# Patient Record
Sex: MALE | Race: OTHER | HISPANIC OR LATINO | Employment: UNEMPLOYED | URBAN - METROPOLITAN AREA
[De-identification: names, ages, dates, MRNs, and addresses within clinical notes are randomized per-mention and may not be internally consistent; named-entity substitution may affect disease eponyms.]

---

## 2021-01-01 ENCOUNTER — APPOINTMENT (INPATIENT)
Dept: NON INVASIVE DIAGNOSTICS | Facility: HOSPITAL | Age: 51
DRG: 435 | End: 2021-01-01
Payer: COMMERCIAL

## 2021-01-01 ENCOUNTER — APPOINTMENT (INPATIENT)
Dept: RADIOLOGY | Facility: HOSPITAL | Age: 51
DRG: 435 | End: 2021-01-01
Payer: COMMERCIAL

## 2021-01-01 ENCOUNTER — APPOINTMENT (OUTPATIENT)
Dept: RADIATION ONCOLOGY | Facility: HOSPITAL | Age: 51
End: 2021-01-01
Payer: COMMERCIAL

## 2021-01-01 ENCOUNTER — APPOINTMENT (EMERGENCY)
Dept: RADIOLOGY | Facility: HOSPITAL | Age: 51
DRG: 871 | End: 2021-01-01
Payer: COMMERCIAL

## 2021-01-01 ENCOUNTER — APPOINTMENT (OUTPATIENT)
Dept: NEUROLOGY | Facility: HOSPITAL | Age: 51
DRG: 871 | End: 2021-01-01
Payer: COMMERCIAL

## 2021-01-01 ENCOUNTER — APPOINTMENT (EMERGENCY)
Dept: CT IMAGING | Facility: HOSPITAL | Age: 51
DRG: 871 | End: 2021-01-01
Payer: COMMERCIAL

## 2021-01-01 ENCOUNTER — IMMUNIZATIONS (OUTPATIENT)
Dept: FAMILY MEDICINE CLINIC | Facility: HOSPITAL | Age: 51
End: 2021-01-01

## 2021-01-01 ENCOUNTER — CONSULT (OUTPATIENT)
Dept: SURGERY | Facility: CLINIC | Age: 51
End: 2021-01-01

## 2021-01-01 ENCOUNTER — APPOINTMENT (INPATIENT)
Dept: NEUROLOGY | Facility: HOSPITAL | Age: 51
DRG: 871 | End: 2021-01-01
Payer: COMMERCIAL

## 2021-01-01 ENCOUNTER — APPOINTMENT (INPATIENT)
Dept: CT IMAGING | Facility: HOSPITAL | Age: 51
DRG: 871 | End: 2021-01-01
Payer: COMMERCIAL

## 2021-01-01 ENCOUNTER — HOSPITAL ENCOUNTER (INPATIENT)
Facility: HOSPITAL | Age: 51
LOS: 14 days | Discharge: HOME HEALTH | DRG: 435 | End: 2021-08-06
Attending: EMERGENCY MEDICINE | Admitting: INTERNAL MEDICINE
Payer: COMMERCIAL

## 2021-01-01 ENCOUNTER — TELEPHONE (OUTPATIENT)
Dept: PHYSICAL THERAPY | Facility: OTHER | Age: 51
End: 2021-01-01

## 2021-01-01 ENCOUNTER — HOSPITAL ENCOUNTER (OUTPATIENT)
Dept: ULTRASOUND IMAGING | Facility: HOSPITAL | Age: 51
Discharge: HOME | End: 2021-07-22
Attending: SURGERY

## 2021-01-01 ENCOUNTER — ANESTHESIA EVENT (INPATIENT)
Dept: RADIOLOGY | Facility: HOSPITAL | Age: 51
DRG: 435 | End: 2021-01-01
Payer: COMMERCIAL

## 2021-01-01 ENCOUNTER — HOSPITAL ENCOUNTER (EMERGENCY)
Facility: HOSPITAL | Age: 51
Discharge: HOME | End: 2021-07-08
Attending: EMERGENCY MEDICINE | Admitting: EMERGENCY MEDICINE

## 2021-01-01 ENCOUNTER — APPOINTMENT (OUTPATIENT)
Dept: RADIATION ONCOLOGY | Facility: HOSPITAL | Age: 51
End: 2021-01-01
Attending: INTERNAL MEDICINE
Payer: COMMERCIAL

## 2021-01-01 ENCOUNTER — APPOINTMENT (EMERGENCY)
Dept: RADIOLOGY | Facility: HOSPITAL | Age: 51
DRG: 435 | End: 2021-01-01
Payer: COMMERCIAL

## 2021-01-01 ENCOUNTER — TELEPHONE (OUTPATIENT)
Dept: PALLIATIVE MEDICINE | Facility: CLINIC | Age: 51
End: 2021-01-01

## 2021-01-01 ENCOUNTER — HOSPITAL ENCOUNTER (OUTPATIENT)
Dept: CT IMAGING | Facility: HOSPITAL | Age: 51
Discharge: HOME | End: 2021-07-22
Attending: SURGERY

## 2021-01-01 ENCOUNTER — PATIENT OUTREACH (OUTPATIENT)
Dept: CASE MANAGEMENT | Facility: HOSPITAL | Age: 51
End: 2021-01-01

## 2021-01-01 ENCOUNTER — ANESTHESIA (INPATIENT)
Dept: PERIOP | Facility: HOSPITAL | Age: 51
DRG: 435 | End: 2021-01-01
Payer: COMMERCIAL

## 2021-01-01 ENCOUNTER — TELEPHONE (OUTPATIENT)
Dept: NEUROSURGERY | Facility: CLINIC | Age: 51
End: 2021-01-01

## 2021-01-01 ENCOUNTER — APPOINTMENT (INPATIENT)
Dept: RADIOLOGY | Facility: HOSPITAL | Age: 51
DRG: 435 | End: 2021-01-01
Attending: INTERNAL MEDICINE
Payer: COMMERCIAL

## 2021-01-01 ENCOUNTER — HOSPITAL ENCOUNTER (INPATIENT)
Facility: HOSPITAL | Age: 51
LOS: 6 days | DRG: 871 | End: 2021-08-17
Attending: EMERGENCY MEDICINE | Admitting: INTERNAL MEDICINE
Payer: COMMERCIAL

## 2021-01-01 ENCOUNTER — ANESTHESIA (INPATIENT)
Dept: RADIOLOGY | Facility: HOSPITAL | Age: 51
DRG: 435 | End: 2021-01-01
Payer: COMMERCIAL

## 2021-01-01 ENCOUNTER — HOSPITAL ENCOUNTER (EMERGENCY)
Facility: HOSPITAL | Age: 51
Discharge: HOME | End: 2021-06-10
Attending: EMERGENCY MEDICINE | Admitting: EMERGENCY MEDICINE

## 2021-01-01 ENCOUNTER — ANESTHESIA EVENT (INPATIENT)
Dept: PERIOP | Facility: HOSPITAL | Age: 51
DRG: 435 | End: 2021-01-01
Payer: COMMERCIAL

## 2021-01-01 VITALS
OXYGEN SATURATION: 97 % | WEIGHT: 115.44 LBS | DIASTOLIC BLOOD PRESSURE: 82 MMHG | TEMPERATURE: 97.9 F | HEART RATE: 110 BPM | SYSTOLIC BLOOD PRESSURE: 125 MMHG | RESPIRATION RATE: 18 BRPM

## 2021-01-01 VITALS
HEIGHT: 67 IN | BODY MASS INDEX: 19.93 KG/M2 | TEMPERATURE: 98.7 F | DIASTOLIC BLOOD PRESSURE: 67 MMHG | HEART RATE: 128 BPM | RESPIRATION RATE: 16 BRPM | SYSTOLIC BLOOD PRESSURE: 108 MMHG | OXYGEN SATURATION: 86 % | WEIGHT: 126.98 LBS

## 2021-01-01 VITALS
HEART RATE: 79 BPM | SYSTOLIC BLOOD PRESSURE: 128 MMHG | WEIGHT: 121.19 LBS | TEMPERATURE: 99.5 F | RESPIRATION RATE: 16 BRPM | OXYGEN SATURATION: 98 % | DIASTOLIC BLOOD PRESSURE: 70 MMHG

## 2021-01-01 VITALS
TEMPERATURE: 97.2 F | SYSTOLIC BLOOD PRESSURE: 110 MMHG | WEIGHT: 112.43 LBS | RESPIRATION RATE: 19 BRPM | HEART RATE: 116 BPM | OXYGEN SATURATION: 96 % | DIASTOLIC BLOOD PRESSURE: 76 MMHG | HEIGHT: 69 IN | BODY MASS INDEX: 16.65 KG/M2

## 2021-01-01 VITALS
SYSTOLIC BLOOD PRESSURE: 122 MMHG | DIASTOLIC BLOOD PRESSURE: 74 MMHG | HEART RATE: 104 BPM | WEIGHT: 115 LBS | TEMPERATURE: 99.4 F | BODY MASS INDEX: 17.03 KG/M2 | HEIGHT: 69 IN

## 2021-01-01 DIAGNOSIS — G95.89 CERVICAL SPINAL MASS (HCC): ICD-10-CM

## 2021-01-01 DIAGNOSIS — C79.9 METASTATIC CANCER (HCC): Primary | ICD-10-CM

## 2021-01-01 DIAGNOSIS — C79.31 BRAIN METASTASES (HCC): ICD-10-CM

## 2021-01-01 DIAGNOSIS — Z23 ENCOUNTER FOR IMMUNIZATION: Primary | ICD-10-CM

## 2021-01-01 DIAGNOSIS — R94.31 ABNORMAL EKG: ICD-10-CM

## 2021-01-01 DIAGNOSIS — R56.9 SEIZURE-LIKE ACTIVITY (HCC): ICD-10-CM

## 2021-01-01 DIAGNOSIS — R22.1 MASS OF LATERAL NECK: Primary | ICD-10-CM

## 2021-01-01 DIAGNOSIS — T40.2X5A CONSTIPATION DUE TO OPIOID THERAPY: ICD-10-CM

## 2021-01-01 DIAGNOSIS — G89.3 CANCER RELATED PAIN: ICD-10-CM

## 2021-01-01 DIAGNOSIS — M54.50 LOW BACK PAIN: Primary | ICD-10-CM

## 2021-01-01 DIAGNOSIS — I48.91 NEW ONSET A-FIB (HCC): ICD-10-CM

## 2021-01-01 DIAGNOSIS — R22.1 NECK MASS: ICD-10-CM

## 2021-01-01 DIAGNOSIS — R22.1 NECK MASS: Primary | ICD-10-CM

## 2021-01-01 DIAGNOSIS — D64.9 ANEMIA: ICD-10-CM

## 2021-01-01 DIAGNOSIS — M48.02 CERVICAL STENOSIS OF SPINAL CANAL: ICD-10-CM

## 2021-01-01 DIAGNOSIS — I82.B11 SUBCLAVIAN VEIN THROMBOEMBOLISM, ACUTE, RIGHT (HCC): ICD-10-CM

## 2021-01-01 DIAGNOSIS — R56.9 SEIZURE (HCC): Primary | ICD-10-CM

## 2021-01-01 DIAGNOSIS — D17.9 LIPOMA: Primary | ICD-10-CM

## 2021-01-01 DIAGNOSIS — R93.89 ABNORMAL CT SCAN, NECK: ICD-10-CM

## 2021-01-01 DIAGNOSIS — C79.31 BRAIN METASTASIS (HCC): ICD-10-CM

## 2021-01-01 DIAGNOSIS — K59.03 CONSTIPATION DUE TO OPIOID THERAPY: ICD-10-CM

## 2021-01-01 DIAGNOSIS — L72.9 SCALP CYST: ICD-10-CM

## 2021-01-01 DIAGNOSIS — E43 SEVERE PROTEIN-CALORIE MALNUTRITION (HCC): ICD-10-CM

## 2021-01-01 DIAGNOSIS — K86.9 PANCREATIC LESION: ICD-10-CM

## 2021-01-01 DIAGNOSIS — L98.9 SCALP LESION: ICD-10-CM

## 2021-01-01 DIAGNOSIS — R91.8 OPACITY OF LUNG ON IMAGING STUDY: ICD-10-CM

## 2021-01-01 DIAGNOSIS — G95.20 CERVICAL SPINAL CORD COMPRESSION (HCC): ICD-10-CM

## 2021-01-01 DIAGNOSIS — I82.290 BRACHIOCEPHALIC VEIN THROMBOSIS (HCC): ICD-10-CM

## 2021-01-01 DIAGNOSIS — I82.B12 SUBCLAVIAN VEIN THROMBOSIS, LEFT (HCC): Primary | ICD-10-CM

## 2021-01-01 DIAGNOSIS — L73.9 FOLLICULITIS: ICD-10-CM

## 2021-01-01 DIAGNOSIS — C79.9 METASTATIC CANCER (HCC): ICD-10-CM

## 2021-01-01 DIAGNOSIS — R22.1 MASS OF LATERAL NECK: ICD-10-CM

## 2021-01-01 DIAGNOSIS — I82.C12 THROMBOSIS OF LEFT INTERNAL JUGULAR VEIN (HCC): ICD-10-CM

## 2021-01-01 DIAGNOSIS — C79.51 BONE METASTASIS (HCC): ICD-10-CM

## 2021-01-01 LAB
ABO GROUP BLD: NORMAL
ABO GROUP BLD: NORMAL
ACANTHOCYTES BLD QL SMEAR: PRESENT
ALBUMIN SERPL BCP-MCNC: 1.4 G/DL (ref 3.5–5)
ALBUMIN SERPL BCP-MCNC: 1.8 G/DL (ref 3.5–5)
ALBUMIN SERPL BCP-MCNC: 1.9 G/DL (ref 3.5–5)
ALBUMIN SERPL BCP-MCNC: 2.2 G/DL (ref 3.5–5)
ALBUMIN SERPL BCP-MCNC: 2.2 G/DL (ref 3.5–5)
ALBUMIN SERPL BCP-MCNC: 2.3 G/DL (ref 3.5–5)
ALBUMIN SERPL BCP-MCNC: 2.6 G/DL (ref 3.5–5)
ALBUMIN SERPL BCP-MCNC: 2.7 G/DL (ref 3.5–5)
ALBUMIN SERPL BCP-MCNC: 2.8 G/DL (ref 3.5–5)
ALP SERPL-CCNC: 117 U/L (ref 46–116)
ALP SERPL-CCNC: 134 U/L (ref 46–116)
ALP SERPL-CCNC: 140 U/L (ref 46–116)
ALP SERPL-CCNC: 142 U/L (ref 46–116)
ALP SERPL-CCNC: 173 U/L (ref 46–116)
ALP SERPL-CCNC: 198 U/L (ref 46–116)
ALP SERPL-CCNC: 254 U/L (ref 46–116)
ALP SERPL-CCNC: 256 U/L (ref 46–116)
ALP SERPL-CCNC: 266 U/L (ref 46–116)
ALP SERPL-CCNC: 272 U/L (ref 46–116)
ALP SERPL-CCNC: 275 U/L (ref 46–116)
ALP SERPL-CCNC: 282 U/L (ref 46–116)
ALT SERPL W P-5'-P-CCNC: 26 U/L (ref 12–78)
ALT SERPL W P-5'-P-CCNC: 30 U/L (ref 12–78)
ALT SERPL W P-5'-P-CCNC: 32 U/L (ref 12–78)
ALT SERPL W P-5'-P-CCNC: 32 U/L (ref 12–78)
ALT SERPL W P-5'-P-CCNC: 53 U/L (ref 12–78)
ALT SERPL W P-5'-P-CCNC: 58 U/L (ref 12–78)
ALT SERPL W P-5'-P-CCNC: 65 U/L (ref 12–78)
ALT SERPL W P-5'-P-CCNC: 65 U/L (ref 12–78)
ALT SERPL W P-5'-P-CCNC: 75 U/L (ref 12–78)
ALT SERPL W P-5'-P-CCNC: 75 U/L (ref 12–78)
ALT SERPL W P-5'-P-CCNC: 76 U/L (ref 12–78)
ALT SERPL W P-5'-P-CCNC: 77 U/L (ref 12–78)
ANION GAP SERPL CALCULATED.3IONS-SCNC: 10 MMOL/L (ref 4–13)
ANION GAP SERPL CALCULATED.3IONS-SCNC: 2 MMOL/L (ref 4–13)
ANION GAP SERPL CALCULATED.3IONS-SCNC: 2 MMOL/L (ref 4–13)
ANION GAP SERPL CALCULATED.3IONS-SCNC: 3 MMOL/L (ref 4–13)
ANION GAP SERPL CALCULATED.3IONS-SCNC: 3 MMOL/L (ref 4–13)
ANION GAP SERPL CALCULATED.3IONS-SCNC: 4 MMOL/L (ref 4–13)
ANION GAP SERPL CALCULATED.3IONS-SCNC: 6 MMOL/L (ref 4–13)
ANION GAP SERPL CALCULATED.3IONS-SCNC: 7 MMOL/L (ref 4–13)
ANION GAP SERPL CALCULATED.3IONS-SCNC: 8 MMOL/L (ref 4–13)
ANION GAP SERPL CALCULATED.3IONS-SCNC: 9 MMOL/L (ref 4–13)
ANISOCYTOSIS BLD QL SMEAR: PRESENT
APTT PPP: 22 SECONDS (ref 23–37)
APTT PPP: 27 SECONDS (ref 23–37)
APTT PPP: 28 SECONDS (ref 23–37)
APTT PPP: 28 SECONDS (ref 23–37)
APTT PPP: 33 SECONDS (ref 23–37)
APTT PPP: 37 SECONDS (ref 23–37)
APTT PPP: 39 SECONDS (ref 23–37)
APTT PPP: 42 SECONDS (ref 23–37)
APTT PPP: 43 SECONDS (ref 23–37)
APTT PPP: 44 SECONDS (ref 23–37)
APTT PPP: 48 SECONDS (ref 23–37)
APTT PPP: 50 SECONDS (ref 23–37)
APTT PPP: 51 SECONDS (ref 23–37)
APTT PPP: 54 SECONDS (ref 23–37)
APTT PPP: 56 SECONDS (ref 23–37)
APTT PPP: 58 SECONDS (ref 23–37)
APTT PPP: 60 SECONDS (ref 23–37)
APTT PPP: 61 SECONDS (ref 23–37)
APTT PPP: 61 SECONDS (ref 23–37)
APTT PPP: 63 SECONDS (ref 23–37)
APTT PPP: 65 SECONDS (ref 23–37)
APTT PPP: 68 SECONDS (ref 23–37)
APTT PPP: 68 SECONDS (ref 23–37)
APTT PPP: 71 SECONDS (ref 23–37)
APTT PPP: 74 SECONDS (ref 23–37)
APTT PPP: 76 SECONDS (ref 23–37)
APTT PPP: 76 SECONDS (ref 23–37)
APTT PPP: 82 SECONDS (ref 23–37)
APTT PPP: 85 SECONDS (ref 23–37)
APTT PPP: 91 SECONDS (ref 23–37)
AST SERPL W P-5'-P-CCNC: 29 U/L (ref 5–45)
AST SERPL W P-5'-P-CCNC: 32 U/L (ref 5–45)
AST SERPL W P-5'-P-CCNC: 32 U/L (ref 5–45)
AST SERPL W P-5'-P-CCNC: 39 U/L (ref 5–45)
AST SERPL W P-5'-P-CCNC: 46 U/L (ref 5–45)
AST SERPL W P-5'-P-CCNC: 49 U/L (ref 5–45)
AST SERPL W P-5'-P-CCNC: 49 U/L (ref 5–45)
AST SERPL W P-5'-P-CCNC: 57 U/L (ref 5–45)
AST SERPL W P-5'-P-CCNC: 57 U/L (ref 5–45)
AST SERPL W P-5'-P-CCNC: 58 U/L (ref 5–45)
AST SERPL W P-5'-P-CCNC: 61 U/L (ref 5–45)
AST SERPL W P-5'-P-CCNC: 70 U/L (ref 5–45)
ATRIAL RATE: 0 BPM
ATRIAL RATE: 100 BPM
ATRIAL RATE: 100 BPM
ATRIAL RATE: 107 BPM
ATRIAL RATE: 109 BPM
ATRIAL RATE: 159 BPM
ATRIAL RATE: 88 BPM
ATRIAL RATE: 97 BPM
BACTERIA BLD CULT: NORMAL
BACTERIA SPEC ANAEROBE CULT: NO GROWTH
BACTERIA SPEC ANAEROBE CULT: NO GROWTH
BACTERIA TISS AEROBE CULT: NO GROWTH
BACTERIA TISS AEROBE CULT: NO GROWTH
BASOPHILS # BLD AUTO: 0.01 THOUSANDS/ΜL (ref 0–0.1)
BASOPHILS # BLD AUTO: 0.02 THOUSANDS/ΜL (ref 0–0.1)
BASOPHILS # BLD AUTO: 0.02 THOUSANDS/ΜL (ref 0–0.1)
BASOPHILS # BLD AUTO: 0.04 THOUSANDS/ΜL (ref 0–0.1)
BASOPHILS # BLD AUTO: 0.05 THOUSANDS/ΜL (ref 0–0.1)
BASOPHILS # BLD AUTO: 0.06 THOUSANDS/ΜL (ref 0–0.1)
BASOPHILS # BLD AUTO: 0.07 THOUSANDS/ΜL (ref 0–0.1)
BASOPHILS # BLD MANUAL: 0 THOUSAND/UL (ref 0–0.1)
BASOPHILS NFR BLD AUTO: 0 % (ref 0–1)
BASOPHILS NFR BLD AUTO: 1 % (ref 0–1)
BASOPHILS NFR BLD AUTO: 1 % (ref 0–1)
BASOPHILS NFR BLD MANUAL: 1 % (ref 0–1)
BASOPHILS NFR MAR MANUAL: 0 % (ref 0–1)
BILIRUB DIRECT SERPL-MCNC: 0.12 MG/DL (ref 0–0.2)
BILIRUB SERPL-MCNC: 0.16 MG/DL (ref 0.2–1)
BILIRUB SERPL-MCNC: 0.22 MG/DL (ref 0.2–1)
BILIRUB SERPL-MCNC: 0.29 MG/DL (ref 0.2–1)
BILIRUB SERPL-MCNC: 0.32 MG/DL (ref 0.2–1)
BILIRUB SERPL-MCNC: 0.34 MG/DL (ref 0.2–1)
BILIRUB SERPL-MCNC: 0.37 MG/DL (ref 0.2–1)
BILIRUB SERPL-MCNC: 0.38 MG/DL (ref 0.2–1)
BILIRUB SERPL-MCNC: 0.4 MG/DL (ref 0.2–1)
BILIRUB SERPL-MCNC: 0.41 MG/DL (ref 0.2–1)
BILIRUB SERPL-MCNC: 0.41 MG/DL (ref 0.2–1)
BILIRUB SERPL-MCNC: 0.46 MG/DL (ref 0.2–1)
BILIRUB SERPL-MCNC: 0.52 MG/DL (ref 0.2–1)
BILIRUB UR QL STRIP: NEGATIVE
BILIRUB UR QL STRIP: NEGATIVE
BLD GP AB SCN SERPL QL: NEGATIVE
BUN SERPL-MCNC: 10 MG/DL (ref 5–25)
BUN SERPL-MCNC: 10 MG/DL (ref 5–25)
BUN SERPL-MCNC: 11 MG/DL (ref 5–25)
BUN SERPL-MCNC: 13 MG/DL (ref 5–25)
BUN SERPL-MCNC: 14 MG/DL (ref 5–25)
BUN SERPL-MCNC: 15 MG/DL (ref 5–25)
BUN SERPL-MCNC: 16 MG/DL (ref 5–25)
BUN SERPL-MCNC: 18 MG/DL (ref 5–25)
BUN SERPL-MCNC: 19 MG/DL (ref 5–25)
BUN SERPL-MCNC: 21 MG/DL (ref 5–25)
BUN SERPL-MCNC: 21 MG/DL (ref 5–25)
BUN SERPL-MCNC: 27 MG/DL (ref 5–25)
BUN SERPL-MCNC: 29 MG/DL (ref 5–25)
BUN SERPL-MCNC: 30 MG/DL (ref 5–25)
BUN SERPL-MCNC: 6 MG/DL (ref 5–25)
BUN SERPL-MCNC: 7 MG/DL (ref 5–25)
BUN SERPL-MCNC: 8 MG/DL (ref 5–25)
BUN SERPL-MCNC: 9 MG/DL (ref 5–25)
BURR CELLS BLD QL SMEAR: PRESENT
CALCIUM ALBUM COR SERPL-MCNC: 10.4 MG/DL (ref 8.3–10.1)
CALCIUM ALBUM COR SERPL-MCNC: 10.4 MG/DL (ref 8.3–10.1)
CALCIUM ALBUM COR SERPL-MCNC: 10.5 MG/DL (ref 8.3–10.1)
CALCIUM ALBUM COR SERPL-MCNC: 10.6 MG/DL (ref 8.3–10.1)
CALCIUM ALBUM COR SERPL-MCNC: 10.7 MG/DL (ref 8.3–10.1)
CALCIUM ALBUM COR SERPL-MCNC: 11 MG/DL (ref 8.3–10.1)
CALCIUM ALBUM COR SERPL-MCNC: 11.2 MG/DL (ref 8.3–10.1)
CALCIUM ALBUM COR SERPL-MCNC: 11.3 MG/DL (ref 8.3–10.1)
CALCIUM ALBUM COR SERPL-MCNC: 11.5 MG/DL (ref 8.3–10.1)
CALCIUM ALBUM COR SERPL-MCNC: 11.6 MG/DL (ref 8.3–10.1)
CALCIUM ALBUM COR SERPL-MCNC: 9.7 MG/DL (ref 8.3–10.1)
CALCIUM SERPL-MCNC: 10.1 MG/DL (ref 8.3–10.1)
CALCIUM SERPL-MCNC: 11.1 MG/DL (ref 8.3–10.1)
CALCIUM SERPL-MCNC: 8.3 MG/DL (ref 8.3–10.1)
CALCIUM SERPL-MCNC: 8.6 MG/DL (ref 8.3–10.1)
CALCIUM SERPL-MCNC: 8.7 MG/DL (ref 8.3–10.1)
CALCIUM SERPL-MCNC: 8.8 MG/DL (ref 8.3–10.1)
CALCIUM SERPL-MCNC: 8.8 MG/DL (ref 8.3–10.1)
CALCIUM SERPL-MCNC: 8.9 MG/DL (ref 8.3–10.1)
CALCIUM SERPL-MCNC: 9 MG/DL (ref 8.3–10.1)
CALCIUM SERPL-MCNC: 9.2 MG/DL (ref 8.3–10.1)
CALCIUM SERPL-MCNC: 9.3 MG/DL (ref 8.3–10.1)
CALCIUM SERPL-MCNC: 9.3 MG/DL (ref 8.3–10.1)
CALCIUM SERPL-MCNC: 9.4 MG/DL (ref 8.3–10.1)
CALCIUM SERPL-MCNC: 9.4 MG/DL (ref 8.3–10.1)
CALCIUM SERPL-MCNC: 9.5 MG/DL (ref 8.3–10.1)
CALCIUM SERPL-MCNC: 9.5 MG/DL (ref 8.3–10.1)
CALCIUM SERPL-MCNC: 9.6 MG/DL (ref 8.3–10.1)
CALCIUM SERPL-MCNC: 9.6 MG/DL (ref 8.3–10.1)
CALCIUM SERPL-MCNC: 9.7 MG/DL (ref 8.3–10.1)
CALCIUM SERPL-MCNC: 9.8 MG/DL (ref 8.3–10.1)
CANCER AG19-9 SERPL-ACNC: 5418 U/ML (ref 0–35)
CEA SERPL-MCNC: 63.8 NG/ML (ref 0–3)
CHLORIDE SERPL-SCNC: 101 MMOL/L (ref 100–108)
CHLORIDE SERPL-SCNC: 101 MMOL/L (ref 100–108)
CHLORIDE SERPL-SCNC: 102 MMOL/L (ref 100–108)
CHLORIDE SERPL-SCNC: 102 MMOL/L (ref 100–108)
CHLORIDE SERPL-SCNC: 103 MMOL/L (ref 100–108)
CHLORIDE SERPL-SCNC: 103 MMOL/L (ref 100–108)
CHLORIDE SERPL-SCNC: 104 MMOL/L (ref 100–108)
CHLORIDE SERPL-SCNC: 104 MMOL/L (ref 100–108)
CHLORIDE SERPL-SCNC: 105 MMOL/L (ref 100–108)
CHLORIDE SERPL-SCNC: 106 MMOL/L (ref 100–108)
CHLORIDE SERPL-SCNC: 107 MMOL/L (ref 100–108)
CHLORIDE SERPL-SCNC: 110 MMOL/L (ref 100–108)
CHLORIDE SERPL-SCNC: 111 MMOL/L (ref 100–108)
CHLORIDE SERPL-SCNC: 113 MMOL/L (ref 100–108)
CHLORIDE SERPL-SCNC: 94 MMOL/L (ref 100–108)
CHLORIDE SERPL-SCNC: 97 MMOL/L (ref 100–108)
CHLORIDE SERPL-SCNC: 98 MMOL/L (ref 100–108)
CHLORIDE SERPL-SCNC: 99 MMOL/L (ref 100–108)
CLARITY UR: CLEAR
CLARITY UR: CLEAR
CO2 SERPL-SCNC: 24 MMOL/L (ref 21–32)
CO2 SERPL-SCNC: 25 MMOL/L (ref 21–32)
CO2 SERPL-SCNC: 25 MMOL/L (ref 21–32)
CO2 SERPL-SCNC: 26 MMOL/L (ref 21–32)
CO2 SERPL-SCNC: 27 MMOL/L (ref 21–32)
CO2 SERPL-SCNC: 28 MMOL/L (ref 21–32)
CO2 SERPL-SCNC: 28 MMOL/L (ref 21–32)
CO2 SERPL-SCNC: 29 MMOL/L (ref 21–32)
CO2 SERPL-SCNC: 30 MMOL/L (ref 21–32)
CO2 SERPL-SCNC: 30 MMOL/L (ref 21–32)
CO2 SERPL-SCNC: 31 MMOL/L (ref 21–32)
CO2 SERPL-SCNC: 31 MMOL/L (ref 21–32)
CO2 SERPL-SCNC: 32 MMOL/L (ref 21–32)
COLOR UR: YELLOW
COLOR UR: YELLOW
CREAT SERPL-MCNC: 0.34 MG/DL (ref 0.6–1.3)
CREAT SERPL-MCNC: 0.35 MG/DL (ref 0.6–1.3)
CREAT SERPL-MCNC: 0.39 MG/DL (ref 0.6–1.3)
CREAT SERPL-MCNC: 0.39 MG/DL (ref 0.6–1.3)
CREAT SERPL-MCNC: 0.41 MG/DL (ref 0.6–1.3)
CREAT SERPL-MCNC: 0.43 MG/DL (ref 0.6–1.3)
CREAT SERPL-MCNC: 0.5 MG/DL (ref 0.6–1.3)
CREAT SERPL-MCNC: 0.52 MG/DL (ref 0.6–1.3)
CREAT SERPL-MCNC: 0.52 MG/DL (ref 0.6–1.3)
CREAT SERPL-MCNC: 0.53 MG/DL (ref 0.6–1.3)
CREAT SERPL-MCNC: 0.57 MG/DL (ref 0.6–1.3)
CREAT SERPL-MCNC: 0.58 MG/DL (ref 0.6–1.3)
CREAT SERPL-MCNC: 0.62 MG/DL (ref 0.6–1.3)
CREAT SERPL-MCNC: 0.63 MG/DL (ref 0.6–1.3)
CREAT SERPL-MCNC: 0.64 MG/DL (ref 0.6–1.3)
CREAT SERPL-MCNC: 0.65 MG/DL (ref 0.6–1.3)
CREAT SERPL-MCNC: 0.7 MG/DL (ref 0.6–1.3)
CREAT SERPL-MCNC: 0.7 MG/DL (ref 0.6–1.3)
CREAT SERPL-MCNC: 0.75 MG/DL (ref 0.6–1.3)
CREAT SERPL-MCNC: 1.07 MG/DL (ref 0.6–1.3)
EOSINOPHIL # BLD AUTO: 0 THOUSAND/ΜL (ref 0–0.61)
EOSINOPHIL # BLD AUTO: 0.08 THOUSAND/ΜL (ref 0–0.61)
EOSINOPHIL # BLD AUTO: 0.09 THOUSAND/ΜL (ref 0–0.61)
EOSINOPHIL # BLD AUTO: 0.11 THOUSAND/ΜL (ref 0–0.61)
EOSINOPHIL # BLD AUTO: 0.12 THOUSAND/ΜL (ref 0–0.61)
EOSINOPHIL # BLD AUTO: 0.12 THOUSAND/ΜL (ref 0–0.61)
EOSINOPHIL # BLD AUTO: 0.13 THOUSAND/ΜL (ref 0–0.61)
EOSINOPHIL # BLD AUTO: 0.16 THOUSAND/ΜL (ref 0–0.61)
EOSINOPHIL # BLD AUTO: 0.16 THOUSAND/ΜL (ref 0–0.61)
EOSINOPHIL # BLD MANUAL: 0 THOUSAND/UL (ref 0–0.4)
EOSINOPHIL # BLD MANUAL: 0 THOUSAND/UL (ref 0–0.4)
EOSINOPHIL # BLD MANUAL: 0.3 THOUSAND/UL (ref 0–0.4)
EOSINOPHIL NFR BLD AUTO: 0 % (ref 0–6)
EOSINOPHIL NFR BLD AUTO: 1 % (ref 0–6)
EOSINOPHIL NFR BLD MANUAL: 0 % (ref 0–6)
EOSINOPHIL NFR BLD MANUAL: 0 % (ref 0–6)
EOSINOPHIL NFR BLD MANUAL: 1 % (ref 0–6)
ERYTHROCYTE [DISTWIDTH] IN BLOOD BY AUTOMATED COUNT: 13.8 % (ref 11.6–15.1)
ERYTHROCYTE [DISTWIDTH] IN BLOOD BY AUTOMATED COUNT: 13.9 % (ref 11.6–15.1)
ERYTHROCYTE [DISTWIDTH] IN BLOOD BY AUTOMATED COUNT: 14 % (ref 11.6–15.1)
ERYTHROCYTE [DISTWIDTH] IN BLOOD BY AUTOMATED COUNT: 14.2 % (ref 11.6–15.1)
ERYTHROCYTE [DISTWIDTH] IN BLOOD BY AUTOMATED COUNT: 14.4 % (ref 11.6–15.1)
ERYTHROCYTE [DISTWIDTH] IN BLOOD BY AUTOMATED COUNT: 14.8 % (ref 11.6–15.1)
ERYTHROCYTE [DISTWIDTH] IN BLOOD BY AUTOMATED COUNT: 14.9 % (ref 11.6–15.1)
ERYTHROCYTE [DISTWIDTH] IN BLOOD BY AUTOMATED COUNT: 16.6 % (ref 11.6–15.1)
ERYTHROCYTE [DISTWIDTH] IN BLOOD BY AUTOMATED COUNT: 18.8 % (ref 11.6–15.1)
ERYTHROCYTE [DISTWIDTH] IN BLOOD BY AUTOMATED COUNT: 18.9 % (ref 11.6–15.1)
ERYTHROCYTE [DISTWIDTH] IN BLOOD BY AUTOMATED COUNT: 19.2 % (ref 11.6–15.1)
ERYTHROCYTE [DISTWIDTH] IN BLOOD BY AUTOMATED COUNT: 19.5 % (ref 11.6–15.1)
ERYTHROCYTE [DISTWIDTH] IN BLOOD BY AUTOMATED COUNT: 19.9 % (ref 11.6–15.1)
ERYTHROCYTE [DISTWIDTH] IN BLOOD BY AUTOMATED COUNT: 19.9 % (ref 11.6–15.1)
ERYTHROCYTE [DISTWIDTH] IN BLOOD BY AUTOMATED COUNT: 20.1 % (ref 11.6–15.1)
ERYTHROCYTE [DISTWIDTH] IN BLOOD BY AUTOMATED COUNT: 20.3 % (ref 11.6–15.1)
ERYTHROCYTE [DISTWIDTH] IN BLOOD BY AUTOMATED COUNT: 20.6 % (ref 11.6–15.1)
FERRITIN SERPL-MCNC: 1230 NG/ML (ref 8–388)
GFR SERPL CREATININE-BSD FRML MDRD: 107 ML/MIN/1.73SQ M
GFR SERPL CREATININE-BSD FRML MDRD: 110 ML/MIN/1.73SQ M
GFR SERPL CREATININE-BSD FRML MDRD: 110 ML/MIN/1.73SQ M
GFR SERPL CREATININE-BSD FRML MDRD: 113 ML/MIN/1.73SQ M
GFR SERPL CREATININE-BSD FRML MDRD: 114 ML/MIN/1.73SQ M
GFR SERPL CREATININE-BSD FRML MDRD: 115 ML/MIN/1.73SQ M
GFR SERPL CREATININE-BSD FRML MDRD: 116 ML/MIN/1.73SQ M
GFR SERPL CREATININE-BSD FRML MDRD: 119 ML/MIN/1.73SQ M
GFR SERPL CREATININE-BSD FRML MDRD: 120 ML/MIN/1.73SQ M
GFR SERPL CREATININE-BSD FRML MDRD: 123 ML/MIN/1.73SQ M
GFR SERPL CREATININE-BSD FRML MDRD: 124 ML/MIN/1.73SQ M
GFR SERPL CREATININE-BSD FRML MDRD: 124 ML/MIN/1.73SQ M
GFR SERPL CREATININE-BSD FRML MDRD: 126 ML/MIN/1.73SQ M
GFR SERPL CREATININE-BSD FRML MDRD: 134 ML/MIN/1.73SQ M
GFR SERPL CREATININE-BSD FRML MDRD: 137 ML/MIN/1.73SQ M
GFR SERPL CREATININE-BSD FRML MDRD: 140 ML/MIN/1.73SQ M
GFR SERPL CREATININE-BSD FRML MDRD: 140 ML/MIN/1.73SQ M
GFR SERPL CREATININE-BSD FRML MDRD: 146 ML/MIN/1.73SQ M
GFR SERPL CREATININE-BSD FRML MDRD: 148 ML/MIN/1.73SQ M
GFR SERPL CREATININE-BSD FRML MDRD: 81 ML/MIN/1.73SQ M
GLUCOSE SERPL-MCNC: 101 MG/DL (ref 65–140)
GLUCOSE SERPL-MCNC: 108 MG/DL (ref 65–140)
GLUCOSE SERPL-MCNC: 113 MG/DL (ref 65–140)
GLUCOSE SERPL-MCNC: 116 MG/DL (ref 65–140)
GLUCOSE SERPL-MCNC: 122 MG/DL (ref 65–140)
GLUCOSE SERPL-MCNC: 124 MG/DL (ref 65–140)
GLUCOSE SERPL-MCNC: 132 MG/DL (ref 65–140)
GLUCOSE SERPL-MCNC: 151 MG/DL (ref 65–140)
GLUCOSE SERPL-MCNC: 156 MG/DL (ref 65–140)
GLUCOSE SERPL-MCNC: 199 MG/DL (ref 65–140)
GLUCOSE SERPL-MCNC: 84 MG/DL (ref 65–140)
GLUCOSE SERPL-MCNC: 85 MG/DL (ref 65–140)
GLUCOSE SERPL-MCNC: 87 MG/DL (ref 65–140)
GLUCOSE SERPL-MCNC: 88 MG/DL (ref 65–140)
GLUCOSE SERPL-MCNC: 89 MG/DL (ref 65–140)
GLUCOSE SERPL-MCNC: 91 MG/DL (ref 65–140)
GLUCOSE SERPL-MCNC: 94 MG/DL (ref 65–140)
GLUCOSE SERPL-MCNC: 94 MG/DL (ref 65–140)
GLUCOSE SERPL-MCNC: 95 MG/DL (ref 65–140)
GLUCOSE SERPL-MCNC: 96 MG/DL (ref 65–140)
GLUCOSE SERPL-MCNC: 96 MG/DL (ref 65–140)
GLUCOSE SERPL-MCNC: 97 MG/DL (ref 65–140)
GLUCOSE SERPL-MCNC: 97 MG/DL (ref 65–140)
GLUCOSE UR STRIP-MCNC: NEGATIVE MG/DL
GLUCOSE UR STRIP-MCNC: NEGATIVE MG/DL
GRAM STN SPEC: NORMAL
GRAM STN SPEC: NORMAL
HCT VFR BLD AUTO: 23.4 % (ref 36.5–49.3)
HCT VFR BLD AUTO: 25.4 % (ref 36.5–49.3)
HCT VFR BLD AUTO: 26 % (ref 36.5–49.3)
HCT VFR BLD AUTO: 26.8 % (ref 36.5–49.3)
HCT VFR BLD AUTO: 27 % (ref 36.5–49.3)
HCT VFR BLD AUTO: 27.4 % (ref 36.5–49.3)
HCT VFR BLD AUTO: 27.6 % (ref 36.5–49.3)
HCT VFR BLD AUTO: 28.3 % (ref 36.5–49.3)
HCT VFR BLD AUTO: 28.5 % (ref 36.5–49.3)
HCT VFR BLD AUTO: 28.7 % (ref 36.5–49.3)
HCT VFR BLD AUTO: 28.8 % (ref 36.5–49.3)
HCT VFR BLD AUTO: 29.2 % (ref 36.5–49.3)
HCT VFR BLD AUTO: 29.6 % (ref 36.5–49.3)
HCT VFR BLD AUTO: 30.3 % (ref 36.5–49.3)
HCT VFR BLD AUTO: 30.8 % (ref 36.5–49.3)
HCT VFR BLD AUTO: 33.2 % (ref 36.5–49.3)
HCT VFR BLD AUTO: 33.7 % (ref 36.5–49.3)
HCT VFR BLD AUTO: 35.2 % (ref 36.5–49.3)
HCT VFR BLD AUTO: 35.6 % (ref 36.5–49.3)
HCT VFR BLD AUTO: 36.2 % (ref 36.5–49.3)
HCT VFR BLD AUTO: 37.2 % (ref 36.5–49.3)
HELMET CELLS BLD QL SMEAR: PRESENT
HGB BLD-MCNC: 10.4 G/DL (ref 12–17)
HGB BLD-MCNC: 10.8 G/DL (ref 12–17)
HGB BLD-MCNC: 11.2 G/DL (ref 12–17)
HGB BLD-MCNC: 11.2 G/DL (ref 12–17)
HGB BLD-MCNC: 11.3 G/DL (ref 12–17)
HGB BLD-MCNC: 11.8 G/DL (ref 12–17)
HGB BLD-MCNC: 7.3 G/DL (ref 12–17)
HGB BLD-MCNC: 8.4 G/DL (ref 12–17)
HGB BLD-MCNC: 8.4 G/DL (ref 12–17)
HGB BLD-MCNC: 8.6 G/DL (ref 12–17)
HGB BLD-MCNC: 8.7 G/DL (ref 12–17)
HGB BLD-MCNC: 8.8 G/DL (ref 12–17)
HGB BLD-MCNC: 8.9 G/DL (ref 12–17)
HGB BLD-MCNC: 8.9 G/DL (ref 12–17)
HGB BLD-MCNC: 9.1 G/DL (ref 12–17)
HGB BLD-MCNC: 9.4 G/DL (ref 12–17)
HGB BLD-MCNC: 9.4 G/DL (ref 12–17)
HGB BLD-MCNC: 9.5 G/DL (ref 12–17)
HGB BLD-MCNC: 9.5 G/DL (ref 12–17)
HGB BLD-MCNC: 9.6 G/DL (ref 12–17)
HGB BLD-MCNC: 9.9 G/DL (ref 12–17)
HGB UR QL STRIP.AUTO: NEGATIVE
HGB UR QL STRIP.AUTO: NEGATIVE
HIV 1+2 AB+HIV1 P24 AG SERPL QL IA: NORMAL
HIV1 P24 AG SER QL: NORMAL
IMM GRANULOCYTES # BLD AUTO: 0.09 THOUSAND/UL (ref 0–0.2)
IMM GRANULOCYTES # BLD AUTO: 0.1 THOUSAND/UL (ref 0–0.2)
IMM GRANULOCYTES # BLD AUTO: 0.11 THOUSAND/UL (ref 0–0.2)
IMM GRANULOCYTES # BLD AUTO: 0.11 THOUSAND/UL (ref 0–0.2)
IMM GRANULOCYTES # BLD AUTO: 0.12 THOUSAND/UL (ref 0–0.2)
IMM GRANULOCYTES # BLD AUTO: 0.12 THOUSAND/UL (ref 0–0.2)
IMM GRANULOCYTES # BLD AUTO: 0.14 THOUSAND/UL (ref 0–0.2)
IMM GRANULOCYTES # BLD AUTO: 0.15 THOUSAND/UL (ref 0–0.2)
IMM GRANULOCYTES # BLD AUTO: 0.26 THOUSAND/UL (ref 0–0.2)
IMM GRANULOCYTES # BLD AUTO: 0.27 THOUSAND/UL (ref 0–0.2)
IMM GRANULOCYTES # BLD AUTO: 0.37 THOUSAND/UL (ref 0–0.2)
IMM GRANULOCYTES # BLD AUTO: 0.41 THOUSAND/UL (ref 0–0.2)
IMM GRANULOCYTES # BLD AUTO: 0.42 THOUSAND/UL (ref 0–0.2)
IMM GRANULOCYTES NFR BLD AUTO: 1 % (ref 0–2)
IMM GRANULOCYTES NFR BLD AUTO: 2 % (ref 0–2)
INR PPP: 0.96 (ref 0.84–1.19)
INR PPP: 1.05 (ref 0.84–1.19)
INR PPP: 1.08 (ref 0.84–1.19)
INR PPP: 1.4 (ref 0.84–1.19)
IRON SATN MFR SERPL: 14 %
IRON SERPL-MCNC: 28 UG/DL (ref 65–175)
KETONES UR STRIP-MCNC: NEGATIVE MG/DL
KETONES UR STRIP-MCNC: NEGATIVE MG/DL
LACTATE SERPL-SCNC: 1.1 MMOL/L (ref 0.5–2)
LACTATE SERPL-SCNC: 1.5 MMOL/L (ref 0.5–2)
LACTATE SERPL-SCNC: 1.9 MMOL/L (ref 0.5–2)
LACTATE SERPL-SCNC: 2.2 MMOL/L (ref 0.5–2)
LACTATE SERPL-SCNC: 2.3 MMOL/L (ref 0.5–2)
LACTATE SERPL-SCNC: 2.4 MMOL/L (ref 0.5–2)
LACTATE SERPL-SCNC: 2.6 MMOL/L (ref 0.5–2)
LACTATE SERPL-SCNC: 2.9 MMOL/L (ref 0.5–2)
LACTATE SERPL-SCNC: 3.6 MMOL/L (ref 0.5–2)
LACTATE SERPL-SCNC: 3.7 MMOL/L (ref 0.5–2)
LACTATE SERPL-SCNC: 3.7 MMOL/L (ref 0.5–2)
LACTATE SERPL-SCNC: 4 MMOL/L (ref 0.5–2)
LACTATE SERPL-SCNC: 7.2 MMOL/L (ref 0.5–2)
LDH SERPL-CCNC: 1234 U/L (ref 81–234)
LEUKOCYTE ESTERASE UR QL STRIP: NEGATIVE
LEUKOCYTE ESTERASE UR QL STRIP: NEGATIVE
LG PLATELETS BLD QL SMEAR: PRESENT
LIPASE SERPL-CCNC: 141 U/L (ref 73–393)
LYMPHOCYTES # BLD AUTO: 0.82 THOUSANDS/ΜL (ref 0.6–4.47)
LYMPHOCYTES # BLD AUTO: 1.4 THOUSANDS/ΜL (ref 0.6–4.47)
LYMPHOCYTES # BLD AUTO: 1.43 THOUSANDS/ΜL (ref 0.6–4.47)
LYMPHOCYTES # BLD AUTO: 1.46 THOUSANDS/ΜL (ref 0.6–4.47)
LYMPHOCYTES # BLD AUTO: 1.49 THOUSAND/UL (ref 0.6–4.47)
LYMPHOCYTES # BLD AUTO: 1.81 THOUSANDS/ΜL (ref 0.6–4.47)
LYMPHOCYTES # BLD AUTO: 1.82 THOUSANDS/ΜL (ref 0.6–4.47)
LYMPHOCYTES # BLD AUTO: 1.88 THOUSANDS/ΜL (ref 0.6–4.47)
LYMPHOCYTES # BLD AUTO: 1.89 THOUSANDS/ΜL (ref 0.6–4.47)
LYMPHOCYTES # BLD AUTO: 1.98 THOUSANDS/ΜL (ref 0.6–4.47)
LYMPHOCYTES # BLD AUTO: 12 % (ref 14–44)
LYMPHOCYTES # BLD AUTO: 2.04 THOUSANDS/ΜL (ref 0.6–4.47)
LYMPHOCYTES # BLD AUTO: 2.27 THOUSANDS/ΜL (ref 0.6–4.47)
LYMPHOCYTES # BLD AUTO: 2.3 THOUSANDS/ΜL (ref 0.6–4.47)
LYMPHOCYTES # BLD AUTO: 2.47 THOUSANDS/ΜL (ref 0.6–4.47)
LYMPHOCYTES # BLD AUTO: 2.7 THOUSAND/UL (ref 0.6–4.47)
LYMPHOCYTES # BLD AUTO: 2.89 THOUSAND/UL (ref 0.6–4.47)
LYMPHOCYTES # BLD AUTO: 6 % (ref 14–44)
LYMPHOCYTES # BLD AUTO: 9 % (ref 14–44)
LYMPHOCYTES NFR BLD AUTO: 10 % (ref 14–44)
LYMPHOCYTES NFR BLD AUTO: 17 % (ref 14–44)
LYMPHOCYTES NFR BLD AUTO: 17 % (ref 14–44)
LYMPHOCYTES NFR BLD AUTO: 18 % (ref 14–44)
LYMPHOCYTES NFR BLD AUTO: 18 % (ref 14–44)
LYMPHOCYTES NFR BLD AUTO: 21 % (ref 14–44)
LYMPHOCYTES NFR BLD AUTO: 6 % (ref 14–44)
LYMPHOCYTES NFR BLD AUTO: 8 % (ref 14–44)
LYMPHOCYTES NFR BLD AUTO: 9 % (ref 14–44)
LYMPHOCYTES NFR BLD: 7 % (ref 14–44)
MAGNESIUM SERPL-MCNC: 1.3 MG/DL (ref 1.6–2.6)
MAGNESIUM SERPL-MCNC: 1.6 MG/DL (ref 1.6–2.6)
MAGNESIUM SERPL-MCNC: 1.7 MG/DL (ref 1.6–2.6)
MAGNESIUM SERPL-MCNC: 1.9 MG/DL (ref 1.6–2.6)
MAGNESIUM SERPL-MCNC: 2 MG/DL (ref 1.6–2.6)
MAGNESIUM SERPL-MCNC: 2.1 MG/DL (ref 1.6–2.6)
MAGNESIUM SERPL-MCNC: 2.1 MG/DL (ref 1.6–2.6)
MCH RBC QN AUTO: 29 PG (ref 26.8–34.3)
MCH RBC QN AUTO: 29.3 PG (ref 26.8–34.3)
MCH RBC QN AUTO: 29.6 PG (ref 26.8–34.3)
MCH RBC QN AUTO: 29.6 PG (ref 26.8–34.3)
MCH RBC QN AUTO: 29.7 PG (ref 26.8–34.3)
MCH RBC QN AUTO: 29.7 PG (ref 26.8–34.3)
MCH RBC QN AUTO: 29.9 PG (ref 26.8–34.3)
MCH RBC QN AUTO: 30 PG (ref 26.8–34.3)
MCH RBC QN AUTO: 30.1 PG (ref 26.8–34.3)
MCH RBC QN AUTO: 30.2 PG (ref 26.8–34.3)
MCH RBC QN AUTO: 30.3 PG (ref 26.8–34.3)
MCH RBC QN AUTO: 30.6 PG (ref 26.8–34.3)
MCH RBC QN AUTO: 30.6 PG (ref 26.8–34.3)
MCH RBC QN AUTO: 30.7 PG (ref 26.8–34.3)
MCHC RBC AUTO-ENTMCNC: 30.6 G/DL (ref 31.4–37.4)
MCHC RBC AUTO-ENTMCNC: 30.7 G/DL (ref 31.4–37.4)
MCHC RBC AUTO-ENTMCNC: 30.9 G/DL (ref 31.4–37.4)
MCHC RBC AUTO-ENTMCNC: 31 G/DL (ref 31.4–37.4)
MCHC RBC AUTO-ENTMCNC: 31.2 G/DL (ref 31.4–37.4)
MCHC RBC AUTO-ENTMCNC: 31.2 G/DL (ref 31.4–37.4)
MCHC RBC AUTO-ENTMCNC: 31.3 G/DL (ref 31.4–37.4)
MCHC RBC AUTO-ENTMCNC: 31.4 G/DL (ref 31.4–37.4)
MCHC RBC AUTO-ENTMCNC: 31.7 G/DL (ref 31.4–37.4)
MCHC RBC AUTO-ENTMCNC: 31.7 G/DL (ref 31.4–37.4)
MCHC RBC AUTO-ENTMCNC: 32.1 G/DL (ref 31.4–37.4)
MCHC RBC AUTO-ENTMCNC: 32.2 G/DL (ref 31.4–37.4)
MCHC RBC AUTO-ENTMCNC: 32.2 G/DL (ref 31.4–37.4)
MCHC RBC AUTO-ENTMCNC: 32.3 G/DL (ref 31.4–37.4)
MCHC RBC AUTO-ENTMCNC: 32.5 G/DL (ref 31.4–37.4)
MCHC RBC AUTO-ENTMCNC: 33 G/DL (ref 31.4–37.4)
MCHC RBC AUTO-ENTMCNC: 33.1 G/DL (ref 31.4–37.4)
MCHC RBC AUTO-ENTMCNC: 33.1 G/DL (ref 31.4–37.4)
MCHC RBC AUTO-ENTMCNC: 33.2 G/DL (ref 31.4–37.4)
MCHC RBC AUTO-ENTMCNC: 33.2 G/DL (ref 31.4–37.4)
MCHC RBC AUTO-ENTMCNC: 33.4 G/DL (ref 31.4–37.4)
MCV RBC AUTO: 91 FL (ref 82–98)
MCV RBC AUTO: 92 FL (ref 82–98)
MCV RBC AUTO: 93 FL (ref 82–98)
MCV RBC AUTO: 93 FL (ref 82–98)
MCV RBC AUTO: 94 FL (ref 82–98)
MCV RBC AUTO: 95 FL (ref 82–98)
MCV RBC AUTO: 96 FL (ref 82–98)
MCV RBC AUTO: 96 FL (ref 82–98)
MCV RBC AUTO: 97 FL (ref 82–98)
MCV RBC AUTO: 98 FL (ref 82–98)
METAMYELOCYTES NFR BLD MANUAL: 1 % (ref 0–1)
MONOCYTES # BLD AUTO: 0.2 THOUSAND/ΜL (ref 0.17–1.22)
MONOCYTES # BLD AUTO: 0.66 THOUSAND/ΜL (ref 0.17–1.22)
MONOCYTES # BLD AUTO: 0.71 THOUSAND/ΜL (ref 0.17–1.22)
MONOCYTES # BLD AUTO: 0.72 THOUSAND/UL (ref 0–1.22)
MONOCYTES # BLD AUTO: 0.72 THOUSAND/ΜL (ref 0.17–1.22)
MONOCYTES # BLD AUTO: 0.85 THOUSAND/ΜL (ref 0.17–1.22)
MONOCYTES # BLD AUTO: 0.9 THOUSAND/ΜL (ref 0.17–1.22)
MONOCYTES # BLD AUTO: 0.92 THOUSAND/ΜL (ref 0.17–1.22)
MONOCYTES # BLD AUTO: 0.99 THOUSAND/UL (ref 0–1.22)
MONOCYTES # BLD AUTO: 1.07 THOUSAND/ΜL (ref 0.17–1.22)
MONOCYTES # BLD AUTO: 1.2 THOUSAND/ΜL (ref 0.17–1.22)
MONOCYTES # BLD AUTO: 1.25 THOUSAND/ΜL (ref 0.17–1.22)
MONOCYTES # BLD AUTO: 1.29 THOUSAND/ΜL (ref 0.17–1.22)
MONOCYTES # BLD AUTO: 1.3 THOUSAND/ΜL (ref 0.17–1.22)
MONOCYTES # BLD AUTO: 1.5 THOUSAND/UL (ref 0–1.22)
MONOCYTES # BLD AUTO: 1.84 THOUSAND/ΜL (ref 0.17–1.22)
MONOCYTES NFR BLD AUTO: 10 % (ref 4–12)
MONOCYTES NFR BLD AUTO: 10 % (ref 4–12)
MONOCYTES NFR BLD AUTO: 11 % (ref 4–12)
MONOCYTES NFR BLD AUTO: 13 % (ref 4–12)
MONOCYTES NFR BLD AUTO: 2 % (ref 4–12)
MONOCYTES NFR BLD AUTO: 3 % (ref 4–12)
MONOCYTES NFR BLD AUTO: 4 % (ref 4–12)
MONOCYTES NFR BLD AUTO: 5 % (ref 4–12)
MONOCYTES NFR BLD AUTO: 5 % (ref 4–12)
MONOCYTES NFR BLD AUTO: 8 % (ref 4–12)
MONOCYTES NFR BLD: 3 % (ref 4–12)
MONOCYTES NFR BLD: 4 % (ref 4–12)
MONOCYTES NFR BLD: 5 % (ref 4–12)
MRSA NOSE QL CULT: NORMAL
MYELOCYTES NFR BLD MANUAL: 1 % (ref 0–1)
MYELOCYTES NFR BLD MANUAL: 2 % (ref 0–1)
MYELOCYTES NFR BLD MANUAL: 4 % (ref 0–1)
NEUTROPHILS # BLD AUTO: 13.56 THOUSANDS/ΜL (ref 1.85–7.62)
NEUTROPHILS # BLD AUTO: 14.87 THOUSANDS/ΜL (ref 1.85–7.62)
NEUTROPHILS # BLD AUTO: 15.24 THOUSANDS/ΜL (ref 1.85–7.62)
NEUTROPHILS # BLD AUTO: 19.48 THOUSANDS/ΜL (ref 1.85–7.62)
NEUTROPHILS # BLD AUTO: 21.56 THOUSANDS/ΜL (ref 1.85–7.62)
NEUTROPHILS # BLD AUTO: 5.95 THOUSANDS/ΜL (ref 1.85–7.62)
NEUTROPHILS # BLD AUTO: 7.01 THOUSANDS/ΜL (ref 1.85–7.62)
NEUTROPHILS # BLD AUTO: 7.36 THOUSANDS/ΜL (ref 1.85–7.62)
NEUTROPHILS # BLD AUTO: 7.87 THOUSANDS/ΜL (ref 1.85–7.62)
NEUTROPHILS # BLD AUTO: 8.1 THOUSANDS/ΜL (ref 1.85–7.62)
NEUTROPHILS # BLD AUTO: 8.25 THOUSANDS/ΜL (ref 1.85–7.62)
NEUTROPHILS # BLD AUTO: 8.96 THOUSANDS/ΜL (ref 1.85–7.62)
NEUTROPHILS # BLD AUTO: 9.32 THOUSANDS/ΜL (ref 1.85–7.62)
NEUTROPHILS # BLD MANUAL: 19.75 THOUSAND/UL (ref 1.85–7.62)
NEUTROPHILS # BLD MANUAL: 21.88 THOUSAND/UL (ref 1.85–7.62)
NEUTROPHILS # BLD MANUAL: 23.96 THOUSAND/UL (ref 1.85–7.62)
NEUTS BAND NFR BLD MANUAL: 5 THOUSAND/UL
NEUTS BAND NFR BLD MANUAL: 7 % (ref 0–8)
NEUTS SEG NFR BLD AUTO: 66 % (ref 43–75)
NEUTS SEG NFR BLD AUTO: 67 % (ref 43–75)
NEUTS SEG NFR BLD AUTO: 68 % (ref 43–75)
NEUTS SEG NFR BLD AUTO: 68 % (ref 43–75)
NEUTS SEG NFR BLD AUTO: 69 % (ref 43–75)
NEUTS SEG NFR BLD AUTO: 69 % (ref 43–75)
NEUTS SEG NFR BLD AUTO: 70 % (ref 43–75)
NEUTS SEG NFR BLD AUTO: 75 % (ref 43–75)
NEUTS SEG NFR BLD AUTO: 80 % (ref 43–75)
NEUTS SEG NFR BLD AUTO: 82 % (ref 43–75)
NEUTS SEG NFR BLD AUTO: 82 % (ref 45–77)
NEUTS SEG NFR BLD AUTO: 84 % (ref 43–75)
NEUTS SEG NFR BLD AUTO: 85 % (ref 43–75)
NEUTS SEG NFR BLD AUTO: 87 % (ref 43–75)
NEUTS SEG NFR BLD AUTO: 88 % (ref 43–75)
NEUTS SEG NFR BLD AUTO: 88 % (ref 43–75)
NEUTS SEG NFR BLD AUTO: 89 % (ref 43–75)
NITRITE UR QL STRIP: NEGATIVE
NITRITE UR QL STRIP: NEGATIVE
NRBC BLD AUTO-RTO: 0 /100 WBCS
NRBC BLD AUTO-RTO: 1 /100 WBC (ref 0–2)
NRBC BLD AUTO-RTO: 1 /100 WBCS
NRBC BLD AUTO-RTO: 15 /100 WBCS
NRBC BLD AUTO-RTO: 17 /100 WBCS
NRBC BLD AUTO-RTO: 18 /100 WBC (ref 0–2)
NRBC BLD AUTO-RTO: 18 /100 WBCS
NRBC BLD AUTO-RTO: 2 /100 WBCS
NRBC BLD AUTO-RTO: 2 /100 WBCS
NRBC BLD AUTO-RTO: 23 /100 WBCS
NRBC BLD AUTO-RTO: 24 /100 WBCS
NRBC BLD AUTO-RTO: 27 /100 WBC (ref 0–2)
NRBC BLD AUTO-RTO: 3 /100 WBCS
NRBC BLD AUTO-RTO: 3 /100 WBCS
NRBC BLD AUTO-RTO: 32 /100 WBCS
NRBC BLD AUTO-RTO: 5 /100 WBCS
NRBC BLD AUTO-RTO: 7 /100 WBCS
NT-PROBNP SERPL-MCNC: 924 PG/ML
P AXIS: 73 DEGREES
P AXIS: 76 DEGREES
P AXIS: 77 DEGREES
P AXIS: 83 DEGREES
P AXIS: 86 DEGREES
PH UR STRIP.AUTO: 6 [PH] (ref 4.5–8)
PH UR STRIP.AUTO: 7.5 [PH]
PHOSPHATE SERPL-MCNC: 2.9 MG/DL (ref 2.7–4.5)
PHOSPHATE SERPL-MCNC: 2.9 MG/DL (ref 2.7–4.5)
PHOSPHATE SERPL-MCNC: 3.2 MG/DL (ref 2.7–4.5)
PHOSPHATE SERPL-MCNC: 3.5 MG/DL (ref 2.7–4.5)
PHOSPHATE SERPL-MCNC: 3.6 MG/DL (ref 2.7–4.5)
PHOSPHATE SERPL-MCNC: 3.6 MG/DL (ref 2.7–4.5)
PHOSPHATE SERPL-MCNC: 4.3 MG/DL (ref 2.7–4.5)
PLATELET # BLD AUTO: 138 THOUSANDS/UL (ref 149–390)
PLATELET # BLD AUTO: 141 THOUSANDS/UL (ref 149–390)
PLATELET # BLD AUTO: 142 THOUSANDS/UL (ref 149–390)
PLATELET # BLD AUTO: 153 THOUSANDS/UL (ref 149–390)
PLATELET # BLD AUTO: 154 THOUSANDS/UL (ref 149–390)
PLATELET # BLD AUTO: 218 THOUSANDS/UL (ref 149–390)
PLATELET # BLD AUTO: 231 THOUSANDS/UL (ref 149–390)
PLATELET # BLD AUTO: 239 THOUSANDS/UL (ref 149–390)
PLATELET # BLD AUTO: 252 THOUSANDS/UL (ref 149–390)
PLATELET # BLD AUTO: 268 THOUSANDS/UL (ref 149–390)
PLATELET # BLD AUTO: 278 THOUSANDS/UL (ref 149–390)
PLATELET # BLD AUTO: 283 THOUSANDS/UL (ref 149–390)
PLATELET # BLD AUTO: 299 THOUSANDS/UL (ref 149–390)
PLATELET # BLD AUTO: 333 THOUSANDS/UL (ref 149–390)
PLATELET # BLD AUTO: 335 THOUSANDS/UL (ref 149–390)
PLATELET # BLD AUTO: 365 THOUSANDS/UL (ref 149–390)
PLATELET # BLD AUTO: 382 THOUSANDS/UL (ref 149–390)
PLATELET # BLD AUTO: 383 THOUSANDS/UL (ref 149–390)
PLATELET # BLD AUTO: 412 THOUSANDS/UL (ref 149–390)
PLATELET # BLD AUTO: 433 THOUSANDS/UL (ref 149–390)
PLATELET # BLD AUTO: 438 THOUSANDS/UL (ref 149–390)
PLATELET BLD QL SMEAR: ABNORMAL
PLATELET BLD QL SMEAR: ABNORMAL
PLATELET BLD QL SMEAR: ADEQUATE
PLATELET BLD QL SMEAR: ADEQUATE
PMV BLD AUTO: 10.1 FL (ref 8.9–12.7)
PMV BLD AUTO: 10.2 FL (ref 8.9–12.7)
PMV BLD AUTO: 10.3 FL (ref 8.9–12.7)
PMV BLD AUTO: 10.4 FL (ref 8.9–12.7)
PMV BLD AUTO: 10.5 FL (ref 8.9–12.7)
PMV BLD AUTO: 10.7 FL (ref 8.9–12.7)
PMV BLD AUTO: 8.8 FL (ref 8.9–12.7)
PMV BLD AUTO: 9 FL (ref 8.9–12.7)
PMV BLD AUTO: 9.1 FL (ref 8.9–12.7)
PMV BLD AUTO: 9.2 FL (ref 8.9–12.7)
PMV BLD AUTO: 9.3 FL (ref 8.9–12.7)
PMV BLD AUTO: 9.4 FL (ref 8.9–12.7)
PMV BLD AUTO: 9.4 FL (ref 8.9–12.7)
PMV BLD AUTO: 9.6 FL (ref 8.9–12.7)
PMV BLD AUTO: 9.6 FL (ref 8.9–12.7)
PMV BLD AUTO: 9.7 FL (ref 8.9–12.7)
PMV BLD AUTO: 9.8 FL (ref 8.9–12.7)
PMV BLD AUTO: 9.9 FL (ref 8.9–12.7)
POIKILOCYTOSIS BLD QL SMEAR: PRESENT
POLYCHROMASIA BLD QL SMEAR: PRESENT
POTASSIUM SERPL-SCNC: 3.7 MMOL/L (ref 3.5–5.3)
POTASSIUM SERPL-SCNC: 3.8 MMOL/L (ref 3.5–5.3)
POTASSIUM SERPL-SCNC: 3.9 MMOL/L (ref 3.5–5.3)
POTASSIUM SERPL-SCNC: 4 MMOL/L (ref 3.5–5.3)
POTASSIUM SERPL-SCNC: 4 MMOL/L (ref 3.5–5.3)
POTASSIUM SERPL-SCNC: 4.1 MMOL/L (ref 3.5–5.3)
POTASSIUM SERPL-SCNC: 4.1 MMOL/L (ref 3.5–5.3)
POTASSIUM SERPL-SCNC: 4.2 MMOL/L (ref 3.5–5.3)
POTASSIUM SERPL-SCNC: 4.3 MMOL/L (ref 3.5–5.3)
POTASSIUM SERPL-SCNC: 4.4 MMOL/L (ref 3.5–5.3)
POTASSIUM SERPL-SCNC: 4.5 MMOL/L (ref 3.5–5.3)
POTASSIUM SERPL-SCNC: 4.6 MMOL/L (ref 3.5–5.3)
POTASSIUM SERPL-SCNC: 4.7 MMOL/L (ref 3.5–5.3)
PR INTERVAL: 108 MS
PR INTERVAL: 110 MS
PR INTERVAL: 116 MS
PR INTERVAL: 116 MS
PR INTERVAL: 121 MS
PROCALCITONIN SERPL-MCNC: 0.14 NG/ML
PROCALCITONIN SERPL-MCNC: 0.27 NG/ML
PROCALCITONIN SERPL-MCNC: 2.32 NG/ML
PROCALCITONIN SERPL-MCNC: 3.3 NG/ML
PROCALCITONIN SERPL-MCNC: 3.35 NG/ML
PROCALCITONIN SERPL-MCNC: 3.86 NG/ML
PROT SERPL-MCNC: 4.8 G/DL (ref 6.4–8.2)
PROT SERPL-MCNC: 4.9 G/DL (ref 6.4–8.2)
PROT SERPL-MCNC: 5.5 G/DL (ref 6.4–8.2)
PROT SERPL-MCNC: 5.7 G/DL (ref 6.4–8.2)
PROT SERPL-MCNC: 5.9 G/DL (ref 6.4–8.2)
PROT SERPL-MCNC: 6.2 G/DL (ref 6.4–8.2)
PROT SERPL-MCNC: 6.2 G/DL (ref 6.4–8.2)
PROT SERPL-MCNC: 6.3 G/DL (ref 6.4–8.2)
PROT SERPL-MCNC: 6.6 G/DL (ref 6.4–8.2)
PROT SERPL-MCNC: 7.1 G/DL (ref 6.4–8.2)
PROT SERPL-MCNC: 7.3 G/DL (ref 6.4–8.2)
PROT SERPL-MCNC: 7.3 G/DL (ref 6.4–8.2)
PROT UR STRIP-MCNC: NEGATIVE MG/DL
PROT UR STRIP-MCNC: NEGATIVE MG/DL
PROTHROMBIN TIME: 12.8 SECONDS (ref 11.6–14.5)
PROTHROMBIN TIME: 13.7 SECONDS (ref 11.6–14.5)
PROTHROMBIN TIME: 14 SECONDS (ref 11.6–14.5)
PROTHROMBIN TIME: 16.9 SECONDS (ref 11.6–14.5)
QRS AXIS: 0 DEGREES
QRS AXIS: 69 DEGREES
QRS AXIS: 71 DEGREES
QRS AXIS: 72 DEGREES
QRS AXIS: 72 DEGREES
QRS AXIS: 79 DEGREES
QRS AXIS: 79 DEGREES
QRS AXIS: 84 DEGREES
QRSD INTERVAL: 0 MS
QRSD INTERVAL: 100 MS
QRSD INTERVAL: 102 MS
QRSD INTERVAL: 104 MS
QRSD INTERVAL: 88 MS
QRSD INTERVAL: 94 MS
QRSD INTERVAL: 94 MS
QRSD INTERVAL: 96 MS
QT INTERVAL: 0 MS
QT INTERVAL: 270 MS
QT INTERVAL: 300 MS
QT INTERVAL: 308 MS
QT INTERVAL: 308 MS
QT INTERVAL: 328 MS
QT INTERVAL: 334 MS
QT INTERVAL: 334 MS
QTC INTERVAL: 0 MS
QTC INTERVAL: 373 MS
QTC INTERVAL: 397 MS
QTC INTERVAL: 404 MS
QTC INTERVAL: 415 MS
QTC INTERVAL: 423 MS
QTC INTERVAL: 424 MS
QTC INTERVAL: 436 MS
RBC # BLD AUTO: 2.52 MILLION/UL (ref 3.88–5.62)
RBC # BLD AUTO: 2.79 MILLION/UL (ref 3.88–5.62)
RBC # BLD AUTO: 2.83 MILLION/UL (ref 3.88–5.62)
RBC # BLD AUTO: 2.88 MILLION/UL (ref 3.88–5.62)
RBC # BLD AUTO: 2.88 MILLION/UL (ref 3.88–5.62)
RBC # BLD AUTO: 2.94 MILLION/UL (ref 3.88–5.62)
RBC # BLD AUTO: 3 MILLION/UL (ref 3.88–5.62)
RBC # BLD AUTO: 3.01 MILLION/UL (ref 3.88–5.62)
RBC # BLD AUTO: 3.01 MILLION/UL (ref 3.88–5.62)
RBC # BLD AUTO: 3.1 MILLION/UL (ref 3.88–5.62)
RBC # BLD AUTO: 3.15 MILLION/UL (ref 3.88–5.62)
RBC # BLD AUTO: 3.17 MILLION/UL (ref 3.88–5.62)
RBC # BLD AUTO: 3.18 MILLION/UL (ref 3.88–5.62)
RBC # BLD AUTO: 3.23 MILLION/UL (ref 3.88–5.62)
RBC # BLD AUTO: 3.23 MILLION/UL (ref 3.88–5.62)
RBC # BLD AUTO: 3.44 MILLION/UL (ref 3.88–5.62)
RBC # BLD AUTO: 3.59 MILLION/UL (ref 3.88–5.62)
RBC # BLD AUTO: 3.66 MILLION/UL (ref 3.88–5.62)
RBC # BLD AUTO: 3.72 MILLION/UL (ref 3.88–5.62)
RBC # BLD AUTO: 3.73 MILLION/UL (ref 3.88–5.62)
RBC # BLD AUTO: 3.85 MILLION/UL (ref 3.88–5.62)
RBC MORPH BLD: PRESENT
RBC MORPH BLD: PRESENT
RETICS # AUTO: ABNORMAL 10*3/UL (ref 14356–105094)
RETICS # CALC: 5.12 % (ref 0.37–1.87)
RH BLD: POSITIVE
RH BLD: POSITIVE
SARS-COV-2 RNA RESP QL NAA+PROBE: NEGATIVE
SCAN RESULT: NORMAL
SCAN RESULT: NORMAL
SODIUM SERPL-SCNC: 131 MMOL/L (ref 136–145)
SODIUM SERPL-SCNC: 131 MMOL/L (ref 136–145)
SODIUM SERPL-SCNC: 133 MMOL/L (ref 136–145)
SODIUM SERPL-SCNC: 135 MMOL/L (ref 136–145)
SODIUM SERPL-SCNC: 136 MMOL/L (ref 136–145)
SODIUM SERPL-SCNC: 136 MMOL/L (ref 136–145)
SODIUM SERPL-SCNC: 137 MMOL/L (ref 136–145)
SODIUM SERPL-SCNC: 138 MMOL/L (ref 136–145)
SODIUM SERPL-SCNC: 139 MMOL/L (ref 136–145)
SODIUM SERPL-SCNC: 140 MMOL/L (ref 136–145)
SODIUM SERPL-SCNC: 142 MMOL/L (ref 136–145)
SODIUM SERPL-SCNC: 143 MMOL/L (ref 136–145)
SODIUM SERPL-SCNC: 143 MMOL/L (ref 136–145)
SODIUM SERPL-SCNC: 146 MMOL/L (ref 136–145)
SP GR UR STRIP.AUTO: 1 (ref 1–1.03)
SP GR UR STRIP.AUTO: <=1.005 (ref 1–1.03)
SPECIMEN EXPIRATION DATE: NORMAL
T WAVE AXIS: 0 DEGREES
T WAVE AXIS: 56 DEGREES
T WAVE AXIS: 58 DEGREES
T WAVE AXIS: 64 DEGREES
T WAVE AXIS: 66 DEGREES
T WAVE AXIS: 72 DEGREES
T WAVE AXIS: 72 DEGREES
T WAVE AXIS: 80 DEGREES
TARGETS BLD QL SMEAR: PRESENT
TIBC SERPL-MCNC: 205 UG/DL (ref 250–450)
TOTAL CELLS COUNTED SPEC: 100
TOTAL CELLS COUNTED SPEC: 100
TROPONIN I SERPL-MCNC: <0.02 NG/ML
TSH SERPL DL<=0.05 MIU/L-ACNC: 3.86 UIU/ML (ref 0.36–3.74)
URATE SERPL-MCNC: 3.1 MG/DL (ref 4.2–8)
UROBILINOGEN UR QL STRIP.AUTO: 0.2 E.U./DL
UROBILINOGEN UR QL STRIP.AUTO: 1 E.U./DL
VANCOMYCIN TROUGH SERPL-MCNC: 14.5 UG/ML (ref 10–20)
VARIANT LYMPHS BLD QL SMEAR: 1 % (ref 0–0)
VENTRICULAR RATE: 0 BPM
VENTRICULAR RATE: 100 BPM
VENTRICULAR RATE: 100 BPM
VENTRICULAR RATE: 109 BPM
VENTRICULAR RATE: 115 BPM
VENTRICULAR RATE: 127 BPM
VENTRICULAR RATE: 88 BPM
VENTRICULAR RATE: 97 BPM
VIT B12 SERPL-MCNC: 3067 PG/ML (ref 100–900)
WBC # BLD AUTO: 10.08 THOUSAND/UL (ref 4.31–10.16)
WBC # BLD AUTO: 10.62 THOUSAND/UL (ref 4.31–10.16)
WBC # BLD AUTO: 10.73 THOUSAND/UL (ref 4.31–10.16)
WBC # BLD AUTO: 11.45 THOUSAND/UL (ref 4.31–10.16)
WBC # BLD AUTO: 11.84 THOUSAND/UL (ref 4.31–10.16)
WBC # BLD AUTO: 11.85 THOUSAND/UL (ref 4.31–10.16)
WBC # BLD AUTO: 13.8 THOUSAND/UL (ref 4.31–10.16)
WBC # BLD AUTO: 16.04 THOUSAND/UL (ref 4.31–10.16)
WBC # BLD AUTO: 17.97 THOUSAND/UL (ref 4.31–10.16)
WBC # BLD AUTO: 18.08 THOUSAND/UL (ref 4.31–10.16)
WBC # BLD AUTO: 19.12 THOUSAND/UL (ref 4.31–10.16)
WBC # BLD AUTO: 22.42 THOUSAND/UL (ref 4.31–10.16)
WBC # BLD AUTO: 24.08 THOUSAND/UL (ref 4.31–10.16)
WBC # BLD AUTO: 24.53 THOUSAND/UL (ref 4.31–10.16)
WBC # BLD AUTO: 24.86 THOUSAND/UL (ref 4.31–10.16)
WBC # BLD AUTO: 29.72 THOUSAND/UL (ref 4.31–10.16)
WBC # BLD AUTO: 29.95 THOUSAND/UL (ref 4.31–10.16)
WBC # BLD AUTO: 30.71 THOUSAND/UL (ref 4.31–10.16)
WBC # BLD AUTO: 32.56 THOUSAND/UL (ref 4.31–10.16)
WBC # BLD AUTO: 37.1 THOUSAND/UL (ref 4.31–10.16)
WBC # BLD AUTO: 9.03 THOUSAND/UL (ref 4.31–10.16)

## 2021-01-01 PROCEDURE — 85610 PROTHROMBIN TIME: CPT | Performed by: EMERGENCY MEDICINE

## 2021-01-01 PROCEDURE — 99232 SBSQ HOSP IP/OBS MODERATE 35: CPT | Performed by: HOSPITALIST

## 2021-01-01 PROCEDURE — 85025 COMPLETE CBC W/AUTO DIFF WBC: CPT | Performed by: STUDENT IN AN ORGANIZED HEALTH CARE EDUCATION/TRAINING PROGRAM

## 2021-01-01 PROCEDURE — 84100 ASSAY OF PHOSPHORUS: CPT | Performed by: STUDENT IN AN ORGANIZED HEALTH CARE EDUCATION/TRAINING PROGRAM

## 2021-01-01 PROCEDURE — 88307 TISSUE EXAM BY PATHOLOGIST: CPT | Performed by: PATHOLOGY

## 2021-01-01 PROCEDURE — 93005 ELECTROCARDIOGRAM TRACING: CPT

## 2021-01-01 PROCEDURE — 83605 ASSAY OF LACTIC ACID: CPT | Performed by: EMERGENCY MEDICINE

## 2021-01-01 PROCEDURE — 81003 URINALYSIS AUTO W/O SCOPE: CPT

## 2021-01-01 PROCEDURE — 77387 GUIDANCE FOR RADJ TX DLVR: CPT | Performed by: STUDENT IN AN ORGANIZED HEALTH CARE EDUCATION/TRAINING PROGRAM

## 2021-01-01 PROCEDURE — 80053 COMPREHEN METABOLIC PANEL: CPT | Performed by: INTERNAL MEDICINE

## 2021-01-01 PROCEDURE — 85025 COMPLETE CBC W/AUTO DIFF WBC: CPT | Performed by: EMERGENCY MEDICINE

## 2021-01-01 PROCEDURE — 88333 PATH CONSLTJ SURG CYTO XM 1: CPT | Performed by: PATHOLOGY

## 2021-01-01 PROCEDURE — 99232 SBSQ HOSP IP/OBS MODERATE 35: CPT | Performed by: SURGERY

## 2021-01-01 PROCEDURE — 83605 ASSAY OF LACTIC ACID: CPT

## 2021-01-01 PROCEDURE — 80076 HEPATIC FUNCTION PANEL: CPT | Performed by: INTERNAL MEDICINE

## 2021-01-01 PROCEDURE — 99024 POSTOP FOLLOW-UP VISIT: CPT | Performed by: SURGERY

## 2021-01-01 PROCEDURE — 88342 IMHCHEM/IMCYTCHM 1ST ANTB: CPT | Performed by: PATHOLOGY

## 2021-01-01 PROCEDURE — 85730 THROMBOPLASTIN TIME PARTIAL: CPT | Performed by: EMERGENCY MEDICINE

## 2021-01-01 PROCEDURE — 80053 COMPREHEN METABOLIC PANEL: CPT | Performed by: STUDENT IN AN ORGANIZED HEALTH CARE EDUCATION/TRAINING PROGRAM

## 2021-01-01 PROCEDURE — NC001 PR NO CHARGE: Performed by: RADIOLOGY

## 2021-01-01 PROCEDURE — 77387 GUIDANCE FOR RADJ TX DLVR: CPT | Performed by: INTERNAL MEDICINE

## 2021-01-01 PROCEDURE — 86301 IMMUNOASSAY TUMOR CA 19-9: CPT | Performed by: STUDENT IN AN ORGANIZED HEALTH CARE EDUCATION/TRAINING PROGRAM

## 2021-01-01 PROCEDURE — 99255 IP/OBS CONSLTJ NEW/EST HI 80: CPT | Performed by: RADIOLOGY

## 2021-01-01 PROCEDURE — 36415 COLL VENOUS BLD VENIPUNCTURE: CPT | Performed by: STUDENT IN AN ORGANIZED HEALTH CARE EDUCATION/TRAINING PROGRAM

## 2021-01-01 PROCEDURE — ND001 PR NO DOCUMENTATION: Performed by: SURGERY

## 2021-01-01 PROCEDURE — 80048 BASIC METABOLIC PNL TOTAL CA: CPT | Performed by: STUDENT IN AN ORGANIZED HEALTH CARE EDUCATION/TRAINING PROGRAM

## 2021-01-01 PROCEDURE — 85730 THROMBOPLASTIN TIME PARTIAL: CPT | Performed by: INTERNAL MEDICINE

## 2021-01-01 PROCEDURE — 93010 ELECTROCARDIOGRAM REPORT: CPT | Performed by: INTERNAL MEDICINE

## 2021-01-01 PROCEDURE — 83605 ASSAY OF LACTIC ACID: CPT | Performed by: HOSPITALIST

## 2021-01-01 PROCEDURE — A9585 GADOBUTROL INJECTION: HCPCS | Performed by: STUDENT IN AN ORGANIZED HEALTH CARE EDUCATION/TRAINING PROGRAM

## 2021-01-01 PROCEDURE — 99254 IP/OBS CNSLTJ NEW/EST MOD 60: CPT | Performed by: INTERNAL MEDICINE

## 2021-01-01 PROCEDURE — 80202 ASSAY OF VANCOMYCIN: CPT | Performed by: INTERNAL MEDICINE

## 2021-01-01 PROCEDURE — G1004 CDSM NDSC: HCPCS

## 2021-01-01 PROCEDURE — 71045 X-RAY EXAM CHEST 1 VIEW: CPT

## 2021-01-01 PROCEDURE — 88341 IMHCHEM/IMCYTCHM EA ADD ANTB: CPT | Performed by: PATHOLOGY

## 2021-01-01 PROCEDURE — 77427 RADIATION TX MANAGEMENT X5: CPT | Performed by: INTERNAL MEDICINE

## 2021-01-01 PROCEDURE — 85027 COMPLETE CBC AUTOMATED: CPT | Performed by: STUDENT IN AN ORGANIZED HEALTH CARE EDUCATION/TRAINING PROGRAM

## 2021-01-01 PROCEDURE — 77290 THER RAD SIMULAJ FIELD CPLX: CPT | Performed by: INTERNAL MEDICINE

## 2021-01-01 PROCEDURE — 97116 GAIT TRAINING THERAPY: CPT

## 2021-01-01 PROCEDURE — 38510 BIOPSY/REMOVAL LYMPH NODES: CPT | Performed by: SURGERY

## 2021-01-01 PROCEDURE — 85007 BL SMEAR W/DIFF WBC COUNT: CPT

## 2021-01-01 PROCEDURE — 71275 CT ANGIOGRAPHY CHEST: CPT

## 2021-01-01 PROCEDURE — 99282 EMERGENCY DEPT VISIT SF MDM: CPT

## 2021-01-01 PROCEDURE — 87040 BLOOD CULTURE FOR BACTERIA: CPT | Performed by: EMERGENCY MEDICINE

## 2021-01-01 PROCEDURE — 99232 SBSQ HOSP IP/OBS MODERATE 35: CPT | Performed by: INTERNAL MEDICINE

## 2021-01-01 PROCEDURE — 83735 ASSAY OF MAGNESIUM: CPT | Performed by: STUDENT IN AN ORGANIZED HEALTH CARE EDUCATION/TRAINING PROGRAM

## 2021-01-01 PROCEDURE — 76536 US EXAM OF HEAD AND NECK: CPT

## 2021-01-01 PROCEDURE — 80053 COMPREHEN METABOLIC PANEL: CPT | Performed by: HOSPITALIST

## 2021-01-01 PROCEDURE — 77334 RADIATION TREATMENT AID(S): CPT | Performed by: INTERNAL MEDICINE

## 2021-01-01 PROCEDURE — 70491 CT SOFT TISSUE NECK W/DYE: CPT

## 2021-01-01 PROCEDURE — 85027 COMPLETE CBC AUTOMATED: CPT

## 2021-01-01 PROCEDURE — 77399 UNLISTED PX MED RADJ PHYSICS: CPT | Performed by: INTERNAL MEDICINE

## 2021-01-01 PROCEDURE — 99232 SBSQ HOSP IP/OBS MODERATE 35: CPT | Performed by: PHYSICIAN ASSISTANT

## 2021-01-01 PROCEDURE — 85027 COMPLETE CBC AUTOMATED: CPT | Performed by: INTERNAL MEDICINE

## 2021-01-01 PROCEDURE — 88304 TISSUE EXAM BY PATHOLOGIST: CPT | Performed by: PATHOLOGY

## 2021-01-01 PROCEDURE — 88305 TISSUE EXAM BY PATHOLOGIST: CPT | Performed by: PATHOLOGY

## 2021-01-01 PROCEDURE — NC001 PR NO CHARGE: Performed by: SURGERY

## 2021-01-01 PROCEDURE — 82728 ASSAY OF FERRITIN: CPT | Performed by: INTERNAL MEDICINE

## 2021-01-01 PROCEDURE — 77412 RADIATION TX DELIVERY LVL 3: CPT | Performed by: INTERNAL MEDICINE

## 2021-01-01 PROCEDURE — 83880 ASSAY OF NATRIURETIC PEPTIDE: CPT | Performed by: EMERGENCY MEDICINE

## 2021-01-01 PROCEDURE — 93970 EXTREMITY STUDY: CPT

## 2021-01-01 PROCEDURE — 88365 INSITU HYBRIDIZATION (FISH): CPT | Performed by: PATHOLOGY

## 2021-01-01 PROCEDURE — 99223 1ST HOSP IP/OBS HIGH 75: CPT | Performed by: PHYSICIAN ASSISTANT

## 2021-01-01 PROCEDURE — 88184 FLOWCYTOMETRY/ TC 1 MARKER: CPT | Performed by: STUDENT IN AN ORGANIZED HEALTH CARE EDUCATION/TRAINING PROGRAM

## 2021-01-01 PROCEDURE — 87102 FUNGUS ISOLATION CULTURE: CPT | Performed by: SURGERY

## 2021-01-01 PROCEDURE — 99203 OFFICE O/P NEW LOW 30 MIN: CPT | Performed by: SURGERY

## 2021-01-01 PROCEDURE — 77331 SPECIAL RADIATION DOSIMETRY: CPT | Performed by: INTERNAL MEDICINE

## 2021-01-01 PROCEDURE — 96365 THER/PROPH/DIAG IV INF INIT: CPT

## 2021-01-01 PROCEDURE — 76942 ECHO GUIDE FOR BIOPSY: CPT | Performed by: RADIOLOGY

## 2021-01-01 PROCEDURE — 72157 MRI CHEST SPINE W/O & W/DYE: CPT

## 2021-01-01 PROCEDURE — 87081 CULTURE SCREEN ONLY: CPT | Performed by: STUDENT IN AN ORGANIZED HEALTH CARE EDUCATION/TRAINING PROGRAM

## 2021-01-01 PROCEDURE — 84145 PROCALCITONIN (PCT): CPT | Performed by: INTERNAL MEDICINE

## 2021-01-01 PROCEDURE — 84550 ASSAY OF BLOOD/URIC ACID: CPT | Performed by: INTERNAL MEDICINE

## 2021-01-01 PROCEDURE — 99291 CRITICAL CARE FIRST HOUR: CPT | Performed by: EMERGENCY MEDICINE

## 2021-01-01 PROCEDURE — 83550 IRON BINDING TEST: CPT | Performed by: INTERNAL MEDICINE

## 2021-01-01 PROCEDURE — 97530 THERAPEUTIC ACTIVITIES: CPT

## 2021-01-01 PROCEDURE — 87075 CULTR BACTERIA EXCEPT BLOOD: CPT | Performed by: SURGERY

## 2021-01-01 PROCEDURE — 85007 BL SMEAR W/DIFF WBC COUNT: CPT | Performed by: INTERNAL MEDICINE

## 2021-01-01 PROCEDURE — 87206 SMEAR FLUORESCENT/ACID STAI: CPT | Performed by: SURGERY

## 2021-01-01 PROCEDURE — 77336 RADIATION PHYSICS CONSULT: CPT | Performed by: INTERNAL MEDICINE

## 2021-01-01 PROCEDURE — 86850 RBC ANTIBODY SCREEN: CPT | Performed by: SURGERY

## 2021-01-01 PROCEDURE — 0012A SARS-COV-2 / COVID-19 MRNA VACCINE (MODERNA) 100 MCG: CPT

## 2021-01-01 PROCEDURE — 72158 MRI LUMBAR SPINE W/O & W/DYE: CPT

## 2021-01-01 PROCEDURE — 36415 COLL VENOUS BLD VENIPUNCTURE: CPT | Performed by: EMERGENCY MEDICINE

## 2021-01-01 PROCEDURE — 99252 IP/OBS CONSLTJ NEW/EST SF 35: CPT | Performed by: INTERNAL MEDICINE

## 2021-01-01 PROCEDURE — 47000 NEEDLE BIOPSY OF LIVER PERQ: CPT | Performed by: RADIOLOGY

## 2021-01-01 PROCEDURE — 99283 EMERGENCY DEPT VISIT LOW MDM: CPT

## 2021-01-01 PROCEDURE — 72040 X-RAY EXAM NECK SPINE 2-3 VW: CPT

## 2021-01-01 PROCEDURE — 83735 ASSAY OF MAGNESIUM: CPT | Performed by: EMERGENCY MEDICINE

## 2021-01-01 PROCEDURE — 77280 THER RAD SIMULAJ FIELD SMPL: CPT | Performed by: RADIOLOGY

## 2021-01-01 PROCEDURE — 91301 SARS-COV-2 / COVID-19 MRNA VACCINE (MODERNA) 100 MCG: CPT

## 2021-01-01 PROCEDURE — U0005 INFEC AGEN DETEC AMPLI PROBE: HCPCS | Performed by: EMERGENCY MEDICINE

## 2021-01-01 PROCEDURE — 99285 EMERGENCY DEPT VISIT HI MDM: CPT

## 2021-01-01 PROCEDURE — 77263 THER RADIOLOGY TX PLNG CPLX: CPT | Performed by: INTERNAL MEDICINE

## 2021-01-01 PROCEDURE — 76937 US GUIDE VASCULAR ACCESS: CPT

## 2021-01-01 PROCEDURE — 99223 1ST HOSP IP/OBS HIGH 75: CPT | Performed by: INTERNAL MEDICINE

## 2021-01-01 PROCEDURE — 0JB53ZX EXCISION OF LEFT NECK SUBCUTANEOUS TISSUE AND FASCIA, PERCUTANEOUS APPROACH, DIAGNOSTIC: ICD-10-PCS | Performed by: INTERNAL MEDICINE

## 2021-01-01 PROCEDURE — 88185 FLOWCYTOMETRY/TC ADD-ON: CPT

## 2021-01-01 PROCEDURE — 85730 THROMBOPLASTIN TIME PARTIAL: CPT | Performed by: STUDENT IN AN ORGANIZED HEALTH CARE EDUCATION/TRAINING PROGRAM

## 2021-01-01 PROCEDURE — 74018 RADEX ABDOMEN 1 VIEW: CPT

## 2021-01-01 PROCEDURE — 85730 THROMBOPLASTIN TIME PARTIAL: CPT | Performed by: SURGERY

## 2021-01-01 PROCEDURE — G6002 STEREOSCOPIC X-RAY GUIDANCE: HCPCS | Performed by: INTERNAL MEDICINE

## 2021-01-01 PROCEDURE — 72080 X-RAY EXAM THORACOLMB 2/> VW: CPT

## 2021-01-01 PROCEDURE — 70450 CT HEAD/BRAIN W/O DYE: CPT

## 2021-01-01 PROCEDURE — 87116 MYCOBACTERIA CULTURE: CPT | Performed by: SURGERY

## 2021-01-01 PROCEDURE — 82607 VITAMIN B-12: CPT | Performed by: INTERNAL MEDICINE

## 2021-01-01 PROCEDURE — 93970 EXTREMITY STUDY: CPT | Performed by: SURGERY

## 2021-01-01 PROCEDURE — 96374 THER/PROPH/DIAG INJ IV PUSH: CPT

## 2021-01-01 PROCEDURE — 84145 PROCALCITONIN (PCT): CPT | Performed by: HOSPITALIST

## 2021-01-01 PROCEDURE — 84145 PROCALCITONIN (PCT): CPT | Performed by: EMERGENCY MEDICINE

## 2021-01-01 PROCEDURE — 80048 BASIC METABOLIC PNL TOTAL CA: CPT | Performed by: HOSPITALIST

## 2021-01-01 PROCEDURE — 86901 BLOOD TYPING SEROLOGIC RH(D): CPT | Performed by: SURGERY

## 2021-01-01 PROCEDURE — 84484 ASSAY OF TROPONIN QUANT: CPT | Performed by: EMERGENCY MEDICINE

## 2021-01-01 PROCEDURE — 82948 REAGENT STRIP/BLOOD GLUCOSE: CPT

## 2021-01-01 PROCEDURE — G6002 STEREOSCOPIC X-RAY GUIDANCE: HCPCS | Performed by: RADIOLOGY

## 2021-01-01 PROCEDURE — 99233 SBSQ HOSP IP/OBS HIGH 50: CPT | Performed by: PHYSICIAN ASSISTANT

## 2021-01-01 PROCEDURE — 0FB03ZX EXCISION OF LIVER, PERCUTANEOUS APPROACH, DIAGNOSTIC: ICD-10-PCS | Performed by: INTERNAL MEDICINE

## 2021-01-01 PROCEDURE — 80048 BASIC METABOLIC PNL TOTAL CA: CPT | Performed by: INTERNAL MEDICINE

## 2021-01-01 PROCEDURE — 77412 RADIATION TX DELIVERY LVL 3: CPT | Performed by: RADIOLOGY

## 2021-01-01 PROCEDURE — 99284 EMERGENCY DEPT VISIT MOD MDM: CPT | Performed by: EMERGENCY MEDICINE

## 2021-01-01 PROCEDURE — 96372 THER/PROPH/DIAG INJ SC/IM: CPT

## 2021-01-01 PROCEDURE — 0011A SARS-COV-2 / COVID-19 MRNA VACCINE (MODERNA) 100 MCG: CPT

## 2021-01-01 PROCEDURE — 99233 SBSQ HOSP IP/OBS HIGH 50: CPT | Performed by: HOSPITALIST

## 2021-01-01 PROCEDURE — 83735 ASSAY OF MAGNESIUM: CPT | Performed by: HOSPITALIST

## 2021-01-01 PROCEDURE — 77307 TELETHX ISODOSE PLAN CPLX: CPT | Performed by: INTERNAL MEDICINE

## 2021-01-01 PROCEDURE — 97167 OT EVAL HIGH COMPLEX 60 MIN: CPT

## 2021-01-01 PROCEDURE — 96375 TX/PRO/DX INJ NEW DRUG ADDON: CPT

## 2021-01-01 PROCEDURE — G6002 STEREOSCOPIC X-RAY GUIDANCE: HCPCS | Performed by: STUDENT IN AN ORGANIZED HEALTH CARE EDUCATION/TRAINING PROGRAM

## 2021-01-01 PROCEDURE — 87806 HIV AG W/HIV1&2 ANTB W/OPTIC: CPT | Performed by: EMERGENCY MEDICINE

## 2021-01-01 PROCEDURE — 85027 COMPLETE CBC AUTOMATED: CPT | Performed by: EMERGENCY MEDICINE

## 2021-01-01 PROCEDURE — NC001 PR NO CHARGE: Performed by: INTERNAL MEDICINE

## 2021-01-01 PROCEDURE — 85045 AUTOMATED RETICULOCYTE COUNT: CPT | Performed by: INTERNAL MEDICINE

## 2021-01-01 PROCEDURE — 83540 ASSAY OF IRON: CPT | Performed by: INTERNAL MEDICINE

## 2021-01-01 PROCEDURE — 84443 ASSAY THYROID STIM HORMONE: CPT | Performed by: EMERGENCY MEDICINE

## 2021-01-01 PROCEDURE — 88312 SPECIAL STAINS GROUP 1: CPT | Performed by: PATHOLOGY

## 2021-01-01 PROCEDURE — 85025 COMPLETE CBC W/AUTO DIFF WBC: CPT | Performed by: HOSPITALIST

## 2021-01-01 PROCEDURE — 99152 MOD SED SAME PHYS/QHP 5/>YRS: CPT

## 2021-01-01 PROCEDURE — 74177 CT ABD & PELVIS W/CONTRAST: CPT

## 2021-01-01 PROCEDURE — 99152 MOD SED SAME PHYS/QHP 5/>YRS: CPT | Performed by: RADIOLOGY

## 2021-01-01 PROCEDURE — 99284 EMERGENCY DEPT VISIT MOD MDM: CPT | Performed by: PHYSICIAN ASSISTANT

## 2021-01-01 PROCEDURE — 93306 TTE W/DOPPLER COMPLETE: CPT | Performed by: INTERNAL MEDICINE

## 2021-01-01 PROCEDURE — 99231 SBSQ HOSP IP/OBS SF/LOW 25: CPT | Performed by: PSYCHIATRY & NEUROLOGY

## 2021-01-01 PROCEDURE — 86900 BLOOD TYPING SEROLOGIC ABO: CPT | Performed by: SURGERY

## 2021-01-01 PROCEDURE — 99285 EMERGENCY DEPT VISIT HI MDM: CPT | Performed by: EMERGENCY MEDICINE

## 2021-01-01 PROCEDURE — 77387 GUIDANCE FOR RADJ TX DLVR: CPT | Performed by: RADIOLOGY

## 2021-01-01 PROCEDURE — 93306 TTE W/DOPPLER COMPLETE: CPT

## 2021-01-01 PROCEDURE — 87070 CULTURE OTHR SPECIMN AEROBIC: CPT | Performed by: SURGERY

## 2021-01-01 PROCEDURE — 85007 BL SMEAR W/DIFF WBC COUNT: CPT | Performed by: EMERGENCY MEDICINE

## 2021-01-01 PROCEDURE — 85610 PROTHROMBIN TIME: CPT | Performed by: STUDENT IN AN ORGANIZED HEALTH CARE EDUCATION/TRAINING PROGRAM

## 2021-01-01 PROCEDURE — 82378 CARCINOEMBRYONIC ANTIGEN: CPT | Performed by: STUDENT IN AN ORGANIZED HEALTH CARE EDUCATION/TRAINING PROGRAM

## 2021-01-01 PROCEDURE — 83615 LACTATE (LD) (LDH) ENZYME: CPT | Performed by: INTERNAL MEDICINE

## 2021-01-01 PROCEDURE — 47000 NEEDLE BIOPSY OF LIVER PERQ: CPT

## 2021-01-01 PROCEDURE — 87040 BLOOD CULTURE FOR BACTERIA: CPT

## 2021-01-01 PROCEDURE — 83690 ASSAY OF LIPASE: CPT | Performed by: EMERGENCY MEDICINE

## 2021-01-01 PROCEDURE — 72156 MRI NECK SPINE W/O & W/DYE: CPT

## 2021-01-01 PROCEDURE — 99223 1ST HOSP IP/OBS HIGH 75: CPT | Performed by: NURSE PRACTITIONER

## 2021-01-01 PROCEDURE — 83605 ASSAY OF LACTIC ACID: CPT | Performed by: NURSE PRACTITIONER

## 2021-01-01 PROCEDURE — 97163 PT EVAL HIGH COMPLEX 45 MIN: CPT

## 2021-01-01 PROCEDURE — 77412 RADIATION TX DELIVERY LVL 3: CPT | Performed by: STUDENT IN AN ORGANIZED HEALTH CARE EDUCATION/TRAINING PROGRAM

## 2021-01-01 PROCEDURE — NC001 PR NO CHARGE: Performed by: NURSE PRACTITIONER

## 2021-01-01 PROCEDURE — A9585 GADOBUTROL INJECTION: HCPCS | Performed by: INTERNAL MEDICINE

## 2021-01-01 PROCEDURE — 0HB0XZX EXCISION OF SCALP SKIN, EXTERNAL APPROACH, DIAGNOSTIC: ICD-10-PCS | Performed by: INTERNAL MEDICINE

## 2021-01-01 PROCEDURE — 85007 BL SMEAR W/DIFF WBC COUNT: CPT | Performed by: STUDENT IN AN ORGANIZED HEALTH CARE EDUCATION/TRAINING PROGRAM

## 2021-01-01 PROCEDURE — 72146 MRI CHEST SPINE W/O DYE: CPT

## 2021-01-01 PROCEDURE — 84145 PROCALCITONIN (PCT): CPT | Performed by: STUDENT IN AN ORGANIZED HEALTH CARE EDUCATION/TRAINING PROGRAM

## 2021-01-01 PROCEDURE — 70553 MRI BRAIN STEM W/O & W/DYE: CPT

## 2021-01-01 PROCEDURE — 99153 MOD SED SAME PHYS/QHP EA: CPT

## 2021-01-01 PROCEDURE — 80053 COMPREHEN METABOLIC PANEL: CPT | Performed by: EMERGENCY MEDICINE

## 2021-01-01 PROCEDURE — U0003 INFECTIOUS AGENT DETECTION BY NUCLEIC ACID (DNA OR RNA); SEVERE ACUTE RESPIRATORY SYNDROME CORONAVIRUS 2 (SARS-COV-2) (CORONAVIRUS DISEASE [COVID-19]), AMPLIFIED PROBE TECHNIQUE, MAKING USE OF HIGH THROUGHPUT TECHNOLOGIES AS DESCRIBED BY CMS-2020-01-R: HCPCS | Performed by: EMERGENCY MEDICINE

## 2021-01-01 PROCEDURE — 95816 EEG AWAKE AND DROWSY: CPT | Performed by: PSYCHIATRY & NEUROLOGY

## 2021-01-01 PROCEDURE — 85025 COMPLETE CBC W/AUTO DIFF WBC: CPT | Performed by: INTERNAL MEDICINE

## 2021-01-01 PROCEDURE — 97535 SELF CARE MNGMENT TRAINING: CPT

## 2021-01-01 PROCEDURE — 87205 SMEAR GRAM STAIN: CPT | Performed by: SURGERY

## 2021-01-01 PROCEDURE — 99254 IP/OBS CNSLTJ NEW/EST MOD 60: CPT | Performed by: SURGERY

## 2021-01-01 PROCEDURE — 95816 EEG AWAKE AND DROWSY: CPT

## 2021-01-01 PROCEDURE — 10060 I&D ABSCESS SIMPLE/SINGLE: CPT | Performed by: PHYSICIAN ASSISTANT

## 2021-01-01 RX ORDER — CALCIUM CARBONATE 200(500)MG
500 TABLET,CHEWABLE ORAL DAILY PRN
Qty: 90 TABLET | Refills: 0 | Status: SHIPPED | OUTPATIENT
Start: 2021-01-01

## 2021-01-01 RX ORDER — OXYCODONE HYDROCHLORIDE 5 MG/1
TABLET ORAL
Qty: 168 TABLET | Refills: 0 | Status: SHIPPED | OUTPATIENT
Start: 2021-01-01

## 2021-01-01 RX ORDER — FENTANYL CITRATE/PF 50 MCG/ML
25 SYRINGE (ML) INJECTION
Status: COMPLETED | OUTPATIENT
Start: 2021-01-01 | End: 2021-01-01

## 2021-01-01 RX ORDER — DEXAMETHASONE 4 MG/1
4 TABLET ORAL EVERY 12 HOURS SCHEDULED
Status: DISCONTINUED | OUTPATIENT
Start: 2021-01-01 | End: 2021-01-01 | Stop reason: HOSPADM

## 2021-01-01 RX ORDER — OXYCODONE HYDROCHLORIDE 10 MG/1
10 TABLET ORAL EVERY 6 HOURS PRN
Status: DISCONTINUED | OUTPATIENT
Start: 2021-01-01 | End: 2021-01-01

## 2021-01-01 RX ORDER — SODIUM CHLORIDE 9 MG/ML
125 INJECTION, SOLUTION INTRAVENOUS CONTINUOUS
Status: DISCONTINUED | OUTPATIENT
Start: 2021-01-01 | End: 2021-01-01

## 2021-01-01 RX ORDER — DILTIAZEM HYDROCHLORIDE 5 MG/ML
15 INJECTION INTRAVENOUS ONCE
Status: COMPLETED | OUTPATIENT
Start: 2021-01-01 | End: 2021-01-01

## 2021-01-01 RX ORDER — VANCOMYCIN HYDROCHLORIDE 1 G/200ML
1000 INJECTION, SOLUTION INTRAVENOUS EVERY 8 HOURS
Status: DISCONTINUED | OUTPATIENT
Start: 2021-01-01 | End: 2021-01-01

## 2021-01-01 RX ORDER — OXYCODONE HYDROCHLORIDE 5 MG/1
5 TABLET ORAL EVERY 4 HOURS PRN
Status: DISCONTINUED | OUTPATIENT
Start: 2021-01-01 | End: 2021-01-01 | Stop reason: HOSPADM

## 2021-01-01 RX ORDER — CLINDAMYCIN PHOSPHATE 10 MG/G
GEL TOPICAL 2 TIMES DAILY
Qty: 30 G | Refills: 0 | Status: SHIPPED | OUTPATIENT
Start: 2021-01-01 | End: 2021-01-01 | Stop reason: HOSPADM

## 2021-01-01 RX ORDER — LACTULOSE 20 G/30ML
10 SOLUTION ORAL ONCE
Status: COMPLETED | OUTPATIENT
Start: 2021-01-01 | End: 2021-01-01

## 2021-01-01 RX ORDER — CEFAZOLIN SODIUM 1 G/3ML
INJECTION, POWDER, FOR SOLUTION INTRAMUSCULAR; INTRAVENOUS AS NEEDED
Status: DISCONTINUED | OUTPATIENT
Start: 2021-01-01 | End: 2021-01-01

## 2021-01-01 RX ORDER — CLINDAMYCIN PHOSPHATE 600 MG/50ML
600 INJECTION INTRAVENOUS ONCE
Status: COMPLETED | OUTPATIENT
Start: 2021-01-01 | End: 2021-01-01

## 2021-01-01 RX ORDER — HEPARIN SODIUM 1000 [USP'U]/ML
4000 INJECTION, SOLUTION INTRAVENOUS; SUBCUTANEOUS ONCE
Status: COMPLETED | OUTPATIENT
Start: 2021-01-01 | End: 2021-01-01

## 2021-01-01 RX ORDER — OXYCODONE HYDROCHLORIDE 5 MG/1
5 TABLET ORAL EVERY 6 HOURS PRN
Status: DISCONTINUED | OUTPATIENT
Start: 2021-01-01 | End: 2021-01-01

## 2021-01-01 RX ORDER — LORAZEPAM 2 MG/ML
1 INJECTION INTRAMUSCULAR ONCE AS NEEDED
Status: COMPLETED | OUTPATIENT
Start: 2021-01-01 | End: 2021-01-01

## 2021-01-01 RX ORDER — MAGNESIUM SULFATE 1 G/100ML
1 INJECTION INTRAVENOUS ONCE
Status: COMPLETED | OUTPATIENT
Start: 2021-01-01 | End: 2021-01-01

## 2021-01-01 RX ORDER — METOPROLOL TARTRATE 50 MG/1
100 TABLET, FILM COATED ORAL EVERY 12 HOURS SCHEDULED
Status: DISCONTINUED | OUTPATIENT
Start: 2021-01-01 | End: 2021-01-01 | Stop reason: HOSPADM

## 2021-01-01 RX ORDER — SODIUM CHLORIDE 9 MG/ML
75 INJECTION, SOLUTION INTRAVENOUS CONTINUOUS
Status: DISCONTINUED | OUTPATIENT
Start: 2021-01-01 | End: 2021-01-01

## 2021-01-01 RX ORDER — OXYCODONE HYDROCHLORIDE 10 MG/1
10 TABLET ORAL EVERY 4 HOURS PRN
Status: DISCONTINUED | OUTPATIENT
Start: 2021-01-01 | End: 2021-01-01 | Stop reason: HOSPADM

## 2021-01-01 RX ORDER — PROPOFOL 10 MG/ML
INJECTION, EMULSION INTRAVENOUS AS NEEDED
Status: DISCONTINUED | OUTPATIENT
Start: 2021-01-01 | End: 2021-01-01

## 2021-01-01 RX ORDER — METOPROLOL TARTRATE 100 MG/1
50 TABLET ORAL EVERY 12 HOURS SCHEDULED
Qty: 90 TABLET | Refills: 2 | Status: SHIPPED | OUTPATIENT
Start: 2021-01-01

## 2021-01-01 RX ORDER — ONDANSETRON 2 MG/ML
INJECTION INTRAMUSCULAR; INTRAVENOUS AS NEEDED
Status: DISCONTINUED | OUTPATIENT
Start: 2021-01-01 | End: 2021-01-01

## 2021-01-01 RX ORDER — VANCOMYCIN HYDROCHLORIDE 1 G/200ML
20 INJECTION, SOLUTION INTRAVENOUS ONCE
Status: COMPLETED | OUTPATIENT
Start: 2021-01-01 | End: 2021-01-01

## 2021-01-01 RX ORDER — IBUPROFEN 400 MG/1
400 TABLET ORAL EVERY 6 HOURS PRN
Status: DISCONTINUED | OUTPATIENT
Start: 2021-01-01 | End: 2021-01-01

## 2021-01-01 RX ORDER — HEPARIN SODIUM 1000 [USP'U]/ML
2000 INJECTION, SOLUTION INTRAVENOUS; SUBCUTANEOUS
Status: DISCONTINUED | OUTPATIENT
Start: 2021-01-01 | End: 2021-01-01

## 2021-01-01 RX ORDER — LIDOCAINE 50 MG/G
1 PATCH TOPICAL ONCE
Status: COMPLETED | OUTPATIENT
Start: 2021-01-01 | End: 2021-01-01

## 2021-01-01 RX ORDER — METOPROLOL TARTRATE 5 MG/5ML
5 INJECTION INTRAVENOUS ONCE
Status: COMPLETED | OUTPATIENT
Start: 2021-01-01 | End: 2021-01-01

## 2021-01-01 RX ORDER — FLUCONAZOLE 200 MG/1
200 TABLET ORAL ONCE
Status: COMPLETED | OUTPATIENT
Start: 2021-01-01 | End: 2021-01-01

## 2021-01-01 RX ORDER — CYCLOBENZAPRINE HCL 10 MG
10 TABLET ORAL 2 TIMES DAILY PRN
Qty: 20 TABLET | Refills: 0 | Status: SHIPPED | OUTPATIENT
Start: 2021-01-01 | End: 2021-01-01 | Stop reason: HOSPADM

## 2021-01-01 RX ORDER — ONDANSETRON 2 MG/ML
4 INJECTION INTRAMUSCULAR; INTRAVENOUS ONCE AS NEEDED
Status: DISCONTINUED | OUTPATIENT
Start: 2021-01-01 | End: 2021-01-01 | Stop reason: HOSPADM

## 2021-01-01 RX ORDER — FLUCONAZOLE 200 MG/1
200 TABLET ORAL DAILY
Status: CANCELLED | OUTPATIENT
Start: 2021-01-01 | End: 2021-01-01

## 2021-01-01 RX ORDER — POLYETHYLENE GLYCOL 3350 17 G/17G
17 POWDER, FOR SOLUTION ORAL DAILY
Status: DISCONTINUED | OUTPATIENT
Start: 2021-01-01 | End: 2021-01-01

## 2021-01-01 RX ORDER — ACETAMINOPHEN 325 MG/1
650 TABLET ORAL EVERY 6 HOURS PRN
Qty: 120 TABLET | Refills: 2 | Status: SHIPPED | OUTPATIENT
Start: 2021-01-01

## 2021-01-01 RX ORDER — CALCIUM CARBONATE 200(500)MG
500 TABLET,CHEWABLE ORAL DAILY PRN
Status: DISCONTINUED | OUTPATIENT
Start: 2021-01-01 | End: 2021-08-18 | Stop reason: HOSPADM

## 2021-01-01 RX ORDER — CALCIUM CARBONATE 200(500)MG
500 TABLET,CHEWABLE ORAL DAILY PRN
Status: DISCONTINUED | OUTPATIENT
Start: 2021-01-01 | End: 2021-01-01 | Stop reason: HOSPADM

## 2021-01-01 RX ORDER — SENNOSIDES 8.6 MG
8.6 TABLET ORAL 2 TIMES DAILY
Qty: 28 TABLET | Refills: 0 | Status: SHIPPED | OUTPATIENT
Start: 2021-01-01

## 2021-01-01 RX ORDER — BISACODYL 10 MG
10 SUPPOSITORY, RECTAL RECTAL DAILY PRN
Status: DISCONTINUED | OUTPATIENT
Start: 2021-01-01 | End: 2021-01-01 | Stop reason: HOSPADM

## 2021-01-01 RX ORDER — MORPHINE SULFATE 15 MG/1
15 TABLET, FILM COATED, EXTENDED RELEASE ORAL EVERY 8 HOURS SCHEDULED
Status: DISCONTINUED | OUTPATIENT
Start: 2021-01-01 | End: 2021-01-01 | Stop reason: HOSPADM

## 2021-01-01 RX ORDER — DEXAMETHASONE 4 MG/1
TABLET ORAL
Qty: 8 TABLET | Refills: 0 | Status: SHIPPED | OUTPATIENT
Start: 2021-01-01 | End: 2021-01-01

## 2021-01-01 RX ORDER — ACETAMINOPHEN 325 MG/1
650 TABLET ORAL EVERY 6 HOURS PRN
Status: DISCONTINUED | OUTPATIENT
Start: 2021-01-01 | End: 2021-01-01 | Stop reason: HOSPADM

## 2021-01-01 RX ORDER — HYDROMORPHONE HCL IN WATER/PF 6 MG/30 ML
0.2 PATIENT CONTROLLED ANALGESIA SYRINGE INTRAVENOUS
Status: DISCONTINUED | OUTPATIENT
Start: 2021-01-01 | End: 2021-01-01 | Stop reason: HOSPADM

## 2021-01-01 RX ORDER — ACETAMINOPHEN 325 MG/1
975 TABLET ORAL ONCE
Status: COMPLETED | OUTPATIENT
Start: 2021-01-01 | End: 2021-01-01

## 2021-01-01 RX ORDER — POLYETHYLENE GLYCOL 3350 17 G/17G
17 POWDER, FOR SOLUTION ORAL 2 TIMES DAILY
Status: DISCONTINUED | OUTPATIENT
Start: 2021-01-01 | End: 2021-01-01 | Stop reason: HOSPADM

## 2021-01-01 RX ORDER — KETOROLAC TROMETHAMINE 30 MG/ML
15 INJECTION, SOLUTION INTRAMUSCULAR; INTRAVENOUS ONCE
Status: COMPLETED | OUTPATIENT
Start: 2021-01-01 | End: 2021-01-01

## 2021-01-01 RX ORDER — BISACODYL 10 MG
10 SUPPOSITORY, RECTAL RECTAL DAILY PRN
Qty: 12 SUPPOSITORY | Refills: 0 | Status: SHIPPED | OUTPATIENT
Start: 2021-01-01

## 2021-01-01 RX ORDER — HEPARIN SODIUM 1000 [USP'U]/ML
4000 INJECTION, SOLUTION INTRAVENOUS; SUBCUTANEOUS
Status: DISCONTINUED | OUTPATIENT
Start: 2021-01-01 | End: 2021-01-01

## 2021-01-01 RX ORDER — MAGNESIUM SULFATE HEPTAHYDRATE 40 MG/ML
2 INJECTION, SOLUTION INTRAVENOUS ONCE
Status: COMPLETED | OUTPATIENT
Start: 2021-01-01 | End: 2021-01-01

## 2021-01-01 RX ORDER — OXYCODONE HYDROCHLORIDE 5 MG/1
5 TABLET ORAL EVERY 4 HOURS PRN
Status: DISCONTINUED | OUTPATIENT
Start: 2021-01-01 | End: 2021-08-18 | Stop reason: HOSPADM

## 2021-01-01 RX ORDER — METRONIDAZOLE 500 MG/1
500 TABLET ORAL EVERY 8 HOURS SCHEDULED
Status: DISCONTINUED | OUTPATIENT
Start: 2021-01-01 | End: 2021-01-01

## 2021-01-01 RX ORDER — SENNOSIDES 8.6 MG
8.6 TABLET ORAL 2 TIMES DAILY
Status: DISCONTINUED | OUTPATIENT
Start: 2021-01-01 | End: 2021-08-18 | Stop reason: HOSPADM

## 2021-01-01 RX ORDER — MAGNESIUM HYDROXIDE 1200 MG/15ML
LIQUID ORAL AS NEEDED
Status: DISCONTINUED | OUTPATIENT
Start: 2021-01-01 | End: 2021-01-01 | Stop reason: HOSPADM

## 2021-01-01 RX ORDER — LORAZEPAM 2 MG/ML
1 INJECTION INTRAMUSCULAR ONCE AS NEEDED
Status: DISCONTINUED | OUTPATIENT
Start: 2021-01-01 | End: 2021-01-01 | Stop reason: HOSPADM

## 2021-01-01 RX ORDER — MORPHINE SULFATE 15 MG/1
15 TABLET, FILM COATED, EXTENDED RELEASE ORAL EVERY 8 HOURS SCHEDULED
Status: COMPLETED | OUTPATIENT
Start: 2021-01-01 | End: 2021-01-01

## 2021-01-01 RX ORDER — DEXAMETHASONE SODIUM PHOSPHATE 10 MG/ML
10 INJECTION, SOLUTION INTRAMUSCULAR; INTRAVENOUS ONCE
Status: COMPLETED | OUTPATIENT
Start: 2021-01-01 | End: 2021-01-01

## 2021-01-01 RX ORDER — DEXAMETHASONE 2 MG/1
2 TABLET ORAL DAILY
Status: COMPLETED | OUTPATIENT
Start: 2021-01-01 | End: 2021-01-01

## 2021-01-01 RX ORDER — AMOXICILLIN 250 MG
1 CAPSULE ORAL
Status: DISCONTINUED | OUTPATIENT
Start: 2021-01-01 | End: 2021-01-01

## 2021-01-01 RX ORDER — FLUCONAZOLE 200 MG/1
200 TABLET ORAL DAILY
Status: COMPLETED | OUTPATIENT
Start: 2021-01-01 | End: 2021-01-01

## 2021-01-01 RX ORDER — LIDOCAINE HYDROCHLORIDE 10 MG/ML
10 INJECTION, SOLUTION EPIDURAL; INFILTRATION; INTRACAUDAL; PERINEURAL ONCE
Status: DISCONTINUED | OUTPATIENT
Start: 2021-01-01 | End: 2021-01-01

## 2021-01-01 RX ORDER — METOPROLOL TARTRATE 50 MG/1
50 TABLET, FILM COATED ORAL EVERY 12 HOURS SCHEDULED
Status: DISCONTINUED | OUTPATIENT
Start: 2021-01-01 | End: 2021-01-01

## 2021-01-01 RX ORDER — MIDAZOLAM HYDROCHLORIDE 1 MG/ML
1 INJECTION INTRAMUSCULAR; INTRAVENOUS ONCE
Status: COMPLETED | OUTPATIENT
Start: 2021-01-01 | End: 2021-01-01

## 2021-01-01 RX ORDER — CEFAZOLIN SODIUM 1 G/50ML
1000 SOLUTION INTRAVENOUS
Status: COMPLETED | OUTPATIENT
Start: 2021-01-01 | End: 2021-01-01

## 2021-01-01 RX ORDER — FENTANYL CITRATE 50 UG/ML
INJECTION, SOLUTION INTRAMUSCULAR; INTRAVENOUS CODE/TRAUMA/SEDATION MEDICATION
Status: COMPLETED | OUTPATIENT
Start: 2021-01-01 | End: 2021-01-01

## 2021-01-01 RX ORDER — MIDAZOLAM HYDROCHLORIDE 2 MG/2ML
INJECTION, SOLUTION INTRAMUSCULAR; INTRAVENOUS CODE/TRAUMA/SEDATION MEDICATION
Status: COMPLETED | OUTPATIENT
Start: 2021-01-01 | End: 2021-01-01

## 2021-01-01 RX ORDER — POLYETHYLENE GLYCOL 3350 17 G/17G
17 POWDER, FOR SOLUTION ORAL DAILY PRN
Status: DISCONTINUED | OUTPATIENT
Start: 2021-01-01 | End: 2021-01-01

## 2021-01-01 RX ORDER — AMOXICILLIN 250 MG
1 CAPSULE ORAL 2 TIMES DAILY
Status: DISCONTINUED | OUTPATIENT
Start: 2021-01-01 | End: 2021-01-01 | Stop reason: HOSPADM

## 2021-01-01 RX ORDER — SODIUM CHLORIDE, SODIUM LACTATE, POTASSIUM CHLORIDE, CALCIUM CHLORIDE 600; 310; 30; 20 MG/100ML; MG/100ML; MG/100ML; MG/100ML
INJECTION, SOLUTION INTRAVENOUS CONTINUOUS PRN
Status: DISCONTINUED | OUTPATIENT
Start: 2021-01-01 | End: 2021-01-01

## 2021-01-01 RX ORDER — DEXAMETHASONE 4 MG/1
4 TABLET ORAL EVERY 6 HOURS SCHEDULED
Status: DISCONTINUED | OUTPATIENT
Start: 2021-01-01 | End: 2021-01-01

## 2021-01-01 RX ORDER — POLYETHYLENE GLYCOL 3350 17 G/17G
17 POWDER, FOR SOLUTION ORAL 2 TIMES DAILY
Qty: 90 EACH | Refills: 0 | Status: SHIPPED | OUTPATIENT
Start: 2021-01-01

## 2021-01-01 RX ORDER — KETAMINE HYDROCHLORIDE 50 MG/ML
INJECTION, SOLUTION, CONCENTRATE INTRAMUSCULAR; INTRAVENOUS AS NEEDED
Status: DISCONTINUED | OUTPATIENT
Start: 2021-01-01 | End: 2021-01-01

## 2021-01-01 RX ORDER — OXYCODONE HYDROCHLORIDE 5 MG/1
2.5 TABLET ORAL EVERY 6 HOURS PRN
Status: DISCONTINUED | OUTPATIENT
Start: 2021-01-01 | End: 2021-01-01

## 2021-01-01 RX ORDER — HYDROMORPHONE HCL/PF 1 MG/ML
0.5 SYRINGE (ML) INJECTION
Status: DISCONTINUED | OUTPATIENT
Start: 2021-01-01 | End: 2021-08-18 | Stop reason: HOSPADM

## 2021-01-01 RX ORDER — LIDOCAINE HYDROCHLORIDE AND EPINEPHRINE 10; 10 MG/ML; UG/ML
5 INJECTION, SOLUTION INFILTRATION; PERINEURAL ONCE
Status: COMPLETED | OUTPATIENT
Start: 2021-01-01 | End: 2021-01-01

## 2021-01-01 RX ORDER — FENTANYL CITRATE 50 UG/ML
INJECTION, SOLUTION INTRAMUSCULAR; INTRAVENOUS AS NEEDED
Status: DISCONTINUED | OUTPATIENT
Start: 2021-01-01 | End: 2021-01-01

## 2021-01-01 RX ORDER — MIDAZOLAM HYDROCHLORIDE 2 MG/2ML
INJECTION, SOLUTION INTRAMUSCULAR; INTRAVENOUS AS NEEDED
Status: DISCONTINUED | OUTPATIENT
Start: 2021-01-01 | End: 2021-01-01

## 2021-01-01 RX ORDER — ALBUMIN, HUMAN INJ 5% 5 %
25 SOLUTION INTRAVENOUS ONCE
Status: COMPLETED | OUTPATIENT
Start: 2021-01-01 | End: 2021-01-01

## 2021-01-01 RX ORDER — OXYCODONE HYDROCHLORIDE AND ACETAMINOPHEN 5; 325 MG/1; MG/1
1 TABLET ORAL ONCE AS NEEDED
Status: DISCONTINUED | OUTPATIENT
Start: 2021-01-01 | End: 2021-01-01 | Stop reason: HOSPADM

## 2021-01-01 RX ORDER — MAGNESIUM CARB/ALUMINUM HYDROX 105-160MG
296 TABLET,CHEWABLE ORAL ONCE
Status: COMPLETED | OUTPATIENT
Start: 2021-01-01 | End: 2021-01-01

## 2021-01-01 RX ORDER — BACITRACIN, NEOMYCIN, POLYMYXIN B 400; 3.5; 5 [USP'U]/G; MG/G; [USP'U]/G
1 OINTMENT TOPICAL ONCE
Status: COMPLETED | OUTPATIENT
Start: 2021-01-01 | End: 2021-01-01

## 2021-01-01 RX ORDER — SODIUM CHLORIDE, SODIUM LACTATE, POTASSIUM CHLORIDE, CALCIUM CHLORIDE 600; 310; 30; 20 MG/100ML; MG/100ML; MG/100ML; MG/100ML
100 INJECTION, SOLUTION INTRAVENOUS CONTINUOUS
Status: DISPENSED | OUTPATIENT
Start: 2021-01-01 | End: 2021-01-01

## 2021-01-01 RX ORDER — SODIUM CHLORIDE, SODIUM LACTATE, POTASSIUM CHLORIDE, CALCIUM CHLORIDE 600; 310; 30; 20 MG/100ML; MG/100ML; MG/100ML; MG/100ML
75 INJECTION, SOLUTION INTRAVENOUS CONTINUOUS
Status: DISCONTINUED | OUTPATIENT
Start: 2021-01-01 | End: 2021-01-01

## 2021-01-01 RX ORDER — OXYCODONE HYDROCHLORIDE 5 MG/1
5 TABLET ORAL EVERY 4 HOURS PRN
Status: DISCONTINUED | OUTPATIENT
Start: 2021-01-01 | End: 2021-01-01

## 2021-01-01 RX ORDER — OXYCODONE HYDROCHLORIDE 10 MG/1
10 TABLET ORAL EVERY 4 HOURS
Status: DISCONTINUED | OUTPATIENT
Start: 2021-01-01 | End: 2021-01-01

## 2021-01-01 RX ORDER — POLYETHYLENE GLYCOL 3350 17 G/17G
17 POWDER, FOR SOLUTION ORAL 2 TIMES DAILY PRN
Status: DISCONTINUED | OUTPATIENT
Start: 2021-01-01 | End: 2021-01-01

## 2021-01-01 RX ORDER — HEPARIN SODIUM 10000 [USP'U]/100ML
3-30 INJECTION, SOLUTION INTRAVENOUS
Status: DISCONTINUED | OUTPATIENT
Start: 2021-01-01 | End: 2021-01-01

## 2021-01-01 RX ORDER — METOPROLOL TARTRATE 5 MG/5ML
2.5 INJECTION INTRAVENOUS EVERY 6 HOURS PRN
Status: DISCONTINUED | OUTPATIENT
Start: 2021-01-01 | End: 2021-01-01 | Stop reason: HOSPADM

## 2021-01-01 RX ORDER — LORAZEPAM 2 MG/ML
1 INJECTION INTRAMUSCULAR EVERY 4 HOURS PRN
Status: DISCONTINUED | OUTPATIENT
Start: 2021-01-01 | End: 2021-08-18 | Stop reason: HOSPADM

## 2021-01-01 RX ORDER — POLYETHYLENE GLYCOL 3350 17 G/17G
17 POWDER, FOR SOLUTION ORAL 2 TIMES DAILY PRN
Qty: 578 G | Refills: 0 | Status: SHIPPED | OUTPATIENT
Start: 2021-01-01

## 2021-01-01 RX ORDER — HYDROMORPHONE HCL/PF 1 MG/ML
0.5 SYRINGE (ML) INJECTION
Status: DISCONTINUED | OUTPATIENT
Start: 2021-01-01 | End: 2021-01-01 | Stop reason: HOSPADM

## 2021-01-01 RX ORDER — BISACODYL 10 MG
10 SUPPOSITORY, RECTAL RECTAL DAILY PRN
Status: DISCONTINUED | OUTPATIENT
Start: 2021-01-01 | End: 2021-08-18 | Stop reason: HOSPADM

## 2021-01-01 RX ORDER — LIDOCAINE HYDROCHLORIDE 10 MG/ML
INJECTION, SOLUTION EPIDURAL; INFILTRATION; INTRACAUDAL; PERINEURAL AS NEEDED
Status: DISCONTINUED | OUTPATIENT
Start: 2021-01-01 | End: 2021-01-01

## 2021-01-01 RX ORDER — CYCLOBENZAPRINE HCL 10 MG
10 TABLET ORAL ONCE
Status: COMPLETED | OUTPATIENT
Start: 2021-01-01 | End: 2021-01-01

## 2021-01-01 RX ORDER — SODIUM CHLORIDE, SODIUM LACTATE, POTASSIUM CHLORIDE, CALCIUM CHLORIDE 600; 310; 30; 20 MG/100ML; MG/100ML; MG/100ML; MG/100ML
125 INJECTION, SOLUTION INTRAVENOUS CONTINUOUS
Status: DISCONTINUED | OUTPATIENT
Start: 2021-01-01 | End: 2021-01-01

## 2021-01-01 RX ORDER — MORPHINE SULFATE 15 MG/1
15 TABLET, FILM COATED, EXTENDED RELEASE ORAL EVERY 8 HOURS SCHEDULED
Qty: 42 TABLET | Refills: 0 | Status: SHIPPED | OUTPATIENT
Start: 2021-01-01 | End: 2021-08-19

## 2021-01-01 RX ORDER — POLYETHYLENE GLYCOL 3350 17 G/17G
17 POWDER, FOR SOLUTION ORAL 2 TIMES DAILY
Status: DISCONTINUED | OUTPATIENT
Start: 2021-01-01 | End: 2021-01-01

## 2021-01-01 RX ORDER — LORAZEPAM 2 MG/ML
1 INJECTION INTRAMUSCULAR ONCE
Status: DISCONTINUED | OUTPATIENT
Start: 2021-01-01 | End: 2021-01-01

## 2021-01-01 RX ORDER — MORPHINE SULFATE 30 MG/1
30 TABLET, FILM COATED, EXTENDED RELEASE ORAL EVERY 12 HOURS SCHEDULED
Status: DISCONTINUED | OUTPATIENT
Start: 2021-01-01 | End: 2021-01-01

## 2021-01-01 RX ADMIN — OXYCODONE HYDROCHLORIDE 10 MG: 10 TABLET ORAL at 11:40

## 2021-01-01 RX ADMIN — OXYCODONE HYDROCHLORIDE 15 MG: 10 TABLET ORAL at 05:09

## 2021-01-01 RX ADMIN — HEPARIN SODIUM 2000 UNITS: 1000 INJECTION INTRAVENOUS; SUBCUTANEOUS at 09:01

## 2021-01-01 RX ADMIN — DOCUSATE SODIUM AND SENNOSIDES 1 TABLET: 8.6; 5 TABLET ORAL at 23:02

## 2021-01-01 RX ADMIN — SODIUM CHLORIDE, SODIUM LACTATE, POTASSIUM CHLORIDE, AND CALCIUM CHLORIDE 1000 ML: .6; .31; .03; .02 INJECTION, SOLUTION INTRAVENOUS at 16:53

## 2021-01-01 RX ADMIN — SODIUM CHLORIDE 75 ML/HR: 0.9 INJECTION, SOLUTION INTRAVENOUS at 10:15

## 2021-01-01 RX ADMIN — HEPARIN SODIUM 18 UNITS/KG/HR: 10000 INJECTION, SOLUTION INTRAVENOUS at 04:24

## 2021-01-01 RX ADMIN — LORAZEPAM 1 MG: 2 INJECTION INTRAMUSCULAR; INTRAVENOUS at 00:52

## 2021-01-01 RX ADMIN — MIDAZOLAM 2 MG: 1 INJECTION INTRAMUSCULAR; INTRAVENOUS at 09:07

## 2021-01-01 RX ADMIN — HYDROMORPHONE HYDROCHLORIDE 0.5 MG: 1 INJECTION, SOLUTION INTRAMUSCULAR; INTRAVENOUS; SUBCUTANEOUS at 17:30

## 2021-01-01 RX ADMIN — METHYLNALTREXONE BROMIDE 8 MG: 8 INJECTION, SOLUTION SUBCUTANEOUS at 11:05

## 2021-01-01 RX ADMIN — Medication 25 MCG: at 10:43

## 2021-01-01 RX ADMIN — CEFAZOLIN SODIUM 1000 MG: 1 SOLUTION INTRAVENOUS at 05:17

## 2021-01-01 RX ADMIN — HYDROMORPHONE HYDROCHLORIDE 0.5 MG: 1 INJECTION, SOLUTION INTRAMUSCULAR; INTRAVENOUS; SUBCUTANEOUS at 00:21

## 2021-01-01 RX ADMIN — SODIUM CHLORIDE 100 ML/HR: 0.9 INJECTION, SOLUTION INTRAVENOUS at 04:51

## 2021-01-01 RX ADMIN — METRONIDAZOLE 500 MG: 500 TABLET ORAL at 21:15

## 2021-01-01 RX ADMIN — SODIUM CHLORIDE 100 ML/HR: 0.9 INJECTION, SOLUTION INTRAVENOUS at 00:51

## 2021-01-01 RX ADMIN — HYDROMORPHONE HYDROCHLORIDE 0.5 MG: 1 INJECTION, SOLUTION INTRAMUSCULAR; INTRAVENOUS; SUBCUTANEOUS at 08:25

## 2021-01-01 RX ADMIN — DEXAMETHASONE 4 MG: 4 TABLET ORAL at 12:26

## 2021-01-01 RX ADMIN — OXYCODONE HYDROCHLORIDE 5 MG: 5 TABLET ORAL at 04:05

## 2021-01-01 RX ADMIN — OXYCODONE HYDROCHLORIDE 5 MG: 5 TABLET ORAL at 18:39

## 2021-01-01 RX ADMIN — MAGNESIUM SULFATE HEPTAHYDRATE 1 G: 1 INJECTION, SOLUTION INTRAVENOUS at 12:46

## 2021-01-01 RX ADMIN — HEPARIN SODIUM 2000 UNITS: 1000 INJECTION INTRAVENOUS; SUBCUTANEOUS at 22:25

## 2021-01-01 RX ADMIN — OXYCODONE HYDROCHLORIDE 10 MG: 10 TABLET ORAL at 01:54

## 2021-01-01 RX ADMIN — DOCUSATE SODIUM AND SENNOSIDES 1 TABLET: 8.6; 5 TABLET ORAL at 22:47

## 2021-01-01 RX ADMIN — SODIUM CHLORIDE, SODIUM LACTATE, POTASSIUM CHLORIDE, AND CALCIUM CHLORIDE 125 ML/HR: .6; .31; .03; .02 INJECTION, SOLUTION INTRAVENOUS at 01:18

## 2021-01-01 RX ADMIN — CEFEPIME HYDROCHLORIDE 2000 MG: 2 INJECTION, POWDER, FOR SOLUTION INTRAVENOUS at 04:33

## 2021-01-01 RX ADMIN — METOPROLOL TARTRATE 25 MG: 25 TABLET, FILM COATED ORAL at 20:49

## 2021-01-01 RX ADMIN — GADOBUTROL 5 ML: 604.72 INJECTION INTRAVENOUS at 04:18

## 2021-01-01 RX ADMIN — FENTANYL CITRATE 25 MCG: 50 INJECTION INTRAMUSCULAR; INTRAVENOUS at 10:10

## 2021-01-01 RX ADMIN — DOCUSATE SODIUM AND SENNOSIDES 1 TABLET: 8.6; 5 TABLET ORAL at 08:53

## 2021-01-01 RX ADMIN — DILTIAZEM HYDROCHLORIDE 2.5 MG/HR: 5 INJECTION INTRAVENOUS at 19:40

## 2021-01-01 RX ADMIN — DOCUSATE SODIUM AND SENNOSIDES 1 TABLET: 8.6; 5 TABLET ORAL at 09:28

## 2021-01-01 RX ADMIN — SODIUM CHLORIDE, SODIUM LACTATE, POTASSIUM CHLORIDE, AND CALCIUM CHLORIDE 125 ML/HR: .6; .31; .03; .02 INJECTION, SOLUTION INTRAVENOUS at 00:01

## 2021-01-01 RX ADMIN — OXYCODONE HYDROCHLORIDE 10 MG: 10 TABLET ORAL at 17:31

## 2021-01-01 RX ADMIN — METRONIDAZOLE 500 MG: 500 INJECTION, SOLUTION INTRAVENOUS at 16:18

## 2021-01-01 RX ADMIN — DOCUSATE SODIUM AND SENNOSIDES 1 TABLET: 8.6; 5 TABLET ORAL at 20:33

## 2021-01-01 RX ADMIN — METRONIDAZOLE 500 MG: 500 TABLET ORAL at 05:02

## 2021-01-01 RX ADMIN — APIXABAN 5 MG: 5 TABLET, FILM COATED ORAL at 08:13

## 2021-01-01 RX ADMIN — HYDROMORPHONE HYDROCHLORIDE 0.5 MG: 1 INJECTION, SOLUTION INTRAMUSCULAR; INTRAVENOUS; SUBCUTANEOUS at 04:16

## 2021-01-01 RX ADMIN — METOPROLOL TARTRATE 50 MG: 50 TABLET, FILM COATED ORAL at 08:02

## 2021-01-01 RX ADMIN — SODIUM CHLORIDE 75 ML/HR: 0.9 INJECTION, SOLUTION INTRAVENOUS at 04:43

## 2021-01-01 RX ADMIN — SENNOSIDES 8.6 MG: 8.6 TABLET ORAL at 17:13

## 2021-01-01 RX ADMIN — OXYCODONE HYDROCHLORIDE 10 MG: 10 TABLET ORAL at 11:33

## 2021-01-01 RX ADMIN — CLINDAMYCIN IN 5 PERCENT DEXTROSE 600 MG: 12 INJECTION, SOLUTION INTRAVENOUS at 22:50

## 2021-01-01 RX ADMIN — Medication 25 MCG: at 11:20

## 2021-01-01 RX ADMIN — CEFAZOLIN 1000 MG: 1 INJECTION, POWDER, FOR SOLUTION INTRAMUSCULAR; INTRAVENOUS at 09:25

## 2021-01-01 RX ADMIN — HYDROMORPHONE HYDROCHLORIDE 0.5 MG: 1 INJECTION, SOLUTION INTRAMUSCULAR; INTRAVENOUS; SUBCUTANEOUS at 02:18

## 2021-01-01 RX ADMIN — APIXABAN 5 MG: 5 TABLET, FILM COATED ORAL at 17:46

## 2021-01-01 RX ADMIN — METRONIDAZOLE 500 MG: 500 TABLET ORAL at 22:11

## 2021-01-01 RX ADMIN — METOPROLOL TARTRATE 50 MG: 50 TABLET, FILM COATED ORAL at 20:52

## 2021-01-01 RX ADMIN — DOCUSATE SODIUM AND SENNOSIDES 1 TABLET: 8.6; 5 TABLET ORAL at 08:02

## 2021-01-01 RX ADMIN — LEVETIRACETAM 750 MG: 100 INJECTION, SOLUTION INTRAVENOUS at 21:11

## 2021-01-01 RX ADMIN — DEXAMETHASONE 4 MG: 4 TABLET ORAL at 05:58

## 2021-01-01 RX ADMIN — SODIUM CHLORIDE, SODIUM LACTATE, POTASSIUM CHLORIDE, AND CALCIUM CHLORIDE: .6; .31; .03; .02 INJECTION, SOLUTION INTRAVENOUS at 10:27

## 2021-01-01 RX ADMIN — BACITRACIN ZINC, NEOMYCIN, POLYMYXIN B SULFAT 1 SMALL APPLICATION: 5000; 3.5; 4 OINTMENT TOPICAL at 12:37

## 2021-01-01 RX ADMIN — MORPHINE SULFATE 15 MG: 15 TABLET, EXTENDED RELEASE ORAL at 15:12

## 2021-01-01 RX ADMIN — MIDAZOLAM 0.5 MG: 1 INJECTION INTRAMUSCULAR; INTRAVENOUS at 09:41

## 2021-01-01 RX ADMIN — HEPARIN SODIUM 32 UNITS/KG/HR: 10000 INJECTION, SOLUTION INTRAVENOUS at 22:39

## 2021-01-01 RX ADMIN — OXYCODONE HYDROCHLORIDE 15 MG: 10 TABLET ORAL at 20:11

## 2021-01-01 RX ADMIN — HEPARIN SODIUM 38 UNITS/KG/HR: 10000 INJECTION, SOLUTION INTRAVENOUS at 10:31

## 2021-01-01 RX ADMIN — SODIUM CHLORIDE 100 ML/HR: 0.9 INJECTION, SOLUTION INTRAVENOUS at 22:39

## 2021-01-01 RX ADMIN — LEVETIRACETAM 750 MG: 100 INJECTION, SOLUTION INTRAVENOUS at 08:48

## 2021-01-01 RX ADMIN — OXYCODONE HYDROCHLORIDE 15 MG: 10 TABLET ORAL at 12:58

## 2021-01-01 RX ADMIN — OXYCODONE HYDROCHLORIDE 15 MG: 10 TABLET ORAL at 15:41

## 2021-01-01 RX ADMIN — ENOXAPARIN SODIUM 50 MG: 60 INJECTION SUBCUTANEOUS at 06:39

## 2021-01-01 RX ADMIN — DEXAMETHASONE 4 MG: 4 TABLET ORAL at 19:48

## 2021-01-01 RX ADMIN — PROPOFOL 130 MG: 10 INJECTION, EMULSION INTRAVENOUS at 09:15

## 2021-01-01 RX ADMIN — OXYCODONE HYDROCHLORIDE 15 MG: 10 TABLET ORAL at 04:01

## 2021-01-01 RX ADMIN — ENOXAPARIN SODIUM 50 MG: 60 INJECTION SUBCUTANEOUS at 06:01

## 2021-01-01 RX ADMIN — ONDANSETRON 4 MG: 2 INJECTION INTRAMUSCULAR; INTRAVENOUS at 09:57

## 2021-01-01 RX ADMIN — LEVETIRACETAM 750 MG: 100 INJECTION, SOLUTION INTRAVENOUS at 21:43

## 2021-01-01 RX ADMIN — SENNOSIDES 8.6 MG: 8.6 TABLET ORAL at 08:13

## 2021-01-01 RX ADMIN — POLYETHYLENE GLYCOL 3350 17 G: 17 POWDER, FOR SOLUTION ORAL at 09:49

## 2021-01-01 RX ADMIN — HEPARIN SODIUM 4000 UNITS: 1000 INJECTION INTRAVENOUS; SUBCUTANEOUS at 07:26

## 2021-01-01 RX ADMIN — APIXABAN 5 MG: 5 TABLET, FILM COATED ORAL at 17:30

## 2021-01-01 RX ADMIN — VANCOMYCIN HYDROCHLORIDE 750 MG: 750 INJECTION, SOLUTION INTRAVENOUS at 06:21

## 2021-01-01 RX ADMIN — POLYETHYLENE GLYCOL 3350 17 G: 17 POWDER, FOR SOLUTION ORAL at 17:13

## 2021-01-01 RX ADMIN — HEPARIN SODIUM 4000 UNITS: 1000 INJECTION INTRAVENOUS; SUBCUTANEOUS at 04:23

## 2021-01-01 RX ADMIN — METOPROLOL TARTRATE 50 MG: 50 TABLET, FILM COATED ORAL at 20:50

## 2021-01-01 RX ADMIN — HEPARIN SODIUM 20 UNITS/KG/HR: 10000 INJECTION, SOLUTION INTRAVENOUS at 04:42

## 2021-01-01 RX ADMIN — LIDOCAINE HYDROCHLORIDE,EPINEPHRINE BITARTRATE 5 ML: 10; .01 INJECTION, SOLUTION INFILTRATION; PERINEURAL at 12:37

## 2021-01-01 RX ADMIN — OXYCODONE HYDROCHLORIDE 15 MG: 10 TABLET ORAL at 05:58

## 2021-01-01 RX ADMIN — IOHEXOL 85 ML: 350 INJECTION, SOLUTION INTRAVENOUS at 19:22

## 2021-01-01 RX ADMIN — DEXAMETHASONE 4 MG: 4 TABLET ORAL at 05:09

## 2021-01-01 RX ADMIN — SODIUM CHLORIDE 75 ML/HR: 0.9 INJECTION, SOLUTION INTRAVENOUS at 04:05

## 2021-01-01 RX ADMIN — DOCUSATE SODIUM AND SENNOSIDES 1 TABLET: 8.6; 5 TABLET ORAL at 20:49

## 2021-01-01 RX ADMIN — MAGNESIUM SULFATE HEPTAHYDRATE 2 G: 40 INJECTION, SOLUTION INTRAVENOUS at 09:54

## 2021-01-01 RX ADMIN — SODIUM CHLORIDE 100 ML/HR: 0.9 INJECTION, SOLUTION INTRAVENOUS at 12:47

## 2021-01-01 RX ADMIN — HYDROMORPHONE HYDROCHLORIDE 0.5 MG: 1 INJECTION, SOLUTION INTRAMUSCULAR; INTRAVENOUS; SUBCUTANEOUS at 03:14

## 2021-01-01 RX ADMIN — LEVETIRACETAM 3200 MG: 100 INJECTION, SOLUTION INTRAVENOUS at 11:37

## 2021-01-01 RX ADMIN — MORPHINE SULFATE 15 MG: 15 TABLET, EXTENDED RELEASE ORAL at 05:44

## 2021-01-01 RX ADMIN — HEPARIN SODIUM 4000 UNITS: 1000 INJECTION INTRAVENOUS; SUBCUTANEOUS at 12:44

## 2021-01-01 RX ADMIN — SODIUM CHLORIDE, SODIUM LACTATE, POTASSIUM CHLORIDE, AND CALCIUM CHLORIDE 125 ML/HR: .6; .31; .03; .02 INJECTION, SOLUTION INTRAVENOUS at 09:22

## 2021-01-01 RX ADMIN — POLYETHYLENE GLYCOL 3350 17 G: 17 POWDER, FOR SOLUTION ORAL at 08:22

## 2021-01-01 RX ADMIN — MIDAZOLAM 0.5 MG: 1 INJECTION INTRAMUSCULAR; INTRAVENOUS at 09:49

## 2021-01-01 RX ADMIN — LEVETIRACETAM 750 MG: 100 INJECTION, SOLUTION INTRAVENOUS at 08:14

## 2021-01-01 RX ADMIN — ONDANSETRON 4 MG: 2 INJECTION INTRAMUSCULAR; INTRAVENOUS at 12:36

## 2021-01-01 RX ADMIN — METRONIDAZOLE 500 MG: 500 TABLET ORAL at 14:18

## 2021-01-01 RX ADMIN — DEXAMETHASONE 2 MG: 2 TABLET ORAL at 09:50

## 2021-01-01 RX ADMIN — HYDROMORPHONE HYDROCHLORIDE 0.5 MG: 1 INJECTION, SOLUTION INTRAMUSCULAR; INTRAVENOUS; SUBCUTANEOUS at 17:35

## 2021-01-01 RX ADMIN — ACETAMINOPHEN 650 MG: 325 TABLET, FILM COATED ORAL at 01:32

## 2021-01-01 RX ADMIN — OXYCODONE HYDROCHLORIDE 15 MG: 10 TABLET ORAL at 20:47

## 2021-01-01 RX ADMIN — LIDOCAINE HYDROCHLORIDE 50 MG: 10 INJECTION, SOLUTION EPIDURAL; INFILTRATION; INTRACAUDAL; PERINEURAL at 10:30

## 2021-01-01 RX ADMIN — SODIUM CHLORIDE 100 ML/HR: 0.9 INJECTION, SOLUTION INTRAVENOUS at 07:31

## 2021-01-01 RX ADMIN — FENTANYL CITRATE 25 MCG: 50 INJECTION INTRAMUSCULAR; INTRAVENOUS at 09:32

## 2021-01-01 RX ADMIN — POLYETHYLENE GLYCOL 3350 17 G: 17 POWDER, FOR SOLUTION ORAL at 09:26

## 2021-01-01 RX ADMIN — SODIUM CHLORIDE, SODIUM LACTATE, POTASSIUM CHLORIDE, AND CALCIUM CHLORIDE 600 ML: .6; .31; .03; .02 INJECTION, SOLUTION INTRAVENOUS at 12:10

## 2021-01-01 RX ADMIN — FENTANYL CITRATE 25 MCG: 50 INJECTION INTRAMUSCULAR; INTRAVENOUS at 09:21

## 2021-01-01 RX ADMIN — ENOXAPARIN SODIUM 50 MG: 60 INJECTION SUBCUTANEOUS at 20:34

## 2021-01-01 RX ADMIN — HYDROMORPHONE HYDROCHLORIDE 0.5 MG: 1 INJECTION, SOLUTION INTRAMUSCULAR; INTRAVENOUS; SUBCUTANEOUS at 00:54

## 2021-01-01 RX ADMIN — OXYCODONE HYDROCHLORIDE 15 MG: 10 TABLET ORAL at 14:29

## 2021-01-01 RX ADMIN — DEXAMETHASONE 4 MG: 4 TABLET ORAL at 17:26

## 2021-01-01 RX ADMIN — DEXAMETHASONE 4 MG: 4 TABLET ORAL at 01:54

## 2021-01-01 RX ADMIN — DEXAMETHASONE 4 MG: 4 TABLET ORAL at 00:15

## 2021-01-01 RX ADMIN — OXYCODONE HYDROCHLORIDE 10 MG: 10 TABLET ORAL at 00:28

## 2021-01-01 RX ADMIN — OXYCODONE HYDROCHLORIDE 5 MG: 5 TABLET ORAL at 17:13

## 2021-01-01 RX ADMIN — DEXAMETHASONE 4 MG: 4 TABLET ORAL at 00:21

## 2021-01-01 RX ADMIN — HYDROMORPHONE HYDROCHLORIDE 0.5 MG: 1 INJECTION, SOLUTION INTRAMUSCULAR; INTRAVENOUS; SUBCUTANEOUS at 12:34

## 2021-01-01 RX ADMIN — SODIUM CHLORIDE, SODIUM LACTATE, POTASSIUM CHLORIDE, AND CALCIUM CHLORIDE 125 ML/HR: .6; .31; .03; .02 INJECTION, SOLUTION INTRAVENOUS at 05:02

## 2021-01-01 RX ADMIN — CEFEPIME HYDROCHLORIDE 2000 MG: 2 INJECTION, POWDER, FOR SOLUTION INTRAVENOUS at 22:58

## 2021-01-01 RX ADMIN — LEVETIRACETAM 750 MG: 100 INJECTION, SOLUTION INTRAVENOUS at 09:05

## 2021-01-01 RX ADMIN — APIXABAN 5 MG: 5 TABLET, FILM COATED ORAL at 08:57

## 2021-01-01 RX ADMIN — SODIUM CHLORIDE, SODIUM LACTATE, POTASSIUM CHLORIDE, AND CALCIUM CHLORIDE 500 ML: .6; .31; .03; .02 INJECTION, SOLUTION INTRAVENOUS at 11:16

## 2021-01-01 RX ADMIN — CYCLOBENZAPRINE HYDROCHLORIDE 10 MG: 10 TABLET, FILM COATED ORAL at 17:07

## 2021-01-01 RX ADMIN — SODIUM CHLORIDE 125 ML/HR: 0.9 INJECTION, SOLUTION INTRAVENOUS at 11:04

## 2021-01-01 RX ADMIN — SODIUM CHLORIDE, SODIUM LACTATE, POTASSIUM CHLORIDE, AND CALCIUM CHLORIDE 1000 ML: .6; .31; .03; .02 INJECTION, SOLUTION INTRAVENOUS at 15:47

## 2021-01-01 RX ADMIN — VANCOMYCIN HYDROCHLORIDE 1000 MG: 1 INJECTION, SOLUTION INTRAVENOUS at 23:13

## 2021-01-01 RX ADMIN — HEPARIN SODIUM 38 UNITS/KG/HR: 10000 INJECTION, SOLUTION INTRAVENOUS at 20:13

## 2021-01-01 RX ADMIN — HEPARIN SODIUM 30 UNITS/KG/HR: 10000 INJECTION, SOLUTION INTRAVENOUS at 15:43

## 2021-01-01 RX ADMIN — CEFEPIME HYDROCHLORIDE 2000 MG: 2 INJECTION, POWDER, FOR SOLUTION INTRAVENOUS at 09:54

## 2021-01-01 RX ADMIN — POLYETHYLENE GLYCOL 3350 17 G: 17 POWDER, FOR SOLUTION ORAL at 09:28

## 2021-01-01 RX ADMIN — HYDROMORPHONE HYDROCHLORIDE 0.5 MG: 1 INJECTION, SOLUTION INTRAMUSCULAR; INTRAVENOUS; SUBCUTANEOUS at 13:38

## 2021-01-01 RX ADMIN — FLUCONAZOLE 200 MG: 200 TABLET ORAL at 11:49

## 2021-01-01 RX ADMIN — METOPROLOL TARTRATE 50 MG: 50 TABLET, FILM COATED ORAL at 21:43

## 2021-01-01 RX ADMIN — KETOROLAC TROMETHAMINE 15 MG: 30 INJECTION, SOLUTION INTRAMUSCULAR; INTRAVENOUS at 17:07

## 2021-01-01 RX ADMIN — ENOXAPARIN SODIUM 50 MG: 60 INJECTION SUBCUTANEOUS at 17:18

## 2021-01-01 RX ADMIN — METOROPROLOL TARTRATE 5 MG: 5 INJECTION, SOLUTION INTRAVENOUS at 12:44

## 2021-01-01 RX ADMIN — CEFEPIME HYDROCHLORIDE 2000 MG: 2 INJECTION, POWDER, FOR SOLUTION INTRAVENOUS at 15:26

## 2021-01-01 RX ADMIN — POLYETHYLENE GLYCOL 3350 17 G: 17 POWDER, FOR SOLUTION ORAL at 17:36

## 2021-01-01 RX ADMIN — POLYETHYLENE GLYCOL 3350 17 G: 17 POWDER, FOR SOLUTION ORAL at 08:12

## 2021-01-01 RX ADMIN — POLYETHYLENE GLYCOL 3350 17 G: 17 POWDER, FOR SOLUTION ORAL at 08:00

## 2021-01-01 RX ADMIN — LEVETIRACETAM 750 MG: 100 INJECTION, SOLUTION INTRAVENOUS at 09:24

## 2021-01-01 RX ADMIN — HEPARIN SODIUM 38 UNITS/KG/HR: 10000 INJECTION, SOLUTION INTRAVENOUS at 11:29

## 2021-01-01 RX ADMIN — DILTIAZEM HYDROCHLORIDE 15 MG: 5 INJECTION INTRAVENOUS at 15:42

## 2021-01-01 RX ADMIN — POLYETHYLENE GLYCOL 3350 17 G: 17 POWDER, FOR SOLUTION ORAL at 08:57

## 2021-01-01 RX ADMIN — HEPARIN SODIUM 2000 UNITS: 1000 INJECTION INTRAVENOUS; SUBCUTANEOUS at 16:23

## 2021-01-01 RX ADMIN — SODIUM CHLORIDE, SODIUM LACTATE, POTASSIUM CHLORIDE, AND CALCIUM CHLORIDE 100 ML/HR: .6; .31; .03; .02 INJECTION, SOLUTION INTRAVENOUS at 11:28

## 2021-01-01 RX ADMIN — CEFEPIME HYDROCHLORIDE 2000 MG: 2 INJECTION, POWDER, FOR SOLUTION INTRAVENOUS at 04:21

## 2021-01-01 RX ADMIN — OXYCODONE HYDROCHLORIDE 15 MG: 10 TABLET ORAL at 10:02

## 2021-01-01 RX ADMIN — METOPROLOL TARTRATE 2.5 MG: 1 INJECTION, SOLUTION INTRAVENOUS at 15:11

## 2021-01-01 RX ADMIN — APIXABAN 5 MG: 5 TABLET, FILM COATED ORAL at 18:00

## 2021-01-01 RX ADMIN — LEVETIRACETAM 750 MG: 100 INJECTION, SOLUTION INTRAVENOUS at 09:22

## 2021-01-01 RX ADMIN — DOCUSATE SODIUM AND SENNOSIDES 1 TABLET: 8.6; 5 TABLET ORAL at 08:22

## 2021-01-01 RX ADMIN — SODIUM CHLORIDE, SODIUM LACTATE, POTASSIUM CHLORIDE, AND CALCIUM CHLORIDE 125 ML/HR: .6; .31; .03; .02 INJECTION, SOLUTION INTRAVENOUS at 00:41

## 2021-01-01 RX ADMIN — LIDOCAINE HYDROCHLORIDE 50 MG: 20 INJECTION, SOLUTION INTRAVENOUS at 09:15

## 2021-01-01 RX ADMIN — HEPARIN SODIUM 2000 UNITS: 1000 INJECTION INTRAVENOUS; SUBCUTANEOUS at 00:14

## 2021-01-01 RX ADMIN — HYDROMORPHONE HYDROCHLORIDE 0.5 MG: 1 INJECTION, SOLUTION INTRAMUSCULAR; INTRAVENOUS; SUBCUTANEOUS at 20:18

## 2021-01-01 RX ADMIN — HEPARIN SODIUM 36 UNITS/KG/HR: 10000 INJECTION, SOLUTION INTRAVENOUS at 17:36

## 2021-01-01 RX ADMIN — VANCOMYCIN HYDROCHLORIDE 750 MG: 750 INJECTION, SOLUTION INTRAVENOUS at 22:25

## 2021-01-01 RX ADMIN — MAGNESIUM CITRATE 296 ML: 1.75 LIQUID ORAL at 17:00

## 2021-01-01 RX ADMIN — Medication 25 MCG: at 10:46

## 2021-01-01 RX ADMIN — SODIUM CHLORIDE, SODIUM LACTATE, POTASSIUM CHLORIDE, AND CALCIUM CHLORIDE 1000 ML: .6; .31; .03; .02 INJECTION, SOLUTION INTRAVENOUS at 12:40

## 2021-01-01 RX ADMIN — IOHEXOL 70 ML: 350 INJECTION, SOLUTION INTRAVENOUS at 12:07

## 2021-01-01 RX ADMIN — SODIUM CHLORIDE, SODIUM LACTATE, POTASSIUM CHLORIDE, AND CALCIUM CHLORIDE 125 ML/HR: .6; .31; .03; .02 INJECTION, SOLUTION INTRAVENOUS at 20:34

## 2021-01-01 RX ADMIN — OXYCODONE HYDROCHLORIDE 10 MG: 10 TABLET ORAL at 20:22

## 2021-01-01 RX ADMIN — HYDROMORPHONE HYDROCHLORIDE 0.5 MG: 1 INJECTION, SOLUTION INTRAMUSCULAR; INTRAVENOUS; SUBCUTANEOUS at 04:40

## 2021-01-01 RX ADMIN — OXYCODONE HYDROCHLORIDE 15 MG: 10 TABLET ORAL at 14:02

## 2021-01-01 RX ADMIN — DEXAMETHASONE 4 MG: 4 TABLET ORAL at 12:29

## 2021-01-01 RX ADMIN — POLYETHYLENE GLYCOL 3350 17 G: 17 POWDER, FOR SOLUTION ORAL at 08:52

## 2021-01-01 RX ADMIN — DEXAMETHASONE 4 MG: 4 TABLET ORAL at 17:23

## 2021-01-01 RX ADMIN — DEXAMETHASONE 4 MG: 4 TABLET ORAL at 11:33

## 2021-01-01 RX ADMIN — KETOROLAC TROMETHAMINE 15 MG: 30 INJECTION, SOLUTION INTRAMUSCULAR; INTRAVENOUS at 22:50

## 2021-01-01 RX ADMIN — DEXAMETHASONE SODIUM PHOSPHATE 10 MG: 10 INJECTION, SOLUTION INTRAMUSCULAR; INTRAVENOUS at 18:08

## 2021-01-01 RX ADMIN — HYDROMORPHONE HYDROCHLORIDE 0.5 MG: 1 INJECTION, SOLUTION INTRAMUSCULAR; INTRAVENOUS; SUBCUTANEOUS at 09:59

## 2021-01-01 RX ADMIN — DEXAMETHASONE 4 MG: 4 TABLET ORAL at 00:53

## 2021-01-01 RX ADMIN — HYDROMORPHONE HYDROCHLORIDE 0.5 MG: 1 INJECTION, SOLUTION INTRAMUSCULAR; INTRAVENOUS; SUBCUTANEOUS at 07:52

## 2021-01-01 RX ADMIN — OXYCODONE HYDROCHLORIDE 10 MG: 10 TABLET ORAL at 02:35

## 2021-01-01 RX ADMIN — METRONIDAZOLE 500 MG: 500 TABLET ORAL at 05:14

## 2021-01-01 RX ADMIN — DEXAMETHASONE 4 MG: 4 TABLET ORAL at 20:33

## 2021-01-01 RX ADMIN — HYDROMORPHONE HYDROCHLORIDE 0.5 MG: 1 INJECTION, SOLUTION INTRAMUSCULAR; INTRAVENOUS; SUBCUTANEOUS at 00:53

## 2021-01-01 RX ADMIN — CEFEPIME HYDROCHLORIDE 2000 MG: 2 INJECTION, POWDER, FOR SOLUTION INTRAVENOUS at 15:57

## 2021-01-01 RX ADMIN — DEXAMETHASONE 4 MG: 4 TABLET ORAL at 17:36

## 2021-01-01 RX ADMIN — IOHEXOL 100 ML: 350 INJECTION, SOLUTION INTRAVENOUS at 13:00

## 2021-01-01 RX ADMIN — SODIUM CHLORIDE 125 ML/HR: 0.9 INJECTION, SOLUTION INTRAVENOUS at 02:58

## 2021-01-01 RX ADMIN — MORPHINE SULFATE 15 MG: 15 TABLET, EXTENDED RELEASE ORAL at 21:26

## 2021-01-01 RX ADMIN — SODIUM CHLORIDE, SODIUM LACTATE, POTASSIUM CHLORIDE, AND CALCIUM CHLORIDE: .6; .31; .03; .02 INJECTION, SOLUTION INTRAVENOUS at 10:22

## 2021-01-01 RX ADMIN — OXYCODONE HYDROCHLORIDE 10 MG: 10 TABLET ORAL at 17:17

## 2021-01-01 RX ADMIN — ALBUMIN (HUMAN) 25 G: 12.5 INJECTION, SOLUTION INTRAVENOUS at 15:51

## 2021-01-01 RX ADMIN — SODIUM CHLORIDE, SODIUM LACTATE, POTASSIUM CHLORIDE, AND CALCIUM CHLORIDE 75 ML/HR: .6; .31; .03; .02 INJECTION, SOLUTION INTRAVENOUS at 12:34

## 2021-01-01 RX ADMIN — HYDROMORPHONE HYDROCHLORIDE 0.5 MG: 1 INJECTION, SOLUTION INTRAMUSCULAR; INTRAVENOUS; SUBCUTANEOUS at 10:01

## 2021-01-01 RX ADMIN — METOPROLOL TARTRATE 25 MG: 25 TABLET, FILM COATED ORAL at 14:41

## 2021-01-01 RX ADMIN — OXYCODONE HYDROCHLORIDE 10 MG: 10 TABLET ORAL at 00:11

## 2021-01-01 RX ADMIN — OXYCODONE HYDROCHLORIDE 10 MG: 10 TABLET ORAL at 21:43

## 2021-01-01 RX ADMIN — DEXAMETHASONE 4 MG: 4 TABLET ORAL at 08:44

## 2021-01-01 RX ADMIN — HYDROMORPHONE HYDROCHLORIDE 0.5 MG: 1 INJECTION, SOLUTION INTRAMUSCULAR; INTRAVENOUS; SUBCUTANEOUS at 16:45

## 2021-01-01 RX ADMIN — METRONIDAZOLE 500 MG: 500 INJECTION, SOLUTION INTRAVENOUS at 16:19

## 2021-01-01 RX ADMIN — LEVETIRACETAM 750 MG: 100 INJECTION, SOLUTION INTRAVENOUS at 20:15

## 2021-01-01 RX ADMIN — HEPARIN SODIUM 38 UNITS/KG/HR: 10000 INJECTION, SOLUTION INTRAVENOUS at 17:42

## 2021-01-01 RX ADMIN — CEFEPIME HYDROCHLORIDE 2000 MG: 2 INJECTION, POWDER, FOR SOLUTION INTRAVENOUS at 12:02

## 2021-01-01 RX ADMIN — MORPHINE SULFATE 15 MG: 15 TABLET, EXTENDED RELEASE ORAL at 14:42

## 2021-01-01 RX ADMIN — OXYCODONE HYDROCHLORIDE 10 MG: 10 TABLET ORAL at 07:29

## 2021-01-01 RX ADMIN — DEXAMETHASONE 4 MG: 4 TABLET ORAL at 00:57

## 2021-01-01 RX ADMIN — POLYETHYLENE GLYCOL 3350 17 G: 17 POWDER, FOR SOLUTION ORAL at 20:33

## 2021-01-01 RX ADMIN — SODIUM CHLORIDE, SODIUM LACTATE, POTASSIUM CHLORIDE, AND CALCIUM CHLORIDE 125 ML/HR: .6; .31; .03; .02 INJECTION, SOLUTION INTRAVENOUS at 13:54

## 2021-01-01 RX ADMIN — METRONIDAZOLE 500 MG: 500 TABLET ORAL at 21:10

## 2021-01-01 RX ADMIN — HYDROMORPHONE HYDROCHLORIDE 0.5 MG: 1 INJECTION, SOLUTION INTRAMUSCULAR; INTRAVENOUS; SUBCUTANEOUS at 02:56

## 2021-01-01 RX ADMIN — HEPARIN SODIUM 32 UNITS/KG/HR: 10000 INJECTION, SOLUTION INTRAVENOUS at 10:27

## 2021-01-01 RX ADMIN — DEXAMETHASONE 4 MG: 4 TABLET ORAL at 06:00

## 2021-01-01 RX ADMIN — DEXAMETHASONE 4 MG: 4 TABLET ORAL at 06:38

## 2021-01-01 RX ADMIN — METOPROLOL TARTRATE 100 MG: 50 TABLET, FILM COATED ORAL at 12:22

## 2021-01-01 RX ADMIN — FLUCONAZOLE 200 MG: 200 TABLET ORAL at 11:26

## 2021-01-01 RX ADMIN — HYDROMORPHONE HYDROCHLORIDE 0.5 MG: 1 INJECTION, SOLUTION INTRAMUSCULAR; INTRAVENOUS; SUBCUTANEOUS at 23:16

## 2021-01-01 RX ADMIN — OXYCODONE HYDROCHLORIDE 15 MG: 10 TABLET ORAL at 13:26

## 2021-01-01 RX ADMIN — LEVETIRACETAM 750 MG: 100 INJECTION, SOLUTION INTRAVENOUS at 09:50

## 2021-01-01 RX ADMIN — OXYCODONE HYDROCHLORIDE 10 MG: 10 TABLET ORAL at 14:13

## 2021-01-01 RX ADMIN — IOHEXOL 100 ML: 350 INJECTION, SOLUTION INTRAVENOUS at 21:41

## 2021-01-01 RX ADMIN — METRONIDAZOLE 500 MG: 500 INJECTION, SOLUTION INTRAVENOUS at 07:37

## 2021-01-01 RX ADMIN — FENTANYL CITRATE 25 MCG: 50 INJECTION INTRAMUSCULAR; INTRAVENOUS at 09:41

## 2021-01-01 RX ADMIN — OXYCODONE HYDROCHLORIDE 15 MG: 10 TABLET ORAL at 10:19

## 2021-01-01 RX ADMIN — HYDROMORPHONE HYDROCHLORIDE 0.5 MG: 1 INJECTION, SOLUTION INTRAMUSCULAR; INTRAVENOUS; SUBCUTANEOUS at 05:13

## 2021-01-01 RX ADMIN — HYDROMORPHONE HYDROCHLORIDE 0.5 MG: 1 INJECTION, SOLUTION INTRAMUSCULAR; INTRAVENOUS; SUBCUTANEOUS at 23:02

## 2021-01-01 RX ADMIN — OXYCODONE HYDROCHLORIDE 10 MG: 10 TABLET ORAL at 03:41

## 2021-01-01 RX ADMIN — OXYCODONE HYDROCHLORIDE 10 MG: 10 TABLET ORAL at 19:49

## 2021-01-01 RX ADMIN — ENOXAPARIN SODIUM 50 MG: 60 INJECTION SUBCUTANEOUS at 05:42

## 2021-01-01 RX ADMIN — LIDOCAINE 1 PATCH: 50 PATCH TOPICAL at 23:55

## 2021-01-01 RX ADMIN — SENNOSIDES 8.6 MG: 8.6 TABLET ORAL at 08:57

## 2021-01-01 RX ADMIN — APIXABAN 5 MG: 5 TABLET, FILM COATED ORAL at 17:23

## 2021-01-01 RX ADMIN — OXYCODONE HYDROCHLORIDE 10 MG: 10 TABLET ORAL at 20:34

## 2021-01-01 RX ADMIN — HYDROMORPHONE HYDROCHLORIDE 0.5 MG: 1 INJECTION, SOLUTION INTRAMUSCULAR; INTRAVENOUS; SUBCUTANEOUS at 01:10

## 2021-01-01 RX ADMIN — SODIUM CHLORIDE 100 ML/HR: 0.9 INJECTION, SOLUTION INTRAVENOUS at 17:56

## 2021-01-01 RX ADMIN — VANCOMYCIN HYDROCHLORIDE 1000 MG: 1 INJECTION, SOLUTION INTRAVENOUS at 12:26

## 2021-01-01 RX ADMIN — METRONIDAZOLE 500 MG: 500 TABLET ORAL at 13:50

## 2021-01-01 RX ADMIN — METOPROLOL TARTRATE 50 MG: 50 TABLET, FILM COATED ORAL at 21:16

## 2021-01-01 RX ADMIN — DEXAMETHASONE 4 MG: 4 TABLET ORAL at 11:04

## 2021-01-01 RX ADMIN — DEXAMETHASONE 4 MG: 4 TABLET ORAL at 01:59

## 2021-01-01 RX ADMIN — METOPROLOL TARTRATE 50 MG: 50 TABLET, FILM COATED ORAL at 09:28

## 2021-01-01 RX ADMIN — METRONIDAZOLE 500 MG: 500 INJECTION, SOLUTION INTRAVENOUS at 23:19

## 2021-01-01 RX ADMIN — DOCUSATE SODIUM AND SENNOSIDES 1 TABLET: 8.6; 5 TABLET ORAL at 21:09

## 2021-01-01 RX ADMIN — HEPARIN SODIUM 2000 UNITS: 1000 INJECTION INTRAVENOUS; SUBCUTANEOUS at 19:49

## 2021-01-01 RX ADMIN — HYDROMORPHONE HYDROCHLORIDE 0.5 MG: 1 INJECTION, SOLUTION INTRAMUSCULAR; INTRAVENOUS; SUBCUTANEOUS at 15:51

## 2021-01-01 RX ADMIN — HEPARIN SODIUM 30 UNITS/KG/HR: 10000 INJECTION, SOLUTION INTRAVENOUS at 12:49

## 2021-01-01 RX ADMIN — APIXABAN 5 MG: 5 TABLET, FILM COATED ORAL at 18:36

## 2021-01-01 RX ADMIN — OXYCODONE HYDROCHLORIDE 15 MG: 10 TABLET ORAL at 20:22

## 2021-01-01 RX ADMIN — APIXABAN 5 MG: 5 TABLET, FILM COATED ORAL at 09:48

## 2021-01-01 RX ADMIN — METOPROLOL TARTRATE 50 MG: 50 TABLET, FILM COATED ORAL at 08:44

## 2021-01-01 RX ADMIN — METOROPROLOL TARTRATE 5 MG: 5 INJECTION, SOLUTION INTRAVENOUS at 10:31

## 2021-01-01 RX ADMIN — CEFEPIME HYDROCHLORIDE 2000 MG: 2 INJECTION, POWDER, FOR SOLUTION INTRAVENOUS at 03:54

## 2021-01-01 RX ADMIN — HYDROMORPHONE HYDROCHLORIDE 0.5 MG: 1 INJECTION, SOLUTION INTRAMUSCULAR; INTRAVENOUS; SUBCUTANEOUS at 06:29

## 2021-01-01 RX ADMIN — HYDROMORPHONE HYDROCHLORIDE 0.5 MG: 1 INJECTION, SOLUTION INTRAMUSCULAR; INTRAVENOUS; SUBCUTANEOUS at 23:59

## 2021-01-01 RX ADMIN — DEXAMETHASONE 4 MG: 4 TABLET ORAL at 17:21

## 2021-01-01 RX ADMIN — HYDROMORPHONE HYDROCHLORIDE 0.5 MG: 1 INJECTION, SOLUTION INTRAMUSCULAR; INTRAVENOUS; SUBCUTANEOUS at 07:59

## 2021-01-01 RX ADMIN — SODIUM CHLORIDE, SODIUM LACTATE, POTASSIUM CHLORIDE, AND CALCIUM CHLORIDE 500 ML: .6; .31; .03; .02 INJECTION, SOLUTION INTRAVENOUS at 11:41

## 2021-01-01 RX ADMIN — SODIUM CHLORIDE 125 ML/HR: 0.9 INJECTION, SOLUTION INTRAVENOUS at 18:35

## 2021-01-01 RX ADMIN — GADOBUTROL 5 ML: 604.72 INJECTION INTRAVENOUS at 12:07

## 2021-01-01 RX ADMIN — HYDROMORPHONE HYDROCHLORIDE 0.5 MG: 1 INJECTION, SOLUTION INTRAMUSCULAR; INTRAVENOUS; SUBCUTANEOUS at 22:18

## 2021-01-01 RX ADMIN — OXYCODONE HYDROCHLORIDE 15 MG: 10 TABLET ORAL at 21:09

## 2021-01-01 RX ADMIN — DEXAMETHASONE 4 MG: 4 TABLET ORAL at 09:25

## 2021-01-01 RX ADMIN — APIXABAN 5 MG: 5 TABLET, FILM COATED ORAL at 09:06

## 2021-01-01 RX ADMIN — SENNOSIDES 8.6 MG: 8.6 TABLET ORAL at 18:36

## 2021-01-01 RX ADMIN — OXYCODONE HYDROCHLORIDE 10 MG: 10 TABLET ORAL at 06:38

## 2021-01-01 RX ADMIN — SODIUM CHLORIDE 100 ML/HR: 0.9 INJECTION, SOLUTION INTRAVENOUS at 15:40

## 2021-01-01 RX ADMIN — PROPOFOL 120 MG: 10 INJECTION, EMULSION INTRAVENOUS at 10:30

## 2021-01-01 RX ADMIN — SENNOSIDES 8.6 MG: 8.6 TABLET ORAL at 09:48

## 2021-01-01 RX ADMIN — OXYCODONE HYDROCHLORIDE 10 MG: 10 TABLET ORAL at 03:03

## 2021-01-01 RX ADMIN — ACETAMINOPHEN 975 MG: 325 TABLET, FILM COATED ORAL at 03:25

## 2021-01-01 RX ADMIN — HEPARIN SODIUM 26 UNITS/KG/HR: 10000 INJECTION, SOLUTION INTRAVENOUS at 00:12

## 2021-01-01 RX ADMIN — OXYCODONE HYDROCHLORIDE 5 MG: 5 TABLET ORAL at 16:03

## 2021-01-01 RX ADMIN — FENTANYL CITRATE 25 MCG: 50 INJECTION INTRAMUSCULAR; INTRAVENOUS at 09:49

## 2021-01-01 RX ADMIN — HEPARIN SODIUM 26 UNITS/KG/HR: 10000 INJECTION, SOLUTION INTRAVENOUS at 01:43

## 2021-01-01 RX ADMIN — KETAMINE HYDROCHLORIDE 20 MG: 50 INJECTION, SOLUTION INTRAMUSCULAR; INTRAVENOUS at 09:43

## 2021-01-01 RX ADMIN — HEPARIN SODIUM 2000 UNITS: 1000 INJECTION INTRAVENOUS; SUBCUTANEOUS at 06:35

## 2021-01-01 RX ADMIN — SODIUM CHLORIDE, SODIUM LACTATE, POTASSIUM CHLORIDE, AND CALCIUM CHLORIDE 75 ML/HR: .6; .31; .03; .02 INJECTION, SOLUTION INTRAVENOUS at 21:44

## 2021-01-01 RX ADMIN — ENOXAPARIN SODIUM 50 MG: 60 INJECTION SUBCUTANEOUS at 17:25

## 2021-01-01 RX ADMIN — MORPHINE SULFATE 15 MG: 15 TABLET, EXTENDED RELEASE ORAL at 15:22

## 2021-01-01 RX ADMIN — METOPROLOL TARTRATE 100 MG: 50 TABLET, FILM COATED ORAL at 21:26

## 2021-01-01 RX ADMIN — MORPHINE SULFATE 15 MG: 15 TABLET, EXTENDED RELEASE ORAL at 06:00

## 2021-01-01 RX ADMIN — LACTULOSE 10 G: 20 SOLUTION ORAL at 09:59

## 2021-01-01 RX ADMIN — DEXAMETHASONE 4 MG: 4 TABLET ORAL at 00:22

## 2021-01-01 RX ADMIN — VANCOMYCIN HYDROCHLORIDE 750 MG: 750 INJECTION, SOLUTION INTRAVENOUS at 07:49

## 2021-01-01 RX ADMIN — HEPARIN SODIUM 34 UNITS/KG/HR: 10000 INJECTION, SOLUTION INTRAVENOUS at 03:17

## 2021-01-01 RX ADMIN — OXYCODONE HYDROCHLORIDE 15 MG: 10 TABLET ORAL at 22:37

## 2021-01-01 RX ADMIN — HYDROMORPHONE HYDROCHLORIDE 0.5 MG: 1 INJECTION, SOLUTION INTRAMUSCULAR; INTRAVENOUS; SUBCUTANEOUS at 08:13

## 2021-01-01 RX ADMIN — DOCUSATE SODIUM AND SENNOSIDES 1 TABLET: 8.6; 5 TABLET ORAL at 20:47

## 2021-01-01 RX ADMIN — DEXAMETHASONE 4 MG: 4 TABLET ORAL at 18:34

## 2021-01-01 RX ADMIN — ENOXAPARIN SODIUM 40 MG: 40 INJECTION SUBCUTANEOUS at 13:06

## 2021-01-01 RX ADMIN — APIXABAN 5 MG: 5 TABLET, FILM COATED ORAL at 17:13

## 2021-01-01 RX ADMIN — DEXAMETHASONE 4 MG: 4 TABLET ORAL at 12:43

## 2021-01-01 RX ADMIN — SODIUM CHLORIDE, SODIUM LACTATE, POTASSIUM CHLORIDE, AND CALCIUM CHLORIDE 1000 ML: .6; .31; .03; .02 INJECTION, SOLUTION INTRAVENOUS at 10:12

## 2021-01-01 RX ADMIN — DEXAMETHASONE 4 MG: 4 TABLET ORAL at 06:29

## 2021-01-01 RX ADMIN — SODIUM CHLORIDE, SODIUM LACTATE, POTASSIUM CHLORIDE, AND CALCIUM CHLORIDE 125 ML/HR: .6; .31; .03; .02 INJECTION, SOLUTION INTRAVENOUS at 13:30

## 2021-01-01 RX ADMIN — LEVETIRACETAM 750 MG: 100 INJECTION, SOLUTION INTRAVENOUS at 21:15

## 2021-01-01 RX ADMIN — OXYCODONE HYDROCHLORIDE 15 MG: 10 TABLET ORAL at 16:03

## 2021-01-01 RX ADMIN — FLUCONAZOLE 200 MG: 200 TABLET ORAL at 09:25

## 2021-01-01 RX ADMIN — METOPROLOL TARTRATE 50 MG: 50 TABLET, FILM COATED ORAL at 20:34

## 2021-01-01 RX ADMIN — HEPARIN SODIUM 2000 UNITS: 1000 INJECTION INTRAVENOUS; SUBCUTANEOUS at 05:17

## 2021-01-01 RX ADMIN — SODIUM CHLORIDE, SODIUM LACTATE, POTASSIUM CHLORIDE, AND CALCIUM CHLORIDE 125 ML/HR: .6; .31; .03; .02 INJECTION, SOLUTION INTRAVENOUS at 08:49

## 2021-01-01 RX ADMIN — HYDROMORPHONE HYDROCHLORIDE 0.5 MG: 1 INJECTION, SOLUTION INTRAMUSCULAR; INTRAVENOUS; SUBCUTANEOUS at 19:40

## 2021-01-01 RX ADMIN — OXYCODONE HYDROCHLORIDE 10 MG: 10 TABLET ORAL at 00:29

## 2021-01-01 RX ADMIN — SODIUM CHLORIDE 75 ML/HR: 0.9 INJECTION, SOLUTION INTRAVENOUS at 01:34

## 2021-01-01 RX ADMIN — OXYCODONE HYDROCHLORIDE 10 MG: 10 TABLET ORAL at 06:00

## 2021-01-01 RX ADMIN — OXYCODONE HYDROCHLORIDE 15 MG: 10 TABLET ORAL at 03:12

## 2021-01-01 RX ADMIN — HYDROMORPHONE HYDROCHLORIDE 0.5 MG: 1 INJECTION, SOLUTION INTRAMUSCULAR; INTRAVENOUS; SUBCUTANEOUS at 11:04

## 2021-01-01 RX ADMIN — OXYCODONE HYDROCHLORIDE 10 MG: 10 TABLET ORAL at 14:01

## 2021-01-01 RX ADMIN — HYDROMORPHONE HYDROCHLORIDE 0.5 MG: 1 INJECTION, SOLUTION INTRAMUSCULAR; INTRAVENOUS; SUBCUTANEOUS at 20:50

## 2021-01-01 RX ADMIN — MORPHINE SULFATE 15 MG: 15 TABLET, EXTENDED RELEASE ORAL at 22:04

## 2021-01-01 RX ADMIN — DEXAMETHASONE 4 MG: 4 TABLET ORAL at 14:01

## 2021-01-01 RX ADMIN — DEXAMETHASONE 4 MG: 4 TABLET ORAL at 17:37

## 2021-01-01 RX ADMIN — CEFEPIME HYDROCHLORIDE 2000 MG: 2 INJECTION, POWDER, FOR SOLUTION INTRAVENOUS at 21:51

## 2021-01-01 RX ADMIN — HEPARIN SODIUM 38 UNITS/KG/HR: 10000 INJECTION, SOLUTION INTRAVENOUS at 05:47

## 2021-01-01 RX ADMIN — METHYLNALTREXONE BROMIDE 8 MG: 8 INJECTION, SOLUTION SUBCUTANEOUS at 14:36

## 2021-01-01 RX ADMIN — APIXABAN 5 MG: 5 TABLET, FILM COATED ORAL at 09:22

## 2021-01-01 RX ADMIN — CEFEPIME HYDROCHLORIDE 2000 MG: 2 INJECTION, POWDER, FOR SOLUTION INTRAVENOUS at 11:13

## 2021-01-01 RX ADMIN — HYDROMORPHONE HYDROCHLORIDE 0.5 MG: 1 INJECTION, SOLUTION INTRAMUSCULAR; INTRAVENOUS; SUBCUTANEOUS at 15:58

## 2021-01-01 RX ADMIN — SODIUM CHLORIDE, SODIUM LACTATE, POTASSIUM CHLORIDE, AND CALCIUM CHLORIDE 125 ML/HR: .6; .31; .03; .02 INJECTION, SOLUTION INTRAVENOUS at 10:09

## 2021-01-01 RX ADMIN — OXYCODONE HYDROCHLORIDE 10 MG: 10 TABLET ORAL at 12:40

## 2021-01-01 RX ADMIN — Medication 25 MCG: at 10:57

## 2021-01-01 RX ADMIN — DEXAMETHASONE 4 MG: 4 TABLET ORAL at 05:49

## 2021-01-01 RX ADMIN — HEPARIN SODIUM 38 UNITS/KG/HR: 10000 INJECTION, SOLUTION INTRAVENOUS at 05:44

## 2021-01-01 RX ADMIN — POLYETHYLENE GLYCOL 3350 17 G: 17 POWDER, FOR SOLUTION ORAL at 07:33

## 2021-01-01 RX ADMIN — POLYETHYLENE GLYCOL 3350 17 G: 17 POWDER, FOR SOLUTION ORAL at 18:34

## 2021-01-01 RX ADMIN — DEXAMETHASONE 4 MG: 4 TABLET ORAL at 05:52

## 2021-01-01 RX ADMIN — DOCUSATE SODIUM AND SENNOSIDES 1 TABLET: 8.6; 5 TABLET ORAL at 09:23

## 2021-01-01 RX ADMIN — DEXAMETHASONE 4 MG: 4 TABLET ORAL at 11:27

## 2021-01-01 RX ADMIN — CEFEPIME HYDROCHLORIDE 2000 MG: 2 INJECTION, POWDER, FOR SOLUTION INTRAVENOUS at 05:02

## 2021-01-01 RX ADMIN — APIXABAN 5 MG: 5 TABLET, FILM COATED ORAL at 08:48

## 2021-01-01 RX ADMIN — FENTANYL CITRATE 25 MCG: 50 INJECTION INTRAMUSCULAR; INTRAVENOUS at 09:11

## 2021-01-01 RX ADMIN — METOPROLOL TARTRATE 50 MG: 50 TABLET, FILM COATED ORAL at 08:22

## 2021-01-01 RX ADMIN — SODIUM CHLORIDE 75 ML/HR: 0.9 INJECTION, SOLUTION INTRAVENOUS at 17:26

## 2021-01-01 RX ADMIN — CEFEPIME HYDROCHLORIDE 2000 MG: 2 INJECTION, POWDER, FOR SOLUTION INTRAVENOUS at 16:22

## 2021-01-01 RX ADMIN — IBUPROFEN 400 MG: 400 TABLET ORAL at 02:21

## 2021-01-01 RX ADMIN — SODIUM CHLORIDE, SODIUM LACTATE, POTASSIUM CHLORIDE, AND CALCIUM CHLORIDE 125 ML/HR: .6; .31; .03; .02 INJECTION, SOLUTION INTRAVENOUS at 00:25

## 2021-01-01 RX ADMIN — VANCOMYCIN HYDROCHLORIDE 750 MG: 750 INJECTION, SOLUTION INTRAVENOUS at 14:03

## 2021-01-01 RX ADMIN — DILTIAZEM HYDROCHLORIDE 15 MG: 5 INJECTION INTRAVENOUS at 16:47

## 2021-01-01 RX ADMIN — SODIUM CHLORIDE, SODIUM LACTATE, POTASSIUM CHLORIDE, AND CALCIUM CHLORIDE: .6; .31; .03; .02 INJECTION, SOLUTION INTRAVENOUS at 09:08

## 2021-01-01 RX ADMIN — METOPROLOL TARTRATE 50 MG: 50 TABLET, FILM COATED ORAL at 08:52

## 2021-01-01 RX ADMIN — OXYCODONE HYDROCHLORIDE 10 MG: 10 TABLET ORAL at 08:52

## 2021-01-01 RX ADMIN — HYDROMORPHONE HYDROCHLORIDE 0.5 MG: 1 INJECTION, SOLUTION INTRAMUSCULAR; INTRAVENOUS; SUBCUTANEOUS at 01:59

## 2021-01-01 RX ADMIN — HEPARIN SODIUM 38 UNITS/KG/HR: 10000 INJECTION, SOLUTION INTRAVENOUS at 21:08

## 2021-01-01 RX ADMIN — SODIUM CHLORIDE 100 ML/HR: 0.9 INJECTION, SOLUTION INTRAVENOUS at 02:30

## 2021-01-01 RX ADMIN — LEVETIRACETAM 750 MG: 100 INJECTION, SOLUTION INTRAVENOUS at 20:29

## 2021-01-01 RX ADMIN — HEPARIN SODIUM 4000 UNITS: 1000 INJECTION INTRAVENOUS; SUBCUTANEOUS at 12:25

## 2021-01-01 RX ADMIN — HYDROMORPHONE HYDROCHLORIDE 0.5 MG: 1 INJECTION, SOLUTION INTRAMUSCULAR; INTRAVENOUS; SUBCUTANEOUS at 09:35

## 2021-01-01 RX ADMIN — HYDROMORPHONE HYDROCHLORIDE 0.5 MG: 1 INJECTION, SOLUTION INTRAMUSCULAR; INTRAVENOUS; SUBCUTANEOUS at 15:48

## 2021-01-01 RX ADMIN — OXYCODONE HYDROCHLORIDE 5 MG: 5 TABLET ORAL at 21:51

## 2021-01-01 RX ADMIN — OXYCODONE HYDROCHLORIDE 5 MG: 5 TABLET ORAL at 12:25

## 2021-06-10 NOTE — DISCHARGE INSTRUCTIONS
Consider getting a TENS Unit (available in pharmacies or online stores like [employer]) to use for better relief of back pain if it persists

## 2021-06-10 NOTE — ED ATTENDING ATTESTATION
6/10/2021  IBennie DO, saw and evaluated the patient  I have discussed the patient with the resident/non-physician practitioner and agree with the resident's/non-physician practitioner's findings, Plan of Care, and MDM as documented in the resident's/non-physician practitioner's note, except where noted  All available labs and Radiology studies were reviewed  I was present for key portions of any procedure(s) performed by the resident/non-physician practitioner and I was immediately available to provide assistance  At this point I agree with the current assessment done in the Emergency Department  I have conducted an independent evaluation of this patient a history and physical is as follows:    59-year-old gentleman in no acute distress presents with complaint of ongoing back pain  Pain appears to be musculoskeletal in etiology and the patient has no red flag symptoms  He has been treating the pain at home with Tylenol with improvement of symptoms  Discussed supportive care measures, plan, need for outpatient follow-up with the complains of spine program, and reasons return to the ER      ED Course         Critical Care Time  Procedures

## 2021-06-10 NOTE — ED PROVIDER NOTES
History  Chief Complaint   Patient presents with    Back Pain     lower back pain, states was lifting at work-feels like air in chest-states its been like this for 2 weeks; also cramps in legs     49 yo male presents with 2 week history of back pain - 8/10 severity and responsive to OTC analgesics such as tylenol and advil  He denies fever, bowel or bladder dysfunction, LE weakness or pain that wakes him in the middle of his sleep at night  He denies any trauma  History provided by:  Patient  Back Pain  Location:  Lumbar spine (left paraspinal region)  Quality:  Stabbing  Radiates to:  Does not radiate  Pain severity:  Severe (8/10)  Pain is:  Same all the time  Onset quality:  Sudden  Duration:  2 weeks  Timing:  Intermittent  Progression:  Unchanged  Chronicity:  New  Context: lifting heavy objects and twisting    Context: not falling, not jumping from heights, not recent illness and not recent injury    Relieved by:  Ibuprofen and being still  Worsened by:  Twisting, movement and bending  Associated symptoms: no abdominal pain, no bladder incontinence, no bowel incontinence, no chest pain, no dysuria, no fever, no headaches, no leg pain, no numbness, no paresthesias and no weakness        None       History reviewed  No pertinent past medical history  Past Surgical History:   Procedure Laterality Date    HAND SURGERY      left hand       History reviewed  No pertinent family history  I have reviewed and agree with the history as documented  E-Cigarette/Vaping    E-Cigarette Use Never User      E-Cigarette/Vaping Substances     Social History     Tobacco Use    Smoking status: Current Every Day Smoker     Packs/day: 0 25    Smokeless tobacco: Never Used   Substance Use Topics    Alcohol use: Not Currently    Drug use: Yes     Types: Marijuana        Review of Systems   Constitutional: Negative for chills, fever and unexpected weight change  HENT: Negative for rhinorrhea and sore throat  Eyes: Negative  Respiratory: Negative  Negative for cough and shortness of breath  Cardiovascular: Negative  Negative for chest pain and leg swelling  Gastrointestinal: Negative  Negative for abdominal pain, bowel incontinence, constipation, diarrhea, nausea and vomiting  Endocrine: Negative  Genitourinary: Negative  Negative for bladder incontinence, difficulty urinating, dysuria and hematuria  Musculoskeletal: Positive for back pain  Negative for arthralgias and myalgias  Skin: Negative  Negative for rash  Neurological: Negative  Negative for weakness, numbness, headaches and paresthesias  Psychiatric/Behavioral: Negative for sleep disturbance  All other systems reviewed and are negative  Physical Exam  ED Triage Vitals [06/10/21 1615]   Temperature Pulse Respirations Blood Pressure SpO2   99 5 °F (37 5 °C) 79 16 128/70 98 %      Temp Source Heart Rate Source Patient Position - Orthostatic VS BP Location FiO2 (%)   Tympanic Monitor Sitting Left arm --      Pain Score       8             Orthostatic Vital Signs  Vitals:    06/10/21 1615   BP: 128/70   Pulse: 79   Patient Position - Orthostatic VS: Sitting       Physical Exam  Vitals signs reviewed  Constitutional:       General: He is not in acute distress  Appearance: He is not ill-appearing  Comments: Patient observed sitting up comfortably in chair and able to ambulate and lie in bed with no difficulties  HENT:      Head: Normocephalic  Nose: Nose normal       Mouth/Throat:      Mouth: Mucous membranes are moist       Pharynx: Oropharynx is clear  Eyes:      Extraocular Movements: Extraocular movements intact  Conjunctiva/sclera: Conjunctivae normal    Neck:      Musculoskeletal: Normal range of motion  Cardiovascular:      Rate and Rhythm: Normal rate and regular rhythm  Pulses: Normal pulses  Heart sounds: Normal heart sounds  No murmur     Pulmonary:      Effort: Pulmonary effort is normal       Breath sounds: Normal breath sounds  No wheezing, rhonchi or rales  Abdominal:      General: Abdomen is flat  Bowel sounds are normal  There is no distension  Palpations: Abdomen is soft  There is no mass  Tenderness: There is no abdominal tenderness  There is no right CVA tenderness or left CVA tenderness  Musculoskeletal: Normal range of motion  Cervical back: He exhibits no tenderness and no bony tenderness  Thoracic back: He exhibits no tenderness and no bony tenderness  Lumbar back: He exhibits no bony tenderness  Back:         Arms:       Right lower leg: No edema  Left lower leg: No edema  Skin:     General: Skin is warm and dry  Findings: No rash  Neurological:      General: No focal deficit present  Mental Status: He is alert  Cranial Nerves: No cranial nerve deficit  Sensory: No sensory deficit  Motor: Motor function is intact  No weakness  Gait: Gait is intact  Deep Tendon Reflexes: Reflexes are normal and symmetric  Psychiatric:         Behavior: Behavior normal          ED Medications  Medications   ketorolac (TORADOL) injection 15 mg (15 mg Intramuscular Given 6/10/21 1707)   cyclobenzaprine (FLEXERIL) tablet 10 mg (10 mg Oral Given 6/10/21 1707)       Diagnostic Studies  Results Reviewed     None                 No orders to display           ED Course       SBIRT 20yo+      Most Recent Value   SBIRT (23 yo +)   In order to provide better care to our patients, we are screening all of our patients for alcohol and drug use  Would it be okay to ask you these screening questions? Yes Filed at: 06/10/2021 1708   Initial Alcohol Screen: US AUDIT-C    1  How often do you have a drink containing alcohol?  0 Filed at: 06/10/2021 1708   2  How many drinks containing alcohol do you have on a typical day you are drinking? 0 Filed at: 06/10/2021 1708   3a  Male UNDER 65:  How often do you have five or more drinks on one occasion? 0 Filed at: 06/10/2021 1708   3b  FEMALE Any Age, or MALE 65+: How often do you have 4 or more drinks on one occassion? 0 Filed at: 06/10/2021 1708   Audit-C Score  0 Filed at: 06/10/2021 1708   CURTIS: How many times in the past year have you    Used an illegal drug or used a prescription medication for non-medical reasons? Never Filed at: 06/10/2021 1708                [facility]  Number of Diagnoses or Management Options  Low back pain: new and does not require workup  Diagnosis management comments: 49 yo male presents with 2 week history of left lumbar back pain worsened with movement and improved with advil  Patient given IM Toradol and PO flexeril and discharged with instructions to follow up with [facility] and to establish care with PCP for further management if back pain persists  He was advised to use a TENS unit to aid in quicker recovery from back pain and reported he has one at home but never used it and will try now  Risk of Complications, Morbidity, and/or Mortality  Presenting problems: low  Management options: low    Patient Progress  Patient progress: stable      Disposition  Final diagnoses:   Low back pain     Time reflects when diagnosis was documented in both MDM as applicable and the Disposition within this note     Time User Action Codes Description Comment    6/10/2021  4:46 PM Kevyn Carson Add [M54 5] Low back pain       ED Disposition     ED Disposition Condition Date/Time Comment    Discharge Good Thu Aleksey 10, 2021  4:55 PM Mireille Chou discharge to home/self care              Follow-up Information     Follow up With Specialties Details Why Contact Info Additional [address] [facility] Schedule an appointment as soon as possible for a visit  As needed [address], [address] [facility] [address]  [address], [address], [address], [place], [place], [address] [facility] Emergency Department Emergency Medicine Go to  If symptoms worsen - fever, leg weakness, difficulty passing urine or stool [address] [facility] [address] [address]  [address] [place] [address] Emergency Department          Discharge Medication List as of 6/10/2021  5:12 PM      START taking these medications    Details   cyclobenzaprine (FLEXERIL) 10 mg tablet Take 1 tablet (10 mg total) by mouth 2 (two) times a day as needed for muscle spasms for up to 10 days, Starting Thu 6/10/2021, Until Sun 6/20/2021, Normal               PDMP Review     None           ED Provider  Attending physically available and evaluated Dianelys Radha GARZA managed the patient along with the ED Attending      Electronically Signed by         Felipe Hills MD  06/10/21 3682

## 2021-06-17 NOTE — TELEPHONE ENCOUNTER
Call placed to the patient per [facility] referral     Voice message left for patient to call back  Phone number and hours of business provided  VM greeting was in [place]  This is the 1st attempt to reach the patient  Will defer per protocol

## 2021-07-08 NOTE — ED PROVIDER NOTES
History  Chief Complaint   Patient presents with    Abscess     1 month of abscess on head;size changes at times, c/o pain in the left side of neck below the abscess  49 yo M with no provided PMH presenting for evaluation of abscess to the scalp  Pt reports abscess has been present for the last month  About 1 week ago was seen at [facility] for headache where he had a normal CT scan of his head  Patient states that he has had continued pain to the left posterior aspect of his scalp where the abscess is present  He is having difficulty sleeping secondary to the pain  Did place him on antibiotics but has had no improvement  No fevers, chills or sweats  Prior to Admission Medications   Prescriptions Last Dose Informant Patient Reported? Taking? cyclobenzaprine (FLEXERIL) 10 mg tablet   No No   Sig: Take 1 tablet (10 mg total) by mouth 2 (two) times a day as needed for muscle spasms for up to 10 days      Facility-Administered Medications: None       History reviewed  No pertinent past medical history  Past Surgical History:   Procedure Laterality Date    HAND SURGERY      left hand       History reviewed  No pertinent family history  I have reviewed and agree with the history as documented  E-Cigarette/Vaping    E-Cigarette Use Never User      E-Cigarette/Vaping Substances     Social History     Tobacco Use    Smoking status: Current Every Day Smoker     Packs/day: 0 25    Smokeless tobacco: Never Used   Vaping Use    Vaping Use: Never used   Substance Use Topics    Alcohol use: Not Currently    Drug use: Yes     Types: Marijuana       Review of Systems   All other systems reviewed and are negative  Physical Exam  Physical Exam  Vitals and nursing note reviewed  Constitutional:       General: He is not in acute distress  Appearance: He is well-developed  HENT:      Head: Normocephalic and atraumatic       Eyes:      Conjunctiva/sclera: Conjunctivae normal  Comments: EOM grossly intact   Neck:      Vascular: No JVD  Cardiovascular:      Rate and Rhythm: Normal rate  Pulmonary:      Effort: Pulmonary effort is normal    Abdominal:      Palpations: Abdomen is soft  Musculoskeletal:      Cervical back: Normal range of motion and neck supple  Comments: FROM, steady gait, cap refill brisk, strength and sensation grossly intact throughout   Skin:     General: Skin is warm and dry  Capillary Refill: Capillary refill takes less than 2 seconds  Neurological:      Mental Status: He is alert and oriented to person, place, and time  Psychiatric:         Behavior: Behavior normal          Vital Signs  ED Triage Vitals [07/08/21 1147]   Temperature Pulse Respirations Blood Pressure SpO2   97 9 °F (36 6 °C) (!) 129 18 131/85 97 %      Temp Source Heart Rate Source Patient Position - Orthostatic VS BP Location FiO2 (%)   Tympanic Monitor Sitting Left arm --      Pain Score       9           Vitals:    07/08/21 1147 07/08/21 1251   BP: 131/85 125/82   Pulse: (!) 129 (!) 110   Patient Position - Orthostatic VS: Sitting Sitting         Visual Acuity      ED Medications  Medications   lidocaine-epinephrine (XYLOCAINE/EPINEPHRINE) 1 %-1:100,000 injection 5 mL (5 mL Infiltration Given 7/8/21 1237)   neomycin-bacitracin-polymyxin b (NEOSPORIN) ointment 1 small application (1 small application Topical Given 7/8/21 1237)       Diagnostic Studies  Results Reviewed     None                 No orders to display              Procedures  Incision and drain    Date/Time: 7/8/2021 12:57 PM  Performed by: Harper Morgan PA-C  Authorized by: Harper Morgan PA-C   Universal Protocol:  Consent: Verbal consent obtained    Risks and benefits: risks, benefits and alternatives were discussed  Consent given by: patient  Required items: required blood products, implants, devices, and special equipment available      Patient location:  ED  Location:     Type:  Abscess and cyst (abscess vs lipoma vs cyst)    Location:  Head/neck    Head/neck location:  Scalp  Pre-procedure details:     Skin preparation:  Antiseptic wash  Procedure details:     Complexity:  Simple    Needle aspiration: no      Incision types:  Stab incision    Scalpel blade:  11    Approach:  Open    Incision depth:  Subcutaneous    Drainage characteristics: none  Drainage amount: none  Wound treatment:  Wound left open    Packing materials:  None  Post-procedure details:     Patient tolerance of procedure: Tolerated well, no immediate complications             ED Course                                           MDM  Number of Diagnoses or Management Options  Folliculitis  Lipoma  Diagnosis management comments: 49 yo M presenting for evaluation of abscess to the posterior scalp, the area is firm and round, I&D completed but no drainage expressed from the abscess, likely lipoma, advised to f/u with pcp and gen surg/plastics for lipoma removal, pt is otherwise well appearing, f/u with pcp as an outpatient    strict return to ED precautions discussed  Pt verbalizes understanding and agrees with plan  Pt is stable for discharge    Portions of the record may have been created with voice recognition software  Occasional wrong word or "sound a like" substitutions may have occurred due to the inherent limitations of voice recognition software  Read the chart carefully and recognize, using context, where substitutions have occurred          Disposition  Final diagnoses:   Lipoma   Folliculitis     Time reflects when diagnosis was documented in both MDM as applicable and the Disposition within this note     Time User Action Codes Description Comment    7/8/2021 12:32 PM Parish Ortiz Add [D17 9] Lipoma     7/8/2021 12:32 PM Parish Ortiz Add [S89 1] Folliculitis       ED Disposition     ED Disposition Condition Date/Time Comment    Discharge Stable Thu Jul 8, 2021 12:32 PM Sejal Hodge discharge to home/self care             Follow-up Information     Follow up With Specialties Details Why Contact Info Additional [address] [facility] Medicine Call in 1 day  [address], [address] [facility] [address] [facility] [address] [address]  [address], [address], [address] [place], [place], [address] Heart Emergency Department Emergency Medicine Go to  If symptoms worsen [email or web address] [address] [address] [facility] Heart Emergency Department    [address] [facility] Call in 1 day  [address]  [address] [facility] [address]  [address] [facility], [address], [address], [place], [place], [address] [phone]          Discharge Medication List as of 7/8/2021 12:33 PM      START taking these medications    Details   clindamycin (CLINDAGEL) 1 % gel Apply topically 2 (two) times a day, Starting u 7/8/2021, Normal         CONTINUE these medications which have NOT CHANGED    Details   cyclobenzaprine (FLEXERIL) 10 mg tablet Take 1 tablet (10 mg total) by mouth 2 (two) times a day as needed for muscle spasms for up to 10 days, Starting u 6/10/2021, Until Sun 6/20/2021, Normal           No discharge procedures on file      PDMP Review     None          ED Provider  Electronically Signed by           Manisha Nicholas PA-C  07/08/21 2328

## 2021-07-21 NOTE — PROGRESS NOTES
Assessment/Plan:    He has 2 distinct masses of the posterior scalp there likely pilar cyst   These would easily be able to be excised in the operating room under IV sedation  He also has hard firm nodule the bilateral neck which are more concerning and were not imaged when he was at the outside hospital   Will send him for an ultrasound of the bilateral neck to evaluate the subcutaneous masses  He would like the scalp cyst to be excised as soon as possible so if the neck masses do come back as a benign finding this can be scheduled at a separate time  Discussed risks of the procedure of bleeding, infection, poor wound healing  Did discuss that we will have to shave his head in order to have access to the cyst   Informed consent was obtained and will schedule  No problem-specific Assessment & Plan notes found for this encounter  Diagnoses and all orders for this visit:    Mass of lateral neck  -     US head neck soft tissue; Future    Scalp cyst          Subjective:      Patient ID: Juan Luis Warren is a 48 y o  male  He presents with painful scalp cyst as well as cyst on his bilateral neck and left chest wall  The posterior scalp cyst has been present for about a month, denies it getting larger  He went to the emergency room where the emergency room staff attempted to pop it and were squeezing at significantly which caused a lot of pain that he was unable to sleep that night  He did go to the [facility] Emergency Room he had a CT scan of the head that did not show any abnormality  He has hard cyst on bilateral neck as well as his left chest wall but these do not bother him as much as the posterior scalp  A chart review was performed and previous primary care visit notes were reviewed  All applicable imaging studies were reviewed and images were reviewed personally  All applicable laboratory studies were reviewed personally    Care everywhere review was performed if  available General: No focal deficit present  Mental Status: He is alert and oriented to person, place, and time

## 2021-07-22 NOTE — ED ATTENDING ATTESTATION
7/22/2021  IJameel MD, saw and evaluated the patient  I have discussed the patient with the resident/non-physician practitioner and agree with the resident's/non-physician practitioner's findings, Plan of Care, and MDM as documented in the resident's/non-physician practitioner's note, except where noted  All available labs and Radiology studies were reviewed  I was present for key portions of any procedure(s) performed by the resident/non-physician practitioner and I was immediately available to provide assistance  At this point I agree with the current assessment done in the Emergency Department  I have conducted an independent evaluation of this patient a history and physical is as follows:    ED Course         Critical Care Time  Procedures      47 yo male with masses on neck sent in by surgeon after outpatient u/s and ct showed multiple ring enhancing lesions and necrotizing areas  Vss, afebrile, lungs cta, rrr, abdomen soft nontender, no neuro defiicits  Labs, blood cx, ivf, ct head, c/a/p

## 2021-07-22 NOTE — ED PROVIDER NOTES
History  Chief Complaint   Patient presents with    Medical Problem     pt states he was sent here for L sided neck mass + for subclavian vein thrombosis  pt reports neck, back and chest pain      70-year-old male with no significant past medical history presents the ED for evaluation following abnormal outpatient CT scan  Patient was being evaluated for possible cyst on his scalp  Had outpatient ultrasound performed after he was found to have been multiple neck masses  He subsequently had follow-up CT soft tissue of his neck performed to further evaluate which demonstrated multiple ring-enhancing and necrotic lesions and possible septic pulmonary emboli   Which prompted his ED evaluation  Patient also endorses unexpected weight loss,  Fatigue, and occasional shortness of breath  He denies any fevers or chills  No cough or hemoptysis  No abdominal pain, nausea or vomiting  He denies any IV drug use  Prior to Admission Medications   Prescriptions Last Dose Informant Patient Reported? Taking? clindamycin (CLINDAGEL) 1 % gel Not Taking at Unknown time  No No   Sig: Apply topically 2 (two) times a day   Patient not taking: Reported on 7/23/2021   cyclobenzaprine (FLEXERIL) 10 mg tablet   No No   Sig: Take 1 tablet (10 mg total) by mouth 2 (two) times a day as needed for muscle spasms for up to 10 days      Facility-Administered Medications: None       History reviewed  No pertinent past medical history  Past Surgical History:   Procedure Laterality Date    HAND SURGERY      left hand       History reviewed  No pertinent family history  I have reviewed and agree with the history as documented      E-Cigarette/Vaping    E-Cigarette Use Never User      E-Cigarette/Vaping Substances     Social History     Tobacco Use    Smoking status: Current Every Day Smoker     Packs/day: 0 25    Smokeless tobacco: Never Used   Vaping Use    Vaping Use: Never used   Substance Use Topics    Alcohol use: Not Currently    Drug use: Yes     Types: Marijuana        Review of Systems   Constitutional: Positive for appetite change, fatigue and unexpected weight change  Eyes: Negative for pain and visual disturbance  Respiratory: Positive for shortness of breath  Cardiovascular: Negative for chest pain and palpitations  Gastrointestinal: Negative for abdominal pain, nausea and vomiting  Neurological: Negative for syncope, light-headedness and headaches  All other systems reviewed and are negative  Physical Exam  ED Triage Vitals   Temperature Pulse Respirations Blood Pressure SpO2   07/22/21 1830 07/22/21 1830 07/22/21 1830 07/22/21 1830 07/22/21 1830   98 °F (36 7 °C) 102 16 107/85 100 %      Temp src Heart Rate Source Patient Position - Orthostatic VS BP Location FiO2 (%)   -- 07/22/21 1830 07/22/21 2155 07/22/21 2155 --    Monitor Lying Left arm       Pain Score       07/22/21 2250       8             Orthostatic Vital Signs  Vitals:    07/22/21 2354 07/23/21 0057 07/23/21 0435 07/23/21 0755   BP: 104/64 105/59 120/67 124/65   Pulse: 81 80 79 74   Patient Position - Orthostatic VS: Lying Lying         Physical Exam  Vitals and nursing note reviewed  Constitutional:       General: He is not in acute distress  HENT:      Head: Normocephalic and atraumatic  Comments: Raised, scalp lesion     Right Ear: External ear normal       Left Ear: External ear normal       Nose: Nose normal       Mouth/Throat:      Mouth: Mucous membranes are moist    Eyes:      Extraocular Movements: Extraocular movements intact  Conjunctiva/sclera: Conjunctivae normal       Pupils: Pupils are equal, round, and reactive to light  Neck:      Comments: Multiple tender raised masses  Cardiovascular:      Rate and Rhythm: Normal rate and regular rhythm  Pulses: Normal pulses  Heart sounds: No murmur heard  Pulmonary:      Effort: Pulmonary effort is normal  No respiratory distress  Breath sounds:  No stridor  Musculoskeletal:         General: No deformity  Normal range of motion  Cervical back: Normal range of motion and neck supple  Lymphadenopathy:      Cervical: Cervical adenopathy present  Skin:     General: Skin is warm and dry  Capillary Refill: Capillary refill takes less than 2 seconds  Neurological:      General: No focal deficit present  Mental Status: He is alert and oriented to person, place, and time  Cranial Nerves: No cranial nerve deficit     Psychiatric:         Mood and Affect: Mood normal          Behavior: Behavior normal          ED Medications  Medications   sodium chloride 0 9 % infusion (75 mL/hr Intravenous New Bag 7/23/21 0405)   acetaminophen (TYLENOL) tablet 650 mg (has no administration in time range)   oxyCODONE (ROXICODONE) IR tablet 2 5 mg ( Oral See Alternative 7/23/21 0405)     Or   oxyCODONE (ROXICODONE) IR tablet 5 mg (5 mg Oral Given 7/23/21 0405)   polyethylene glycol (MIRALAX) packet 17 g (has no administration in time range)   cefepime (MAXIPIME) 2 g/50 mL dextrose IVPB (has no administration in time range)   vancomycin (VANCOCIN) IVPB (premix in dextrose) 750 mg 150 mL (750 mg Intravenous New Bag 7/23/21 0749)   heparin (porcine) 25,000 units in 0 45% NaCl 250 mL infusion (premix) (18 Units/kg/hr × 50 kg (Order-Specific) Intravenous New Bag 7/23/21 0424)   heparin (porcine) injection 4,000 Units (has no administration in time range)   heparin (porcine) injection 2,000 Units (has no administration in time range)   iohexol (OMNIPAQUE) 350 MG/ML injection (SINGLE-DOSE) 100 mL (100 mL Intravenous Given 7/22/21 2141)   cefepime (MAXIPIME) 2 g/50 mL dextrose IVPB (0 mg Intravenous Stopped 7/22/21 2313)   clindamycin (CLEOCIN) IVPB (premix in dextrose) 600 mg 50 mL (0 mg Intravenous Stopped 7/22/21 2258)   vancomycin (VANCOCIN) IVPB (premix in dextrose) 1,000 mg 200 mL (0 mg/kg × 52 2 kg Intravenous Stopped 7/23/21 0026)   ketorolac (TORADOL) injection 15 mg (15 mg Intravenous Given 7/22/21 2250)   acetaminophen (TYLENOL) tablet 975 mg (975 mg Oral Given 7/23/21 0325)   heparin (porcine) injection 4,000 Units (4,000 Units Intravenous Given 7/23/21 0423)       Diagnostic Studies  Results Reviewed     Procedure Component Value Units Date/Time    Procalcitonin Reflex [[id number]]  (Normal) Collected: 07/23/21 0416    Lab Status: Final result Specimen: Blood from Arm, Right Updated: 07/23/21 0515     Procalcitonin 0 14 ng/ml     Comprehensive metabolic panel [[id number]]  (Abnormal) Collected: 07/23/21 0415    Lab Status: Final result Specimen: Blood from Arm, Right Updated: 07/23/21 0503     Sodium 138 mmol/L      Potassium 3 9 mmol/L      Chloride 105 mmol/L      CO2 27 mmol/L      ANION GAP 6 mmol/L      BUN 11 mg/dL      Creatinine 0 50 mg/dL      Glucose 94 mg/dL      Calcium 8 3 mg/dL      Corrected Calcium 9 7 mg/dL      AST 32 U/L      ALT 26 U/L      Alkaline Phosphatase 117 U/L      Total Protein 5 7 g/dL      Albumin 2 2 g/dL      Total Bilirubin 0 22 mg/dL      eGFR 126 ml/min/1 73sq m     Narrative:      [place] guidelines for Chronic Kidney Disease (CKD):     Stage 1 with normal or high GFR (GFR > 90 mL/min/1 73 square meters)    Stage 2 Mild CKD (GFR = 60-89 mL/min/1 73 square meters)    Stage 3A Moderate CKD (GFR = 45-59 mL/min/1 73 square meters)    Stage 3B Moderate CKD (GFR = 30-44 mL/min/1 73 square meters)    Stage 4 Severe CKD (GFR = 15-29 mL/min/1 73 square meters)    Stage 5 End Stage CKD (GFR <15 mL/min/1 73 square meters)  Note: GFR calculation is accurate only with a steady state creatinine    Protime-INR [[id number]]  (Normal) Collected: 07/23/21 0415    Lab Status: Final result Specimen: Blood from Arm, Right Updated: 07/23/21 0450     Protime 14 0 seconds      INR 1 08    APTT [[id number]]  (Normal) Collected: 07/23/21 0415    Lab Status: Final result Specimen: Blood from Arm, Right Updated: 07/23/21 0450 PTT 28 seconds     Protime-INR [[id number]]  (Normal) Collected: 07/23/21 0415    Lab Status: Final result Specimen: Blood from Arm, Right Updated: 07/23/21 0450     Protime 13 7 seconds      INR 1 05    CBC and differential [[id number]]  (Abnormal) Collected: 07/23/21 0415    Lab Status: Final result Specimen: Blood from Arm, Right Updated: 07/23/21 0432     WBC 9 03 Thousand/uL      RBC 3 23 Million/uL      Hemoglobin 9 9 g/dL      Hematocrit 29 6 %      MCV 92 fL      MCH 30 7 pg      MCHC 33 4 g/dL      RDW 13 9 %      MPV 9 0 fL      Platelets 136 Thousands/uL      nRBC 0 /100 WBCs      Neutrophils Relative 67 %      Immat GRANS % 1 %      Lymphocytes Relative 21 %      Monocytes Relative 10 %      Eosinophils Relative 1 %      Basophils Relative 0 %      Neutrophils Absolute 5 95 Thousands/µL      Immature Grans Absolute 0 11 Thousand/uL      Lymphocytes Absolute 1 88 Thousands/µL      Monocytes Absolute 0 92 Thousand/µL      Eosinophils Absolute 0 13 Thousand/µL      Basophils Absolute 0 04 Thousands/µL     MRSA culture [[id number]] Collected: 07/23/21 0416    Lab Status: In process Specimen: Nares from Nose Updated: 07/23/21 0425    Blood culture #1 [[id number]] Collected: 07/22/21 2108    Lab Status: Preliminary result Specimen: Blood from Arm, Left Updated: 07/23/21 0201     Blood Culture Received in Microbiology Lab  Culture in Progress  Blood culture #2 [[id number]] Collected: 07/22/21 2108    Lab Status: Preliminary result Specimen: Blood from Arm, Right Updated: 07/23/21 0201     Blood Culture Received in Microbiology Lab  Culture in Progress  Blood culture [[id number]] Collected: 07/22/21 2038    Lab Status: Preliminary result Specimen: Blood from Arm, Right Updated: 07/23/21 0201     Blood Culture Received in Microbiology Lab  Culture in Progress      Rapid HIV 1/2 AB-AG Combo [[id number]]  (Normal) Collected: 07/22/21 2250    Lab Status: Final result Specimen: Blood from Arm, Left Updated: 07/22/21 2342     Rapid HIV 1 AND 2 Non-Reactive     HIV-1 P24 Ag Screen Non-Reactive    Narrative:      Negative for HIV-1 p24 Antigen  Negative for HIV-1 and/or HIV-2 Antibody  Procalcitonin with AM Reflex [[id number]]  (Abnormal) Collected: 07/22/21 2038    Lab Status: Final result Specimen: Blood from Arm, Right Updated: 07/22/21 2131     Procalcitonin 0 27 ng/ml     Lactic acid [[id number]]  (Normal) Collected: 07/22/21 2038    Lab Status: Final result Specimen: Blood from Arm, Right Updated: 07/22/21 2116     LACTIC ACID 1 1 mmol/L     Narrative:      Result may be elevated if tourniquet was used during collection      Comprehensive metabolic panel [[id number]]  (Abnormal) Collected: 07/22/21 2038    Lab Status: Final result Specimen: Blood from Arm, Right Updated: 07/22/21 2115     Sodium 137 mmol/L      Potassium 4 1 mmol/L      Chloride 102 mmol/L      CO2 27 mmol/L      ANION GAP 8 mmol/L      BUN 10 mg/dL      Creatinine 0 64 mg/dL      Glucose 91 mg/dL      Calcium 9 6 mg/dL      Corrected Calcium 10 6 mg/dL      AST 32 U/L      ALT 32 U/L      Alkaline Phosphatase 142 U/L      Total Protein 7 1 g/dL      Albumin 2 8 g/dL      Total Bilirubin 0 38 mg/dL      eGFR 114 ml/min/1 73sq m     Narrative:      [place] guidelines for Chronic Kidney Disease (CKD):     Stage 1 with normal or high GFR (GFR > 90 mL/min/1 73 square meters)    Stage 2 Mild CKD (GFR = 60-89 mL/min/1 73 square meters)    Stage 3A Moderate CKD (GFR = 45-59 mL/min/1 73 square meters)    Stage 3B Moderate CKD (GFR = 30-44 mL/min/1 73 square meters)    Stage 4 Severe CKD (GFR = 15-29 mL/min/1 73 square meters)    Stage 5 End Stage CKD (GFR <15 mL/min/1 73 square meters)  Note: GFR calculation is accurate only with a steady state creatinine    Protime-INR [[id number]]  (Normal) Collected: 07/22/21 2038    Lab Status: Final result Specimen: Blood from Arm, Right Updated: 07/22/21 2109     Protime 12 8 seconds      INR 0  96    APTT [[id number]]  (Normal) Collected: 07/22/21 2038    Lab Status: Final result Specimen: Blood from Arm, Right Updated: 07/22/21 2109     PTT 27 seconds     CBC and differential [[id number]]  (Abnormal) Collected: 07/22/21 2038    Lab Status: Final result Specimen: Blood from Arm, Right Updated: 07/22/21 2054     WBC 11 84 Thousand/uL      RBC 3 66 Million/uL      Hemoglobin 11 2 g/dL      Hematocrit 33 7 %      MCV 92 fL      MCH 30 6 pg      MCHC 33 2 g/dL      RDW 14 0 %      MPV 8 8 fL      Platelets 887 Thousands/uL      nRBC 0 /100 WBCs      Neutrophils Relative 68 %      Immat GRANS % 1 %      Lymphocytes Relative 21 %      Monocytes Relative 8 %      Eosinophils Relative 1 %      Basophils Relative 1 %      Neutrophils Absolute 8 10 Thousands/µL      Immature Grans Absolute 0 14 Thousand/uL      Lymphocytes Absolute 2 47 Thousands/µL      Monocytes Absolute 0 90 Thousand/µL      Eosinophils Absolute 0 16 Thousand/µL      Basophils Absolute 0 07 Thousands/µL                  CT head without contrast   Final Result by Mathew Jalloh MD (07/22 2239)      There is an approximately 2 4 x 1 4 x 2 2 cm masslike lesion involving the scalp overlying the posterior left parietal region  Neoplasm and/or infection should be excluded  Sampling is recommended  The study was marked in [facility] for immediate notification  Workstation performed: [id number]         CTA ED chest PE Study   Final Result by Mathew Jalloh MD (07/23 4025)      No evidence of pulmonary embolism is seen  Measured RV/LV ratio is within normal limits at less than 0 9  Numerous nodular opacities are present bilaterally within the lungs  The appearances most suggestive of metastatic disease  Extensive hepatic metastatic disease  Large bilateral adrenal masses  Extensive lymphadenopathy        Probable osseous metastatic disease with mild to moderate compression deformity of the superior endplate of B05, suggesting pathologic compression  Possible thrombus right subclavian vein extending into the brachiocephalic vein  Consider venous duplex ultrasound for further evaluation  The study was marked in [facility] for immediate notification  Workstation performed: [id number]         CT abdomen pelvis w contrast    (Results Pending)         Procedures  Procedures      ED Course  ED Course as of Jul 23 0855   Thu Jul 22, 2021 2054 WBC(!): 11 84   2054 Hemoglobin(!): 11 2   2134 Procalcitonin(!): 0 27                             SBIRT 20yo+      Most Recent Value   SBIRT (23 yo +)   In order to provide better care to our patients, we are screening all of our patients for alcohol and drug use  Would it be okay to ask you these screening questions? No Filed at: 07/22/2021 1951                MDM  Number of Diagnoses or Management Options  Abnormal CT scan, neck  Metastatic cancer ([facility] [address] )  Opacity of lung on imaging study  Scalp lesion  Diagnosis management comments:   20-year-old male presents to the ED for evaluation following abnormal outpatient CT scan of his neck which was concerning for multiple necrotic lesions as possible septic pulmonary emboli  On evaluation, patient is cachectic, but in no acute distress  Given concern for possible infectious etiology, will check septic labs including 3 sets of blood cultures  Given possibility of infectious endocarditis     Will get CT Head and PE study to further evaluate  And will administer broad-spectrum antibiotics  patient's labs demonstrated mild anemia, mildly elevated white blood cell count and elevated procalcitonin  Patient signed out to Dr Ronald Rao pending results of CT PE study  Patient will require admission to medicine for further management and evaluation        Disposition  Final diagnoses:   Abnormal CT scan, neck   Opacity of lung on imaging study   Scalp lesion   Metastatic cancer [facility])     Time reflects when diagnosis was documented in both MDM as applicable and the Disposition within this note     Time User Action Codes Description Comment    7/22/2021 10:18 PM Check, Minta Gaitan Add [R93 89] Abnormal CT scan, neck     7/22/2021 10:19 PM Check, Minta Gaitan Add [R91 8] Opacities of both lungs present on chest x-ray     7/22/2021 10:19 PM Check, Minta Gaitan Remove [R91 8] Opacities of both lungs present on chest x-ray     7/22/2021 10:19 PM Check, Minta Gaitan Add [R91 8] Opacity of lung on imaging study     7/22/2021 10:41 PM Check, Minta Gaitan Add [L98 9] Scalp lesion     7/23/2021  1:09 AM Qi Torres Add [C79 9] Metastatic cancer ([facility] [address] )     7/23/2021  4:10 AM Maria Del Carmen Irving Add [M48 02] Cervical stenosis of spinal canal       ED Disposition     ED Disposition Condition Date/Time Comment    Admit Stable Thu Jul 22, 2021 10:18 PM Case was discussed with DANDY and the patient's admission status was agreed to be Admission Status: inpatient status to the service of Dr Guille Cowan  Follow-up Information    None         Patient's Medications   Discharge Prescriptions    No medications on file     No discharge procedures on file  PDMP Review     None           ED Provider  Attending physically available and evaluated Allyalem Chatman  GREG managed the patient along with the ED Attending      Electronically Signed by         Gisela Hammnod MD  07/23/21 9403

## 2021-07-23 PROBLEM — S22.080A COMPRESSION FRACTURE OF T11 VERTEBRA (HCC): Status: ACTIVE | Noted: 2021-01-01

## 2021-07-23 PROBLEM — C79.9 METASTATIC CANCER (HCC): Status: ACTIVE | Noted: 2021-01-01

## 2021-07-23 PROBLEM — M48.02 CERVICAL STENOSIS OF SPINAL CANAL: Status: ACTIVE | Noted: 2021-01-01

## 2021-07-23 PROBLEM — I82.B11: Status: ACTIVE | Noted: 2021-01-01

## 2021-07-23 PROBLEM — D64.9 ANEMIA: Status: ACTIVE | Noted: 2021-01-01

## 2021-07-23 PROBLEM — G95.89 CERVICAL SPINAL MASS (HCC): Status: ACTIVE | Noted: 2021-01-01

## 2021-07-23 PROBLEM — I82.C19 INTERNAL JUGULAR VEIN THROMBOSIS (HCC): Status: ACTIVE | Noted: 2021-01-01

## 2021-07-23 PROBLEM — L98.9 SCALP LESION: Status: ACTIVE | Noted: 2021-01-01

## 2021-07-23 NOTE — CONSULTS
Consultation - Medical Oncology   Lea Peoples 48 y o  male MRN: [id number]  Unit/Bed#: [facility] 914-01 Encounter: [id number]      Reason for Consult / Principal Problem: Possible metastatic malignancy with unknown primary    ECOG PS: 2    Assessment/Plan   1  Metastatic malignancy of unknown primary: Significant smoking history  Negative family history of cancer of note  Review of CT of imaging extensive bilateral lungs, mediastinal and bilateral hilar lymphadenopathy, left axillary lymphadenopathy, bilateral adrenal masses, multiple hepatic nodules  HIV 1/2 was negative  Denies any history of malignancies in self or family  He has not had an age-appropriate cancer screening  At this time, possible differentials seem broad, however there is consideration for metastatic malignancy of unknown primary  Given the foci of the lesions- below and above the diaphragm, presumptive therapy seems less likely  · Initial plan is to identify the primary site of malignancy  · Recommend biopsy of most accessible lesion  · Appreciate Surgical Oncology recommendation  · Recommend IV contrast-enhanced CT abdomen and pelvis  · Follow LDH and uric acid  2  Subclavian vein thrombosis: Continue heparin gtt  Monitor APTT  3  Anemia: Hb of 9 9  Multifactorial     Monitor closely and transfuse as per unit protocol  Follow iron studies, reticulocyte count, peripheral smear and Vit B12    4  Cervical spine stenosis: C2-C4 cervical spinal narrowing noted on imaging  Neurosurgery on board  5  T12 compressive fracture: Seen in imaging  Pain control as per primary team    History of Present Illness   Mr Lea Peoples is a 48y o  year old male presents with head lump, weakness and back pain of 6 weeks duration  Patient problems started about 6 weeks ago when he noticed a lump on the left side of his head  He subsequently reported back pain that was worse at night   Reports associated unintentional weight loss about 30lbs, constipation, fatigue and loss of appetite  Patient reports long-term smoking history -0 25PPD    Had multiple evaluations in the last 1 month for LBP and head abscess  Was scheduled for scalp lesion excision on 08/09/2021    Imaging done on presentation showed bilateral neck masses R>L, thrombosis of the right subclavian vein to the brachiocephalic vein, multiple bilateral lung nodules, mediastinal and bilateral hilar lymphadenopathy, bilateral adrenal gland masses and compressive deformity of T11  There is also some component of cervical spinal narrowing (C2-4)    CT abdomen and pelvis with contrast is pending  He is currently receiving  IV cefepime and vancomycin  Also on heparin gtt for the right subclavian to brachiocephalic vein thrombosis          Inpatient consult to Oncology     Performed by  Tj Solis MD     Authorized by Raina Pinzon DO              Review of Systems   Constitutional: Positive for activity change and unexpected weight change  Negative for chills and fever  HENT: Negative  Eyes: Negative  Respiratory: Positive for shortness of breath  Negative for cough and choking  Cardiovascular: Negative  Gastrointestinal: Positive for abdominal pain and constipation  Negative for diarrhea, nausea and vomiting  Endocrine: Negative  Genitourinary: Negative  Musculoskeletal: Positive for back pain and neck pain  Historical Information   History reviewed  No pertinent past medical history    Past Surgical History:   Procedure Laterality Date    HAND SURGERY      left hand     Social History   Social History     Substance and Sexual Activity   Alcohol Use Not Currently     Social History     Substance and Sexual Activity   Drug Use Yes    Types: Marijuana     E-Cigarette/Vaping    E-Cigarette Use Never User      E-Cigarette/Vaping Substances     Social History     Tobacco Use   Smoking Status Current Every Day Smoker    Packs/day: 0 25   Smokeless Tobacco Never Used     Family History: History reviewed  No pertinent family history  Meds/Allergies   all current active meds have been reviewed and current meds:   Current Facility-Administered Medications   Medication Dose Route Frequency    acetaminophen (TYLENOL) tablet 650 mg  650 mg Oral Q6H PRN    cefepime (MAXIPIME) 2 g/50 mL dextrose IVPB  2,000 mg Intravenous Q12H    heparin (porcine) 25,000 units in 0 45% NaCl 250 mL infusion (premix)  3-30 Units/kg/hr (Order-Specific) Intravenous Titrated    heparin (porcine) injection 2,000 Units  2,000 Units Intravenous Q1H PRN    heparin (porcine) injection 4,000 Units  4,000 Units Intravenous Q1H PRN    lidocaine (PF) (XYLOCAINE-MPF) 1 % injection 10 mL  10 mL Infiltration Once    oxyCODONE (ROXICODONE) IR tablet 2 5 mg  2 5 mg Oral Q6H PRN    Or    oxyCODONE (ROXICODONE) IR tablet 5 mg  5 mg Oral Q6H PRN    polyethylene glycol (MIRALAX) packet 17 g  17 g Oral Daily PRN    sodium chloride 0 9 % infusion  75 mL/hr Intravenous Continuous    vancomycin (VANCOCIN) IVPB (premix in dextrose) 750 mg 150 mL  15 mg/kg Intravenous Q8H     No Known Allergies    Objective   Vitals: Blood pressure 134/76, pulse 80, temperature 98 3 °F (36 8 °C), resp  rate 18, height 5' 9" (1 753 m), weight 51 kg (112 lb 7 oz), SpO2 97 %  Intake/Output Summary (Last 24 hours) at 7/23/2021 1524  Last data filed at 7/23/2021 1015  Gross per 24 hour   Intake 762 5 ml   Output --   Net 762 5 ml     Invasive Devices     Peripheral Intravenous Line            Peripheral IV 07/22/21 Left;Upper;Ventral (anterior) Arm <1 day    Peripheral IV 07/22/21 Right Antecubital <1 day                Physical Exam  Vitals reviewed  Constitutional:       General: He is not in acute distress  Appearance: He is ill-appearing (chronically)  He is not toxic-appearing  HENT:      Head:      Comments: Notable left parietal bone mass, measuring about 5 x5 cm  Non-fungating   Minimally tender with no differential warmth  Eyes:      Conjunctiva/sclera: Conjunctivae normal    Cardiovascular:      Rate and Rhythm: Normal rate and regular rhythm  Pulses: Normal pulses  Heart sounds: Normal heart sounds  No murmur heard  Pulmonary:      Effort: Pulmonary effort is normal  No respiratory distress  Breath sounds: Normal breath sounds  No wheezing  Abdominal:      General: Bowel sounds are normal  There is no distension  Palpations: Abdomen is soft  Tenderness: There is no abdominal tenderness  There is left CVA tenderness  Comments: Epigastric subcutanous nodule on the skin of the abdomen   Musculoskeletal:      Right lower leg: No edema  Left lower leg: No edema  Lymphadenopathy:      Cervical: Cervical adenopathy (left > right supraclavicular) present  Skin:     General: Skin is dry  Coloration: Skin is not jaundiced or pale  Neurological:      General: No focal deficit present  Mental Status: He is alert and oriented to person, place, and time  Mental status is at baseline  Lab Results:   I have reviewed all pertinent labs  CBC:   Lab Results   Component Value Date    WBC 9 03 07/23/2021    HGB 9 9 (L) 07/23/2021    HCT 29 6 (L) 07/23/2021    MCV 92 07/23/2021     07/23/2021    MCH 30 7 07/23/2021    MCHC 33 4 07/23/2021    RDW 13 9 07/23/2021    MPV 9 0 07/23/2021    NRBC 0 07/23/2021     CMP:   Lab Results   Component Value Date    SODIUM 138 07/23/2021     07/23/2021    CO2 27 07/23/2021    BUN 11 07/23/2021    CREATININE 0 50 (L) 07/23/2021    CALCIUM 8 3 07/23/2021    AST 32 07/23/2021    ALT 26 07/23/2021    ALKPHOS 117 (H) 07/23/2021    EGFR 126 07/23/2021     Imaging Studies: I have personally reviewed pertinent reports     and I have personally reviewed pertinent films in PACS    VTE Prophylaxis: Heparin    Code Status: Level 1 - Full Code  Advance Directive and Living Will:        Counseling / Coordination of Care  Total floor / unit time spent today 45 minutes  Greater than 50% of total time was spent with the patient and / or family counseling and / or coordination of care   A description of the counseling / coordination of care: Chart review/ history and physical/ plan formulation

## 2021-07-23 NOTE — PROCEDURES
Procedure Note- Scalp Lesion Punch Biopsy:    Patient provided both verbal and written consent for procedure  He was prepped with chlorhexidine and draped in the usual sterile fashion  10 cc of 1% lidocaine was administered for local anesthetic  Two adjacent 4 mm punch biopsies of patient's left parietal scalp lesion were taken  Tissue samples were placed in formalin, and sent to pathology for review  Hemostasis was obtained  Remaining defect was closed with 3 interrupted sutures of 4-0 Monocryl  Patient tolerated the procedure well      Lissette Damon MD  7/23/2021  3:16 PM

## 2021-07-23 NOTE — ASSESSMENT & PLAN NOTE
Hgb 9 9 and Hct 29 6 at time of admission  Plan:  · Monitor Hgb and Hct    · Transfuse if patient becomes symptomatic or Hgb drops < 7

## 2021-07-23 NOTE — ED NOTES
CT scan will be done later in the day since pt has already had contrast today     Michael Harden, RN  07/23/21 9372

## 2021-07-23 NOTE — CONSULTS
[address] 1970, 48 y o  male MRN: [id number]  Unit/Bed#: [facility] 914-01 Encounter: [id number]  Primary Care Provider: Clara Serrano MD   Date and time admitted to hospital: 7/22/2021  7:16 PM    Inpatient consult to Neurosurgery  Consult performed by: KRISTIN Lee  Consult ordered by: Malou Florentino DO          Scalp lesion  Assessment & Plan  Noted with palpable mass to left parietal scalp x weeks  CT head completed  Cervical spinal mass (HCC)  Assessment & Plan  Epidural mass at C2-C4  · Presented to ED with neck and back pain and unintentional weight loss  · Noted on imaging with the above as well as metastatic masses and lymphadenopathy  · Exam non-focal  States ambulatory dysfunction over past 3 weeks secondary to back pain  Imaging:  · CT soft tissue neck 7/22/21: Left greater than right numerous superficial and deep neck soft tissue and intramuscular rim-enhancing necrotic lesions/collections as described  Rim-enhancing epidural thickening/collection within posterolateral spinal canal spanning levels C2-C4 resulting in canal narrowing  Finding is concerning for widespread necrotizing infection  Neoplastic process is within differential consideration  Recommend correlation with sampling  2   Occluded left subclavian and brachiocephalic veins as well as left internal jugular vein to the level of hyoid bone suggesting  3   Subpleural nodular opacities in the right upper lobe and superior segment of right lower lobe could indicate septic emboli  Suggested left hilar necrotic lymph node  4   Indeterminate sclerotic foci within T3 and T4 vertebral bodies  Plan:  · Frequent neuro checks  · Maintain in [facility] cervical brace  Zoë brace for showers  · Upright cervical x-rays for baseline  · Recommend MRI w/wo contrast of cervical and thoracic spines to further evaluate masses and pathologic fracture    · Medical and pain management per primary team   · Mobilize with PT/OT  · Heme/onc consulted - recommend CT CAP with contrast and biopsy  · Surgical/onc consult pending  · No neurosurgical intervention anticipated  Further workup for diagnosis of primary metastatic disease is needed  Neurosurgery will see again once imaging has been completed  Please call with any questions or concerns  Compression fracture of T11 vertebra [facility])  Assessment & Plan  Likely secondary to bony metastasis  Unknown primary  TLSO brace when upright > 45 degrees and OOB  Upright x-rays when able  Mobilize with PT/OT  Continue medical workup  Subclavian vein thromboembolism, acute, right (HCC)  Assessment & Plan  On Heparin gtt  * Metastatic cancer [facility])  Assessment & Plan  Metastatic disease with unknown primary  30 lbs unintentional weight loss  Extensive masses in bilateral lungs, adrenal glands, liver  IR biopsy pending  History of Present Illness     HPI: Lee Duarte is a 48 y o  male left hand dominant, with no significant past medical history who presents with symptoms of weakness, fatigue, low back and neck pain, unintentional weight loss over the past 3 weeks  He states that he has been unable to work for 2-3 weeks secondary to the pain in his back and he has been unable to really walk  He states that he noticed a lump on his left skull few weeks ago as well and has been trying to have it evaluated to see what it is  He also reports unintentional weight loss of about 25-30 lb  He feels low energy  He denies any chest pain or cough  He denies any headaches  He denies any visual disturbances  He denies any numbness or weakness in his extremities  He denies any dropping of objects from his hands  He is left-hand dominant  He suffered a traumatic injury at work to his left hand several years ago and has amputations multiple fingers      Review of Systems   Constitutional: Positive for activity change, appetite change, fatigue and unexpected weight change  HENT: Negative for ear pain, hearing loss, nosebleeds, postnasal drip, tinnitus, trouble swallowing and voice change  Eyes: Negative for pain and visual disturbance  Respiratory: Negative for chest tightness and shortness of breath  Cardiovascular: Negative for chest pain, palpitations and leg swelling  Gastrointestinal: Negative for abdominal pain, diarrhea, nausea and vomiting  Musculoskeletal: Positive for back pain and neck pain  Negative for neck stiffness  Skin: Negative for color change and pallor  Neurological: Positive for weakness  Negative for dizziness, tremors, seizures, syncope, facial asymmetry, speech difficulty, light-headedness, numbness and headaches  Psychiatric/Behavioral: Positive for confusion  Negative for agitation and behavioral problems  Historical Information   History reviewed  No pertinent past medical history  Past Surgical History:   Procedure Laterality Date    HAND SURGERY      left hand     Social History     Substance and Sexual Activity   Alcohol Use Not Currently     Social History     Substance and Sexual Activity   Drug Use Yes    Types: Marijuana     Social History     Tobacco Use   Smoking Status Current Every Day Smoker    Packs/day: 0 25   Smokeless Tobacco Never Used     History reviewed  No pertinent family history  Meds/Allergies   all current active meds have been reviewed  No Known Allergies    Objective   I/O       07/21 0701 - 07/22 0700 07/22 0701 - 07/23 0700 07/23 0701 - 07/24 0700    I V  (mL/kg)   462 5 (9 1)    IV Piggyback  300     Total Intake(mL/kg)  300 462 5 (9 1)    Net  +300 +462 5                 Physical Exam  Constitutional:       General: He is not in acute distress  Appearance: He is well-developed  He is ill-appearing  He is not diaphoretic  Eyes:      General:         Right eye: No discharge  Left eye: No discharge        Extraocular Movements: EOM normal       Conjunctiva/sclera: Conjunctivae normal       Pupils: Pupils are equal, round, and reactive to light  Pulmonary:      Effort: Pulmonary effort is normal  No respiratory distress  Abdominal:      General: Bowel sounds are normal  There is no distension  Palpations: Abdomen is soft  Tenderness: There is no abdominal tenderness  Musculoskeletal:         General: Normal range of motion  Cervical back: Normal range of motion and neck supple  Skin:     General: Skin is warm and dry  Neurological:      Mental Status: He is alert and oriented to person, place, and time  Cranial Nerves: No cranial nerve deficit  Sensory: No sensory deficit  Motor: No weakness  Coordination: Coordination normal  Finger-Nose-Finger Test normal       Deep Tendon Reflexes: Reflexes normal       Reflex Scores:       Tricep reflexes are 1+ on the right side and 1+ on the left side  Bicep reflexes are 1+ on the right side and 1+ on the left side  Brachioradialis reflexes are 1+ on the right side and 1+ on the left side  Patellar reflexes are 1+ on the right side and 1+ on the left side  Achilles reflexes are 1+ on the right side and 1+ on the left side  Psychiatric:         Speech: Speech normal          Behavior: Behavior normal          Thought Content: Thought content normal          Judgment: Judgment normal        Neurologic Exam     Mental Status   Oriented to person, place, and time  Oriented to person  Oriented to place  Oriented to time  Oriented to year, month and date  Registration: recalls 3 of 3 objects  Attention: normal  Concentration: normal    Speech: speech is normal   Level of consciousness: alert  Knowledge: good and consistent with education  Able to name object  Cranial Nerves     CN III, IV, VI   Pupils are equal, round, and reactive to light  Extraocular motions are normal    Right pupil: Size: 3 mm  Shape: regular  Reactivity: brisk  Consensual response: intact  Accommodation: intact  Left pupil: Size: 3 mm  Shape: regular  Reactivity: brisk  Consensual response: intact  Accommodation: intact  Nystagmus: none   Diplopia: none  Conjugate gaze: present    CN V   Right facial sensation deficit: none  Left facial sensation deficit: none    CN VII   Facial expression full, symmetric  CN VIII   Hearing: intact    CN IX, X   Palate: symmetric    CN XI   Right sternocleidomastoid strength: normal  Left sternocleidomastoid strength: normal  Right trapezius strength: normal  Left trapezius strength: normal    CN XII   Tongue: not atrophic  Fasciculations: absent  Tongue deviation: none    Motor Exam   Muscle bulk: normal  Overall muscle tone: normal  Right arm pronator drift: absent  Left arm pronator drift: absent    Strength   Right deltoid: 4/5  Left deltoid: 4/5  Right biceps: 4/5  Left biceps: 4/5  Right triceps: 4/5  Left triceps: 4/5  Right wrist flexion: 4/5  Left wrist flexion: 4/5  Right wrist extension: 4/5  Left wrist extension: 4/5  Right interossei: 4/5  Left interossei: 4/5  Right quadriceps: 4/5  Left quadriceps: 4/5  Right hamstrin/5  Left hamstrin/5  Right glutei: 4/5  Left glutei: 4/5  Right anterior tibial: 4/5  Left anterior tibial: 4/5  Right posterior tibial: 4/5  Left posterior tibial: 4/5  Right peroneal: 4/5  Left peroneal: 4/5  Right gastroc: 4/5  Left gastroc: 4/5Surgically absent digits of left hand  Left hand dominant       Sensory Exam   Light touch normal    Proprioception normal      Gait, Coordination, and Reflexes     Coordination   Finger to nose coordination: normal    Tremor   Resting tremor: absent  Intention tremor: absent  Action tremor: absent    Reflexes   Right brachioradialis: 1+  Left brachioradialis: 1+  Right biceps: 1+  Left biceps: 1+  Right triceps: 1+  Left triceps: 1+  Right patellar: 1+  Left patellar: 1+  Right achilles: 1+  Left achilles: 1+  Right : 1+  Left : 1+  Right [place]: absent  Left [place]: absent  Right ankle clonus: absent  Left ankle clonus: absent      Vitals:Blood pressure 134/76, pulse 80, temperature 98 3 °F (36 8 °C), resp  rate 18, height 5' 9" (1 753 m), weight 51 kg (112 lb 7 oz), SpO2 97 %  ,Body mass index is 16 6 kg/m²  Lab Results:   Results from last 7 days   Lab Units 07/23/21 0415 07/22/21 2038   WBC Thousand/uL 9 03 11 84*   HEMOGLOBIN g/dL 9 9* 11 2*   HEMATOCRIT % 29 6* 33 7*   PLATELETS Thousands/uL 278 335   NEUTROS PCT % 67 68   MONOS PCT % 10 8     Results from last 7 days   Lab Units 07/23/21 0415 07/22/21 2038   POTASSIUM mmol/L 3 9 4 1   CHLORIDE mmol/L 105 102   CO2 mmol/L 27 27   BUN mg/dL 11 10   CREATININE mg/dL 0 50* 0 64   CALCIUM mg/dL 8 3 9 6   ALK PHOS U/L 117* 142*   ALT U/L 26 32   AST U/L 32 32             Results from last 7 days   Lab Units 07/23/21 0415 07/22/21 2038   INR  1 08  1 05 0 96   PTT seconds 28 27     No results found for: TROPONINT  ABG:No results found for: PHART, JVG1SOM, PO2ART, GRW2OGX, N2ECZQJG, BEART, SOURCE    Imaging Studies: I have personally reviewed pertinent reports  and I have personally reviewed pertinent films in PACS    CT head without contrast    Result Date: 7/22/2021  Impression: There is an approximately 2 4 x 1 4 x 2 2 cm masslike lesion involving the scalp overlying the posterior left parietal region  Neoplasm and/or infection should be excluded  Sampling is recommended  The study was marked in [facility] for immediate notification  Workstation performed: [id number]     CT soft tissue neck w contrast    Result Date: 7/22/2021  Impression: 1  Left greater than right numerous superficial and deep neck soft tissue and intramuscular rim-enhancing necrotic lesions/collections as described  Rim-enhancing epidural thickening/collection within posterolateral spinal canal spanning levels C2-C4 resulting in canal narrowing    Finding is concerning for widespread necrotizing infection  Neoplastic process is within differential consideration  Recommend correlation with sampling  2   Occluded left subclavian and brachiocephalic veins as well as left internal jugular vein to the level of hyoid bone suggesting  3   Subpleural nodular opacities in the right upper lobe and superior segment of right lower lobe could indicate septic emboli  Suggested left hilar necrotic lymph node  4   Indeterminate sclerotic foci within T3 and T4 vertebral bodies  I personally discussed this study with Juanis Jiang on 7/22/2021 at 2:10 PM  Workstation performed: [id number]     US head neck soft tissue    Result Date: 7/22/2021  Impression: Several neck masses are identified corresponding to the palpable abnormalities within additional finding of the suspected thrombosed left jugular vein  Further evaluation with contrast-enhanced CT of the neck is recommended  The study was marked in [facility] for immediate notification  Workstation performed: [id number]     CTA ED chest PE Study    Result Date: 7/23/2021  Impression: No evidence of pulmonary embolism is seen  Measured RV/LV ratio is within normal limits at less than 0 9  Numerous nodular opacities are present bilaterally within the lungs  The appearances most suggestive of metastatic disease  Extensive hepatic metastatic disease  Large bilateral adrenal masses  Extensive lymphadenopathy  Probable osseous metastatic disease with mild to moderate compression deformity of the superior endplate of R06, suggesting pathologic compression  Possible thrombus right subclavian vein extending into the brachiocephalic vein  Consider venous duplex ultrasound for further evaluation  The study was marked in [facility] for immediate notification  Workstation performed: [id number]       EKG, Pathology, and Other Studies: I have personally reviewed pertinent reports     and I have personally reviewed pertinent films in PACS    VTE Prophylaxis: Sequential compression device (Venodyne)  and Heparin    Code Status: Level 1 - Full Code  Advance Directive and Living Will:      Power of :    POLST:      Counseling / Coordination of Care  I spent 20 minutes with the patient

## 2021-07-23 NOTE — ASSESSMENT & PLAN NOTE
Likely secondary to bony metastasis  Unknown primary  TLSO brace when upright > 45 degrees and OOB  Upright x-rays when able  Mobilize with PT/OT  Continue medical workup

## 2021-07-23 NOTE — UTILIZATION REVIEW
Initial Clinical Review    Admission: Date/Time/Statement:   Admission Orders (From admission, onward)     Ordered        07/23/21 0322  Inpatient Admission  Once                   Orders Placed This Encounter   Procedures    Inpatient Admission     Standing Status:   Standing     Number of Occurrences:   1     Order Specific Question:   Level of Care     Answer:   Med Surg [16]     Order Specific Question:   Estimated length of stay     Answer:   More than 2 Midnights     Order Specific Question:   Certification     Answer:   I certify that inpatient services are medically necessary for this patient for a duration of greater than two midnights  See H&P and MD Progress Notes for additional information about the patient's course of treatment  ED Arrival Information     Expected Arrival Acuity    - 7/22/2021 18:15 Urgent         Means of arrival Escorted by Service Admission type    Walk-In Self General Medicine Urgent         Arrival complaint    shortness of breath, back pain         Chief Complaint   Patient presents with    Medical Problem     pt states he was sent here for L sided neck mass + for subclavian vein thrombosis  pt reports neck, back and chest pain        Initial Presentation: 48year old male, presented to the ED @ [facility] from home via walk in  Admitted as Inpatient due to Metastatic Cancer  Patient has no prior past medical history except for tobacco use  Date: 07/23/2021   Presents the ED for evaluation following abnormal outpatient CT scan  Patient was being evaluated for possible cyst on his scalp  Had outpatient ultrasound performed after he was found to have been multiple neck masses  He subsequently had follow-up CT soft tissue of his neck performed to further evaluate which demonstrated multiple ring-enhancing and necrotic lesions and possible septic pulmonary emboli   Which prompted his ED evaluation    Patient also endorses unexpected weight loss,  Fatigue, and occasional shortness of breath  Monitor H&H, transfuse is drops< 7   Consult Neurosurgery - cervical stenosis  Acute right subclavian vein thromboembolism - start IV heparin gtt  Unknown origin, metastatic cancer - consult Medical Oncology    07/23/2021  Consult Neurosurgery:  Scalp lesion:  Noted with palpable mass to left parietal scalp x weeks  CT head completed  Cervical spinal mass:  Epidural mass at C2-C4  Presented to ED with neck and back pain and unintentional weight loss  Noted on imaging with the above as well as metastatic masses and lymphadenopathy  Exam non-focal  States ambulatory dysfunction over past 3 weeks secondary to back pain  Plan:  Frequent neuro checks  Maintain in [facility] cervical brace  Zoë brace for showers  Upright cervical x-rays for baseline  Recommend MRI w/wo contrast of cervical and thoracic spines to further evaluate masses and pathologic fracture  Medical and pain management per primary team   Mobilize with PT/OT  Heme/onc consulted - recommend CT CAP with contrast and biopsy  Surgical/onc consult pending  No neurosurgical intervention anticipated  Further workup for diagnosis of primary metastatic disease is needed  Neurosurgery will see again once imaging has been completed  Please call with any questions or concerns      Compression fracture of T11 vertebra:  Likely secondary to bony metastasis  Unknown primary  TLSO brace when upright > 45 degrees and OOB  Upright x-rays when able  Mobilize with PT/OT  Continue medical workup  Subclavian vein thromboembolism, acute, right:  On Heparin gtt  * Metastatic cancer:  Metastatic disease with unknown primary  30 lbs unintentional weight loss  Extensive masses in bilateral lungs, adrenal glands, liver  IR biopsy pending      VTE Pharmacologic Prophylaxis: Heparin  VTE Mechanical Prophylaxis: sequential compression device    ED Triage Vitals   Temperature Pulse Respirations Blood Pressure SpO2   07/22/21 1830 07/22/21 1830 07/22/21 1830 07/22/21 1830 07/22/21 1830   98 °F (36 7 °C) 102 16 107/85 100 %      Temp src Heart Rate Source Patient Position - Orthostatic VS BP Location FiO2 (%)   -- 07/22/21 1830 07/22/21 2155 07/22/21 2155 --    Monitor Lying Left arm       Pain Score       07/22/21 2250       8          Wt Readings from Last 1 Encounters:   07/23/21 51 kg (112 lb 7 oz)     Additional Vital Signs:   Date/Time  Temp  Pulse  Resp  BP  MAP (mmHg)  SpO2  O2 Device  Patient Position - Orthostatic VS   07/23/21 10:33:35  98 3 °F (36 8 °C)  80  18  134/76  95  97 %  --  --   07/23/21 0755  --  74  16  124/65  --  99 %  None (Room air)  --   07/23/21 0435  --  79  18  120/67  --  99 %  --  --   07/23/21 0057  --  80  18  105/59  --  99 %  None (Room air)  Lying   07/22/21 2354  --  81  18  104/64  --  99 %  None (Room air)  Lying   07/22/21 2155  --  84  18  109/67  --  100 %  None (Room air)  Lying     Date and Time Eye Opening Best Verbal Response Best Motor Response [place] Coma Scale Score   07/23/21 0228 4 5 6 15   07/22/21 2044 4 5 6 15     07/22/2021 @ 2220  CT head: There is an approximately 2 4 x 1 4 x 2 2 cm masslike lesion involving the scalp overlying the posterior left parietal region   Neoplasm and/or infection should be excluded   Sampling is recommended  The study was marked in [facility] for immediate notification  07/23/2021 @ 2240  CTa chest:  No evidence of pulmonary embolism is seen  Measured RV/LV ratio is within normal limits at less than 0 9      Numerous nodular opacities are present bilaterally within the lungs   The appearances most suggestive of metastatic disease  Extensive hepatic metastatic disease  Large bilateral adrenal masses  Extensive lymphadenopathy  Probable osseous metastatic disease with mild to moderate compression deformity of the superior endplate of P46, suggesting pathologic compression     Possible thrombus right subclavian vein extending into the brachiocephalic vein   Consider venous duplex ultrasound for further evaluation  2021 @ 1316  CT soft tissue neck:   1   Left greater than right numerous superficial and deep neck soft tissue and intramuscular rim-enhancing necrotic lesions/collections as described   Rim-enhancing epidural thickening/collection within posterolateral spinal canal spanning levels C2-C4 resulting in canal narrowing   Finding is concerning for widespread necrotizing infection   Neoplastic process is within differential consideration   Recommend correlation with sampling  2   Occluded left subclavian and brachiocephalic veins as well as left internal jugular vein to the level of hyoid bone suggesting  3   Subpleural nodular opacities in the right upper lobe and superior segment of right lower lobe could indicate septic emboli   Suggested left hilar necrotic lymph node  4   Indeterminate sclerotic foci within T3 and T4 vertebral bodies  2021 @ [facility] head/neck:  Several neck masses are identified corresponding to the palpable abnormalities within additional finding of the suspected thrombosed left jugular vein   Further evaluation with contrast-enhanced CT of the neck is recommended       2021 @ 2107  EC, Sinus rhythm with sinus arrhythmia    Pertinent Labs/Diagnostic Test Results:     Results from last 7 days   Lab Units 21  04121   WBC Thousand/uL 9 03 11 84*   HEMOGLOBIN g/dL 9 9* 11 2*   HEMATOCRIT % 29 6* 33 7*   PLATELETS Thousands/uL 278 335   NEUTROS ABS Thousands/µL 5 95 8 10*     Results from last 7 days   Lab Units 21  0415 21   SODIUM mmol/L 138 137   POTASSIUM mmol/L 3 9 4 1   CHLORIDE mmol/L 105 102   CO2 mmol/L 27 27   ANION GAP mmol/L 6 8   BUN mg/dL 11 10   CREATININE mg/dL 0 50* 0 64   EGFR ml/min/1 73sq m 126 114   CALCIUM mg/dL 8 3 9 6     Results from last 7 days   Lab Units 21  0415 21   AST U/L 32 32   ALT U/L 26 32   ALK PHOS U/L 117* 142*   TOTAL PROTEIN g/dL 5 7* 7 1   ALBUMIN g/dL 2 2* 2 8*   TOTAL BILIRUBIN mg/dL 0 22 0 38     Results from last 7 days   Lab Units 07/23/21  0415 07/22/21 2038   GLUCOSE RANDOM mg/dL 94 91     Results from last 7 days   Lab Units 07/23/21  0415 07/22/21 2038   PROTIME seconds 14 0  13 7 12 8   INR  1 08  1 05 0 96   PTT seconds 28 27     Results from last 7 days   Lab Units 07/23/21  0416 07/22/21 2038   PROCALCITONIN ng/ml 0 14 0 27*     Results from last 7 days   Lab Units 07/22/21 2038   LACTIC ACID mmol/L 1 1     Results from last 7 days   Lab Units 07/22/21 2108 07/22/21 2038   BLOOD CULTURE  Received in Microbiology Lab  Culture in Progress  Received in Microbiology Lab  Culture in Progress  Received in Microbiology Lab  Culture in Progress       ED Treatment:   Medication Administration from 07/22/2021 1815 to 07/23/2021 1007       Date/Time Order Dose Route Action     07/22/2021 2141 iohexol (OMNIPAQUE) 350 MG/ML injection (SINGLE-DOSE) 100 mL 100 mL Intravenous Given     07/22/2021 2258 cefepime (MAXIPIME) 2 g/50 mL dextrose IVPB 2,000 mg Intravenous New Bag     07/22/2021 2250 clindamycin (CLEOCIN) IVPB (premix in dextrose) 600 mg 50 mL 600 mg Intravenous New Bag     07/22/2021 2313 vancomycin (VANCOCIN) IVPB (premix in dextrose) 1,000 mg 200 mL 1,000 mg Intravenous New Bag     07/22/2021 2250 ketorolac (TORADOL) injection 15 mg 15 mg Intravenous Given     07/23/2021 0325 acetaminophen (TYLENOL) tablet 975 mg 975 mg Oral Given     07/23/2021 0405 sodium chloride 0 9 % infusion 75 mL/hr Intravenous New Bag     07/23/2021 0405 oxyCODONE (ROXICODONE) IR tablet 5 mg 5 mg Oral Given     07/23/2021 0749 vancomycin (VANCOCIN) IVPB (premix in dextrose) 750 mg 150 mL 750 mg Intravenous New Bag     07/23/2021 0423 heparin (porcine) injection 4,000 Units 4,000 Units Intravenous Given     07/23/2021 0424 heparin (porcine) 25,000 units in 0 45% NaCl 250 mL infusion (premix) 18 Units/kg/hr Intravenous New Bag        History reviewed  No pertinent past medical history  Present on Admission:   Metastatic cancer ([address] )   Subclavian vein thromboembolism, acute, right (HCC)   Cervical stenosis of spinal canal   Anemia      Admitting Diagnosis: Metastatic cancer (HCC) [C79 9]  SOB (shortness of breath) [R06 02]  Cervical stenosis of spinal canal [M48 02]  Scalp lesion [L98 9]  Abnormal CT scan, neck [R93 89]  Opacity of lung on imaging study [R91 8]  Age/Sex: 48 y o  male  Admission Orders:  Scheduled Medications:  cefepime, 2,000 mg, Intravenous, Q12H  vancomycin, 15 mg/kg, Intravenous, Q8H      Continuous IV Infusions:  heparin (porcine), 3-30 Units/kg/hr (Order-Specific), Intravenous, Titrated  sodium chloride, 75 mL/hr, Intravenous, Continuous      PRN Meds:  acetaminophen, 650 mg, Oral, Q6H PRN  heparin (porcine), 2,000 Units, Intravenous, Q1H PRN  heparin (porcine), 4,000 Units, Intravenous, Q1H PRN  oxyCODONE, 2 5 mg, Oral, Q6H PRN   Or  oxyCODONE, 5 mg, Oral, Q6H PRN  polyethylene glycol, 17 g, Oral, Daily PRN      NPO / sips with meds  Up with assistance  Consult PT/OT  Jacoby SCDs  IP CONSULT TO ONCOLOGY:  Metastatic cancer  IP CONSULT TO PHARMACY  IP CONSULT TO NEUROSURGERY:  Cervical Stenosis of spinal canal  IP CONSULT TO SURGICAL ONCOLOGY:  Abnormal CT of neck  Opacity of lung on imaging study 3  [facility]  Network Utilization Review Department  ATTENTION: Please call with any questions or concerns to [phone] and carefully listen to the prompts so that you are directed to the right person  All voicemails are confidential   [facility] all requests for admission clinical reviews, approved or denied determinations and any other requests to dedicated fax number below belonging to the campus where the patient is receiving treatment   List of dedicated fax numbers for the Facilities:  FACILITY NAME UR FAX NUMBER   ADMISSION DENIALS (Administrative/Medical Necessity) [phone]   [facility] [address] (Maternity/NICU/Pediatrics) [address] [facility],[address] Rick Shirley [id number] [address] Radha [address] [facility] [phone]

## 2021-07-23 NOTE — ASSESSMENT & PLAN NOTE
Metastatic disease with unknown primary  30 lbs unintentional weight loss  Extensive masses in bilateral lungs, adrenal glands, liver  IR biopsy pending

## 2021-07-23 NOTE — ASSESSMENT & PLAN NOTE
CT Soft Tissue Neck with Contrast: Occluded left subclavian and brachiocephalic veins as well as left internal jugular vein to the level of hyoid bone suggesting      Plan:  · Heparin gtt

## 2021-07-23 NOTE — PROGRESS NOTES
INTERNAL MEDICINE RESIDENCY SENIOR ADMISSION NOTE     Name: Laura Mast   Age & Sex: 48 y o  male   MRN: [id number]  Unit/Bed#: ED 15   Encounter: [id number]  Primary Care Provider: Caryle Pick, MD    Admit to team: SOD Team B     Patient seen and examined  Reviewed H&P per Dr Juan Pablo Pedroza   Agree with the assessment and plan with any exception/addition as noted below:    Principal Problem:    Metastatic cancer ([address] )  Active Problems:    Subclavian vein thromboembolism, acute, right (HCC)    Compression fracture of T11 vertebra (HCC)    Cervical stenosis of spinal canal    This is 55-year-old male with no significant past medical history is presenting with positive findings on his outpatient CT soft tissue neck  Patient has a history of generalized weakness, weight loss and ambulatory dysfunction for past few weeks  He recently has noticed a mass on his head and also notice multiple neck mass for which he went to [facility]   As per the patient, they tried to drain the area  He recently went to general surgery outpatient who order outpatient ultrasound  It was followed by CT soft tissue neck which showed multiple soft tissue neck masses concerning for possible necrotizing infection versus malignancy  Patient was sent to ER for further evaluation  On further workup at ER, on CT PE study showed no PE although patient was found to have malignancy of unknown origin with multiple lesions in his lungs, liver, possible right subclavian vein thrombus extending into brachycephalic vein, U98 pathologic compression, rim enhancing epidural thickening/collection at C2 C4 spinal canal with canal narrowing  CT head showed 2 4 x 1 4 x 2 2 cm masslike lesion involving the scalp of left parietal region  CT abdomen pelvis pending  On my physical exam, patient is alert and oriented x3  No focal deficits  Patient will be admitted for new malignancy of unknown primary    Will start patient on heparin drip for the right subclavian vein thrombus  IV antibiotics for now in setting of possible infection and elevated procalcitonin  Antibiotics may stop if patient clinically improves and low concern for infection  Neurosurgery consulted for cervical canal narrowing and medical oncology also consulted        Code Status: Level 1 - Full Code  Admission Status: INPATIENT   Disposition: Patient requires Med/Surg  Expected Length of Stay: >2 nights

## 2021-07-23 NOTE — ASSESSMENT & PLAN NOTE
49 yo M with recent abnormal findings on CT head, chest, and tissue neck concerning for extensive metastatic cancer:     7/22/21 - US Head Neck Soft Tissue: Several neck masses are identified corresponding to the palpable abnormalities within additional finding of the suspected thrombosed left jugular vein  Further evaluation with contrast-enhanced CT of the neck is recommended  7/22/21 - CT Soft Tissue Neck: Left greater than right numerous superficial and deep neck soft tissue and intramuscular rim-enhancing necrotic lesions/collections as described  Rim-enhancing epidural thickening/collection within posterolateral spinal canal spanning levels C2-C4 resulting in canal narrowing  Finding is concerning for widespread necrotizing infection  Neoplastic process is within differential consideration  Recommend correlation with sampling  Subpleural nodular opacities in the right upper lobe and superior segment of right lower lobe could indicate septic emboli  Suggested left hilar necrotic lymph node  Indeterminate sclerotic foci within T3 and T4 vertebral bodies  7/22/21 CT Head: There is an approximately 2 4 x 1 4 x 2 2 cm masslike lesion involving the scalp overlying the posterior left parietal region  Neoplasm and/or infection should be excluded  7/22/21 CTA PE study: Numerous nodular opacities are present bilaterally within the lungs  The appearances most suggestive of metastatic disease  Extensive hepatic metastatic disease  Large bilateral adrenal masses  Extensive lymphadenopathy  Probable osseous metastatic disease with mild to moderate compression deformity of the superior endplate of S89, suggesting pathologic compression      Plan:  · Pain Regimen:   · Oxycodone 5/10 mg q3 hours PRN mod/severe  · Dilaudid 0 5 mg q3 hours PRN for breakthrough  · Bowel regimen ordered  · Follow up results of CT CAP  · Medical Oncology Consulted and Appreciate Recommendation  · Surgical Oncology Consulted  · S/P Punch Biopsy on 7/23/21; follow up on pathology results  · Plan for Lymph Node Biopsy on 7/26/21

## 2021-07-23 NOTE — ASSESSMENT & PLAN NOTE
Epidural mass at C2-C4  · Presented to ED with neck and back pain and unintentional weight loss  · Noted on imaging with the above as well as metastatic masses and lymphadenopathy  · Exam non-focal  States ambulatory dysfunction over past 3 weeks secondary to back pain  Imaging:  · CT soft tissue neck 7/22/21: Left greater than right numerous superficial and deep neck soft tissue and intramuscular rim-enhancing necrotic lesions/collections as described  Rim-enhancing epidural thickening/collection within posterolateral spinal canal spanning levels C2-C4 resulting in canal narrowing  Finding is concerning for widespread necrotizing infection  Neoplastic process is within differential consideration  Recommend correlation with sampling  2   Occluded left subclavian and brachiocephalic veins as well as left internal jugular vein to the level of hyoid bone suggesting  3   Subpleural nodular opacities in the right upper lobe and superior segment of right lower lobe could indicate septic emboli  Suggested left hilar necrotic lymph node  4   Indeterminate sclerotic foci within T3 and T4 vertebral bodies  Plan:  · Frequent neuro checks  · Maintain in [facility] cervical brace  Zoë brace for showers  · Upright cervical x-rays for baseline  · Recommend MRI w/wo contrast of cervical and thoracic spines to further evaluate masses and pathologic fracture  · Medical and pain management per primary team   · Mobilize with PT/OT  · Heme/onc consulted - recommend CT CAP with contrast and biopsy  · Surgical/onc consult pending  · No neurosurgical intervention anticipated  Further workup for diagnosis of primary metastatic disease is needed  Neurosurgery will see again once imaging has been completed  Please call with any questions or concerns

## 2021-07-23 NOTE — H&P
INTERNAL MEDICINE RESIDENCY ADMISSION H&P     Name: Jai Shah   Age & Sex: 48 y o  male   MRN: [id number]  Unit/Bed#: ED 15   Encounter: [id number]  Primary Care Provider: Victorina Ness MD    Code Status: Level 1 - Full Code  Admission Status: INPATIENT   Disposition: Patient requires Med/Surg    Admit to team: SOD Team B     ASSESSMENT/PLAN     Principal Problem:    Metastatic cancer [facility])  Active Problems:    Subclavian vein thromboembolism, acute, right (HCC)    Cervical stenosis of spinal canal    Anemia      Anemia  Assessment & Plan  Hgb 9 9 and Hct 29 6 at time of admission      + Monitor Hgb and Hct   + Transfuse if patient becomes symptomatic or Hgb drops < 7  Cervical stenosis of spinal canal  Assessment & Plan  Pt with cervical canal narrowing likely due to rim-enhancing epidural thickening/collection within posterolateral spinal canal spanning levels C2-C4  Findings could be due to necrotizing infectious process or metastasis related      + Neurosurgery consult ordered  + Patient on a cervical collar    Subclavian vein thromboembolism, acute, right [facility])  Assessment & Plan  Patient placed on IV heparin drip for right subclavian thrombus  * Metastatic cancer [facility])  Assessment & Plan  47 yo M with recent abnormal findings on CT head, chest, and tissue neck concerning for extensive metastatic cancer  + Awaiting results of CTAP  + Neurosurgery consult ordered for cervical canal narrowing  + Medical oncology consult ordered for metastatic caner of unknown origin      VTE Pharmacologic Prophylaxis: Heparin  VTE Mechanical Prophylaxis: sequential compression device    CHIEF COMPLAINT     Chief Complaint   Patient presents with    Medical Problem     pt states he was sent here for L sided neck mass + for subclavian vein thrombosis   pt reports neck, back and chest pain       HISTORY OF PRESENT ILLNESS     47 yo M admitted following abnormal findings after a recent CT chest, head and soft tissue neck showing numerous nodular opacities across the lungs bilaterally, large bilateral adrenal masses, extensive hepatic metastatic disease, and numerous deep neck soft tissue rim enhancing lesions concerning for possible metastatic disease  Over the past 4 weeks,patient has had an unintentional weight loss of 35 lbs along with significant loss of appetite  He reports of SOB and dizziness with exertion  Pt has hot flashes multiple times a day with episodes of diaphoresis  He had an episode of loss of consciousness two weeks prior to admission  Reports of significant bilateral flank pain, bilateral neck pain, and pain at the site of lesion on his scalp  Patient has no prior past medical history except for tobacco use  At time of admission, patients blood pressure was 105/59 and heart rate was 80  CT PE with negative findings for PE but nodular opacities noted in both lungs with extensive hepatic metastasis  Awaiting results for CTAP  Patient will be admitted for metastatic cancer of unknown origin with consults orders placed for neurosurgery for cervical canal narrowing and medical oncology  Patient placed on IV heparin for h/o right subclavian thrombus along with IV ABX for possible infection  REVIEW OF SYSTEMS     Review of Systems   Constitutional: Positive for appetite change, diaphoresis, fatigue and unexpected weight change  HENT: Negative for hearing loss, trouble swallowing and voice change  Eyes: Negative for visual disturbance  Respiratory: Positive for shortness of breath  Negative for chest tightness  Cardiovascular: Negative for palpitations  Gastrointestinal: Positive for abdominal pain and constipation  Negative for nausea and vomiting  Genitourinary: Positive for flank pain  Negative for difficulty urinating and hematuria  Musculoskeletal: Positive for back pain and neck pain  Neurological: Positive for dizziness and headaches  Negative for seizures          OBJECTIVE Vitals:    21 2155 21 2354 21 0057 21 0435   BP: 109/67 104/64 105/59 120/67   BP Location: Left arm Right arm Right arm    Pulse: 84 81 80 79   Resp: 18 18 18 18   Temp:       SpO2: 100% 99% 99% 99%      Temperature:   Temp (24hrs), Av °F (36 7 °C), Min:98 °F (36 7 °C), Max:98 °F (36 7 °C)    Temperature: 98 °F (36 7 °C)  Intake & Output:  I/O        07 -  0700  0700    IV Piggyback  300    Total Intake  300    Net  +300              Weights: There is no height or weight on file to calculate BMI  Weight (last 2 days)     None        Physical Exam  Constitutional:       Appearance: He is cachectic  HENT:      Head: Mass present  Comments: Palpable, tender mass in left occipital region  Eyes:      Extraocular Movements: Extraocular movements intact  Pupils: Pupils are equal, round, and reactive to light  Neck:      Comments: Tenderness to palpation on both sides of neck  Cardiovascular:      Rate and Rhythm: Normal rate and regular rhythm  Heart sounds: Normal heart sounds  Pulmonary:      Breath sounds: Normal breath sounds  Abdominal:      Tenderness: There is right CVA tenderness and left CVA tenderness  Musculoskeletal:      Cervical back: Pain with movement and muscular tenderness present  Skin:     General: Skin is warm and dry  Neurological:      Mental Status: He is alert and oriented to person, place, and time  PAST MEDICAL HISTORY   History reviewed  No pertinent past medical history    PAST SURGICAL HISTORY     Past Surgical History:   Procedure Laterality Date    HAND SURGERY      left hand     SOCIAL & FAMILY HISTORY     Social History     Substance and Sexual Activity   Alcohol Use Not Currently     Substance and Sexual Activity   Alcohol Use Not Currently        Substance and Sexual Activity   Drug Use Yes    Types: Marijuana     Social History     Tobacco Use   Smoking Status Current Every Day Smoker    Packs/day: 0 25   Smokeless Tobacco Never Used     History reviewed  No pertinent family history  LABORATORY DATA     Labs: I have personally reviewed pertinent reports  Results from last 7 days   Lab Units 07/23/21  0415 07/22/21 2038   WBC Thousand/uL 9 03 11 84*   HEMOGLOBIN g/dL 9 9* 11 2*   HEMATOCRIT % 29 6* 33 7*   PLATELETS Thousands/uL 278 335   NEUTROS PCT % 67 68   MONOS PCT % 10 8      Results from last 7 days   Lab Units 07/22/21 2038   POTASSIUM mmol/L 4 1   CHLORIDE mmol/L 102   CO2 mmol/L 27   BUN mg/dL 10   CREATININE mg/dL 0 64   CALCIUM mg/dL 9 6   ALK PHOS U/L 142*   ALT U/L 32   AST U/L 32              Results from last 7 days   Lab Units 07/22/21 2038   INR  0 96   PTT seconds 27     Results from last 7 days   Lab Units 07/22/21 2038   LACTIC ACID mmol/L 1 1         Micro:  Lab Results   Component Value Date    BLOODCX Received in Microbiology Lab  Culture in Progress  07/22/2021    BLOODCX Received in Microbiology Lab  Culture in Progress  07/22/2021    BLOODCX Received in Microbiology Lab  Culture in Progress  07/22/2021     IMAGING & DIAGNOSTIC TESTS     Imaging: I have personally reviewed pertinent reports  CT head without contrast    Result Date: 7/22/2021  Impression: There is an approximately 2 4 x 1 4 x 2 2 cm masslike lesion involving the scalp overlying the posterior left parietal region  Neoplasm and/or infection should be excluded  Sampling is recommended  The study was marked in [facility] for immediate notification  Workstation performed: [id number]     CT soft tissue neck w contrast    Result Date: 7/22/2021  Impression: 1  Left greater than right numerous superficial and deep neck soft tissue and intramuscular rim-enhancing necrotic lesions/collections as described  Rim-enhancing epidural thickening/collection within posterolateral spinal canal spanning levels C2-C4 resulting in canal narrowing  Finding is concerning for widespread necrotizing infection  Neoplastic process is within differential consideration  Recommend correlation with sampling  2   Occluded left subclavian and brachiocephalic veins as well as left internal jugular vein to the level of hyoid bone suggesting  3   Subpleural nodular opacities in the right upper lobe and superior segment of right lower lobe could indicate septic emboli  Suggested left hilar necrotic lymph node  4   Indeterminate sclerotic foci within T3 and T4 vertebral bodies  I personally discussed this study with La Benitez on 7/22/2021 at 2:10 PM  Workstation performed: [id number]     US head neck soft tissue    Result Date: 7/22/2021  Impression: Several neck masses are identified corresponding to the palpable abnormalities within additional finding of the suspected thrombosed left jugular vein  Further evaluation with contrast-enhanced CT of the neck is recommended  The study was marked in [facility] for immediate notification  Workstation performed: [id number]     CTA ED chest PE Study    Result Date: 7/23/2021  Impression: No evidence of pulmonary embolism is seen  Measured RV/LV ratio is within normal limits at less than 0 9  Numerous nodular opacities are present bilaterally within the lungs  The appearances most suggestive of metastatic disease  Extensive hepatic metastatic disease  Large bilateral adrenal masses  Extensive lymphadenopathy  Probable osseous metastatic disease with mild to moderate compression deformity of the superior endplate of B85, suggesting pathologic compression  Possible thrombus right subclavian vein extending into the brachiocephalic vein  Consider venous duplex ultrasound for further evaluation  The study was marked in [facility] for immediate notification  Workstation performed: [id number]     EKG, Pathology, and Other Studies: I have personally reviewed pertinent reports       ALLERGIES   No Known Allergies  MEDICATIONS PRIOR TO ARRIVAL     Prior to Admission medications    Medication Sig Start Date End Date Taking?  Authorizing Provider   clindamycin (CLINDAGEL) 1 % gel Apply topically 2 (two) times a day 7/8/21   Mi Gaytan PA-C   cyclobenzaprine (FLEXERIL) 10 mg tablet Take 1 tablet (10 mg total) by mouth 2 (two) times a day as needed for muscle spasms for up to 10 days 6/10/21 6/20/21  David Flannery MD     MEDICATIONS ADMINISTERED IN LAST 24 HOURS     Medication Administration - last 24 hours from 07/22/2021 0446 to 07/23/2021 0446       Date/Time Order Dose Route Action Action by     07/22/2021 2141 iohexol (OMNIPAQUE) 350 MG/ML injection (SINGLE-DOSE) 100 mL 100 mL Intravenous Given Jeanie Lines     07/22/2021 2313 cefepime (MAXIPIME) 2 g/50 mL dextrose IVPB 0 mg Intravenous Stopped Reji Mata, BRITTANY     07/22/2021 2258 cefepime (MAXIPIME) 2 g/50 mL dextrose IVPB 2,000 mg Intravenous New Bag Sueanne Esperanza, RN     07/22/2021 2258 clindamycin (CLEOCIN) IVPB (premix in dextrose) 600 mg 50 mL 0 mg Intravenous Stopped Reji Mata, RN     07/22/2021 2250 clindamycin (CLEOCIN) IVPB (premix in dextrose) 600 mg 50 mL 600 mg Intravenous [address] Reji Mata, BRITTANY     07/23/2021 0026 vancomycin (VANCOCIN) IVPB (premix in dextrose) 1,000 mg 200 mL 0 mg/kg Intravenous Stopped Sarah Peace RN     07/22/2021 2313 vancomycin (VANCOCIN) IVPB (premix in dextrose) 1,000 mg 200 mL 1,000 mg Intravenous New Bag Raynne Esperanza, BRITTANY     07/22/2021 2250 ketorolac (TORADOL) injection 15 mg 15 mg Intravenous Given Reji Mata RN     07/23/2021 0325 acetaminophen (TYLENOL) tablet 975 mg 975 mg Oral Given [facility], RN     07/23/2021 0405 sodium chloride 0 9 % infusion 75 mL/hr Intravenous [facility], RN     07/23/2021 0405 oxyCODONE (ROXICODONE) IR tablet 2 5 mg   Oral See Alternative TOTALLY [facility], RN     07/23/2021 0405 oxyCODONE (ROXICODONE) IR tablet 5 mg 5 mg Oral Given TOTALLY [facility], RN     07/23/2021 0424 heparin (porcine) injection 4,000 Units 4,000 Units Intravenous Given Margi Adrian RN     07/23/2021 0424 heparin (porcine) 25,000 units in 0 45% NaCl 250 mL infusion (premix) 18 Units/kg/hr Intravenous New Bag Bekah Friedman RN        CURRENT MEDICATIONS     heparin (porcine), 3-30 Units/kg/hr (Order-Specific), Last Rate: 18 Units/kg/hr (07/23/21 0424)  sodium chloride, 75 mL/hr, Last Rate: 75 mL/hr (07/23/21 0405)          Admission Time  I spent 30 minutes admitting the patient  This involved direct patient contact where I performed a full history and physical, reviewing previous records, and reviewing laboratory and other diagnostic studies  Portions of the record may have been created with voice recognition software  Occasional wrong word or "sound a like" substitutions may have occurred due to the inherent limitations of voice recognition software    Read the chart carefully and recognize, using context, where substitutions have occurred     ==  Leydi Burrows MD  [facility]  Internal Medicine Residency PGY-1

## 2021-07-23 NOTE — ASSESSMENT & PLAN NOTE
Pt with cervical canal narrowing likely due to rim-enhancing epidural thickening/collection within posterolateral spinal canal spanning levels C2-C4  Findings could be due to necrotizing infectious process or metastasis related       Plan:  · Neurosurgery consulted and appreciate recommendations  · MRI w/wo contrast of cervical/thoracic spine ordered and pending  · Frequent neurochecks  · Maintain cervical collar

## 2021-07-23 NOTE — RESTORATIVE TECHNICIAN NOTE
Restorative Technician Note      Patient Name: Cyn Giles     Restorative Tech Visit Date: 07/23/21  Note Type: Bracing, Initial consult  Patient Position Upon Consult: Supine  Brace Applied: [place] Collar Set  Additional Brace Ordered: Yes  Additional Brace Applied: [employer] 456 Back Pack TLSO  Patient Position When Brace Applied: Other (comment) (supine with the [facility] elevated)  Education Provided: Yes  Patient Position at End of Consult: Other (comment) (in the bed with the [facility] elevated)  Nurse Communication: Nurse aware of consult, application of brace    Horizon backpack adjusted to a 2 for optimal fit  Chin plate adjusted to level 1 for optimal fit on the [employer] collar  Zoë shower collar, replacement pads and instruction sheet for the [employer] and TLSO left in a pt's belongings bag at the bedside  Please call the [employer] tech, ext [phone], with any bracing questions and/or adjustments      General [employer],

## 2021-07-23 NOTE — PROGRESS NOTES
Vancomycin IV Pharmacy-to-Dose Consultation    Ana Porter is a 48 y o  male who is currently receiving Vancomycin IV with management by the Pharmacy Consult service  Relevant clinical data and objective / subjective history reviewed  Vancomycin Assessment:  Indication: skin-soft tissue infection (soft tissue neck masses concerning for possible necrotizing infection vs malignancy)  Status: stable    Micro:         - 07/23 - MRSA: in process        - 07/22 - BC: 2/2 in process  Procalcitonin:         - 07/22 - 0 27  Renal Function: Scr - 0 64, CrCl ~ 95 mL/min, UOP - no data  Potential Nephrotoxicity Factors:  Medications: none  Patient-Factors: none  Days of Therapy: 1  Current Dose: 1000 mg IV one time load (last dose: 07/22 at 2313)  Goal Trough: 15-20 (appropriate for most indications)   Goal AUC(24h): 400-600  Pharmacokinetic Analysis: half-life ~ 7 5 hrs, ke - 0 0919      Vancomycin Plan:  New Dosing: after clinical evaluation will start 750 mg IV Q8H dosing regimen (next dose: 07/23 at 700)  Predicted Trough / AUC: 16 7/573  Next Level: trough level to be collected on 07/24 at 630  Renal Function Monitoring: daily BMP and [place] [address] will continue to follow closely for s/sx of nephrotoxicity, infusion reactions and appropriateness of therapy  BMP and CBC will be ordered per protocol  We will continue to follow the patients culture results and clinical progress daily         Jose Fink, RoxaneD

## 2021-07-23 NOTE — CONSULTS
Consultation - Surgical Oncololgy   Carissa Koo 48 y o  male MRN: [id number]  Unit/Bed#: [facility] 914-01 Encounter: [id number]    Assessment/Plan     Assessment:  54yo M with suspected undiagnosed metastatic cancer  Plan:  - Biopsy of scalp lesion   - Pain control PRN  - Medical oncology consult  - f/u diagnostic imaging   - Medical management by primary     History of Present Illness     HPI:  Carissa Koo is a 48 y o  male with no known PMH, who presents with pain in his abdomen, back, scalp and neck  Six weeks ago he noticed a painful lesion on his left scalp that has grown in size  Over the past 4 weeks he has experienced a 30 lbs unintentional weight loss, weakness, fatigue, SOB, hot flashes, diaphoresis, lightheadedness, and near syncope  CT PE revealed potential metastatic lesions to bilateral lungs, liver, neck, bilateral adrenals, and spine  He was also found to have a Right subclavian DVT  He was started on a heparin drip and given, cefepime and flagyl, and placed on IVF  Medical oncology was consulted for his potential cancer diagnosis, and Neurology was consulted fro the cervical narrowing seen on imaging  Inpatient Consult to Surgical Oncology     Performed by  Anselmo Rubinstein, MD     Authorized by Marcy Alarcon DO              Review of Systems   Constitutional: Positive for activity change, appetite change, diaphoresis, fatigue and unexpected weight change  HENT: Negative  Eyes: Negative  Respiratory: Positive for shortness of breath and wheezing  Negative for cough and stridor  Cardiovascular: Negative  Gastrointestinal: Positive for abdominal pain and constipation  Negative for anal bleeding, blood in stool, diarrhea, nausea and vomiting  Endocrine: Positive for heat intolerance  Genitourinary: Positive for flank pain  Musculoskeletal: Positive for back pain, neck pain and neck stiffness  Skin: Negative      Neurological: Positive for dizziness, weakness and headaches  Hematological: Positive for adenopathy  Psychiatric/Behavioral: Negative  Historical Information   History reviewed  No pertinent past medical history  Past Surgical History:   Procedure Laterality Date    HAND SURGERY      left hand     Social History   Social History     Substance and Sexual Activity   Alcohol Use Not Currently     Social History     Substance and Sexual Activity   Drug Use Yes    Types: Marijuana     E-Cigarette/Vaping    E-Cigarette Use Never User      E-Cigarette/Vaping Substances     Social History     Tobacco Use   Smoking Status Current Every Day Smoker    Packs/day: 0 25   Smokeless Tobacco Never Used     Family History: History reviewed  No pertinent family history      Meds/Allergies   current meds:   Current Facility-Administered Medications   Medication Dose Route Frequency    acetaminophen (TYLENOL) tablet 650 mg  650 mg Oral Q6H PRN    cefepime (MAXIPIME) 2 g/50 mL dextrose IVPB  2,000 mg Intravenous Q12H    heparin (porcine) 25,000 units in 0 45% NaCl 250 mL infusion (premix)  3-30 Units/kg/hr (Order-Specific) Intravenous Titrated    heparin (porcine) injection 2,000 Units  2,000 Units Intravenous Q1H PRN    heparin (porcine) injection 4,000 Units  4,000 Units Intravenous Q1H PRN    lidocaine (PF) (XYLOCAINE-MPF) 1 % injection 10 mL  10 mL Infiltration Once    oxyCODONE (ROXICODONE) IR tablet 2 5 mg  2 5 mg Oral Q6H PRN    Or    oxyCODONE (ROXICODONE) IR tablet 5 mg  5 mg Oral Q6H PRN    polyethylene glycol (MIRALAX) packet 17 g  17 g Oral Daily PRN    sodium chloride 0 9 % infusion  75 mL/hr Intravenous Continuous    vancomycin (VANCOCIN) IVPB (premix in dextrose) 750 mg 150 mL  15 mg/kg Intravenous Q8H     No Known Allergies    Objective   First Vitals:   Blood Pressure: 107/85 (07/22/21 1830)  Pulse: 102 (07/22/21 1830)  Temperature: 98 °F (36 7 °C) (07/22/21 1830)  Respirations: 16 (07/22/21 1830)  Height: 5' 9" (175 3 cm) (07/23/21 1033)  Weight - Scale: 51 kg (112 lb 7 oz) (07/23/21 1033)  SpO2: 100 % (07/22/21 1830)    Current Vitals:   Blood Pressure: 134/76 (07/23/21 1033)  Pulse: 80 (07/23/21 1033)  Temperature: 98 3 °F (36 8 °C) (07/23/21 1033)  Respirations: 18 (07/23/21 1033)  Height: 5' 9" (175 3 cm) (07/23/21 1033)  Weight - Scale: 51 kg (112 lb 7 oz) (07/23/21 1033)  SpO2: 97 % (07/23/21 1033)      Intake/Output Summary (Last 24 hours) at 7/23/2021 1359  Last data filed at 7/23/2021 1015  Gross per 24 hour   Intake 762 5 ml   Output --   Net 762 5 ml       Invasive Devices     Peripheral Intravenous Line            Peripheral IV 07/22/21 Left;Upper;Ventral (anterior) Arm <1 day    Peripheral IV 07/22/21 Right Antecubital <1 day                Physical Exam  Constitutional:       Appearance: Normal appearance  HENT:      Head: Atraumatic  Comments: Left parietal-occipital lesion 2 4x1  4x2 2 cm, mildly tender, firm, non fluctuant, scabbed over, no discharge expressed      Nose: Nose normal       Mouth/Throat:      Mouth: Mucous membranes are moist    Eyes:      Extraocular Movements: Extraocular movements intact  Neck:      Comments: Bilateral palpable lymphadenopathy, firm, minimally tender  C-collar in place  Cardiovascular:      Rate and Rhythm: Normal rate and regular rhythm  Pulses: Normal pulses  Pulmonary:      Effort: Pulmonary effort is normal       Breath sounds: Normal breath sounds  Abdominal:      General: Abdomen is flat  Palpations: Abdomen is soft  There is no mass  Tenderness: There is abdominal tenderness  There is right CVA tenderness and left CVA tenderness  There is no guarding  Musculoskeletal:         General: Normal range of motion  Skin:     General: Skin is warm  Capillary Refill: Capillary refill takes less than 2 seconds  Neurological:      General: No focal deficit present  Mental Status: He is alert and oriented to person, place, and time           Lab Results: I have personally reviewed pertinent lab results  , CBC:   Lab Results   Component Value Date    WBC 9 03 07/23/2021    HGB 9 9 (L) 07/23/2021    HCT 29 6 (L) 07/23/2021    MCV 92 07/23/2021     07/23/2021    MCH 30 7 07/23/2021    MCHC 33 4 07/23/2021    RDW 13 9 07/23/2021    MPV 9 0 07/23/2021    NRBC 0 07/23/2021   , CMP:   Lab Results   Component Value Date    SODIUM 138 07/23/2021    K 3 9 07/23/2021     07/23/2021    CO2 27 07/23/2021    BUN 11 07/23/2021    CREATININE 0 50 (L) 07/23/2021    CALCIUM 8 3 07/23/2021    AST 32 07/23/2021    ALT 26 07/23/2021    ALKPHOS 117 (H) 07/23/2021    EGFR 126 07/23/2021   , Coagulation:   Lab Results   Component Value Date    INR 1 05 07/23/2021    INR 1 08 07/23/2021     Imaging: I have personally reviewed pertinent reports     CT head without contrast   Final Result by Barry Sparks MD (07/22 2239)      There is an approximately 2 4 x 1 4 x 2 2 cm masslike lesion involving the scalp overlying the posterior left parietal region  Neoplasm and/or infection should be excluded  Sampling is recommended  The study was marked in [facility] for immediate notification  Workstation performed: [id number]         CTA ED chest PE Study   Final Result by Barry Sparks MD (07/23 0799)      No evidence of pulmonary embolism is seen  Measured RV/LV ratio is within normal limits at less than 0 9  Numerous nodular opacities are present bilaterally within the lungs  The appearances most suggestive of metastatic disease  Extensive hepatic metastatic disease  Large bilateral adrenal masses  Extensive lymphadenopathy  Probable osseous metastatic disease with mild to moderate compression deformity of the superior endplate of J82, suggesting pathologic compression  Possible thrombus right subclavian vein extending into the brachiocephalic vein  Consider venous duplex ultrasound for further evaluation        The study was marked in [facility] for immediate notification  Workstation performed: [id number]         CT abdomen pelvis w contrast    (Results Pending)   MRI inpatient order    (Results Pending)   XR spine cervical 2 or 3 vw injury    (Results Pending)   XR spine thoracolumbar 2 vw    (Results Pending)       EKG, Pathology, and Other Studies: I have personally reviewed pertinent reports  Counseling / Coordination of Care  Total floor / unit time spent today 30 minutes  Greater than 50% of total time was spent with the patient and / or family counseling and / or coordination of care    A description of the counseling / coordination of care: N/A

## 2021-07-23 NOTE — ASSESSMENT & PLAN NOTE
· T11 and L3 pathologic fractures  · Likely secondary to bony metastasis  · Unknown primary  · TLSO brace when upright > 45 degrees and OOB  · Upright x-rays when able  · Mobilize with PT/OT  · Continue medical workup  · Pt to obtain MRI of the thoracic and lumbar spine w wo

## 2021-07-24 NOTE — PROGRESS NOTES
Progress Note - Surgical Oncology   Sayda Trujillo 48 y o  male MRN: [id number]  Unit/Bed#: [facility] 914-01 Encounter: [id number]    Assessment:  54yo M with suspected undiagnosed metastatic cancer involving scalp, liver, adrenal, neck    Plan:  - follow up punch biopsy results  - rest of care per primary team    Subjective/Objective     Subjective:   No acute events overnight  No bleeding from punch biopsy site  -N  -V  +f  -BM  Objective:     Blood pressure 113/69, pulse 79, temperature 99 3 °F (37 4 °C), resp  rate 18, height 5' 9" (1 753 m), weight 51 kg (112 lb 7 oz), SpO2 93 %  ,Body mass index is 16 6 kg/m²        Intake/Output Summary (Last 24 hours) at 7/24/2021 0642  Last data filed at 7/24/2021 0245  Gross per 24 hour   Intake 462 5 ml   Output 0 ml   Net 462 5 ml       Invasive Devices     Peripheral Intravenous Line            Peripheral IV 07/22/21 Left;Upper;Ventral (anterior) Arm 1 day                Physical Exam:   Gen: NAD, Comfortable  Neuro: A&O, No focal deficits  Head: Normal Cephalic, Atraumatic, lesion on left posterior scalp without bleeding  Neck: R and L neck lesions  Eye: EOMI, PERRLA, No scleral icterus  Neck: Supple, No JVD, Midline trachea  CV: RRR, Cap refill <2 sec  Pulm: Normal work of breathing, no respiratory distress  Abd: Soft, Non-Distended, Non-Tender, masses in LLQ  Ext: No edema, Non-tender  Skin: warm, dry, intact

## 2021-07-24 NOTE — PROGRESS NOTES
Vancomycin IV Pharmacy-to-Dose Consultation    Adilia Tan is a 48 y o  male who is currently receiving Vancomycin IV with management by the Pharmacy Consult service  Assessment/Plan:  The patient was reviewed  Renal function is improving and no signs or symptoms of nephrotoxicity and/or infusion reactions were documented in the chart  Vancomycin trough resulted at 14 5  Based on todays assessment, will increase vancomycin (day # 2) dosing to 1000 mg q8h, with a plan for trough to be drawn at 1130 on 7/25  We will continue to follow the patients culture results and clinical progress daily      Ileana Rincon, RoxaneD, BCIDP

## 2021-07-24 NOTE — PROGRESS NOTES
INTERNAL MEDICINE RESIDENCY PROGRESS NOTE     Name: Rocael Mcgregor   Age & Sex: 48 y o  male   MRN: [id number]  Unit/Bed#: [facility] 914-01   Encounter: [id number]  Team: SOD Team B     PATIENT INFORMATION     Name: Rocael Mcgregor   Age & Sex: 48 y o  male   MRN: [id number]  Hospital Stay Days: 1    ASSESSMENT/PLAN     Principal Problem:    Metastatic cancer [facility])  Active Problems:    Cervical stenosis of spinal canal    Subclavian vein thromboembolism, acute, right (HCC)    Compression fracture of T11 vertebra (HCC)    Anemia    Cervical spinal mass ([address] )      * Metastatic cancer [facility])  Assessment & Plan  49 yo M with recent abnormal findings on CT head, chest, and tissue neck concerning for extensive metastatic cancer:     7/22/21 - US Head Neck Soft Tissue: Several neck masses are identified corresponding to the palpable abnormalities within additional finding of the suspected thrombosed left jugular vein  Further evaluation with contrast-enhanced CT of the neck is recommended  7/22/21 - CT Soft Tissue Neck: Left greater than right numerous superficial and deep neck soft tissue and intramuscular rim-enhancing necrotic lesions/collections as described  Rim-enhancing epidural thickening/collection within posterolateral spinal canal spanning levels C2-C4 resulting in canal narrowing  Finding is concerning for widespread necrotizing infection  Neoplastic process is within differential consideration  Recommend correlation with sampling  Subpleural nodular opacities in the right upper lobe and superior segment of right lower lobe could indicate septic emboli  Suggested left hilar necrotic lymph node  Indeterminate sclerotic foci within T3 and T4 vertebral bodies  7/22/21 CT Head: There is an approximately 2 4 x 1 4 x 2 2 cm masslike lesion involving the scalp overlying the posterior left parietal region  Neoplasm and/or infection should be excluded        7/22/21 CTA PE study: Numerous nodular opacities are present bilaterally within the lungs  The appearances most suggestive of metastatic disease  Extensive hepatic metastatic disease  Large bilateral adrenal masses  Extensive lymphadenopathy  Probable osseous metastatic disease with mild to moderate compression deformity of the superior endplate of H27, suggesting pathologic compression      Plan:  · Pain Regimen:   · Oxycodone 5/10 mg q3 hours PRN mod/severe  · Dilaudid 0 5 mg q3 hours PRN for breakthrough  · Bowel regimen ordered  · Follow up results of CT CAP  · Medical Oncology Consulted and Appreciate Recommendation  · Surgical Oncology Consulted  · S/P Punch Biopsy on 7/23/21; follow up on pathology results  · Plan for Lymph Node Biopsy on 7/26/21    Cervical stenosis of spinal canal  Assessment & Plan  Pt with cervical canal narrowing likely due to rim-enhancing epidural thickening/collection within posterolateral spinal canal spanning levels C2-C4  Findings could be due to necrotizing infectious process or metastasis related  Plan:  · Neurosurgery consulted and appreciate recommendations  · MRI w/wo contrast of cervical/thoracic spine ordered and pending  · Frequent neurochecks  · Maintain cervical collar    Subclavian vein thromboembolism, acute, right (HCC)  Assessment & Plan    CT Soft Tissue Neck with Contrast: Occluded left subclavian and brachiocephalic veins as well as left internal jugular vein to the level of hyoid bone suggesting  Plan:  · Heparin gtt    Anemia  Assessment & Plan  Hgb 9 9 and Hct 29 6 at time of admission  Plan:  · Monitor Hgb and Hct  · Transfuse if patient becomes symptomatic or Hgb drops < 7      Disposition: Will continue to require inpatient level of care  Patient s/p scalp punch biopsy with plans to undergo lymph node biopsy on 7/26/21  MRI cervical/thoracic spine pending to evaluated masses and pathologic fracture and will further determine need for neurosurgical intervention       SUBJECTIVE     Patient seen and examined at bedside this morning  Patient lying in bed comfortably in no acute distress or visible discomfort  Does complain of tolerable pain in his back and states pain is well controlled on current regimen  Denies N/V, fever/chills, SOB, palpitations, headaches, lightheadedness/dizzines, or weakness  OBJECTIVE     Vitals:    21 1033 21 1551 21 2231 21 0755   BP: 134/76 129/77 113/69 114/78   BP Location:       Pulse: 80 79  84   Resp: 18 17 18 18   Temp: 98 3 °F (36 8 °C) 98 3 °F (36 8 °C) 99 3 °F (37 4 °C) 98 5 °F (36 9 °C)   SpO2: 97% 93%  94%   Weight: 51 kg (112 lb 7 oz)      Height: 5' 9" (1 753 m)         Temperature:   Temp (24hrs), Av 7 °F (37 1 °C), Min:98 3 °F (36 8 °C), Max:99 3 °F (37 4 °C)    Temperature: 98 5 °F (36 9 °C)  Intake & Output:  I/O       701 -  0700  07 -  0700 701 -  0700    P  O    120    I V  (mL/kg)  462 5 (9 1)     IV Piggyback 300      Total Intake(mL/kg) 300 462 5 (9 1) 120 (2 4)    Urine (mL/kg/hr)  0 (0) 0 (0)    Total Output  0 0    Net +300 +462 5 +120               Weights:   IBW (Ideal Body Weight): 70 7 kg    Body mass index is 16 6 kg/m²  Weight (last 2 days)     Date/Time   Weight    21 10:33:35   51 (112 43)            Physical Exam  Constitutional:       General: He is not in acute distress  Appearance: Normal appearance  He is cachectic  He is ill-appearing  He is not diaphoretic  HENT:      Head: Mass present  Comments: Palpable, tender mass in left occipital region     Nose: Nose normal  No congestion  Eyes:      General: No scleral icterus  Conjunctiva/sclera: Conjunctivae normal    Neck:      Comments: Cervical collar in place  Cardiovascular:      Rate and Rhythm: Normal rate and regular rhythm  Heart sounds: Normal heart sounds  Pulmonary:      Effort: Pulmonary effort is normal       Breath sounds: Normal breath sounds  No wheezing     Abdominal:      General: Bowel sounds are normal       Tenderness: There is no abdominal tenderness  Musculoskeletal:      Cervical back: Pain with movement and muscular tenderness present  Right lower leg: No edema  Left lower leg: No edema  Skin:     General: Skin is warm and dry  Capillary Refill: Capillary refill takes less than 2 seconds  Neurological:      Mental Status: He is alert and oriented to person, place, and time  Psychiatric:         Mood and Affect: Mood normal          Behavior: Behavior normal        LABORATORY DATA     Labs: I have personally reviewed pertinent reports  Results from last 7 days   Lab Units 07/24/21 0618 07/23/21 0415 07/22/21 2038   WBC Thousand/uL 10 62* 9 03 11 84*   HEMOGLOBIN g/dL 9 1* 9 9* 11 2*   HEMATOCRIT % 27 4* 29 6* 33 7*   PLATELETS Thousands/uL 283 278 335   NEUTROS PCT % 66 67 68   MONOS PCT % 10 10 8      Results from last 7 days   Lab Units 07/24/21  0618 07/23/21 0415 07/22/21 2038   POTASSIUM mmol/L 3 9 3 9 4 1   CHLORIDE mmol/L 105 105 102   CO2 mmol/L 24 27 27   BUN mg/dL 8 11 10   CREATININE mg/dL 0 34* 0 50* 0 64   CALCIUM mg/dL 8 7 8 3 9 6   ALK PHOS U/L  --  117* 142*   ALT U/L  --  26 32   AST U/L  --  32 32     Results from last 7 days   Lab Units 07/24/21  0618   MAGNESIUM mg/dL 1 7     Results from last 7 days   Lab Units 07/24/21  0618   PHOSPHORUS mg/dL 3 6      Results from last 7 days   Lab Units 07/24/21  1134 07/24/21  0433 07/23/21  1930 07/23/21 0415 07/22/21 2038   INR   --   --   --  1 08  1 05 0 96   PTT seconds 39* 44* 50* 28 27     Results from last 7 days   Lab Units 07/22/21 2038   LACTIC ACID mmol/L 1 1           IMAGING & DIAGNOSTIC TESTING     Radiology Results: I have personally reviewed pertinent reports  CT head without contrast    Result Date: 7/22/2021  Impression: There is an approximately 2 4 x 1 4 x 2 2 cm masslike lesion involving the scalp overlying the posterior left parietal region    Neoplasm and/or infection should be excluded  Sampling is recommended  The study was marked in [facility] for immediate notification  Workstation performed: [id number]     CT soft tissue neck w contrast    Result Date: 7/22/2021  Impression: 1  Left greater than right numerous superficial and deep neck soft tissue and intramuscular rim-enhancing necrotic lesions/collections as described  Rim-enhancing epidural thickening/collection within posterolateral spinal canal spanning levels C2-C4 resulting in canal narrowing  Finding is concerning for widespread necrotizing infection  Neoplastic process is within differential consideration  Recommend correlation with sampling  2   Occluded left subclavian and brachiocephalic veins as well as left internal jugular vein to the level of hyoid bone suggesting  3   Subpleural nodular opacities in the right upper lobe and superior segment of right lower lobe could indicate septic emboli  Suggested left hilar necrotic lymph node  4   Indeterminate sclerotic foci within T3 and T4 vertebral bodies  I personally discussed this study with Janet Holm on 7/22/2021 at 2:10 PM  Workstation performed: [id number]     US head neck soft tissue    Result Date: 7/22/2021  Impression: Several neck masses are identified corresponding to the palpable abnormalities within additional finding of the suspected thrombosed left jugular vein  Further evaluation with contrast-enhanced CT of the neck is recommended  The study was marked in [facility] for immediate notification  Workstation performed: [id number]     CTA ED chest PE Study    Result Date: 7/23/2021  Impression: No evidence of pulmonary embolism is seen  Measured RV/LV ratio is within normal limits at less than 0 9  Numerous nodular opacities are present bilaterally within the lungs  The appearances most suggestive of metastatic disease  Extensive hepatic metastatic disease  Large bilateral adrenal masses  Extensive lymphadenopathy   Probable osseous metastatic disease with mild to moderate compression deformity of the superior endplate of D99, suggesting pathologic compression  Possible thrombus right subclavian vein extending into the brachiocephalic vein  Consider venous duplex ultrasound for further evaluation  The study was marked in [facility] for immediate notification  Workstation performed: [id number]     Other Diagnostic Testing: I have personally reviewed pertinent reports  ACTIVE MEDICATIONS     Current Facility-Administered Medications   Medication Dose Route Frequency    acetaminophen (TYLENOL) tablet 650 mg  650 mg Oral Q6H PRN    heparin (porcine) 25,000 units in 0 45% NaCl 250 mL infusion (premix)  3-30 Units/kg/hr (Order-Specific) Intravenous Titrated    heparin (porcine) injection 2,000 Units  2,000 Units Intravenous Q1H PRN    heparin (porcine) injection 4,000 Units  4,000 Units Intravenous Q1H PRN    HYDROmorphone (DILAUDID) injection 0 5 mg  0 5 mg Intravenous Q3H PRN    lidocaine (PF) (XYLOCAINE-MPF) 1 % injection 10 mL  10 mL Infiltration Once    oxyCODONE (ROXICODONE) IR tablet 5 mg  5 mg Oral Q6H PRN    Or    oxyCODONE (ROXICODONE) immediate release tablet 10 mg  10 mg Oral Q6H PRN    polyethylene glycol (MIRALAX) packet 17 g  17 g Oral Daily PRN    sodium chloride 0 9 % infusion  75 mL/hr Intravenous Continuous       VTE Pharmacologic Prophylaxis: Heparin  VTE Mechanical Prophylaxis: sequential compression device    Portions of the record may have been created with voice recognition software  Occasional wrong word or "sound a like" substitutions may have occurred due to the inherent limitations of voice recognition software    Read the chart carefully and recognize, using context, where substitutions have occurred   ==  Domi Choe, [facility]  Internal Medicine Residency PGY-2

## 2021-07-25 NOTE — PROGRESS NOTES
Progress Note - Surgical Oncology   Pia Conti 48 y o  male MRN: [id number]  Unit/Bed#: [facility] 238-02 Encounter: [id number]  07/25/21  9:56 AM      Subjective/Objective   Chief Complaint   Patient presents with    Medical Problem     pt states he was sent here for L sided neck mass + for subclavian vein thrombosis  pt reports neck, back and chest pain        Subjective:  No complaints    Objective:      Blood pressure 126/78, pulse 97, temperature 98 3 °F (36 8 °C), resp  rate 18, height 5' 9" (1 753 m), weight 51 kg (112 lb 7 oz), SpO2 100 %  ,Body mass index is 16 6 kg/m²  Intake/Output Summary (Last 24 hours) at 7/25/2021 0956  Last data filed at 7/25/2021 2334  Gross per 24 hour   Intake 4645 ml   Output 675 ml   Net 3970 ml       Invasive Devices     Peripheral Intravenous Line            Peripheral IV 07/22/21 Left;Upper;Ventral (anterior) Arm 2 days    Peripheral IV 07/24/21 Left;Ventral (anterior) Forearm <1 day                Physical Exam:   Head and neck: is normocephalic  Neck is supple with bilateral adenopathy  Sclerae are anicteric  Mucous membranes are moist  No JVD    Chest: Clear to auscultation  Heart: Has a regular rate  Abdomen: Soft, nontender, nondistended, and with subcutaneous masses  Extremities: Without cyanosis, clubbing, or edema, symmetric  Neuro: Grossly nonfocal  Lymphatics:  Bilateral cervical adenopathy  Skin is warm and anicteric  Psych: Patient is pleasant and talkative              Lab Results:  Lab Results   Component Value Date    WBC 11 45 (H) 07/25/2021    HGB 9 4 (L) 07/25/2021    HCT 28 5 (L) 07/25/2021    MCV 92 07/25/2021     07/25/2021     Lab Results   Component Value Date    CALCIUM 8 9 07/25/2021    K 3 8 07/25/2021    CO2 26 07/25/2021     07/25/2021    BUN 6 07/25/2021    CREATININE 0 35 (L) 07/25/2021     No results found for: AFP  Lab Results   Component Value Date    CALCIUM 8 9 07/25/2021    PHOS 3 5 07/25/2021     No results found for: CEA        Assessment:  59-year-old male with imaging findings concerning for metastatic disease  Bilateral cervical adenopathy    Plan:  Plan for left cervical lymph node biopsy with lymphoma protocol  Risks were explained including bleeding, infection, need for further surgery, wound complications, adjacent organ injury Chyle leak  He is agreeable to proceed  All of his questions were answered      Nhung Her MD  9:56 AM

## 2021-07-25 NOTE — PLAN OF CARE
Problem: MOBILITY - ADULT  Goal: Maintain or return to baseline ADL function  Description: INTERVENTIONS:  -  Assess patient's ability to carry out ADLs; assess patient's baseline for ADL function and identify physical deficits which impact ability to perform ADLs (bathing, care of mouth/teeth, toileting, grooming, dressing, etc )  - Assess/evaluate cause of self-care deficits   - Assess range of motion  - Assess patient's mobility; develop plan if impaired  - Assess patient's need for assistive devices and provide as appropriate  - Encourage maximum independence but intervene and supervise when necessary  - Involve family in performance of ADLs  - Assess for home care needs following discharge   - Consider OT consult to assist with ADL evaluation and planning for discharge  - Provide patient education as appropriate  Outcome: Progressing  Goal: Maintains/Returns to pre admission functional level  Description: INTERVENTIONS:  - Perform BMAT or MOVE assessment daily    - Set and communicate daily mobility goal to care team and patient/family/caregiver  - Collaborate with rehabilitation services on mobility goals if consulted  - Perform Range of Motion  times a day  - Reposition patient every  hours    - Dangle patient  times a day  - Stand patient times a day  - Ambulate patient times a day  - Out of bed to chair times a day   - Out of bed for meals times a day  - Out of bed for toileting  - Record patient progress and toleration of activity level   Outcome: Progressing

## 2021-07-25 NOTE — CONSULTS
Vancomycin IV Pharmacy-to-Dose Consultation    Vancomycin has been discontinued  Pharmacy will sign off  Please contact or re-consult with questions      Gautam Wang, RoxaneD, BCIDP

## 2021-07-25 NOTE — PROGRESS NOTES
[address] [facility]  Progress Note - Tony Hankinssandi 1970, 48 y o  male MRN: [id number]  Unit/Bed#: [facility] 914-01 Encounter: [id number]  Primary Care Provider: No primary care provider on file  Date and time admitted to hospital: 7/22/2021  7:16 PM    Anemia  Assessment & Plan  Hgb 9 9 and Hct 29 6 at time of admission  Plan:  · Monitor Hgb and Hct  · Transfuse if patient becomes symptomatic or Hgb drops < 7    Cervical stenosis of spinal canal  Assessment & Plan  Pt with cervical canal narrowing likely due to rim-enhancing epidural thickening/collection within posterolateral spinal canal spanning levels C2-C4  Findings could be due to necrotizing infectious process or metastasis related  Plan:  · Neurosurgery consulted and appreciate recommendations  · MRI w/wo contrast of cervical/thoracic spine ordered and pending  · Frequent neurochecks  · Maintain cervical collar    Subclavian vein thromboembolism, acute, right (HCC)  Assessment & Plan    CT Soft Tissue Neck with Contrast: Occluded left subclavian and brachiocephalic veins as well as left internal jugular vein to the level of hyoid bone suggesting  Plan:  · Heparin gtt    * Metastatic cancer [facility])  Assessment & Plan  47 yo M with recent abnormal findings on CT head, chest, and tissue neck concerning for extensive metastatic cancer:     7/22/21 - US Head Neck Soft Tissue: Several neck masses are identified corresponding to the palpable abnormalities within additional finding of the suspected thrombosed left jugular vein  Further evaluation with contrast-enhanced CT of the neck is recommended  7/22/21 - CT Soft Tissue Neck: Left greater than right numerous superficial and deep neck soft tissue and intramuscular rim-enhancing necrotic lesions/collections as described  Rim-enhancing epidural thickening/collection within posterolateral spinal canal spanning levels C2-C4 resulting in canal narrowing    Finding is concerning for widespread necrotizing infection  Neoplastic process is within differential consideration  Recommend correlation with sampling  Subpleural nodular opacities in the right upper lobe and superior segment of right lower lobe could indicate septic emboli  Suggested left hilar necrotic lymph node  Indeterminate sclerotic foci within T3 and T4 vertebral bodies  7/22/21 CT Head: There is an approximately 2 4 x 1 4 x 2 2 cm masslike lesion involving the scalp overlying the posterior left parietal region  Neoplasm and/or infection should be excluded  7/22/21 CTA PE study: Numerous nodular opacities are present bilaterally within the lungs  The appearances most suggestive of metastatic disease  Extensive hepatic metastatic disease  Large bilateral adrenal masses  Extensive lymphadenopathy  Probable osseous metastatic disease with mild to moderate compression deformity of the superior endplate of R76, suggesting pathologic compression      Plan:  · Pain Regimen:   · Oxycodone 5/10 mg q3 hours PRN mod/severe  · Dilaudid 0 5 mg q3 hours PRN for breakthrough  · Bowel regimen ordered  · Follow up results of CT CAP  · Medical Oncology Consulted and Appreciate Recommendation  · Surgical Oncology Consulted  · S/P Punch Biopsy on 7/23/21; follow up on pathology results  · Plan for Lymph Node Biopsy on 7/26/21            INTERNAL MEDICINE RESIDENCY PROGRESS NOTE     Name: Peggy Rush   Age & Sex: 48 y o  male   MRN: [id number]  Unit/Bed#: [facility] 914-01   Encounter: [id number]  Team: SOD Team B     PATIENT INFORMATION     Name: Peggy Rush   Age & Sex: 48 y o  male   MRN: [id number]  Hospital Stay Days: 2    ASSESSMENT/PLAN     Principal Problem:    Metastatic cancer [facility])  Active Problems:    Subclavian vein thromboembolism, acute, right (HCC)    Compression fracture of T11 vertebra (HCC)    Cervical stenosis of spinal canal    Anemia    Cervical spinal mass (HCC)      Anemia  Assessment & Plan  Hgb 9 9 and Hct 29 6 at time of admission  Plan:  · Monitor Hgb and Hct  · Transfuse if patient becomes symptomatic or Hgb drops < 7    Cervical stenosis of spinal canal  Assessment & Plan  Pt with cervical canal narrowing likely due to rim-enhancing epidural thickening/collection within posterolateral spinal canal spanning levels C2-C4  Findings could be due to necrotizing infectious process or metastasis related  Plan:  · Neurosurgery consulted and appreciate recommendations  · MRI w/wo contrast of cervical/thoracic spine ordered and pending  · Frequent neurochecks  · Maintain cervical collar    Subclavian vein thromboembolism, acute, right (HCC)  Assessment & Plan    CT Soft Tissue Neck with Contrast: Occluded left subclavian and brachiocephalic veins as well as left internal jugular vein to the level of hyoid bone suggesting  Plan:  · Heparin gtt    * Metastatic cancer [facility])  Assessment & Plan  47 yo M with recent abnormal findings on CT head, chest, and tissue neck concerning for extensive metastatic cancer:     7/22/21 - US Head Neck Soft Tissue: Several neck masses are identified corresponding to the palpable abnormalities within additional finding of the suspected thrombosed left jugular vein  Further evaluation with contrast-enhanced CT of the neck is recommended  7/22/21 - CT Soft Tissue Neck: Left greater than right numerous superficial and deep neck soft tissue and intramuscular rim-enhancing necrotic lesions/collections as described  Rim-enhancing epidural thickening/collection within posterolateral spinal canal spanning levels C2-C4 resulting in canal narrowing  Finding is concerning for widespread necrotizing infection  Neoplastic process is within differential consideration  Recommend correlation with sampling  Subpleural nodular opacities in the right upper lobe and superior segment of right lower lobe could indicate septic emboli  Suggested left hilar necrotic lymph node   Indeterminate sclerotic foci within T3 and T4 vertebral bodies  21 CT Head: There is an approximately 2 4 x 1 4 x 2 2 cm masslike lesion involving the scalp overlying the posterior left parietal region  Neoplasm and/or infection should be excluded  21 CTA PE study: Numerous nodular opacities are present bilaterally within the lungs  The appearances most suggestive of metastatic disease  Extensive hepatic metastatic disease  Large bilateral adrenal masses  Extensive lymphadenopathy  Probable osseous metastatic disease with mild to moderate compression deformity of the superior endplate of M20, suggesting pathologic compression      Plan:  · Pain Regimen:   · Oxycodone 5/10 mg q3 hours PRN mod/severe  · Dilaudid 0 5 mg q3 hours PRN for breakthrough  · Bowel regimen ordered  · Follow up results of CT CAP  · Medical Oncology Consulted and Appreciate Recommendation  · Surgical Oncology Consulted  · S/P Punch Biopsy on 21; follow up on pathology results  · Plan for Lymph Node Biopsy on 21        Disposition:  Will continue to require inpatient level of care  Patient had scalp punch biopsy yesterday  He will undergo lymph node biopsy tomorrow  MRI cervical/thoracic spine pending to evaluate masses and pathological fracture and will determine whether there is need for neurosurgical intervention  SUBJECTIVE     Patient seen and examined  No acute events overnight  Patient complaining of severe left-sided neck pain  Lidocaine patch was applied  He says he still feels pain  Denies nausea, vomiting, fever, shortness of breath, palpitations, headache, chest pain, dizziness      OBJECTIVE     Vitals:    21 0755 21 1636 21 2137 21 0814   BP: 114/78 113/77 114/76 126/78   Pulse: 84 82 86 97   Resp: 18 18 18 18   Temp: 98 5 °F (36 9 °C) 98 9 °F (37 2 °C) 98 4 °F (36 9 °C) 98 3 °F (36 8 °C)   SpO2: 94% 94% 94% 100%   Weight:       Height:          Temperature:   Temp (24hrs), Av 5 °F (36 9 °C), Min:98 3 °F (36 8 °C), Max:98 9 °F (37 2 °C)    Temperature: 98 3 °F (36 8 °C)  Intake & Output:  I/O       07/23 0701 - 07/24 0700 07/24 0701 - 07/25 0700 07/25 0701 - 07/26 0700    P  O   1340 240    I V  (mL/kg) 462 5 (9 1) 3185 (62 5)     IV Piggyback       Total Intake(mL/kg) 462 5 (9 1) 4525 (88 7) 240 (4 7)    Urine (mL/kg/hr) 0 (0) 675 (0 6)     Total Output 0 675     Net +462 5 +3850 +240           Unmeasured Urine Occurrence  4 x 3 x        Weights:   IBW (Ideal Body Weight): 70 7 kg    Body mass index is 16 6 kg/m²  Weight (last 2 days)     Date/Time   Weight    07/23/21 10:33:35   51 (112 43)            Physical Exam  Constitutional:       Comments: Frail   Cachectic appearing   HENT:      Head:      Comments: Palpable, tender mass in the left occipital region     Nose: Nose normal       Mouth/Throat:      Mouth: Mucous membranes are moist    Eyes:      Pupils: Pupils are equal, round, and reactive to light  Neck:      Comments: Supraclavicular lymphadenopathy  Cervical lymphadenopathy palpable hard  right cervical lymph node  Tenderness in cervical back and pain with movement  Cardiovascular:      Rate and Rhythm: Normal rate and regular rhythm  Pulses: Normal pulses  Pulmonary:      Effort: Pulmonary effort is normal    Abdominal:      General: Abdomen is flat  Bowel sounds are normal       Palpations: Abdomen is soft  Musculoskeletal:      Comments: Tenderness in cervical back and pain with movement   Lymphadenopathy:      Cervical: Cervical adenopathy present  Skin:     General: Skin is warm  Neurological:      Mental Status: He is oriented to person, place, and time  Mental status is at baseline  Psychiatric:         Mood and Affect: Mood normal        LABORATORY DATA     Labs: I have personally reviewed pertinent reports    Results from last 7 days   Lab Units 07/25/21  0507 07/24/21  0618 07/23/21  0415   WBC Thousand/uL 11 45* 10 62* 9 03   HEMOGLOBIN g/dL 9 4* 9 1* 9 9*   HEMATOCRIT % 28 5* 27 4* 29 6*   PLATELETS Thousands/uL 268 283 278   NEUTROS PCT % 69 66 67   MONOS PCT % 11 10 10      Results from last 7 days   Lab Units 07/25/21  0507 07/24/21  0618 07/23/21  0415 07/22/21 2038   POTASSIUM mmol/L 3 8 3 9 3 9 4 1   CHLORIDE mmol/L 105 105 105 102   CO2 mmol/L 26 24 27 27   BUN mg/dL 6 8 11 10   CREATININE mg/dL 0 35* 0 34* 0 50* 0 64   CALCIUM mg/dL 8 9 8 7 8 3 9 6   ALK PHOS U/L  --   --  117* 142*   ALT U/L  --   --  26 32   AST U/L  --   --  32 32     Results from last 7 days   Lab Units 07/25/21  0507 07/24/21  0618   MAGNESIUM mg/dL 1 7 1 7     Results from last 7 days   Lab Units 07/25/21  0507 07/24/21  0618   PHOSPHORUS mg/dL 3 5 3 6      Results from last 7 days   Lab Units 07/25/21  0113 07/24/21  1918 07/24/21  1134 07/23/21  0415 07/22/21 2038   INR   --   --   --  1 08  1 05 0 96   PTT seconds 61* 61* 39* 28 27     Results from last 7 days   Lab Units 07/22/21 2038   LACTIC ACID mmol/L 1 1           IMAGING & DIAGNOSTIC TESTING     Radiology Results: I have personally reviewed pertinent reports  CT head without contrast    Result Date: 7/22/2021  Impression: There is an approximately 2 4 x 1 4 x 2 2 cm masslike lesion involving the scalp overlying the posterior left parietal region  Neoplasm and/or infection should be excluded  Sampling is recommended  The study was marked in [facility] for immediate notification  Workstation performed: [id number]     CT soft tissue neck w contrast    Result Date: 7/22/2021  Impression: 1  Left greater than right numerous superficial and deep neck soft tissue and intramuscular rim-enhancing necrotic lesions/collections as described  Rim-enhancing epidural thickening/collection within posterolateral spinal canal spanning levels C2-C4 resulting in canal narrowing  Finding is concerning for widespread necrotizing infection  Neoplastic process is within differential consideration  Recommend correlation with sampling   2  Occluded left subclavian and brachiocephalic veins as well as left internal jugular vein to the level of hyoid bone suggesting  3   Subpleural nodular opacities in the right upper lobe and superior segment of right lower lobe could indicate septic emboli  Suggested left hilar necrotic lymph node  4   Indeterminate sclerotic foci within T3 and T4 vertebral bodies  I personally discussed this study with Leonora Pradhan on 7/22/2021 at 2:10 PM  Workstation performed: [id number]     US head neck soft tissue    Result Date: 7/22/2021  Impression: Several neck masses are identified corresponding to the palpable abnormalities within additional finding of the suspected thrombosed left jugular vein  Further evaluation with contrast-enhanced CT of the neck is recommended  The study was marked in [facility] for immediate notification  Workstation performed: [id number]     CTA ED chest PE Study    Result Date: 7/23/2021  Impression: No evidence of pulmonary embolism is seen  Measured RV/LV ratio is within normal limits at less than 0 9  Numerous nodular opacities are present bilaterally within the lungs  The appearances most suggestive of metastatic disease  Extensive hepatic metastatic disease  Large bilateral adrenal masses  Extensive lymphadenopathy  Probable osseous metastatic disease with mild to moderate compression deformity of the superior endplate of D59, suggesting pathologic compression  Possible thrombus right subclavian vein extending into the brachiocephalic vein  Consider venous duplex ultrasound for further evaluation  The study was marked in [facility] for immediate notification  Workstation performed: [id number]     Other Diagnostic Testing: I have personally reviewed pertinent reports      ACTIVE MEDICATIONS     Current Facility-Administered Medications   Medication Dose Route Frequency    acetaminophen (TYLENOL) tablet 650 mg  650 mg Oral Q6H PRN    [START ON 7/26/2021] ceFAZolin (ANCEF) IVPB (premix in dextrose) 1,000 mg 50 mL  1,000 mg Intravenous On Call To OR    heparin (porcine) 25,000 units in 0 45% NaCl 250 mL infusion (premix)  3-30 Units/kg/hr (Order-Specific) Intravenous Titrated    heparin (porcine) injection 2,000 Units  2,000 Units Intravenous Q1H PRN    heparin (porcine) injection 4,000 Units  4,000 Units Intravenous Q1H PRN    HYDROmorphone (DILAUDID) injection 0 5 mg  0 5 mg Intravenous Q3H PRN    lidocaine (PF) (XYLOCAINE-MPF) 1 % injection 10 mL  10 mL Infiltration Once    oxyCODONE (ROXICODONE) IR tablet 5 mg  5 mg Oral Q6H PRN    Or    oxyCODONE (ROXICODONE) immediate release tablet 10 mg  10 mg Oral Q6H PRN    polyethylene glycol (MIRALAX) packet 17 g  17 g Oral Daily PRN    sodium chloride 0 9 % infusion  75 mL/hr Intravenous Continuous    [START ON 7/26/2021] sodium chloride 0 9 % infusion  100 mL/hr Intravenous Continuous       VTE Pharmacologic Prophylaxis: Heparin  VTE Mechanical Prophylaxis: sequential compression device    Portions of the record may have been created with voice recognition software  Occasional wrong word or "sound a like" substitutions may have occurred due to the inherent limitations of voice recognition software    Read the chart carefully and recognize, using context, where substitutions have occurred   ==  Lala Soto MD  [facility]  Internal Medicine Residency PGY-3

## 2021-07-26 PROBLEM — Z98.890 S/P LYMPH NODE BIOPSY: Status: ACTIVE | Noted: 2021-01-01

## 2021-07-26 NOTE — ANESTHESIA POSTPROCEDURE EVALUATION
Post-Op Assessment Note    CV Status:  Stable    Pain management: adequate     Mental Status:  Sleepy   Hydration Status:  Stable   PONV Controlled:  None   Airway Patency:  Patent      Post Op Vitals Reviewed: Yes      Staff: CRNA         No complications documented      BP   118/74   Temp   97 6   Pulse  86   Resp   20   SpO2   100% on 6LFM   Postop VS in PACU noted above, SV non-obstructed

## 2021-07-26 NOTE — PROGRESS NOTES
Post Op Check - Surgical Oncology  Luisana Mart 48 y o  male MRN: [id number]  Unit/Bed#: [facility] 914-01 Encounter: [id number]    Assessment:  52yo male with PMHx of tobacco use now s/p L neck incisional bx, R neck excisional bx by Dr Laxmi Lopez  Plan:  -Rest of care per primary team  -F/u scalp bx and pathology  -Await tissue dx, will be available as needed    Subjective/Objective   Chief Complaint: Pain near incision sites    Objective:     Blood pressure 128/85, pulse 89, temperature (!) 97 °F (36 1 °C), temperature source Temporal, resp  rate 12, height 5' 9" (1 753 m), weight 51 kg (112 lb 7 oz), SpO2 98 %  ,Body mass index is 16 6 kg/m²  Intake/Output Summary (Last 24 hours) at 7/26/2021 1641  Last data filed at 7/26/2021 1247  Gross per 24 hour   Intake 3071 72 ml   Output 200 ml   Net 2871 72 ml       Invasive Devices     Peripheral Intravenous Line            Peripheral IV 07/22/21 Left;Upper;Ventral (anterior) Arm 3 days    Peripheral IV 07/24/21 Left;Ventral (anterior) Forearm 2 days                Physical Exam:    General: Pt is AAOx3, lying down in bed in NAD  Cachectic, chronically ill appearing  HEENT: moist mucous membranes   Neck: Supple, tender near incision sites B/L, no drainage, no hematoma noted, ROM intact, no tracheal deviation  Incisions C/D/I with glue in place  CV: RRR, no murmurs, gallops, rubs  S1 and S2  Resp: Lung sounds clear to auscultation B/L, normal respiratory effort no wheezes, rhonchi, rhales   Abd: Soft, with no tenderness, non-distended, non-tympanitic  Ext: Warm with no cyanosis, no edema, no deformities  ROM intact    Lab, Imaging and other studies:  I have personally reviewed pertinent lab results    , CBC:   Lab Results   Component Value Date    WBC 10 73 (H) 07/26/2021    HGB 9 5 (L) 07/26/2021    HCT 28 7 (L) 07/26/2021    MCV 91 07/26/2021     07/26/2021    MCH 30 2 07/26/2021    MCHC 33 1 07/26/2021    RDW 14 0 07/26/2021    MPV 9 3 07/26/2021    NRBC 1 07/26/2021   , CMP:   Lab Results   Component Value Date    SODIUM 136 07/26/2021    K 3 9 07/26/2021     07/26/2021    CO2 29 07/26/2021    BUN 7 07/26/2021    CREATININE 0 39 (L) 07/26/2021    CALCIUM 9 3 07/26/2021    EGFR 140 07/26/2021   , Coagulation: No results found for: PT, INR, APTT, Urinalysis: No results found for: COLORU, CLARITYU, SPECGRAV, PHUR, LEUKOCYTESUR, NITRITE, PROTEINUA, GLUCOSEU, KETONESU, BILIRUBINUR, BLOODU, Amylase: No results found for: AMYLASE, Lipase: No results found for: LIPASE  VTE Pharmacologic Prophylaxis: Heparin  VTE Mechanical Prophylaxis: sequential compression device     Renae Barnes PA-C

## 2021-07-26 NOTE — MALNUTRITION/BMI
This medical record reflects one or more clinical indicators suggestive of malnutrition and underweight    Malnutrition Findings:   Adult Malnutrition type: Acute illness  Adult Degree of Malnutrition: Other severe protein calorie malnutrition (related to medical condition <50% energy intake versus needs for > 5 days evidenced by BMI 16 98; lost 9#(7 4%) since 6/10/21; moderate fat loss somewhat hollow orbital, moderate muscle loss visible clavicle  Treat Regular diet &Ensure Enlive TID )  Malnutrition Characteristics: Fat loss, Muscle loss, Inadequate energy, Weight loss    BMI Findings:  Adult BMI Classifications: Underweight < 18 5     Body mass index is 16 6 kg/m²  See Nutrition note dated 07/25/2021   for additional details  Completed nutrition assessment is viewable in the nutrition documentation

## 2021-07-26 NOTE — ASSESSMENT & PLAN NOTE
Patient noted to have evidence go subclavian, internal jugular, and brachiocephalic vein thrombus on imaging on arrival  Started on heparin gtt  Remains on heparin gtt in setting of new onset Afib and ongoing workup and possibility for intervention  7/22/21 - CT Soft Tissue Neck with Contrast: Occluded left subclavian and brachiocephalic veins as well as left internal jugular vein to the level of hyoid bone    7/22/21 - CTA PE chest: Possible thrombus right subclavian vein extending into the brachiocephalic vein  7/27/2021 venous duplex US LEFT UPPER LIMB: Evidence of acute and subacute mostly occlusive deep vein thrombosis noted in the internal jugular vein  Evidence of acute non occlusive deep vein thrombosis noted in the innominate, subclavian and axillary with extension into the most proximal brachial vein      Plan:  · Heparin gtt;will ule require transition to warfarin or DOAC; have not transition due to possible GI intervention  · Vascular surgery made aware; do not recommend any surgical intervention  · Cont to monitor PTT

## 2021-07-26 NOTE — ANESTHESIA PREPROCEDURE EVALUATION
Procedure:  EXCISION BIOPSY LESION /MASS FACIAL/NECK (Left Face)    Relevant Problems   HEMATOLOGY   (+) Anemia      Other   (+) Cervical stenosis of spinal canal   (+) Compression fracture of T11 vertebra (HCC)   (+) Metastatic cancer ([address] )      Recent labs personally reviewed:  Lab Results   Component Value Date    WBC 10 73 (H) 07/26/2021    HGB 9 5 (L) 07/26/2021     07/26/2021     Lab Results   Component Value Date    K 3 9 07/26/2021    BUN 7 07/26/2021    CREATININE 0 39 (L) 07/26/2021     Lab Results   Component Value Date    PTT 42 (H) 07/26/2021      Lab Results   Component Value Date    INR 1 05 07/23/2021    INR 1 08 07/23/2021       Blood type AB    No results found for: HGBA1C    Physical Exam    Airway       Dental       Cardiovascular  Rhythm: regular, Rate: normal,     Pulmonary  Breath sounds clear to auscultation,     Other Findings        Anesthesia Plan  ASA Score- 4     Anesthesia Type- general with ASA Monitors  Additional Monitors:   Airway Plan: ETT  Comment: Brandon KAT for intubation to maintain in-line stabilization, recovery in PACU  Plan Factors-Exercise tolerance (METS): <4 METS  Chart reviewed  EKG reviewed  Existing labs reviewed  Patient summary reviewed  Induction- intravenous  Postoperative Plan-   Planned trial extubation    Informed Consent- Anesthetic plan and risks discussed with patient  I personally reviewed this patient with the CRNA  Discussed and agreed on the Anesthesia Plan with the CRNA  Taya Norman

## 2021-07-26 NOTE — ASSESSMENT & PLAN NOTE
Pt with cervical canal narrowing likely due to rim-enhancing epidural thickening/collection within posterolateral spinal canal spanning levels C2-C4  Findings could be due to necrotizing infectious process or metastasis related  Multiple Concerns for this level of of cord compression: paralysis, dysfunction of the diaphragm  7/28/2021 MRI CS Signal abnormality and erosive change of C3 laminae and spinous process along with lesser degree signal abnormality in the posterior elements of C1, C2, and C4  Associated epidural enhancement /enhancing soft tissue spanning from inferior C2-C4 resulting in severe canal stenosis and cord compression  Findings most favor metastatic disease  Infection is within differential consideration but less favored given findings elsewhere in the body  Correlate with sampling  Multiple rim-enhancing lesions within paraspinal musculature, superficial and deep soft tissue neck as demonstrated on recent soft tissue neck CT  Differential considerations are similar as above  Heterogeneous marrow signal and enhancement of the cervical and visualized thoracic vertebras concerning for infiltrative metastatic disease  Plan:  · Continue on IV Decadron 5mg q6 hours PO  · Radiation oncology consulted  · Patient status post CT stimulation 07/29/2021; started on palliative radiation therapy times 10 sessions on 7/30/2021  · Neurosurgery consulted and appreciate recommendations  · Frequent neurochecks  · In setting of heavy disease burden and late stage metastatic disease, patient can wear TLSO brace as needed for comfort and soft cervical brace for comfort

## 2021-07-26 NOTE — ASSESSMENT & PLAN NOTE
LN Bx done 7/26/2021; INCISIONAL BIOPSY LEFT NECK MASS,AND EXCISIOINAL BIOPSY RIGHT NECK MASS (Bilateral)   Preliminary report demonstrating melanoma

## 2021-07-26 NOTE — TELEPHONE ENCOUNTER
7/30/21- PT IN HOSPITAL  ([facility])- NSX WILL SEE PT BALBINA MARQUEZ NOTE- NO NEUROSURGICAL INTERVENTION OR F/U NEEDED    7/29/21- [facility]    7/28/21- [facility]    7/27/21- [facility]    7/26/21- [facility]    7/23/21- ([facility]) FOLLOW FROM PERIPHERY UNTIL MRI OBTAINED

## 2021-07-26 NOTE — OCCUPATIONAL THERAPY NOTE
Occupational Therapy Cancellation         Patient Name: Cyn Giles  [id number] Date: 7/26/2021 07/26/21 1106   OT Last Visit   OT Visit Date 07/26/21   Note Type   Note type Evaluation   Cancel Reasons Patient to operating room       OT orders received  Chart reviewed  Pt currently at OR for "INCISIONAL BIOPSY LEFT NECK MASS,AND EXCISIOINAL BIOPSY RIGHT NECK MASS" Will continue to follow and evaulate/treat pt as appropriate post-op       Jorge Gonzalez MS, OTR/L

## 2021-07-26 NOTE — PROGRESS NOTES
MEDICAL ONCOLOGY - PROGRESS NOTE  Peggy Rush  Male, 49 yo  MRN; [id number]        Mr Peggy Rush is a 49 yo M, currently being worked up for possible metastatic cancer of unknown primary  Assessment and Plan  1  Metastatic malignancy of unknown primary:  Results of punch biopsy pending  CT abdomen and pelvis showed a pancreatic mass suspicious for pancreatic adenocarcinoma  Had a left incisional biopsy and right excisional biopsy of neck mass earlier today  Will await biopsy report and anerobic culture and Gram stain, Fungal culture, AFB culture and stain and flow cytometry  Follow up pending studies  Consider palliative consultation  2  Anemia:  Anemia of chronic disease vs some component of GONZ[place]LEZ  Hb stable at 9  Transfuse as needed  3  Subclavian vein thrombosis: Ct mgt as per primary team    4  Pancreatic mass: Seen on contrast-enhanced CT abdomen and pelvis- There is a 2 8 x 2 2 x 1 8 cm ill-defined, hypodense pancreatic lesion, highly suspicious for pancreatic adenocarcinoma     5  Multiple osteolytic lesions: CT findings with numerous osteolytic lesions (T11, L3, S1)  Suspicious for metastatic foci  Ct pain mgt as per primary team  Consider palliative radiation       Subjective  Reports severe pain in the back, head and neck  There was no acute overnight events  Vital signs noted  Review of laboratory and radiology studies  Objective  Physical Exam   Physical Exam   Constitutional: He appears cachectic  He appears chronically ill  Eyes: Pupils are equal, round, and reactive to light  Neck: Neck adenopathy present  Pulmonary/Chest: Breath sounds normal  He has no wheezes  He exhibits no tenderness  Abdominal: Soft  Bowel sounds are normal  He exhibits mass  He exhibits no distension  Musculoskeletal:         General: No tenderness or edema  Neurological: He is alert and oriented to person, place, and time   He exhibits altered mental status  Skin: Skin is warm and dry

## 2021-07-26 NOTE — PHYSICAL THERAPY NOTE
Physical Therapy Cancellation Note- pt scheduled for MRI and cervical bx today  Currently off floor  Will follow    Mere Jameson, PT

## 2021-07-26 NOTE — ASSESSMENT & PLAN NOTE
47 yo M with recent abnormal findings on CT head, chest, and tissue neck concerning for extensive metastatic cancer:     7/22/21 - US Head Neck Soft Tissue: Several neck masses are identified corresponding to the palpable abnormalities within additional finding of the suspected thrombosed left jugular vein  Further evaluation with contrast-enhanced CT of the neck is recommended  7/22/21 - CT Soft Tissue Neck: Left greater than right numerous superficial and deep neck soft tissue and intramuscular rim-enhancing necrotic lesions/collections as described  Rim-enhancing epidural thickening/collection within posterolateral spinal canal spanning levels C2-C4 resulting in canal narrowing  Finding is concerning for widespread necrotizing infection  Neoplastic process is within differential consideration  Recommend correlation with sampling  Subpleural nodular opacities in the right upper lobe and superior segment of right lower lobe could indicate septic emboli  Suggested left hilar necrotic lymph node  Indeterminate sclerotic foci within T3 and T4 vertebral bodies  7/22/21 CT Head: There is an approximately 2 4 x 1 4 x 2 2 cm masslike lesion involving the scalp overlying the posterior left parietal region  Neoplasm and/or infection should be excluded  7/22/21 CTA PE study: Numerous nodular opacities are present bilaterally within the lungs  The appearances most suggestive of metastatic disease  Extensive hepatic metastatic disease  Large bilateral adrenal masses  Extensive lymphadenopathy  Probable osseous metastatic disease with mild to moderate compression deformity of the superior endplate of Q65, suggesting pathologic compression  7/24/2021 CT Abd and pelvis:  There is a 2 8 x 2 2 x 1 8 cm ill-defined, hypodense pancreatic lesion, highly suspicious for pancreatic adenocarcinoma (series 3 images 20-22 and series 601 images 57 - 67 ) There is widespread metastatic disease with numerous liver lesions, bilateral adrenal metastases, multiple lytic bone metastases, with resulting mild superior end plate compression fractures of T11 and L3 as above, and also intramuscular metastases as   above      An alternative diagnosis to metastatic pancreatic adenocarcinoma, given the unusual intramuscular metastasis, would be metastatic melanoma  Please correlate with already performed biopsy results  BL LN Bx  · Right neck mass: Malignant epithelioid neoplasm, possibly melanoma, morphologically identical to that in specimen B, at least 1 cm maximal dimension, present at specimen edges  · Left neck mass: Malignant epithelioid neoplasm, possibly melanoma, at least 1 5 cm maximal dimension, present in dermal lymphatics and at specimen edges, likely metastatic  Benign overlying epidermis  CEA 63 8; CA 19-9: 5418, LDH: 1234  At this time, concern for synchronous malignancy given preliminary pathology reports demonstrating melanoma and CA 19 9 elevated in the setting of pancreatic lesion with liver metastasis concerning for pancreatic adenocarcinoma  There is a generic link between these 2 cancers raising concern  Plan:  · Pain Regimen:   · Oxycodone 5/10 mg q3 hours PRN mod/severe  · Dilaudid 0 5 mg q3 hours PRN for breakthrough  · Bowel regimen ordered  · Medical Oncology Consulted and Appreciate Recommendation  · MRI brain ordered and pending  · Surgical Oncology Consulted  · S/P Punch Biopsy on 7/23/21; follow up on pathology results  · Plan for Lymph Node Biopsy on 7/26/21  · Continue to monitor kidney fxn and electrolytes; potential for Tumor lysis syndrome   · GI consulted and appreciate recommendations   · IR bx liver mets 7/30/2021  · If negative for pancreatic mets will proceed with endoscopic ultrasound with pancreatic biopsy

## 2021-07-26 NOTE — ASSESSMENT & PLAN NOTE
Hgb 9 9 and Hct 29 6 at time of admission  Currently Hgb 8 6  Most likely anemia of chronic inflammation with some component of iron deficiency  Fe sat: 14, TIBC 205, Fe 28, Ferritin 1230  Retic: 5 12    Plan:  · Monitor Hgb and Hct    · Transfuse if patient becomes symptomatic or Hgb drops < 7

## 2021-07-26 NOTE — PROGRESS NOTES
INTERNAL MEDICINE RESIDENCY PROGRESS NOTE     PATIENT INFORMATION     Name: Ally Chatman   Age & Sex: 48 y o  male   MRN: [id number]  Hospital Stay Days: 3  Unit/Bed#: OR POOL   Encounter: [id number]  Team: SOD Team B     ASSESSMENT/PLAN     Principal Problem:    Metastatic cancer ([address] )  Active Problems:    Subclavian vein thromboembolism, acute, right (HCC)    Compression fracture of T11 vertebra (HCC)    Cervical stenosis of spinal canal    Anemia    Cervical spinal mass (HCC)    Scalp lesion    S/P lymph node biopsy    S/P lymph node biopsy  Assessment & Plan  LN Bx done 7/26/2021; INCISIONAL BIOPSY LEFT NECK MASS,AND EXCISIOINAL BIOPSY RIGHT NECK MASS (Bilateral)    Path/labs of LN  Anaerobic cx and gram stain, fungal cx, culture tissue and gram stain, AFB culture and gram stain, leukemia/lyphoma flow cytometry           Scalp lesion  Assessment & Plan  Punch bx done  Well healing  Plan  F/u path results from punch bx  Determine primary source of met ca  Anemia  Assessment & Plan  Hgb 9 9 and Hct 29 6 at time of admission  Currently Hgb 9 5  Most likely anemia of chronic inflammation with some component of iron deficiency  Fe sat: 14, TIBC 205, Fe 28, Ferritin 1230  Retic: 5 12    Plan:  · Monitor Hgb and Hct  · Transfuse if patient becomes symptomatic or Hgb drops < 7    Cervical stenosis of spinal canal  Assessment & Plan  Pt with cervical canal narrowing likely due to rim-enhancing epidural thickening/collection within posterolateral spinal canal spanning levels C2-C4  Findings could be due to necrotizing infectious process or metastasis related       Plan:  · Neurosurgery consulted and appreciate recommendations  · MRI w/wo contrast of cervical/thoracic spine ordered and pending  · Frequent neurochecks  · Maintain cervical collar    Subclavian vein thromboembolism, acute, right (HCC)  Assessment & Plan    CT Soft Tissue Neck with Contrast: Occluded left subclavian and brachiocephalic veins as well as left internal jugular vein to the level of hyoid bone suggesting  Originally concerns for potential infectious cause, pt was started on Vanco and heparin  Due to decreasing procal 0 27 to 0 14, absent fever, or leukocytosis patient was Dced from Vancomycin  CTA PE chest: Possible thrombus right subclavian vein extending into the brachiocephalic vein  Consider venous duplex ultrasound for further evaluation    Plan:  · Heparin gtt  · Contact US for venous duplex US     * Metastatic cancer [facility])  Assessment & Plan  49 yo M with recent abnormal findings on CT head, chest, and tissue neck concerning for extensive metastatic cancer:     7/22/21 - US Head Neck Soft Tissue: Several neck masses are identified corresponding to the palpable abnormalities within additional finding of the suspected thrombosed left jugular vein  Further evaluation with contrast-enhanced CT of the neck is recommended  7/22/21 - CT Soft Tissue Neck: Left greater than right numerous superficial and deep neck soft tissue and intramuscular rim-enhancing necrotic lesions/collections as described  Rim-enhancing epidural thickening/collection within posterolateral spinal canal spanning levels C2-C4 resulting in canal narrowing  Finding is concerning for widespread necrotizing infection  Neoplastic process is within differential consideration  Recommend correlation with sampling  Subpleural nodular opacities in the right upper lobe and superior segment of right lower lobe could indicate septic emboli  Suggested left hilar necrotic lymph node  Indeterminate sclerotic foci within T3 and T4 vertebral bodies  7/22/21 CT Head: There is an approximately 2 4 x 1 4 x 2 2 cm masslike lesion involving the scalp overlying the posterior left parietal region  Neoplasm and/or infection should be excluded  7/22/21 CTA PE study: Numerous nodular opacities are present bilaterally within the lungs    The appearances most suggestive of metastatic disease  Extensive hepatic metastatic disease  Large bilateral adrenal masses  Extensive lymphadenopathy  Probable osseous metastatic disease with mild to moderate compression deformity of the superior endplate of I67, suggesting pathologic compression      Plan:  · Pain Regimen:   · Oxycodone 5/10 mg q3 hours PRN mod/severe  · Dilaudid 0 5 mg q3 hours PRN for breakthrough  · Bowel regimen ordered  · Follow up results of CT CAP  · Medical Oncology Consulted and Appreciate Recommendation  · Surgical Oncology Consulted  · S/P Punch Biopsy on 7/23/21; follow up on pathology results  · Plan for Lymph Node Biopsy on 7/26/21  · Continue to monitor kidney fxn and electrolytes; potential for Tumor lysis syndrome  · Uric Acid: 3 1, LDH: 1234      Disposition: Neuro Surg consulted, they are awaiting MRI of CS and TS for concerns of spinal cord compression; Surg Onc Consulted, Awaiting Punch bx result of scalp skin lesion, Patient in [address] today for lymph node bx    SUBJECTIVE     Patient seen and examined  No acute events overnight  Afebrile, AVSS  Patient has SOB with sitting up in bed/exertion  Recurrent generalized severe pain, however patient states he has relief with current pain management regiment  Pain localized to epigastric region, LUQ radiating to left flank, back pain, neck pain, thoracic wall tenderness/pain  He has constipation; last bowel movement was prior to admission  He does have an appetite  Generalized fatigue and weakness as well as states sensitivity to cold temperatures  Denies: HA, dizziness, lightheadedness, dysphagia, cough, wheezing, N/V/D, dysuria, oliguria, hematuria, numbness/tingling, fever,     OR transporter arrived during patient interview/PE  Pt states his family is aware of his health condition and is open to conversation with them  OBJECTIVE     Physical Exam   Constitutional: He is oriented to person, place, and time  Non-toxic appearance  He does not appear ill   No distress  Cachetic      HENT:   Head: Normocephalic  Right Ear: External ear normal    Left Ear: External ear normal    Nose: Nose normal  No rhinorrhea or congestion  Mouth/Throat: Mucous membranes are moist  No oropharyngeal exudate or posterior oropharyngeal erythema  Oropharynx is clear  Eyes: Conjunctivae are normal  Right eye exhibits no discharge  Left eye exhibits no discharge  No scleral icterus  Neck: Carotid bruit is not present  Cardiovascular: Normal rate, regular rhythm, normal heart sounds and normal pulses  Pulmonary/Chest: Effort normal and breath sounds normal  No stridor  No respiratory distress  He has no wheezes  He has no rhonchi  He has no rales  He exhibits no tenderness  Abdominal: Soft  Bowel sounds are normal  He exhibits mass  He exhibits no distension  There is no rebound and no guarding  No hernia  Musculoskeletal:         General: No swelling, tenderness, deformity or signs of injury  Normal range of motion  Cervical back: No rigidity  Tenderness: spinous process  Right sided mass on right neck below the mandibular angle       Right lower leg: No edema  Left lower leg: No edema  Lymphadenopathy:     He has cervical adenopathy (diffuse bilaterally )  Neurological: He is alert and oriented to person, place, and time  Skin: Skin is warm and dry  He is not diaphoretic  Psychiatric: His behavior is normal  Mood, judgment and thought content normal    Vitals reviewed  Vitals:    21 1030 21 1045 21 1100 21 1115   BP: 117/78 122/83 121/88 128/85   Pulse: 86 86 90 89   Resp: 16 16 13 12   Temp:   (!) 97 °F (36 1 °C)    TempSrc:   Temporal    SpO2: 96% 95% 97% 98%   Weight:       Height:          Temperature:   Temp (24hrs), Av °F (36 7 °C), Min:97 °F (36 1 °C), Max:98 6 °F (37 °C)    Temperature: (!) 97 °F (36 1 °C)  Intake & Output:  I/O       701 -  -  -  07    P  O  1340 240     I V  (mL/kg) 3185 (62 5)  2705 1 (53)    Total Intake(mL/kg) 4525 (88 7) 240 (4 7) 2705 1 (53)    Urine (mL/kg/hr) 675 (0 6) 200 (0 2)     Total Output 675 200     Net +3850 +40 +2705 1           Unmeasured Urine Occurrence 4 x 5 x           Intake/Output Summary (Last 24 hours) at 7/26/2021 1136  Last data filed at 7/26/2021 1107  Gross per 24 hour   Intake 2905 05 ml   Output 200 ml   Net 2705 05 ml     I/O this shift:  In: 2905 1 [I V :2905 1]  Out: -   Weights:   IBW (Ideal Body Weight): 70 7 kg    Body mass index is 16 6 kg/m²  Weight (last 2 days)     None        Physical Exam  Vitals reviewed  Constitutional:       General: He is not in acute distress  Appearance: He is not ill-appearing, toxic-appearing or diaphoretic  Comments: Cachetic      HENT:      Head: Normocephalic  Comments: Right scalp lesion mass/lesion from punch bx        Right Ear: External ear normal       Left Ear: External ear normal       Nose: Nose normal  No congestion or rhinorrhea  Mouth/Throat:      Mouth: Mucous membranes are moist       Pharynx: Oropharynx is clear  No oropharyngeal exudate or posterior oropharyngeal erythema  Comments: Patient has dentures; not currently in place    Eyes:      General: No scleral icterus  Right eye: No discharge  Left eye: No discharge  Conjunctiva/sclera: Conjunctivae normal    Neck:      Vascular: No carotid bruit  Cardiovascular:      Rate and Rhythm: Normal rate and regular rhythm  Pulses: Normal pulses  Heart sounds: Normal heart sounds  Pulmonary:      Effort: Pulmonary effort is normal  No respiratory distress  Breath sounds: Normal breath sounds  No stridor  No wheezing, rhonchi or rales  Chest:      Chest wall: No tenderness  Abdominal:      General: Abdomen is flat  Bowel sounds are normal  There is no distension  Palpations: Abdomen is soft  There is mass  Tenderness:  There is no right CVA tenderness, left CVA tenderness, guarding or rebound  Hernia: No hernia is present  Musculoskeletal:         General: No swelling, tenderness, deformity or signs of injury  Normal range of motion  Cervical back: No rigidity  Tenderness: spinous process  Right sided mass on right neck below the mandibular angle       Right lower leg: No edema  Left lower leg: No edema  Lymphadenopathy:      Cervical: Cervical adenopathy (diffuse bilaterally ) present  Skin:     General: Skin is warm and dry  Neurological:      General: No focal deficit present  Mental Status: He is alert and oriented to person, place, and time  Psychiatric:         Mood and Affect: Mood normal          Behavior: Behavior normal          Thought Content: Thought content normal          Judgment: Judgment normal         LABORATORY DATA     Labs: I have personally reviewed pertinent reports      Results from last 7 days   Lab Units 07/26/21  0527 07/25/21  0507 07/24/21  0618   WBC Thousand/uL 10 73* 11 45* 10 62*   HEMOGLOBIN g/dL 9 5* 9 4* 9 1*   HEMATOCRIT % 28 7* 28 5* 27 4*   PLATELETS Thousands/uL 252 268 283   NEUTROS PCT % 69 69 66   MONOS PCT % 11 11 10      Results from last 7 days   Lab Units 07/26/21  0527 07/25/21  0507 07/24/21  0618 07/23/21  0415 07/22/21  2038   POTASSIUM mmol/L 3 9 3 8 3 9 3 9 4 1   CHLORIDE mmol/L 105 105 105 105 102   CO2 mmol/L 29 26 24 27 27   BUN mg/dL 7 6 8 11 10   CREATININE mg/dL 0 39* 0 35* 0 34* 0 50* 0 64   CALCIUM mg/dL 9 3 8 9 8 7 8 3 9 6   ALK PHOS U/L  --   --   --  117* 142*   ALT U/L  --   --   --  26 32   AST U/L  --   --   --  32 32     Serum creatinine: 0 39 mg/dL (L) 07/26/21 0527  Estimated creatinine clearance: 163 5 mL/min (A)   Results from last 7 days   Lab Units 07/25/21  0507 07/24/21  0618   MAGNESIUM mg/dL 1 7 1 7     Results from last 7 days   Lab Units 07/25/21  0507 07/24/21  0618   PHOSPHORUS mg/dL 3 5 3 6      Results from last 7 days   Lab Units 07/26/21  0527 07/25/21  0113 07/24/21  1918 07/23/21  0415 07/22/21 2038   INR   --   --   --  1 08  1 05 0 96   PTT seconds 42* 61* 61* 28 27     Results from last 7 days   Lab Units 07/22/21 2038   LACTIC ACID mmol/L 1 1           IMAGING & DIAGNOSTIC TESTING     Radiology Results: I have personally reviewed pertinent reports  CT head without contrast    Result Date: 7/22/2021  Impression: There is an approximately 2 4 x 1 4 x 2 2 cm masslike lesion involving the scalp overlying the posterior left parietal region  Neoplasm and/or infection should be excluded  Sampling is recommended  The study was marked in [facility] for immediate notification  Workstation performed: [id number]     CT soft tissue neck w contrast    Result Date: 7/22/2021  Impression: 1  Left greater than right numerous superficial and deep neck soft tissue and intramuscular rim-enhancing necrotic lesions/collections as described  Rim-enhancing epidural thickening/collection within posterolateral spinal canal spanning levels C2-C4 resulting in canal narrowing  Finding is concerning for widespread necrotizing infection  Neoplastic process is within differential consideration  Recommend correlation with sampling  2   Occluded left subclavian and brachiocephalic veins as well as left internal jugular vein to the level of hyoid bone suggesting  3   Subpleural nodular opacities in the right upper lobe and superior segment of right lower lobe could indicate septic emboli  Suggested left hilar necrotic lymph node  4   Indeterminate sclerotic foci within T3 and T4 vertebral bodies  I personally discussed this study with Daniela Velasquez on 7/22/2021 at 2:10 PM  Workstation performed: [id number]     US head neck soft tissue    Result Date: 7/22/2021  Impression: Several neck masses are identified corresponding to the palpable abnormalities within additional finding of the suspected thrombosed left jugular vein    Further evaluation with contrast-enhanced CT of the neck is recommended  The study was marked in [place] [address] for immediate notification  Workstation performed: [id number]     CTA ED chest PE Study    Result Date: 7/23/2021  Impression: No evidence of pulmonary embolism is seen  Measured RV/LV ratio is within normal limits at less than 0 9  Numerous nodular opacities are present bilaterally within the lungs  The appearances most suggestive of metastatic disease  Extensive hepatic metastatic disease  Large bilateral adrenal masses  Extensive lymphadenopathy  Probable osseous metastatic disease with mild to moderate compression deformity of the superior endplate of M67, suggesting pathologic compression  Possible thrombus right subclavian vein extending into the brachiocephalic vein  Consider venous duplex ultrasound for further evaluation  The study was marked in [place] [address] for immediate notification  Workstation performed: [id number]     Other Diagnostic Testing: I have personally reviewed pertinent reports        ACTIVE MEDICATIONS     Current Facility-Administered Medications   Medication Dose Route Frequency    [MAR Hold] acetaminophen (TYLENOL) tablet 650 mg  650 mg Oral Q6H PRN    heparin (porcine) 25,000 units in 0 45% NaCl 250 mL infusion (premix)  3-30 Units/kg/hr (Order-Specific) Intravenous Titrated    [MAR Hold] heparin (porcine) injection 2,000 Units  2,000 Units Intravenous Q1H PRN    [MAR Hold] heparin (porcine) injection 4,000 Units  4,000 Units Intravenous Q1H PRN    [MAR Hold] HYDROmorphone (DILAUDID) injection 0 5 mg  0 5 mg Intravenous Q3H PRN    HYDROmorphone HCl (DILAUDID) injection 0 2 mg  0 2 mg Intravenous Q5 Min PRN    ondansetron (ZOFRAN) injection 4 mg  4 mg Intravenous Once PRN    [MAR Hold] oxyCODONE (ROXICODONE) IR tablet 5 mg  5 mg Oral Q6H PRN    Or    [MAR Hold] oxyCODONE (ROXICODONE) immediate release tablet 10 mg  10 mg Oral Q6H PRN    oxyCODONE-acetaminophen (PERCOCET) 5-325 mg per tablet 1 tablet  1 tablet Oral Once PRN    [MAR Hold] polyethylene glycol (MIRALAX) packet 17 g  17 g Oral Daily    [MAR Hold] senna-docusate sodium (SENOKOT S) 8 6-50 mg per tablet 1 tablet  1 tablet Oral HS    sodium chloride 0 9 % infusion  75 mL/hr Intravenous Continuous    sodium chloride 0 9 % infusion  100 mL/hr Intravenous Continuous     VTE Pharmacologic Prophylaxis: Heparin  VTE Mechanical Prophylaxis: sequential compression device    ==  Emerita Patel MS4  [facility] Internal Medicine Residency

## 2021-07-26 NOTE — OP NOTE
OPERATIVE REPORT  PATIENT NAME: Ally Chatman    :  1970  MRN: [id number]  Pt Location: BE OR ROOM 10    SURGERY DATE: 2021    Surgeon(s) and Role:     * Saul Yo MD - Primary     * Miriam Chambers MD - Assisting    Preop Diagnosis:  Metastatic cancer ([facility] ) [C79 9]    Post-Op Diagnosis Codes:     * Metastatic cancer ([facility] ) [C79 9]    Procedure(s) (LRB):  INCISIONAL BIOPSY LEFT NECK MASS,AND EXCISIOINAL BIOPSY RIGHT NECK MASS (Bilateral)    Specimen(s):  ID Type Source Tests Collected by Time Destination   A : LEFT NECK MASS Tissue Neck ANAEROBIC CULTURE AND GRAM STAIN, FUNGAL CULTURE, CULTURE, TISSUE AND GRAM STAIN, AFB CULTURE WITH STAIN, LEUKEMIA/LYMPHOMA FLOW CYTOMETRY Saul Yo MD 2021 1743    B : RIGHT  NECK MASS Tissue Neck ANAEROBIC CULTURE AND GRAM STAIN, FUNGAL CULTURE, CULTURE, TISSUE AND GRAM STAIN, AFB CULTURE WITH STAIN, LEUKEMIA/LYMPHOMA FLOW CYTOMETRY Saul Yo MD 2021 0940        Estimated Blood Loss:   Minimal    Drains:  * No LDAs found *    Anesthesia Type:   General    Operative Indications:  Metastatic cancer ([facility] ) [C789]  51-year-old male with presumed metastatic carcinoma  Primary service requested biopsy of of his enlarged lymph nodes besides his scalp biopsy  Risks and benefits were explained including bleeding, infection, recurrence, need for further surgery, complications, neurovascular injury and Chyle leak  Informed consent was obtained  Patient was brought to the operating room  The patient also requested excision of a right neck lymph node since this was painful  Operative Findings:  Enlarged bilateral cervical nodes    Complications:   None    Procedure and Technique:  After identifying the patient, general anesthesia was achieved  Lines were placed by Anesthesia  Patient was then prepped and draped in the usual sterile fashion  A time-out was performed  Local anesthesia was infiltrated into the left neck lymph node site    Using sharp dissection skin incision was made and this was taken through the skin, platysma and to the node  Node was very firm and fixed to underlying structures, consequently I felt an incisional biopsy would be best, as excision biopsy could risk Chyle leak or vascular injury  Consequently using a knife, a large incisional biopsy of this node was performed without difficulty  Portion was sent for culture and the remaining was sent fresh to pathology for lymphoma protocol  The wound was now irrigated  Hemostasis was achieved with the Bovie electrocautery  Surgicel was placed in the bed and direct pressure was held  Now turned our attention to the right node  This measured 1 5 by 1 5 centimeters  Using sharp dissection an elliptical incision was made around this node  This was taken through the skin, subcutaneous tissue and excised off of the underlying platysma and underlying sternocleidomastoid  Specimen was once again divided and a portion was sent for culture and the remaining was sent fresh to rule out lymphoma  The wound was now copiously irrigated  There was excellent hemostasis achieved the Bovie electrocautery  This incision was approximated with running 3-0 Vicryl suture for the platysma  Skin was approximated with a running 4 Monocryl suture in subcuticular fashion and skin glue for the skin  The packs were removed from the left neck wound  Wound was copiously irrigated there was excellent hemostasis  More Surgicel was placed in the resection bed  The platysma was approximated with a running 3-0 Vicryl suture  Skin was approximated with a running 4 Monocryl suture in a subcuticular fashion  Skin glue was used on the skin  Patient was then extubated and taken to the recovery room in stable condition having tolerated the procedure well  I was present and participated in all aspects of this procedure          I was present for the entire procedure    Patient Disposition:  PACU     SIGNATURE: Johan Ramos MD  DATE: July 26, 2021  TIME: 10:07 AM

## 2021-07-26 NOTE — PLAN OF CARE
Problem: MOBILITY - ADULT  Goal: Maintain or return to baseline ADL function  Description: INTERVENTIONS:  -  Assess patient's ability to carry out ADLs; assess patient's baseline for ADL function and identify physical deficits which impact ability to perform ADLs (bathing, care of mouth/teeth, toileting, grooming, dressing, etc )  - Assess/evaluate cause of self-care deficits   - Assess range of motion  - Assess patient's mobility; develop plan if impaired  - Assess patient's need for assistive devices and provide as appropriate  - Encourage maximum independence but intervene and supervise when necessary  - Involve family in performance of ADLs  - Assess for home care needs following discharge   - Consider OT consult to assist with ADL evaluation and planning for discharge  - Provide patient education as appropriate  Outcome: Progressing  Goal: Maintains/Returns to pre admission functional level  Description: INTERVENTIONS:  - Perform BMAT or MOVE assessment daily    - Set and communicate daily mobility goal to care team and patient/family/caregiver  - Collaborate with rehabilitation services on mobility goals if consulted  - Perform Range of Motion 3 times a day  - Reposition patient every 2 hours  - Dangle patient 3 times a day  - Stand patient 3 times a day  - Ambulate patient 3 times a day  - Out of bed to chair 3 times a day   - Out of bed for meals 3 times a day  - Out of bed for toileting  - Record patient progress and toleration of activity level   Outcome: Progressing     Problem: Nutrition/Hydration-ADULT  Goal: Nutrient/Hydration intake appropriate for improving, restoring or maintaining nutritional needs  Description: Monitor and assess patient's nutrition/hydration status for malnutrition  Collaborate with interdisciplinary team and initiate plan and interventions as ordered  Monitor patient's weight and dietary intake as ordered or per policy   Utilize nutrition screening tool and intervene as necessary  Determine patient's food preferences and provide high-protein, high-caloric foods as appropriate       INTERVENTIONS:  - Monitor oral intake, urinary output, labs, and treatment plans  - Assess nutrition and hydration status and recommend course of action  - Evaluate amount of meals eaten  - Assist patient with eating if necessary   - Allow adequate time for meals  - Recommend/ encourage appropriate diets, oral nutritional supplements, and vitamin/mineral supplements  - Order, calculate, and assess calorie counts as needed  - Recommend, monitor, and adjust tube feedings and TPN/PPN based on assessed needs  - Assess need for intravenous fluids  - Provide specific nutrition/hydration education as appropriate  - Include patient/family/caregiver in decisions related to nutrition  Outcome: Progressing

## 2021-07-27 NOTE — PROGRESS NOTES
Progress Note - Surgical Oncology   Giselle Ventura 48 y o  male MRN: [id number]  Unit/Bed#: [facility] 749-27 Encounter: [id number]  07/27/21  8:02 AM      Subjective/Objective   Chief Complaint   Patient presents with    Medical Problem     pt states he was sent here for L sided neck mass + for subclavian vein thrombosis  pt reports neck, back and chest pain        Subjective:  No complaints except for neck pain during MRI    Objective:      Blood pressure 112/71, pulse 89, temperature 99 7 °F (37 6 °C), temperature source Oral, resp  rate 14, height 5' 9" (1 753 m), weight 51 kg (112 lb 7 oz), SpO2 98 %  ,Body mass index is 16 6 kg/m²  Intake/Output Summary (Last 24 hours) at 7/27/2021 0802  Last data filed at 7/27/2021 0731  Gross per 24 hour   Intake 5425 42 ml   Output 200 ml   Net 5225 42 ml       Invasive Devices     Peripheral Intravenous Line            Peripheral IV 07/24/21 Left;Ventral (anterior) Forearm 2 days                Physical Exam:   Head and neck: is normocephalic  Neck incisions are C/D/I  Extremities: Without cyanosis, clubbing, or edema, symmetric  Neuro: Grossly nonfocal  Skin is warm and anicteric  Psych: Patient is pleasant and talkative              Lab Results:  Lab Results   Component Value Date    WBC 11 85 (H) 07/27/2021    HGB 8 9 (L) 07/27/2021    HCT 27 6 (L) 07/27/2021    MCV 92 07/27/2021     07/27/2021     Lab Results   Component Value Date    CALCIUM 8 6 07/27/2021    K 4 0 07/27/2021    CO2 26 07/27/2021     07/27/2021    BUN 9 07/27/2021    CREATININE 0 43 (L) 07/27/2021     No results found for: AFP  Lab Results   Component Value Date    CALCIUM 8 6 07/27/2021    PHOS 4 3 07/27/2021     No results found for: CEA            Assessment:  49-year-old male status post excision of bilateral neck masses    Plan:  Await follow-up pathology of scalp and neck masses  I will see him once we have pathology      Luz Sandoval MD  8:02 AM

## 2021-07-27 NOTE — PROGRESS NOTES
INTERNAL MEDICINE RESIDENCY PROGRESS NOTE     Name: Pasquale Skiff   Age & Sex: 48 y o  male   MRN: [id number]  Unit/Bed#: [facility] 914-01   Encounter: [id number]  Team: SOD Team B     PATIENT INFORMATION     Name: Pasquale Skiff   Age & Sex: 48 y o  male   MRN: [id number]  Hospital Stay Days: 4    ASSESSMENT/PLAN     Principal Problem:    Metastatic cancer [facility])  Active Problems:    Cervical stenosis of spinal canal    Subclavian vein thromboembolism, acute, right (HCC)    Compression fracture of T11 vertebra (HCC)    Anemia    Cervical spinal mass (HCC)    Scalp lesion    S/P lymph node biopsy      * Metastatic cancer [facility])  Assessment & Plan  48year old male presenting with abnormal findings on CT head, chest, and tissue neck concerning for extensive metastatic cancer:     IMAGIN/22/21 - US Head Neck Soft Tissue: Several neck masses are identified corresponding to the palpable abnormalities within additional finding of the suspected thrombosed left jugular vein  Further evaluation with contrast-enhanced CT of the neck is recommended  21 - CT Soft Tissue Neck: Left greater than right numerous superficial and deep neck soft tissue and intramuscular rim-enhancing necrotic lesions/collections as described  Rim-enhancing epidural thickening/collection within posterolateral spinal canal spanning levels C2-C4 resulting in canal narrowing  Finding is concerning for widespread necrotizing infection  Neoplastic process is within differential consideration  Recommend correlation with sampling  Subpleural nodular opacities in the right upper lobe and superior segment of right lower lobe could indicate septic emboli  Suggested left hilar necrotic lymph node  Indeterminate sclerotic foci within T3 and T4 vertebral bodies  21 - CT Head: There is an approximately 2 4 x 1 4 x 2 2 cm masslike lesion involving the scalp overlying the posterior left parietal region    Neoplasm and/or infection should be excluded  21 - CTA PE study: Numerous nodular opacities are present bilaterally within the lungs  The appearances most suggestive of metastatic disease  Extensive hepatic metastatic disease  Large bilateral adrenal masses  Extensive lymphadenopathy  Probable osseous metastatic disease with mild to moderate compression deformity of the superior endplate of G44, suggesting pathologic compression  2021 - CT abdomen/pelvis:  2 8 x 2 2 x 1 8 cm ill-defined, hypodense pancreatic lesion highly suspicious for pancreatic adenocarcinoma  Wife  medics activities with numerous liver lesions, bilateral adrenal metastases, multiple lytic metastasis, and intramuscular metastasis also noted  Alternative diagnosis to metastatic pancreatic adenocarcinoma given the unusual intramuscular metastasis, would be metastatic melanoma  PATHOLOGY:  · 2021 - Scalp Punch Biopsy:  Pending  · 2021 - Left Neck and Right Neck Incisional Biopsy - Pending     Plan:  · Pain Regimen:   · Oxycodone 10/15 mg q6 hours PRN mod/severe  · Dilaudid 0 5 mg q3 hours PRN for breakthrough  · Bowel regimen ordered  · Medical Oncology Consulted and Appreciate Recommendation  · Surgical Oncology Consulted  · S/P Punch Biopsy on 21; follow up on pathology results  · Follow up on Lymph Node Biopsy performed on on 21  · Continue to monitor kidney fxn and electrolytes; potential for Tumor lysis syndrome  · Uric Acid: 3 1, LDH: 1234    Cervical stenosis of spinal canal  Assessment & Plan  Pt with cervical canal narrowing likely due to rim-enhancing epidural thickening/collection within posterolateral spinal canal spanning levels C2-C4  Findings could be due to necrotizing infectious process or metastasis related  2021 - MRI Cervical Spine: "Signal abnormality and erosive change of C3 laminae and spinous process along with lesser degree signal abnormality in the posterior elements of C1, C2, and C4    Associated epidural enhancement /enhancing soft tissue spanning from inferior C2-C4 resulting in severe canal stenosis and cord compression  Findings most favor metastatic disease  Infection is within differential consideration but less favored given findings elsewhere in the body  Correlate with sampling "    Plan:  · Neurosurgery consulted and appreciate recommendations  · MRI w/wo contrast of thoracic spine ordered and pending  · Frequent neurochecks  · Maintain cervical collar    Subclavian vein thromboembolism, acute, right (HCC)  Assessment & Plan    CT Soft Tissue Neck with Contrast: Occluded left subclavian and brachiocephalic veins as well as left internal jugular vein to the level of hyoid bone suggesting  Originally concerns for potential infectious cause, pt was started on Vanco and heparin  Due to decreasing procal 0 27 to 0 14, absent fever, or leukocytosis patient was Dced from Vancomycin  7/22/2021 - CTA PE chest: Possible thrombus right subclavian vein extending into the brachiocephalic vein  Consider venous duplex ultrasound for further evaluation    7/26/2021 - VAS upper extremity bilateral duplex:  Evidence of acute and subacute mostly occlusive DVT noted in the internal jugular vein  Evidence of acute nonocclusive DVT noted in the innominate, subclavian, axillary with extension onto the was brought brachial vein  Plan:  · Continue Heparin gtt; will require transition to DOAC    S/P lymph node biopsy  Assessment & Plan  LN Bx done 7/26/2021; INCISIONAL BIOPSY LEFT NECK MASS,AND EXCISIOINAL BIOPSY RIGHT NECK MASS (Bilateral)    Plan:  · Follow up path/labs of LN  · Anaerobic cx and gram stain, fungal cx, culture tissue and gram stain, AFB culture and gram stain, leukemia/lyphoma flow cytometry    Scalp lesion  Assessment & Plan  Punch bx done  Well healing  Plan  · F/u path results from punch bx  Determine primary source of met ca       Anemia  Assessment & Plan  Hgb 9 9 and Hct 29 6 at time of admission  Currently Hgb 9 5  Most likely anemia of chronic inflammation with some component of iron deficiency  Fe sat: 14, TIBC 205, Fe 28, Ferritin 1230  Retic: 5 12    Plan:  · Monitor Hgb and Hct  · Transfuse if patient becomes symptomatic or Hgb drops < 7      Disposition:  Will continue to require inpatient level of care  Currently undergoing workup for metastatic cancer of unknown origin  Status S additional punch biopsies, awaiting pathology results  MRI thoracic spine    SUBJECTIVE     No events overnight per nursing or night shift  Patient seen examined at bedside  Patient lying in bed in no acute distress or visible discomfort  Surg currently saturating appropriately on room  Patient complaining of generalized pain most severe in neck and diffusely across abdomen  Moderately controlled with current pain regimen  Since admission, patient has not had a bowel movement  Currently tolerating regular diet and ambulating to the bathroom with issues  Patient otherwise asymptomatic and with no complaints  OBJECTIVE     Vitals:    21 1115 21 2156 21 0801 21 0945   BP: 128/85 112/71 116/73 130/79   Pulse: 89      Resp: 12 14 18    Temp:  99 7 °F (37 6 °C) 98 8 °F (37 1 °C)    TempSrc:  Oral     SpO2: 98%      Weight:       Height:          Temperature:   Temp (24hrs), Av 3 °F (37 4 °C), Min:98 8 °F (37 1 °C), Max:99 7 °F (37 6 °C)    Temperature: 98 8 °F (37 1 °C)  Intake & Output:  I/O       701 -  0700 701 -  0700 701 -  0700    P  O  240 200     I V  (mL/kg)  3071 7 (58 8) 2153 7 (41 3)    Total Intake(mL/kg) 240 (4 6) 3271 7 (62 7) 2153 7 (41 3)    Urine (mL/kg/hr) 200 (0 2) 200 (0 2)     Total Output 200 200     Net +40 +3071 7 +2153 7           Unmeasured Urine Occurrence 5 x          Weights:   IBW (Ideal Body Weight): 70 7 kg    Body mass index is 16 6 kg/m²    Weight (last 2 days)     None        Physical Exam  Constitutional:       General: He is not in acute distress  Appearance: Normal appearance  He is cachectic  He is ill-appearing  He is not diaphoretic  HENT:      Head: Mass present  Comments: Palpable, tender mass in left occipital region     Nose: Nose normal  No congestion  Eyes:      General: No scleral icterus  Conjunctiva/sclera: Conjunctivae normal    Neck:      Comments: Cervical collar in place  Cardiovascular:      Rate and Rhythm: Normal rate and regular rhythm  Heart sounds: Normal heart sounds  Pulmonary:      Effort: Pulmonary effort is normal       Breath sounds: Normal breath sounds  No wheezing  Abdominal:      General: Bowel sounds are normal       Tenderness: There is no abdominal tenderness  Musculoskeletal:      Cervical back: Pain with movement and muscular tenderness present  Right lower leg: No edema  Left lower leg: No edema  Skin:     General: Skin is warm and dry  Capillary Refill: Capillary refill takes less than 2 seconds  Neurological:      Mental Status: He is alert and oriented to person, place, and time  Psychiatric:         Mood and Affect: Mood normal          Behavior: Behavior normal        LABORATORY DATA     Labs: I have personally reviewed pertinent reports    Results from last 7 days   Lab Units 07/27/21  0618 07/26/21  0527 07/25/21  0507   WBC Thousand/uL 11 85* 10 73* 11 45*   HEMOGLOBIN g/dL 8 9* 9 5* 9 4*   HEMATOCRIT % 27 6* 28 7* 28 5*   PLATELETS Thousands/uL 231 252 268   NEUTROS PCT % 70 69 69   MONOS PCT % 11 11 11      Results from last 7 days   Lab Units 07/27/21  0618 07/26/21  0527 07/25/21  0507 07/23/21  0415 07/22/21  2038   POTASSIUM mmol/L 4 0 3 9 3 8 3 9 4 1   CHLORIDE mmol/L 103 105 105 105 102   CO2 mmol/L 26 29 26 27 27   BUN mg/dL 9 7 6 11 10   CREATININE mg/dL 0 43* 0 39* 0 35* 0 50* 0 64   CALCIUM mg/dL 8 6 9 3 8 9 8 3 9 6   ALK PHOS U/L  --   --   --  117* 142*   ALT U/L  --   --   --  26 32 AST U/L  --   --   --  32 32     Results from last 7 days   Lab Units 07/27/21  0618 07/25/21  0507 07/24/21  0618   MAGNESIUM mg/dL 1 6 1 7 1 7     Results from last 7 days   Lab Units 07/27/21  0618 07/25/21  0507 07/24/21  0618   PHOSPHORUS mg/dL 4 3 3 5 3 6      Results from last 7 days   Lab Units 07/27/21  0618 07/27/21  0158 07/26/21  1824 07/23/21  0415 07/22/21 2038   INR   --   --   --  1 08  1 05 0 96   PTT seconds 91* 68* 58* 28 27     Results from last 7 days   Lab Units 07/22/21 2038   LACTIC ACID mmol/L 1 1           IMAGING & DIAGNOSTIC TESTING     Radiology Results: I have personally reviewed pertinent reports  XR spine cervical 2 or 3 vw injury    Result Date: 7/26/2021  Impression: 1  Destructive lytic lesion within the C3 vertebral body extending into the posterior elements suspicious for metastatic disease  2   Additional ill-defined lytic appearing lesions in the C4-C7 vertebral bodies also suggestive of metastatic disease  Workstation performed: [id number]     XR spine thoracolumbar 2 vw    Result Date: 7/26/2021  Impression: Mild superior endplate compression fracture of T11  Multiple lytic lesions to include T11, L3 and S1 are better visualized on recent CT  Workstation performed: [id number]     CT head without contrast    Result Date: 7/22/2021  Impression: There is an approximately 2 4 x 1 4 x 2 2 cm masslike lesion involving the scalp overlying the posterior left parietal region  Neoplasm and/or infection should be excluded  Sampling is recommended  The study was marked in [facility] for immediate notification  Workstation performed: [id number]     CT soft tissue neck w contrast    Result Date: 7/22/2021  Impression: 1  Left greater than right numerous superficial and deep neck soft tissue and intramuscular rim-enhancing necrotic lesions/collections as described    Rim-enhancing epidural thickening/collection within posterolateral spinal canal spanning levels C2-C4 resulting in canal narrowing  Finding is concerning for widespread necrotizing infection  Neoplastic process is within differential consideration  Recommend correlation with sampling  2   Occluded left subclavian and brachiocephalic veins as well as left internal jugular vein to the level of hyoid bone suggesting  3   Subpleural nodular opacities in the right upper lobe and superior segment of right lower lobe could indicate septic emboli  Suggested left hilar necrotic lymph node  4   Indeterminate sclerotic foci within T3 and T4 vertebral bodies  I personally discussed this study with Daniela Velasquez on 7/22/2021 at 2:10 PM  Workstation performed: [id number]     MRI cervical spine w wo contrast    Result Date: 7/27/2021  Impression: 1  Signal abnormality and erosive change of C3 laminae and spinous process along with lesser degree signal abnormality in the posterior elements of C1, C2, and C4  Associated epidural enhancement /enhancing soft tissue spanning from inferior C2-C4 resulting in severe canal stenosis and cord compression  Findings most favor metastatic disease  Infection is within differential consideration but less favored given findings elsewhere in the body  Correlate with sampling  2   Multiple rim-enhancing lesions within paraspinal musculature, superficial and deep soft tissue neck as demonstrated on recent soft tissue neck CT  Differential considerations are similar as above  3   Heterogeneous marrow signal and enhancement of the cervical and visualized thoracic vertebras concerning for infiltrative metastatic disease  4   Redemonstrated occluded left internal jugular vein  5   Degenerative change as above    I personally discussed this study with Сергей Nguyen on 7/27/2021 at 9:35 AM  Workstation performed: [id number]     US head neck soft tissue    Result Date: 7/22/2021  Impression: Several neck masses are identified corresponding to the palpable abnormalities within additional finding of the suspected thrombosed left jugular vein  Further evaluation with contrast-enhanced CT of the neck is recommended  The study was marked in [facility] for immediate notification  Workstation performed: [id number]     CTA ED chest PE Study    Result Date: 7/23/2021  Impression: No evidence of pulmonary embolism is seen  Measured RV/LV ratio is within normal limits at less than 0 9  Numerous nodular opacities are present bilaterally within the lungs  The appearances most suggestive of metastatic disease  Extensive hepatic metastatic disease  Large bilateral adrenal masses  Extensive lymphadenopathy  Probable osseous metastatic disease with mild to moderate compression deformity of the superior endplate of H24, suggesting pathologic compression  Possible thrombus right subclavian vein extending into the brachiocephalic vein  Consider venous duplex ultrasound for further evaluation  The study was marked in [facility] for immediate notification  Workstation performed: [id number]     CT abdomen pelvis w contrast    Result Date: 7/26/2021  Impression: There is a 2 8 x 2 2 x 1 8 cm ill-defined, hypodense pancreatic lesion, highly suspicious for pancreatic adenocarcinoma (series 3 images 20-22 and series 601 images 57 - 67 ) There is widespread metastatic disease with numerous liver lesions, bilateral adrenal metastases, multiple lytic bone metastases, with resulting mild superior end plate compression fractures of T11 and L3 as above, and also intramuscular metastases as above  An alternative diagnosis to metastatic pancreatic adenocarcinoma, given the unusual intramuscular metastasis, would be metastatic melanoma  Please correlate with already performed biopsy results  Workstation performed: [id number]     Other Diagnostic Testing: I have personally reviewed pertinent reports      ACTIVE MEDICATIONS     Current Facility-Administered Medications   Medication Dose Route Frequency    acetaminophen (TYLENOL) tablet 650 mg  650 mg Oral Q6H PRN  heparin (porcine) 25,000 units in 0 45% NaCl 250 mL infusion (premix)  3-30 Units/kg/hr (Order-Specific) Intravenous Titrated    heparin (porcine) injection 2,000 Units  2,000 Units Intravenous Q1H PRN    heparin (porcine) injection 4,000 Units  4,000 Units Intravenous Q1H PRN    HYDROmorphone (DILAUDID) injection 0 5 mg  0 5 mg Intravenous Q3H PRN    oxyCODONE (ROXICODONE) immediate release tablet 10 mg  10 mg Oral Q6H PRN    Or    oxyCODONE (ROXICODONE) IR tablet 15 mg  15 mg Oral Q6H PRN    polyethylene glycol (MIRALAX) packet 17 g  17 g Oral Daily    senna-docusate sodium (SENOKOT S) 8 6-50 mg per tablet 1 tablet  1 tablet Oral HS    sodium chloride 0 9 % infusion  100 mL/hr Intravenous Continuous       VTE Pharmacologic Prophylaxis: Heparin  VTE Mechanical Prophylaxis: sequential compression device    Portions of the record may have been created with voice recognition software  Occasional wrong word or "sound a like" substitutions may have occurred due to the inherent limitations of voice recognition software    Read the chart carefully and recognize, using context, where substitutions have occurred   ==  Domi Choe, [address] [facility]  Internal Medicine Residency PGY-2

## 2021-07-27 NOTE — OCCUPATIONAL THERAPY NOTE
Occupational Therapy Evaluation     Patient Name: America Shaver  BWXMR'H Date: 7/27/2021  Problem List  Principal Problem:    Metastatic cancer [facility])  Active Problems:    Subclavian vein thromboembolism, acute, right ([address] )    Compression fracture of T11 vertebra (HCC)    Cervical stenosis of spinal canal    Anemia    Cervical spinal mass ([facility] [address] )    Scalp lesion    S/P lymph node biopsy    Past Medical History  History reviewed  No pertinent past medical history  Past Surgical History  Past Surgical History:   Procedure Laterality Date    FACIAL/NECK BIOPSY Bilateral 7/26/2021    Procedure: INCISIONAL BIOPSY LEFT NECK MASS,AND EXCISIOINAL BIOPSY RIGHT NECK MASS;  Surgeon: Emmanuel Garcia MD;  [facility]: [facility];  Service: Surgical Oncology    HAND SURGERY      left hand           07/27/21 0957   OT Last Visit   OT Visit Date 07/27/21   Note Type   Note type Evaluation   Restrictions/Precautions   Weight Bearing Precautions Per Order No   Braces or Orthoses C/S Collar;TLSO  (TLSO when HOB > 45* and OOB )   Other Precautions Fall Risk;Pain;Spinal precautions   Pain Assessment   Pain Assessment Tool 0-10   Pain Score Worst Possible Pain   Pain Location/Orientation Orientation: Bilateral;Location: Neck   Hospital Pain Intervention(s) Repositioned; Ambulation/increased activity   Home Living   Type of [address] One level  (located on 3rd flr, 3 FF to enter )   Bathroom Shower/Tub Tub/shower unit   Bathroom Toilet Standard   Bathroom Equipment   (denies )   Home Equipment   (denies )   Prior Function   Level of [place] Independent with ADLs and functional mobility   Lives With Friend(s)   Receives Help From Friend(s)   ADL Assistance Independent   IADLs Needs assistance   Falls in the last 6 months 0   Vocational Other (Comment)  (pt reports planning to retire 2* current state of health)   Lifestyle   Autonomy PTA pt reports being I with ADLs, friend has been assisting recently with IADLs Reciprocal Relationships Friends   Service to Others Does christina - pt unsure of ability to work at this point    [address] Being outside    [address] (WDL) WDL   Subjective   Subjective "I had so much pain through the night"   ADL   Where Assessed Edge of bed   Eating Assistance 7  [address]  4  Minimal Assistance   Bed Mobility   Supine to Sit 4  Minimal assistance   Additional items Assist x 1; Increased time required   Sit to Supine 4  Minimal assistance   Additional items Assist x 1; Increased time required   Transfers   Sit to Stand 4  Minimal assistance   Additional items Assist x 1; Increased time required   Stand to Sit 4  Minimal assistance   Additional items Assist x 1; Increased time required   Additional Comments Transfers w/o AD, fluctuates between CGA to MIN A    Functional Mobility   Functional Mobility 4  Minimal assistance   Additional Comments [facility]    Additional items Rolling walker   Balance   Static Sitting Fair +   Dynamic Sitting Fair   Static Standing Fair   Dynamic Standing Fair -   Ambulatory Fair -   Activity Tolerance   Activity Tolerance Patient limited by fatigue;Patient limited by pain   Medical Staff Made Aware PT Myriam Marks    Nurse Made Aware RN clearance for session    RUE Assessment   RUE Assessment   (3/5 - limited by pain )   LUE Assessment   LUE Assessment   (3/5 - limited by pain )   Hand Function   Gross Motor Coordination Functional   Fine Motor Coordination Functional   Sensation   Light Touch No apparent deficits   Vision - Complex Assessment   Ocular Range of Motion WFL   Head Position WDL   Tracking Able to track stimulus in all quads without difficulty   Perception   Inattention/Neglect Appears intact   Cognition   Overall Cognitive Status WFL   Arousal/Participation Alert; Responsive; Cooperative   Attention Attends with cues to redirect   Orientation Level Oriented X4   Memory Decreased recall of precautions   Following Commands Follows one step commands without difficulty   Comments Pt pleasant and cooperative t/o session    Assessment   Limitation Decreased ADL status; Decreased UE ROM; Decreased UE strength;Decreased endurance;Decreased self-care trans;Decreased high-level ADLs   Prognosis Fair   Assessment Pt is a 48 y o  male admitted to [facility] on 7/22/2021 w/ Metastatic cancer ([facility] [address] )  Pt with compression fx of T11 vertebra and cervical spine mass  Pt with active OT orders and up with assistance  orders  Pt seen as a co-evaluation with PT due to the patient's co-morbidities, clinically unstable presentation/clinical complexity, and present impairments  As per pt report, pta, resides in an apt, located on the 3rd floor with his friend  Pt as 3 FF to enter  Pt was I w/  ADLS and friend A with IADLS, has not been driving  Upon evaluation, pt currently requires MIN A for transfers and mobility  Exhibits decreased strength to B/L UE's limiting performance in ADLs/transfers  Pt currently requires I eating, S grooming, MIN A UB ADLs, MIN A LB ADLs, and MIN A toileting  MIN A to don vista collar and TLSO  Educated on wearing c-collar at all times as well as spinal precautions  Pt is limited at this time 2*: pain, endurance, activity tolerance, functional mobility, forward functional reach, balance, functional standing tolerance, unsupportive home environment, decreased I w/ ADLS/IADLS, strength and ROM  The following Occupational Performance Areas to address include: grooming, bathing/shower, toilet hygiene, dressing, health maintenance, functional mobility, community mobility and clothing management   Pt to benefit from immediate acute skilled OT to address above deficits, improve overall functional independence, maximize fnxl mobility and reduce caregiver burden  From OT standpoint, recommendation at time of d/c would be STR - pending progress  Pt reports friend works during the day so he will be home alone  Pt was left in bed after session with all current needs met  The patient's raw score on the AM-PAC Daily Activity inpatient short form is 19, standardized score is 40 22, greater than 39 4  Patients at this level are likely to benefit from discharge to home  Please refer to the recommendation of the Occupational Therapist for safe discharge planning  Goals   Patient Goals to decrease his pain    LTG Time Frame 10-14   Plan   Treatment Interventions ADL retraining;Functional transfer training;UE strengthening/ROM; Endurance training;Patient/family training;Equipment evaluation/education; Compensatory technique education;Continued evaluation; Energy conservation; Activityengagement   Goal Expiration Date 08/10/21   OT Frequency 3-5x/wk   Recommendation   OT Discharge Recommendation Post acute rehabilitation services  (vs home with skilled therapy - pending progress)   OT - OK to Discharge Yes  (to rehab when medically stable )   AM-PAC Daily Activity Inpatient   Lower Body Dressing 3   Bathing 3   Toileting 3   Upper Body Dressing 3   Grooming 3   Eating 4   Daily Activity Raw Score 19   Daily Activity Standardized Score (Calc for Raw Score >=11) 40 22   AM-PAC Applied Cognition Inpatient   Following a Speech/Presentation 4   Understanding Ordinary Conversation 4   Taking Medications 4   Remembering Where Things Are Placed or Put Away 4   Remembering List of 4-5 Errands 2   Taking Care of Complicated Tasks 2   Applied Cognition Raw Score 20   Applied Cognition Standardized Score 41 76        GOALS    1) Pt will increase activity tolerance to G for 30 min txment sessions    2) Pt will complete UB dressing/self care/bathing w/ mod I using adaptive device and DME as needed    3) Pt will complete LB dressing/self care/bathing w/ mod I using adaptive device and DME as needed    4) Pt will complete toileting w/ mod I w/ G hygiene/thoroughness using DME as needed    5) Pt will improve functional transfers to Mod I on/off all surfaces using DME as needed w/ G balance/safety     6) Pt will improve functional mobility during ADL/IADL/leisure tasks to Mod I using DME as needed w/ G balance/safety     7) Pt will demonstrate G carryover of pt/caregiver education and training as appropriate      8) Pt will demonstrate 100% carryover of energy conservation techniques t/o functional I/ADL/leisure tasks w/o cues s/p skilled education    9) Pt will engage in ongoing cognitive assessment w/ G participation to assist w/ safe d/c planning/recommendations    10) Pt will increase static and dynamic standing/sitting balance to F+ using AD/DME as needed to increase safety and I during fnxl transfers and ADLs    11) Pt will maintain upright sitting position for at least 20 min during dynamic fnxl activity with G balance and endurance to improve ADL performance and independence, as well as reduce risk of falls        Michael Christensen MS, OTR/L

## 2021-07-27 NOTE — PLAN OF CARE
Problem: OCCUPATIONAL THERAPY ADULT  Goal: Performs self-care activities at highest level of function for planned discharge setting  See evaluation for individualized goals  Description: Treatment Interventions: ADL retraining, Functional transfer training, UE strengthening/ROM, Endurance training, Patient/family training, Equipment evaluation/education, Compensatory technique education, Continued evaluation, Energy conservation, Activityengagement          See flowsheet documentation for full assessment, interventions and recommendations  Note: Limitation: Decreased ADL status, Decreased UE ROM, Decreased UE strength, Decreased endurance, Decreased self-care trans, Decreased high-level ADLs  Prognosis: Fair  Assessment: Pt is a 48 y o  male admitted to [facility]"" on 7/22/2021 w/ Metastatic cancer ([facility] [address] )  Pt with compression fx of T11 vertebra and cervical spine mass  Pt with active OT orders and up with assistance  orders  Pt seen as a co-evaluation with PT due to the patient's co-morbidities, clinically unstable presentation/clinical complexity, and present impairments  As per pt report, pta, resides in an apt, located on the 3rd floor with his friend  Pt as 3 FF to enter  Pt was I w/  ADLS and friend A with IADLS, has not been driving  Upon evaluation, pt currently requires MIN A for transfers and mobility  Exhibits decreased strength to B/L UE's limiting performance in ADLs/transfers  Pt currently requires I eating, S grooming, MIN A UB ADLs, MIN A LB ADLs, and MIN A toileting  MIN A to don vista collar and TLSO  Educated on wearing c-collar at all times as well as spinal precautions  Pt is limited at this time 2*: pain, endurance, activity tolerance, functional mobility, forward functional reach, balance, functional standing tolerance, unsupportive home environment, decreased I w/ ADLS/IADLS, strength and ROM  The following Occupational Performance Areas to address include: grooming, bathing/shower, toilet hygiene, dressing, health maintenance, functional mobility, community mobility and clothing management  Pt to benefit from immediate acute skilled OT to address above deficits, improve overall functional independence, maximize fnxl mobility and reduce caregiver burden  From OT standpoint, recommendation at time of d/c would be STR - pending progress  Pt reports friend works during the day so he will be home alone  Pt was left in bed after session with all current needs met  The patient's raw score on the AM-PAC Daily Activity inpatient short form is 19, standardized score is 40 22, greater than 39 4  Patients at this level are likely to benefit from discharge to home  Please refer to the recommendation of the Occupational Therapist for safe discharge planning       OT Discharge Recommendation: Post acute rehabilitation services (vs home with skilled therapy - pending progress)  OT - OK to Discharge: Yes (to rehab when medically stable )

## 2021-07-27 NOTE — PLAN OF CARE
Problem: PHYSICAL THERAPY ADULT  Goal: Performs mobility at highest level of function for planned discharge setting  See evaluation for individualized goals  Description: Treatment/Interventions: Functional transfer training, LE strengthening/ROM, Patient/family training, Bed mobility, Gait training, Spoke to nursing, Spoke to case management, OT  Equipment Recommended:  (TBD )       See flowsheet documentation for full assessment, interventions and recommendations  7/27/2021 1326 by Shyann Garrett PT  Note: Prognosis: Fair  Problem List: Decreased strength, Decreased endurance, Impaired balance, Decreased mobility, Pain  Assessment: Pt seen for high complexity PT evaluation  Pt with active PT eval/treat  orders  Pt is a 48 y o  male who was admitted to [facility] on 7/22/2021 with metastatic cancer  Pt's active problems include: scalp lesion, anemia, cervical stenosis of spinal canal, subclavian vein thromboembolism, acute R, compression fx of T11 vertebra, and cervical spinal mass  Pt resides with friend in 1 level apartment with 3 full flights of steps to enter and was independent prior to hospital admission  Pt has limitations in strength, balance, endurance, pain and overall functional mobility, these limitations increased risk of falls  Pt requires assist of 1 for all mobility performed this date  Pt c/s collar and TLSO damion prior to mobilization, pt educated on spinal precautions with good understanding  Pt performed STS from EOB with min Ax1  Pt ambulated into hallway and back into room with min Ax1  Pt with c/o dizziness, BP taken, 130/79, - further mobility deferred  Pt was left supine in bed at the end of PT session with all needs in reach  Pt would benefit from continued PT services while in hospital to address remaining limitations  PT to continue to follow pt and recommends post-acute rehab services pending progress   The patient's AM-PAC Basic Mobility Inpatient Short Form Raw Score is 17, Standardized Score is 39 67  A standardized score less than 42 9 suggests the patient may benefit from discharge to post-acute rehabilitation services  Please also refer to the recommendation of the Physical Therapist for safe discharge planning  Barriers to Discharge: Inaccessible home environment, Decreased caregiver support        PT Discharge Recommendation: Post acute rehabilitation services (pending progress )     PT - OK to Discharge: Yes (to STR, once medically cleared )    See flowsheet documentation for full assessment

## 2021-07-27 NOTE — PHYSICAL THERAPY NOTE
Physical Therapy Evaluation     Patient's Name: Giselle Ventura    Admitting Diagnosis  Metastatic cancer ([facility] [address] ) [C79 9]  SOB (shortness of breath) [R06 02]  Cervical stenosis of spinal canal [M48 02]  Scalp lesion [L98 9]  Abnormal CT scan, neck [R93 89]  Opacity of lung on imaging study [R91 8]    Problem List  Patient Active Problem List   Diagnosis    Metastatic cancer ([facility] [address] )    Subclavian vein thromboembolism, acute, right ([facility] [address] )    Compression fracture of T11 vertebra (HCC)    Cervical stenosis of spinal canal    Anemia    Cervical spinal mass ([facility] [address] )    Scalp lesion    S/P lymph node biopsy       Past Medical History  History reviewed  No pertinent past medical history  Past Surgical History  Past Surgical History:   Procedure Laterality Date    FACIAL/NECK BIOPSY Bilateral 7/26/2021    Procedure: INCISIONAL BIOPSY LEFT NECK MASS,AND EXCISIOINAL BIOPSY RIGHT NECK MASS;  Surgeon: Luz Sandoval MD;  Location: [facility];  Service: Surgical Oncology    HAND SURGERY      left hand        07/27/21 0954   PT Last Visit   PT Visit Date 07/27/21   Note Type   Note type Evaluation   Pain Assessment   Pain Assessment Tool 0-10   Pain Score Worst Possible Pain   Pain Location/Orientation Orientation: Left;Orientation: Right;Location: Neck   Patient's Stated Pain Goal No pain   Hospital Pain Intervention(s) Repositioned; Ambulation/increased activity   Home Living   Type of [address]  (3rd floor apartment, 3 FF to enter )   Bathroom Shower/Tub Tub/shower unit   Bathroom Toilet Standard   Bathroom Equipment   (denies )   Home Equipment   (denies )   Prior Function   Level of [place] Independent with ADLs and functional mobility   Lives With Friend(s)   Receives Help From Friend(s)   ADL Assistance Independent   IADLs Needs assistance   Falls in the last 6 months 0   Restrictions/Precautions   Weight Bearing Precautions Per Order No   Braces or Orthoses C/S Collar;TLSO  (TLSO when HOB >45* and OOB )   Other Precautions Fall Risk;Pain;Spinal precautions   General   Family/Caregiver Present No   Cognition   Overall Cognitive Status WFL   Attention Attends with cues to redirect   Orientation Level Oriented X4   Memory Decreased recall of precautions   Following Commands Follows one step commands with increased time or repetition   Comments Pt with increased pain; cooperative to participate in therapy session  RLE Assessment   RLE Assessment   (grossly 3/5 )   LLE Assessment   LLE Assessment   (grossly 3/5 )   Bed Mobility   Supine to Sit 4  Minimal assistance   Additional items Assist x 1;HOB elevated; Increased time required   Sit to Supine 4  Minimal assistance   Additional items Increased time required;Verbal cues   Additional Comments Pt supine in bed upon arrival  Pt returned to supine in bed with all needs within reach at the end of therapy session    Transfers   Sit to Stand 4  Minimal assistance   Additional items Assist x 1; Increased time required;Verbal cues   Stand to Sit 4  Minimal assistance   Additional items Assist x 1; Increased time required;Verbal cues   Additional Comments transfers with HHA    Ambulation/Elevation   Gait pattern Excessively slow; Short stride; Ataxia; Decreased foot clearance;Narrow PRAKASH   Gait Assistance 4  Minimal assist   Additional items Assist x 1;Verbal cues; Tactile cues   Assistive Device   (HHA )   Distance 2x 15 ft    Stair Management Assistance Not tested   Balance   Static Sitting Fair   Dynamic Sitting Fair   Static Standing Fair   Dynamic Standing Fair -   Ambulatory Fair -   Activity Tolerance   Activity Tolerance Patient limited by fatigue;Patient limited by pain   Medical Staff Made Aware ROYAL Lyle; david performed this date 2* increased medical complexity and multiple co-morbidities    Nurse Made Aware RN cleared pt to participate in therapy session    Assessment   Prognosis Fair   Problem List Decreased strength;Decreased endurance; Impaired balance;Decreased mobility;Pain   Assessment Pt seen for high complexity PT evaluation  Pt with active PT eval/treat  orders  Pt is a 48 y o  male who was admitted to [facility] on 7/22/2021 with metastatic cancer  Pt's active problems include: scalp lesion, anemia, cervical stenosis of spinal canal, subclavian vein thromboembolism, acute R, compression fx of T11 vertebra, and cervical spinal mass  Pt resides with friend in 1 level apartment with 3 full flights of steps to enter and was independent prior to hospital admission  Pt has limitations in strength, balance, endurance, pain and overall functional mobility, these limitations increased risk of falls  Pt requires assist of 1 for all mobility performed this date  Pt c/s collar and TLSO damion prior to mobilization, pt educated on spinal precautions with good understanding  Pt performed STS from EOB with min Ax1  Pt ambulated into hallway and back into room with min Ax1  Pt with c/o dizziness, BP taken, 130/79, - further mobility deferred  Pt was left supine in bed at the end of PT session with all needs in reach  Pt would benefit from continued PT services while in hospital to address remaining limitations  PT to continue to follow pt and recommends post-acute rehab services pending progress  The patient's AM-PAC Basic Mobility Inpatient Short Form Raw Score is 17, Standardized Score is 39 67  A standardized score less than 42 9 suggests the patient may benefit from discharge to post-acute rehabilitation services  Please also refer to the recommendation of the Physical Therapist for safe discharge planning  Barriers to Discharge Inaccessible home environment;Decreased caregiver support   Goals   Patient Goals to have less pain    STG Expiration Date 08/10/21   Short Term Goal #1 STG 1  Pt will be able to perform bed mobility with mod I in order to improve overall functional mobility and assist in safe d/c  STG 2   Pt will be able to perform functional transfer with mod I in order to improve overall functional mobility and assist in safe d/c  STG 3  Pt will be able to ambulate at least 250 feet with least restrictive device with mod I A in order to improve overall functional mobility and assist in safe d/c  STG 4  Pt will improve sitting/standing static/dynamic balance 1 grade in order to improve functional mobility and assist in safe d/c  STG 5  Pt will improve LE strength by one grade in order to improve functional mobility and assist in safe d/c  STG 6  Pt will be able to negotiate at least 3 full flights of steps at mod I level A in order lloyd improve overall functional mobility and assist in safe d/c     PT Treatment Day 0   Plan   Treatment/Interventions Functional transfer training;LE strengthening/ROM; Patient/family training;Bed mobility;Gait training;Spoke to nursing;Spoke to case management;OT   PT Frequency Other (Comment)  (3-5x/wk )   Recommendation   PT Discharge Recommendation Post acute rehabilitation services  (pending progress )   Equipment Recommended   (TBD )   PT - OK to Discharge Yes  (to STR, once medically cleared )   AM-PAC Basic Mobility Inpatient   Turning in Bed Without Bedrails 3   Lying on Back to Sitting on Edge of Flat Bed 3   Moving Bed to Chair 3   Standing Up From Chair 3   Walk in Room 3   Climb 3-5 Stairs 2   Basic Mobility Inpatient Raw Score 17   Basic Mobility Standardized Score 39 67           Shyann Garrett, PT, DPT

## 2021-07-28 PROBLEM — T40.2X5A CONSTIPATION DUE TO OPIOID THERAPY: Status: ACTIVE | Noted: 2021-01-01

## 2021-07-28 PROBLEM — L89.159: Status: ACTIVE | Noted: 2021-01-01

## 2021-07-28 PROBLEM — E43 SEVERE PROTEIN-CALORIE MALNUTRITION (HCC): Status: ACTIVE | Noted: 2021-01-01

## 2021-07-28 PROBLEM — M48.9 MASS OF THORACIC VERTEBRA: Status: ACTIVE | Noted: 2021-01-01

## 2021-07-28 PROBLEM — K59.03 CONSTIPATION DUE TO OPIOID THERAPY: Status: ACTIVE | Noted: 2021-01-01

## 2021-07-28 NOTE — ASSESSMENT & PLAN NOTE
Malnutrition Findings:   Adult Malnutrition type: Acute illness  Adult Degree of Malnutrition: Other severe protein calorie malnutrition (related to medical condition <50% energy intake versus needs for > 5 days evidenced by BMI 16 98; lost 9#(7 4%) since 6/10/21; moderate fat loss somewhat hollow orbital, moderate muscle loss visible clavicle  Treat Regular diet &Ensure Enlive TID )    BMI Findings:  Adult BMI Classifications: Underweight < 18 5     Body mass index is 16 6 kg/m²       Plan:  · Regular diet   · Ensure supplementation TID with meals  · Nutrition recommendations appreciated

## 2021-07-28 NOTE — ASSESSMENT & PLAN NOTE
· Metastatic disease  · 30 lbs unintentional weight loss  · Extensive masses in bilateral lungs, adrenal glands, liver  · Neck mass Biopsy:  Preliminary pathology results: melanoma

## 2021-07-28 NOTE — ASSESSMENT & PLAN NOTE
Epidural mass at C2-C4  · Presented to ED with neck and back pain and unintentional weight loss  · Noted on imaging with the above as well as metastatic masses to T11, L3 and lymphadenopathy  · Exam non-focal  States ambulatory dysfunction over past 3 weeks secondary to back pain  Imaging:  · CT soft tissue neck 7/22/21: Left greater than right numerous superficial and deep neck soft tissue and intramuscular rim-enhancing necrotic lesions/collections as described  Rim-enhancing epidural thickening/collection within posterolateral spinal canal spanning levels C2-C4 resulting in canal narrowing  Finding is concerning for widespread necrotizing infection  Neoplastic process is within differential consideration  Recommend correlation with sampling  2   Occluded left subclavian and brachiocephalic veins as well as left internal jugular vein to the level of hyoid bone suggesting  3   Subpleural nodular opacities in the right upper lobe and superior segment of right lower lobe could indicate septic emboli  Suggested left hilar necrotic lymph node  4   Indeterminate sclerotic foci within T3 and T4 vertebral bodies  Plan:  · Frequent neuro checks  · Maintain in [facility] cervical brace  Zoë brace for showers  · Upright cervical x-rays for baseline  · Medical and pain management per primary team   · Heme/onc consulted  · Surgical/onc consulted  · Pt had biopsy done  S/p Incisional biopsy left neck mass, excisional biopsy right neck mass  Biopsy results pending  · Mobilize with PT/OT  · Neurosurgery will follow biopsy results  Ongoing discussion about surgical plan  Pt might come to need posterior cervical decompressive surgery, however, that may be dependent on the diagnosis/primary  Neurosurgery will continue to follow  Please call with any questions or concerns

## 2021-07-28 NOTE — ASSESSMENT & PLAN NOTE
· T11 and L3 pathologic fractures  · Likely secondary to bony metastasis  · TLSO brace when upright > 45 degrees and OOB  · Upright xrays when able to  · Mobilize with PT/OT  · Continue medical workup  · Pt to obtain MRI of the thoracic and lumbar spine w wo when able to or baseline

## 2021-07-28 NOTE — ASSESSMENT & PLAN NOTE
Epidural mass at C2-C4  · Presented to ED with neck and back pain and unintentional weight loss  · Noted on imaging with the above as well as metastatic masses to T11, L3 and lymphadenopathy  · Exam non-focal  States ambulatory dysfunction over past 3 weeks secondary to back pain  Imaging:  · CT soft tissue neck 7/22/21: Left greater than right numerous superficial and deep neck soft tissue and intramuscular rim-enhancing necrotic lesions/collections as described  Rim-enhancing epidural thickening/collection within posterolateral spinal canal spanning levels C2-C4 resulting in canal narrowing  Finding is concerning for widespread necrotizing infection  Neoplastic process is within differential consideration  Recommend correlation with sampling  2   Occluded left subclavian and brachiocephalic veins as well as left internal jugular vein to the level of hyoid bone suggesting  3   Subpleural nodular opacities in the right upper lobe and superior segment of right lower lobe could indicate septic emboli  Suggested left hilar necrotic lymph node  4   Indeterminate sclerotic foci within T3 and T4 vertebral bodies  Plan:  · Frequent neuro checks  · Maintain in [facility] cervical brace  Zoë brace for showers  · Upright cervical x-rays for baseline  · Medical and pain management per primary team   · Heme/onc consulted  · Surgical/onc consulted  · Pt had biopsy done  S/p Incisional biopsy left neck mass, excisional biopsy right neck mass  Biopsy results pending  · Palliative consult recommended  · Mobilize with PT/OT  · No neurosurgical intervention anticipated at this time  Given pt's advanced metastatic disease/ disease burden and poor performance , surgery is not recommended at this time  Okay to proceed with palliative radiation therapy from nsx standpoint  · Neurosurgery will see pt PRN during the remainder of this hospitalization   Will discuss if follow up required, if required will arrange appropriate follow up  Please call with any questions or concerns

## 2021-07-28 NOTE — ASSESSMENT & PLAN NOTE
7/28/2021 Alerted by nursing that pt has ulcer on the coccyx  Per wound care: "Mid sacrum - evolving DTI - HA  Wound presents as approx: 20% evolving open partial thickness appearing moist yellow tissue, and 80% intact non-evolved non-blanchable purple skin  All aspects measured together  Edges fragile and attached, no maceration  Tatianna-wound is intact with pink blanchable skin  Suspect mixed etiology of pressure and friction given the chaffed/irregular dry appearance  -Will recommend foam dressing to protect "    Plan:  · Per wound care  · Frequent repositioning   · Apply hydraguard to b/l heels and b/l buttocks and sacrum BID and PRN for prevention and protection  · Apply skin nourishing cream the entire skin daily for moisture  · Turn and reposition patient every  2 hours   · Elevate heels off of bed with pillows to offload pressure   · Apply EHOB waffle cushion to chair when OOB, if able  · Cleanse sacral wound with NSS, pat dry, and apply Allevyn foam dressing  Feroz dressing with T  Change every other day and PRN for soilage/dislodgement     · Wound care will follow along with patient weekly

## 2021-07-28 NOTE — PROGRESS NOTES
INTERNAL MEDICINE RESIDENCY PROGRESS NOTE     PATIENT INFORMATION     Name: Lee Duarte   Age & Sex: 48 y o  male   MRN: [id number]  Hospital Stay Days: 5  Unit/Bed#: PPHP 914-01   Encounter: [id number]  Team: SOD Team B     ASSESSMENT/PLAN     Principal Problem:    Metastatic cancer ([facility] [address] )  Active Problems:    Subclavian vein thromboembolism, acute, right (HCC)    Compression fracture of T11 vertebra (HCC)    Cervical stenosis of spinal canal    Anemia    Cervical spinal mass ([facility] [address] )    Scalp lesion    S/P lymph node biopsy    Severe protein-calorie malnutrition ([facility] [address] )    Pressure ulcer of coccygeal region, unspecified pressure ulcer stage    Mass of thoracic vertebra    Constipation due to opioid therapy    Constipation due to opioid therapy  Assessment & Plan  Pt last bowel movement prior to admission  Increasing abd pain/tenderness/distension  Tympanic  (-) rebound  Constipation unresponsive to lactulose 10 g  Plan   · KUB  · Cont  · miralax   · sennakot   · Consider starting   · Mag ox         Mass of thoracic vertebra  Assessment & Plan  7/28/2021 MRI TS Widely metastatic disease to the thoracic spinal axis with early pathologic fracture superior T11 endplate  No extraosseous tumor within the spinal canal, no cord or root compression  Multiple soft tissue masses in the adjacent soft tissues  Plan  · Neurosurgery consult appreciated     Pressure ulcer of coccygeal region, unspecified pressure ulcer stage  Assessment & Plan  7/28/2021 Alerted by nursing that pt has ulcer on the coccyx  Pt had 1 occurrence of fever to 101 F overnight, with subsequent T 99 F  Pt has also been tachycardic  WBC trending upward from 11 85 to 13 80  Difficult to determine if change in pt status is due to stress of surgical LN bx vs  Pain vs  Stress of MRI (claustrophobia, pain) vs   Infection     Pt with multiple sources of infection: recent surgical bx, ulcer       Plan  · Continue to observe, if clinically worsening, repeat elevated temperatures, continuing increased in WBCs, will start sepsis work up  BCx, Ucx, CXR, antibiotics  · Wound care consulted  · Frequent repositioning     Severe protein-calorie malnutrition ([address] )  Assessment & Plan  Malnutrition Findings:   Adult Malnutrition type: Acute illness  Adult Degree of Malnutrition: Other severe protein calorie malnutrition (related to medical condition <50% energy intake versus needs for > 5 days evidenced by BMI 16 98; lost 9#(7 4%) since 6/10/21; moderate fat loss somewhat hollow orbital, moderate muscle loss visible clavicle  Treat Regular diet &Ensure Enlive TID )    BMI Findings:  Adult BMI Classifications: Underweight < 18 5          Body mass index is 16 6 kg/m²  S/P lymph node biopsy  Assessment & Plan  LN Bx done 7/26/2021; INCISIONAL BIOPSY LEFT NECK MASS,AND EXCISIOINAL BIOPSY RIGHT NECK MASS (Bilateral)    Path/labs of LN  Anaerobic cx and gram stain, fungal cx, culture tissue and gram stain, AFB culture and gram stain,     leukemia/lyphoma flow cytometry: cancelled by pathology as cells were not viable for analysis                Scalp lesion  Assessment & Plan  Punch bx done  Well healing  Plan  F/u path results from punch bx  Determine primary source of met ca  Anemia  Assessment & Plan  Hgb 9 9 and Hct 29 6 at time of admission  Currently Hgb 9 5  Most likely anemia of chronic inflammation with some component of iron deficiency  Fe sat: 14, TIBC 205, Fe 28, Ferritin 1230  Retic: 5 12    Plan:  · Monitor Hgb and Hct  · Transfuse if patient becomes symptomatic or Hgb drops < 7    Cervical stenosis of spinal canal  Assessment & Plan  Pt with cervical canal narrowing likely due to rim-enhancing epidural thickening/collection within posterolateral spinal canal spanning levels C2-C4  Findings could be due to necrotizing infectious process or metastasis related   Multiple Concerns for this level of of cord compression: paralysis, dysfunction of the diaphragm  7/28/2021 MRI CS Signal abnormality and erosive change of C3 laminae and spinous process along with lesser degree signal abnormality in the posterior elements of C1, C2, and C4  Associated epidural enhancement /enhancing soft tissue spanning from inferior C2-C4 resulting in severe canal stenosis and cord compression  Findings most favor metastatic disease  Infection is within differential consideration but less favored given findings elsewhere in the body  Correlate with sampling  Multiple rim-enhancing lesions within paraspinal musculature, superficial and deep soft tissue neck as demonstrated on recent soft tissue neck CT  Differential considerations are similar as above  Heterogeneous marrow signal and enhancement of the cervical and visualized thoracic vertebras concerning for infiltrative metastatic disease  Plan:  · Neurosurgery consulted and appreciate recommendations  · Frequent neurochecks  · Maintain cervical collar  · Consider glucocorticoids     Subclavian vein thromboembolism, acute, right [facility])  Assessment & Plan    CT Soft Tissue Neck with Contrast: Occluded left subclavian and brachiocephalic veins as well as left internal jugular vein to the level of hyoid bone suggesting  Originally concerns for potential infectious cause, pt was started on Vanco and heparin  Due to decreasing procal 0 27 to 0 14, absent fever, or leukocytosis patient was Dced from Vancomycin  CTA PE chest: Possible thrombus right subclavian vein extending into the brachiocephalic vein  7/27/2021 venous duplex US LEFT UPPER LIMB: Evidence of acute and subacute mostly occlusive deep vein thrombosis noted in the internal jugular vein  Evidence of acute non occlusive deep vein thrombosis noted in the innominate, subclavian and axillary with extension into the most proximal brachial vein      Plan:  · Heparin gtt  · Vascular aware    * Metastatic cancer [facility])  Assessment & Plan  49 yo M with recent abnormal findings on CT head, chest, and tissue neck concerning for extensive metastatic cancer:     7/22/21 - US Head Neck Soft Tissue: Several neck masses are identified corresponding to the palpable abnormalities within additional finding of the suspected thrombosed left jugular vein  Further evaluation with contrast-enhanced CT of the neck is recommended  7/22/21 - CT Soft Tissue Neck: Left greater than right numerous superficial and deep neck soft tissue and intramuscular rim-enhancing necrotic lesions/collections as described  Rim-enhancing epidural thickening/collection within posterolateral spinal canal spanning levels C2-C4 resulting in canal narrowing  Finding is concerning for widespread necrotizing infection  Neoplastic process is within differential consideration  Recommend correlation with sampling  Subpleural nodular opacities in the right upper lobe and superior segment of right lower lobe could indicate septic emboli  Suggested left hilar necrotic lymph node  Indeterminate sclerotic foci within T3 and T4 vertebral bodies  7/22/21 CT Head: There is an approximately 2 4 x 1 4 x 2 2 cm masslike lesion involving the scalp overlying the posterior left parietal region  Neoplasm and/or infection should be excluded  7/22/21 CTA PE study: Numerous nodular opacities are present bilaterally within the lungs  The appearances most suggestive of metastatic disease  Extensive hepatic metastatic disease  Large bilateral adrenal masses  Extensive lymphadenopathy  Probable osseous metastatic disease with mild to moderate compression deformity of the superior endplate of V99, suggesting pathologic compression  7/24/2021 CT Abd and pelvis:  There is a 2 8 x 2 2 x 1 8 cm ill-defined, hypodense pancreatic lesion, highly suspicious for pancreatic adenocarcinoma (series 3 images 20-22 and series 601 images 57 - 67 ) There is widespread metastatic disease with numerous liver lesions, bilateral adrenal metastases, multiple lytic bone metastases, with resulting mild superior end plate compression fractures of T11 and L3 as above, and also intramuscular metastases as   above      An alternative diagnosis to metastatic pancreatic adenocarcinoma, given the unusual intramuscular metastasis, would be metastatic melanoma  Please correlate with already performed biopsy results  Plan:  · Pain Regimen:   · Oxycodone 5/10 mg q3 hours PRN mod/severe  · Dilaudid 0 5 mg q3 hours PRN for breakthrough  · Bowel regimen ordered  · Follow up results of CT CAP  · Medical Oncology Consulted and Appreciate Recommendation  · Surgical Oncology Consulted  · S/P Punch Biopsy on 7/23/21; follow up on pathology results  · Plan for Lymph Node Biopsy on 7/26/21  · Continue to monitor kidney fxn and electrolytes; potential for Tumor lysis syndrome  · Uric Acid: 3 1, LDH: 1234  · Labs ordered  · CEA, CA 19-9, leukemia/lymphoma flow cytometry (blood)  Disposition: inpatient, awaiting results of scalp lesion bx as well as LN bx x2; Surg-onc following; Neuro-Surgery following for cord compression at C3-C5 level 2/2 to mets  Pt with large occlusive DVT of left IJV  SUBJECTIVE     Patient seen and examined  Patient had 1x fever of 101 F and tachycardic to 127  Repeat temperatures since then were 99 F, however pt has remained tachycardic  Pt does not have any complaints other than generalized pain  Pt has not had a bowel movement since admission and has had progressive abd pain  Pt was taken for MRI of TS early this AM, but pt is claustrophobic and had pain during imaging leading to motion artifact       OBJECTIVE     Vitals:    07/27/21 1434 07/27/21 2207 07/28/21 0257 07/28/21 0642   BP: 129/80 124/79  124/79   Pulse: (!) 112 (!) 127 (!) 114 (!) 115   Resp: 18 18  20   Temp: 99 7 °F (37 6 °C) (!) 101 °F (38 3 °C) 99 °F (37 2 °C) 99 1 °F (37 3 °C)   TempSrc:       SpO2: 95% 94%  93%   Weight:       Height:          Temperature:   Temp (24hrs), Av 7 °F (37 6 °C), Min:99 °F (37 2 °C), Max:101 °F (38 3 °C)    Temperature: 99 1 °F (37 3 °C)  Intake & Output:  I/O       701 -  07 -  07 -  0700    P  O  200 640 240    I V  (mL/kg) 3071 7 (58 8) 4130 4 (79 1)     Total Intake(mL/kg) 3271 7 (62 7) 4770 4 (91 4) 240 (4 6)    Urine (mL/kg/hr) 200 (0 2) 300 (0 2)     Total Output 200 300     Net +3071 7 +4470 4 +240                 Intake/Output Summary (Last 24 hours) at 2021 1223  Last data filed at 2021 0920  Gross per 24 hour   Intake 2856 67 ml   Output 300 ml   Net 2556 67 ml     I/O this shift:  In: 240 [P O :240]  Out: -   Weights:   IBW (Ideal Body Weight): 70 7 kg    Body mass index is 16 6 kg/m²  Weight (last 2 days)     None        Physical Exam  Constitutional:       General: He is not in acute distress  Appearance: He is not ill-appearing, toxic-appearing or diaphoretic  Comments: Cachectic      HENT:      Head: Normocephalic and atraumatic  Comments: Scalp lesion on left       Right Ear: External ear normal       Left Ear: External ear normal       Nose: Nose normal       Mouth/Throat:      Mouth: Mucous membranes are moist       Pharynx: Oropharynx is clear  No oropharyngeal exudate or posterior oropharyngeal erythema  Comments: Dentures     Eyes:      General: No scleral icterus  Right eye: No discharge  Left eye: No discharge  Conjunctiva/sclera: Conjunctivae normal       Pupils: Pupils are equal, round, and reactive to light  Neck:      Comments: In CS brace    Cardiovascular:      Rate and Rhythm: Regular rhythm  Tachycardia present  Pulses: Normal pulses  Heart sounds: Normal heart sounds  Pulmonary:      Effort: Pulmonary effort is normal  No respiratory distress  Breath sounds: Normal breath sounds  No stridor  No wheezing, rhonchi or rales  Chest:      Chest wall: No tenderness     Abdominal:      General: Abdomen is flat  Bowel sounds are normal  There is distension  Palpations: There is no mass  Tenderness: There is abdominal tenderness (diffuse, however mostly Epigastric, LUQ, bilaterally raditating to Flanks)  There is no right CVA tenderness, left CVA tenderness, guarding or rebound  Hernia: No hernia is present  Comments: Tympanic     Musculoskeletal:         General: Signs of injury (informed by Nurse pt has coccygeal uler) present  Normal range of motion  Right lower leg: No edema  Left lower leg: No edema  Skin:     General: Skin is warm and dry  Capillary Refill: Capillary refill takes less than 2 seconds  Neurological:      General: No focal deficit present  Mental Status: He is alert and oriented to person, place, and time  Psychiatric:         Mood and Affect: Mood normal          Behavior: Behavior normal          Thought Content: Thought content normal          Judgment: Judgment normal         LABORATORY DATA     Labs: I have personally reviewed pertinent reports      Results from last 7 days   Lab Units 07/28/21  0754 07/27/21 0618 07/26/21  0527   WBC Thousand/uL 13 80* 11 85* 10 73*   HEMOGLOBIN g/dL 9 5* 8 9* 9 5*   HEMATOCRIT % 29 2* 27 6* 28 7*   PLATELETS Thousands/uL 218 231 252   NEUTROS PCT % 68 70 69   MONOS PCT % 13* 11 11      Results from last 7 days   Lab Units 07/28/21  0754 07/27/21  0618 07/26/21  0527 07/23/21  0415 07/22/21  2038   POTASSIUM mmol/L 4 3  4 3 4 0 3 9 3 9 4 1   CHLORIDE mmol/L 105  104 103 105 105 102   CO2 mmol/L 29  31 26 29 27 27   BUN mg/dL 8  8 9 7 11 10   CREATININE mg/dL 0 53*  0 52* 0 43* 0 39* 0 50* 0 64   CALCIUM mg/dL 9 5  9 8 8 6 9 3 8 3 9 6   ALK PHOS U/L 140*  --   --  117* 142*   ALT U/L 30  --   --  26 32   AST U/L 39  --   --  32 32     Serum creatinine: 0 52 mg/dL (L) 07/28/21 0754  Estimated creatinine clearance: 122 6 mL/min (A)   Results from last 7 days   Lab Units 07/27/21  0618 07/25/21  0507 07/24/21  0618   MAGNESIUM mg/dL 1 6 1 7 1 7     Results from last 7 days   Lab Units 07/27/21  0618 07/25/21  0507 07/24/21  0618   PHOSPHORUS mg/dL 4 3 3 5 3 6      Results from last 7 days   Lab Units 07/28/21  0754 07/27/21 2026 07/27/21  1228 07/23/21  0415 07/22/21 2038   INR   --   --   --  1 08  1 05 0 96   PTT seconds 54* 60* 63* 28 27     Results from last 7 days   Lab Units 07/22/21 2038   LACTIC ACID mmol/L 1 1           IMAGING & DIAGNOSTIC TESTING     Radiology Results: I have personally reviewed pertinent reports  XR spine cervical 2 or 3 vw injury    Result Date: 7/26/2021  Impression: 1  Destructive lytic lesion within the C3 vertebral body extending into the posterior elements suspicious for metastatic disease  2   Additional ill-defined lytic appearing lesions in the C4-C7 vertebral bodies also suggestive of metastatic disease  Workstation performed: [id number]     XR spine thoracolumbar 2 vw    Result Date: 7/26/2021  Impression: Mild superior endplate compression fracture of T11  Multiple lytic lesions to include T11, L3 and S1 are better visualized on recent CT  Workstation performed: [id number]     CT head without contrast    Result Date: 7/22/2021  Impression: There is an approximately 2 4 x 1 4 x 2 2 cm masslike lesion involving the scalp overlying the posterior left parietal region  Neoplasm and/or infection should be excluded  Sampling is recommended  The study was marked in [facility] for immediate notification  Workstation performed: [id number]     CT soft tissue neck w contrast    Result Date: 7/22/2021  Impression: 1  Left greater than right numerous superficial and deep neck soft tissue and intramuscular rim-enhancing necrotic lesions/collections as described  Rim-enhancing epidural thickening/collection within posterolateral spinal canal spanning levels C2-C4 resulting in canal narrowing  Finding is concerning for widespread necrotizing infection    Neoplastic process is within differential consideration  Recommend correlation with sampling  2   Occluded left subclavian and brachiocephalic veins as well as left internal jugular vein to the level of hyoid bone suggesting  3   Subpleural nodular opacities in the right upper lobe and superior segment of right lower lobe could indicate septic emboli  Suggested left hilar necrotic lymph node  4   Indeterminate sclerotic foci within T3 and T4 vertebral bodies  I personally discussed this study with [facility] on 7/22/2021 at 2:10 PM  Workstation performed: [id number]     MRI cervical spine w wo contrast    Result Date: 7/27/2021  Impression: 1  Signal abnormality and erosive change of C3 laminae and spinous process along with lesser degree signal abnormality in the posterior elements of C1, C2, and C4  Associated epidural enhancement /enhancing soft tissue spanning from inferior C2-C4 resulting in severe canal stenosis and cord compression  Findings most favor metastatic disease  Infection is within differential consideration but less favored given findings elsewhere in the body  Correlate with sampling  2   Multiple rim-enhancing lesions within paraspinal musculature, superficial and deep soft tissue neck as demonstrated on recent soft tissue neck CT  Differential considerations are similar as above  3   Heterogeneous marrow signal and enhancement of the cervical and visualized thoracic vertebras concerning for infiltrative metastatic disease  4   Redemonstrated occluded left internal jugular vein  5   Degenerative change as above  I personally discussed this study with Jacobo Valiente on 7/27/2021 at 9:35 AM  Workstation performed: [id number]     US head neck soft tissue    Result Date: 7/22/2021  Impression: Several neck masses are identified corresponding to the palpable abnormalities within additional finding of the suspected thrombosed left jugular vein    Further evaluation with contrast-enhanced CT of the neck is recommended  The study was marked in [facility] for immediate notification  Workstation performed: [id number]     CTA ED chest PE Study    Result Date: 7/23/2021  Impression: No evidence of pulmonary embolism is seen  Measured RV/LV ratio is within normal limits at less than 0 9  Numerous nodular opacities are present bilaterally within the lungs  The appearances most suggestive of metastatic disease  Extensive hepatic metastatic disease  Large bilateral adrenal masses  Extensive lymphadenopathy  Probable osseous metastatic disease with mild to moderate compression deformity of the superior endplate of O28, suggesting pathologic compression  Possible thrombus right subclavian vein extending into the brachiocephalic vein  Consider venous duplex ultrasound for further evaluation  The study was marked in [facility] for immediate notification  Workstation performed: [id number]     CT abdomen pelvis w contrast    Result Date: 7/26/2021  Impression: There is a 2 8 x 2 2 x 1 8 cm ill-defined, hypodense pancreatic lesion, highly suspicious for pancreatic adenocarcinoma (series 3 images 20-22 and series 601 images 57 - 67 ) There is widespread metastatic disease with numerous liver lesions, bilateral adrenal metastases, multiple lytic bone metastases, with resulting mild superior end plate compression fractures of T11 and L3 as above, and also intramuscular metastases as above  An alternative diagnosis to metastatic pancreatic adenocarcinoma, given the unusual intramuscular metastasis, would be metastatic melanoma  Please correlate with already performed biopsy results  Workstation performed: [id number]     Other Diagnostic Testing: I have personally reviewed pertinent reports        ACTIVE MEDICATIONS     Current Facility-Administered Medications   Medication Dose Route Frequency    acetaminophen (TYLENOL) tablet 650 mg  650 mg Oral Q6H PRN    Gadobutrol injection (SINGLE-DOSE) SOLN 5 mL  5 mL Intravenous Once in imaging    heparin (porcine) 25,000 units in 0 45% NaCl 250 mL infusion (premix)  3-30 Units/kg/hr (Order-Specific) Intravenous Titrated    heparin (porcine) injection 2,000 Units  2,000 Units Intravenous Q1H PRN    heparin (porcine) injection 4,000 Units  4,000 Units Intravenous Q1H PRN    HYDROmorphone (DILAUDID) injection 0 5 mg  0 5 mg Intravenous Q3H PRN    oxyCODONE (ROXICODONE) immediate release tablet 10 mg  10 mg Oral Q6H PRN    Or    oxyCODONE (ROXICODONE) IR tablet 15 mg  15 mg Oral Q6H PRN    polyethylene glycol (MIRALAX) packet 17 g  17 g Oral Daily    senna-docusate sodium (SENOKOT S) 8 6-50 mg per tablet 1 tablet  1 tablet Oral HS    sodium chloride 0 9 % infusion  100 mL/hr Intravenous Continuous     VTE Pharmacologic Prophylaxis: Heparin  VTE Mechanical Prophylaxis: sequential compression device    ==  Emerita Nunes  [facility] Internal Medicine Residency

## 2021-07-28 NOTE — DISCHARGE INSTR - OTHER ORDERS
1  Apply hydraguard to b/l heels and b/l buttocks and sacrum BID and PRN for prevention and protection  2  Apply skin nourishing cream the entire skin daily for moisture  3  Turn and reposition patient every  2 hours   4  Elevate heels off of bed with pillows to offload pressure   5  Apply EHOB waffle cushion to chair when OOB, if able  6  Cleanse sacral wound with NSS, pat dry, and apply Allevyn foam dressing  Feroz dressing with T  Change every other day and PRN for soilage/dislodgement

## 2021-07-28 NOTE — ASSESSMENT & PLAN NOTE
7/28/2021 MRI TS Widely metastatic disease to the thoracic spinal axis with early pathologic fracture superior T11 endplate  No extraosseous tumor within the spinal canal, no cord or root compression  Multiple soft tissue masses in the adjacent soft tissues      Plan  · Neurosurgery consult   · MRI TS and LS w/wo contrast under anesthesia

## 2021-07-28 NOTE — ASSESSMENT & PLAN NOTE
Malnutrition Findings:   Adult Malnutrition type: Acute illness  Adult Degree of Malnutrition: Other severe protein calorie malnutrition (related to medical condition <50% energy intake versus needs for > 5 days evidenced by BMI 16 98; lost 9#(7 4%) since 6/10/21; moderate fat loss somewhat hollow orbital, moderate muscle loss visible clavicle  Treat Regular diet &Ensure Enlive TID )    BMI Findings:  Adult BMI Classifications: Underweight < 18 5     Body mass index is 16 6 kg/m²

## 2021-07-28 NOTE — CASE MANAGEMENT
Tasha informed CM that there is no DC date as of right now  Pt has cancer, no cancer treatment will be done here  MRI pending

## 2021-07-28 NOTE — PROGRESS NOTES
[address] [facility]  Progress Note - Justin Deaner 1970, 48 y o  male MRN: [id number]  Unit/Bed#: [facility] 914-01 Encounter: [id number]  Primary Care Provider: No primary care provider on file  Date and time admitted to hospital: 7/22/2021  7:16 PM    Cervical spinal mass [facility])  Assessment & Plan  Epidural mass at C2-C4  · Presented to ED with neck and back pain and unintentional weight loss  · Noted on imaging with the above as well as metastatic masses to T11, L3 and lymphadenopathy  · Exam non-focal  States ambulatory dysfunction over past 3 weeks secondary to back pain  Imaging:  · CT soft tissue neck 7/22/21: Left greater than right numerous superficial and deep neck soft tissue and intramuscular rim-enhancing necrotic lesions/collections as described  Rim-enhancing epidural thickening/collection within posterolateral spinal canal spanning levels C2-C4 resulting in canal narrowing  Finding is concerning for widespread necrotizing infection  Neoplastic process is within differential consideration  Recommend correlation with sampling  2   Occluded left subclavian and brachiocephalic veins as well as left internal jugular vein to the level of hyoid bone suggesting  3   Subpleural nodular opacities in the right upper lobe and superior segment of right lower lobe could indicate septic emboli  Suggested left hilar necrotic lymph node  4   Indeterminate sclerotic foci within T3 and T4 vertebral bodies  Plan:  · Frequent neuro checks  · Maintain in [facility]IST[facility] cervical brace  Zoë brace for showers  · Upright cervical x-rays for baseline  · Medical and pain management per primary team   · Heme/onc consulted  · Surgical/onc consulted  · Pt had biopsy done  S/p Incisional biopsy left neck mass, excisional biopsy right neck mass  Biopsy results pending  · Palliative consult recommended  · Mobilize with PT/OT  · No neurosurgical intervention anticipated at this time  There is concern that given pt's advanced disease with extensive metatasis and cormobidities, surgery is not an ideal option at this time  Radiation and/or chemotherapy may be recommended/considered based on biopsy results  Recommend ongoing medical management and optimization of pt's nutrition  Neurosurgery will continue to follow  Please call with any questions or concerns  * Metastatic cancer [facility])  Assessment & Plan  Metastatic disease with unknown primary  30 lbs unintentional weight loss  Extensive masses in bilateral lungs, adrenal glands, liver  IR biopsy pending  Compression fracture of T11 vertebra (HCC)  Assessment & Plan  · T11 and L3 pathologic fractures  · Likely secondary to bony metastasis  · Unknown primary  · TLSO brace when upright > 45 degrees and OOB  · Upright x-rays when able  · Mobilize with PT/OT  · Continue medical workup  · Pt to obtain MRI of the thoracic and lumbar spine w wo  Scalp lesion  Assessment & Plan  Noted with palpable mass to left parietal scalp x weeks  CT head completed  Severe protein-calorie malnutrition ([address] )  Assessment & Plan  Malnutrition Findings:   Adult Malnutrition type: Acute illness  Adult Degree of Malnutrition: Other severe protein calorie malnutrition (related to medical condition <50% energy intake versus needs for > 5 days evidenced by BMI 16 98; lost 9#(7 4%) since 6/10/21; moderate fat loss somewhat hollow orbital, moderate muscle loss visible clavicle  Treat Regular diet &Ensure Enlive TID )    BMI Findings:  Adult BMI Classifications: Underweight < 18 5     Body mass index is 16 6 kg/m²  Subclavian vein thromboembolism, acute, right (HCC)  Assessment & Plan  On Heparin gtt  Subjective/Objective     Chief Complaint: "My neck and back hurts"    Subjective:  Patient reports that he has neck and back pain that he rates as 8/10 on the pain scale  Pt reports that his back pain radiates to his abdomen bilaterally  Patient also reports weakness in bilateral upper and lower extremities  Patient also reports pain in his left scalp where he has a lesion  Patient reports some numbness on the left chest   Patient reports that he has had nausea over the last month and has not been eating well, the us, losing weight  Patient reports that since admission with his nausea being treated, he has been eating a little better  Patient denies bowel or bladder incontinence  Patient denies saddle anesthesia  Reports he has been able to mobilize to the bathroom to void  Objective:  Alert and awake, lying in bed, no acute distress  I/O       07/26 0701 - 07/27 0700 07/27 0701 - 07/28 0700 07/28 0701 - 07/29 0700    P  O  200 640 240    I V  (mL/kg) 3071 7 (58 8) 4130 4 (79 1)     Total Intake(mL/kg) 3271 7 (62 7) 4770 4 (91 4) 240 (4 6)    Urine (mL/kg/hr) 200 (0 2) 300 (0 2)     Total Output 200 300     Net +3071 7 +4470 4 +240                 Invasive Devices     Peripheral Intravenous Line            Peripheral IV 07/28/21 Left;Upper;Ventral (anterior) Arm <1 day                Physical Exam:  Vitals: Blood pressure 99/59, pulse (!) 115, temperature 99 4 °F (37 4 °C), resp  rate 18, height 5' 9" (1 753 m), weight 51 kg (112 lb 7 oz), SpO2 93 %  ,Body mass index is 16 6 kg/m²  General appearance:  Appears stated age, pt appears cachectic  Head: atraumatic, left parietal palpable scalp lesion  Eyes: EOMI, PERRL  Neck: supple, symmetrical, trachea midline, cervical brace in place  Lungs: non labored breathing  Heart: regular heart rate  Neurologic:   Mental status: Alert, oriented, thought content appropriate  Cranial nerves: grossly intact (Cranial nerves II-XII)  Sensory: Decreased sensation left upper shoulder/ neck C4-C5 dermatomal distribution otherwise intact in all other dermatomes to LT/PP     Motor: moving all extremities, strength RUE 4+/5, LUE SF/EF 4/5, IO:  with intact fingers 4/5 (pt has some amputated fingers 2/2 to hx of machine injury at work), BLE 4+/5  Reflexes: 2+ and symmetric, no arzate's or clonus  Coordination: finger to nose test normal bilaterally, no drift in bilateral upper extremities      Lab Results:  Results from last 7 days   Lab Units 07/28/21  0754 07/27/21  0618 07/26/21  0527   WBC Thousand/uL 13 80* 11 85* 10 73*   HEMOGLOBIN g/dL 9 5* 8 9* 9 5*   HEMATOCRIT % 29 2* 27 6* 28 7*   PLATELETS Thousands/uL 218 231 252   NEUTROS PCT % 68 70 69   MONOS PCT % 13* 11 11     Results from last 7 days   Lab Units 07/28/21  0754 07/27/21  0618 07/26/21  0527 07/23/21 0415 07/22/21 2038   POTASSIUM mmol/L 4 3  4 3 4 0 3 9 3 9 4 1   CHLORIDE mmol/L 105  104 103 105 105 102   CO2 mmol/L 29  31 26 29 27 27   BUN mg/dL 8  8 9 7 11 10   CREATININE mg/dL 0 53*  0 52* 0 43* 0 39* 0 50* 0 64   CALCIUM mg/dL 9 5  9 8 8 6 9 3 8 3 9 6   ALK PHOS U/L 140*  --   --  117* 142*   ALT U/L 30  --   --  26 32   AST U/L 39  --   --  32 32     Results from last 7 days   Lab Units 07/27/21  0618 07/25/21  0507 07/24/21  0618   MAGNESIUM mg/dL 1 6 1 7 1 7     Results from last 7 days   Lab Units 07/27/21  0618 07/25/21  0507 07/24/21  0618   PHOSPHORUS mg/dL 4 3 3 5 3 6     Results from last 7 days   Lab Units 07/28/21  1525 07/28/21  0754 07/27/21 2026 07/23/21  0415 07/22/21 2038   INR   --   --   --  1 08  1 05 0 96   PTT seconds 43* 54* 60* 28 27       Imaging Studies: I have personally reviewed pertinent reports and I have personally reviewed pertinent films in PACS    XR spine cervical 2 or 3 vw injury    Result Date: 7/26/2021  Impression: 1  Destructive lytic lesion within the C3 vertebral body extending into the posterior elements suspicious for metastatic disease  2   Additional ill-defined lytic appearing lesions in the C4-C7 vertebral bodies also suggestive of metastatic disease   Workstation performed: [id number]     XR spine thoracolumbar 2 vw    Result Date: 7/26/2021  Impression: Mild superior endplate compression fracture of T11  Multiple lytic lesions to include T11, L3 and S1 are better visualized on recent CT  Workstation performed: [id number]     CT head without contrast    Result Date: 7/22/2021  Impression: There is an approximately 2 4 x 1 4 x 2 2 cm masslike lesion involving the scalp overlying the posterior left parietal region  Neoplasm and/or infection should be excluded  Sampling is recommended  The study was marked in [facility] for immediate notification  Workstation performed: [id number]     CT soft tissue neck w contrast    Result Date: 7/22/2021  Impression: 1  Left greater than right numerous superficial and deep neck soft tissue and intramuscular rim-enhancing necrotic lesions/collections as described  Rim-enhancing epidural thickening/collection within posterolateral spinal canal spanning levels C2-C4 resulting in canal narrowing  Finding is concerning for widespread necrotizing infection  Neoplastic process is within differential consideration  Recommend correlation with sampling  2   Occluded left subclavian and brachiocephalic veins as well as left internal jugular vein to the level of hyoid bone suggesting  3   Subpleural nodular opacities in the right upper lobe and superior segment of right lower lobe could indicate septic emboli  Suggested left hilar necrotic lymph node  4   Indeterminate sclerotic foci within T3 and T4 vertebral bodies  I personally discussed this study with Tay Jorgensen on 7/22/2021 at 2:10 PM  Workstation performed: [id number]     MRI cervical spine w wo contrast    Result Date: 7/27/2021  Impression: 1  Signal abnormality and erosive change of C3 laminae and spinous process along with lesser degree signal abnormality in the posterior elements of C1, C2, and C4  Associated epidural enhancement /enhancing soft tissue spanning from inferior C2-C4 resulting in severe canal stenosis and cord compression  Findings most favor metastatic disease  Infection is within differential consideration but less favored given findings elsewhere in the body  Correlate with sampling  2   Multiple rim-enhancing lesions within paraspinal musculature, superficial and deep soft tissue neck as demonstrated on recent soft tissue neck CT  Differential considerations are similar as above  3   Heterogeneous marrow signal and enhancement of the cervical and visualized thoracic vertebras concerning for infiltrative metastatic disease  4   Redemonstrated occluded left internal jugular vein  5   Degenerative change as above  I personally discussed this study with Andrew Paez on 7/27/2021 at 9:35 AM  Workstation performed: [id number]     US head neck soft tissue    Result Date: 7/22/2021  Impression: Several neck masses are identified corresponding to the palpable abnormalities within additional finding of the suspected thrombosed left jugular vein  Further evaluation with contrast-enhanced CT of the neck is recommended  The study was marked in [facility] for immediate notification  Workstation performed: [id number]     CTA ED chest PE Study    Result Date: 7/23/2021  Impression: No evidence of pulmonary embolism is seen  Measured RV/LV ratio is within normal limits at less than 0 9  Numerous nodular opacities are present bilaterally within the lungs  The appearances most suggestive of metastatic disease  Extensive hepatic metastatic disease  Large bilateral adrenal masses  Extensive lymphadenopathy  Probable osseous metastatic disease with mild to moderate compression deformity of the superior endplate of X67, suggesting pathologic compression  Possible thrombus right subclavian vein extending into the brachiocephalic vein  Consider venous duplex ultrasound for further evaluation  The study was marked in [facility] for immediate notification   Workstation performed: [id number]     CT abdomen pelvis w contrast    Result Date: 7/26/2021  Impression: There is a 2 8 x 2 2 x 1 8 cm ill-defined, hypodense pancreatic lesion, highly suspicious for pancreatic adenocarcinoma (series 3 images 20-22 and series 601 images 62 - 67 ) There is widespread metastatic disease with numerous liver lesions, bilateral adrenal metastases, multiple lytic bone metastases, with resulting mild superior end plate compression fractures of T11 and L3 as above, and also intramuscular metastases as above  An alternative diagnosis to metastatic pancreatic adenocarcinoma, given the unusual intramuscular metastasis, would be metastatic melanoma  Please correlate with already performed biopsy results  Workstation performed: [id number]       EKG, Pathology, and Other Studies: I have personally reviewed pertinent reports  VTE Pharmacologic Prophylaxis: Heparin    VTE Mechanical Prophylaxis: sequential compression device    PLEASE NOTE:  This encounter may have been completed utilizing the [employer] Direct Speech Voice Recognition Software  Grammatical errors, random word insertions, pronoun errors and incomplete sentences are occasional consequences of the system due to software limitations, ambient noise and hardware issues  These may be missed by proof reading prior to affixing electronic signature  Any questions or concerns about the content, text or information contained within the body of this dictation should be directly addressed to the advanced practitioner or physician for clarification

## 2021-07-28 NOTE — WOUND OSTOMY CARE
Progress Note - Wound   Adilia Hidden 48 y o  male MRN: [id number]  Unit/Bed#: [facility] 914-01 Encounter: [id number]      Assessment:  Wound care consulted for assessment of sacral wound  Patient admitted with metastatic cancer  History of - cervical stenosis, anemia, subclavian vein thromboembolism  Patient is primarily [address] speaking but is able to follow commands for the assessment  Independent for standing for the assessment  Continent of bowel and bladder  Seen with the primary RN  Nutrition is following along  B/l heels and b/l buttocks are dry and intact, no with no redness or wounds  1  Mid sacrum - evolving DTI - HA  Wound presents as approx: 20% evolving open partial thickness appearing moist yellow tissue, and 80% intact non-evolved non-blanchable purple skin  All aspects measured together  Edges fragile and attached, no maceration  Tatianna-wound is intact with pink blanchable skin  Suspect mixed etiology of pressure and friction given the chaffed/irregular dry appearance    -will recommend foam dressing to protect  No induration, fluctuance, odor, warmth, redness, or purulence noted to the above noted wounds  New dressings applied  Patient tolerated well- no s/s of non-verbal pain or discomfort observed during the encounter  Primary nurse aware of plan of care  See flow sheets for more detailed assessment findings  Will follow along  Manager of the nursing using and attending Dr Gabino Bowie, made aware of assessment findings via tiger text  Plan:   1  Apply hydraguard to b/l heels and b/l buttocks and sacrum BID and PRN for prevention and protection  2  Apply skin nourishing cream the entire skin daily for moisture  3  Turn and reposition patient every  2 hours   4  Elevate heels off of bed with pillows to offload pressure   5  Apply EHOB waffle cushion to chair when OOB, if able  6  Cleanse sacral wound with NSS, pat dry, and apply Allevyn foam dressing   Feroz dressing with T  Change every other day and PRN for soilage/dislodgement  7  Wound care will follow along with patient weekly, please call or tiger text with questions and concerns      Objective:    Vitals: Blood pressure 124/79, pulse (!) 115, temperature 99 1 °F (37 3 °C), resp  rate 20, height 5' 9" (1 753 m), weight 51 kg (112 lb 7 oz), SpO2 93 %  ,Body mass index is 16 6 kg/m²  Wound 07/28/21 Pressure Injury Sacrum Mid (Active)   Wound Image   07/28/21 1025   Wound Description Yellow;Slough;Light purple 07/28/21 1025   Pressure Injury Stage DTPI 07/28/21 1025   Tatianna-wound Assessment Dry; Intact;Fragile;Pink 07/28/21 1025   Wound Length (cm) 1 5 cm 07/28/21 1025   Wound Width (cm) 2 2 cm 07/28/21 1025   Wound Depth (cm) 0 1 cm 07/28/21 1025   Wound Surface Area (cm^2) 3 3 cm^2 07/28/21 1025   Wound Volume (cm^3) 0 33 cm^3 07/28/21 1025   Calculated Wound Volume (cm^3) 0 33 cm^3 07/28/21 1025   Tunneling 0 cm 07/28/21 1025   Tunneling in depth located at 0 07/28/21 1025   Undermining 0 07/28/21 1025   Undermining is depth extending from 0 07/28/21 1025   Drainage Amount None 07/28/21 1025   Non-staged Wound Description Partial thickness 07/28/21 1025   Treatments Cleansed;Irrigation with NSS;Site care;Elevated 07/28/21 1025   Dressing Foam, Silicon (eg  Allevyn, etc) 07/28/21 1025   Wound packed? No 07/28/21 1025   Packing- # removed 0 07/28/21 1025   Packing- # inserted 0 07/28/21 1025   Dressing Changed New 07/28/21 1025   Patient Tolerance Tolerated well 07/28/21 1025   Dressing Status Clean;Dry; Intact 07/28/21 1025       Recommendations written as orders  AVS updated    Jackson Nowak RN BSN CWON

## 2021-07-28 NOTE — ASSESSMENT & PLAN NOTE
Pt last bowel movement prior to admission  Increasing abd pain/tenderness/distension  Tympanic  (-) rebound  Constipation unresponsive to lactulose 10 g, Mag citrate, and Relistor 8 mg  Most likely due to constipation which is opioid induced  7/28/2021 KUB Larger than typical volume of stool seen over the colon consistent with reported history of constipation    No bowel obstruction    Plan:  · Bowel regimen in place

## 2021-07-28 NOTE — NURSING NOTE
Wound noticed on coccyx  Coby SOTO brought into room for second skin check  DTI located on mid coccyx  Wound consulted  Allevyn in place

## 2021-07-28 NOTE — PROGRESS NOTES
MEDICAL ONCOLOGY - PROGRESS NOTE  Luisana Mart  Male, 49 yo  MRN; [id number]        Mr Luisana Mart is a 49 yo M, currently being worked up for possible metastatic cancer of unknown primary  Assessment and Plan  1  Metastatic malignancy of unknown primary:  Results of punch biopsy pending  CT abdomen and pelvis showed a pancreatic mass suspicious for pancreatic adenocarcinoma  Had a left incisional biopsy and right excisional biopsy of neck mass and biopsy currently pending  AFB culture and stain  Anaerobic culture and Gram stain  · Will follow flow cytometry  · Follow up pending studies  · Consider palliative consultation  2  Anemia:  Anemia of chronic disease vs some component of GONZÁLEZ  Hb stable at 9 5    · Transfuse as needed  3  Subclavian vein thrombosis: Ct mgt as per primary team    4  Pancreatic mass: Seen on contrast-enhanced CT abdomen and pelvis- There is a 2 8 x 2 2 x 1 8 cm ill-defined, hypodense pancreatic lesion, highly suspicious for pancreatic adenocarcinoma     5  Multiple osteolytic lesions: CT findings with numerous osteolytic lesions (T11, L3, S1)  Suspicious for metastatic foci  Ct pain mgt as per primary team  Consider palliative radiation    6  Leukocytosis: Could be reactive  Cannot exclude infective source with presence of SIRS - fever of 101, and tachycardia of 115     7  Cord compression: MRI cervical spine - Signal abnormality and erosive change of C3 laminae and spinous process along with lesser degree signal abnormality in the posterior elements of C1, C2, and C4  Associated epidural enhancement /enhancing soft tissue spanning from inferior C2-C4 resulting in severe canal stenosis and cord compression  Findings most favor metastatic disease  Infection is within differential consideration but less favored given findings elsewhere in the body    - neurosurgical consult appreciated  - Iv Decadron 10 mg stat  - p o   Decadron 4 mg q 6      Medical oncology will continue to follow  Please call with any questions or concerns       Subjective  See reports occasional back and neck pain  Denies weakness or numbness in any limb or extremity  Vital signs noted- tachycardia of 127  Review of laboratory and radiology studies  Objective  Physical Exam   Physical Exam   Constitutional: He appears cachectic  He appears chronically ill  Eyes: Pupils are equal, round, and reactive to light  Neck: Neck adenopathy present  Pulmonary/Chest: Breath sounds normal  He has no wheezes  He exhibits no tenderness  Abdominal: Soft  Bowel sounds are normal  He exhibits mass  He exhibits no distension  Musculoskeletal:         General: No tenderness or edema  Neurological: He is alert and oriented to person, place, and time  Skin: Skin is warm and dry       Results from last 7 days   Lab Units 07/28/21  0754 07/27/21  0618 07/26/21  0527 07/23/21  0415 07/22/21  2038   SODIUM mmol/L 138  138 138 136 138 137   POTASSIUM mmol/L 4 3  4 3 4 0 3 9 3 9 4 1   CHLORIDE mmol/L 105  104 103 105 105 102   CO2 mmol/L 29  31 26 29 27 27   ANION GAP mmol/L 4  3* 9 2* 6 8   BUN mg/dL 8  8 9 7 11 10   CREATININE mg/dL 0 53*  0 52* 0 43* 0 39* 0 50* 0 64   CALCIUM mg/dL 9 5  9 8 8 6 9 3 8 3 9 6   ALK PHOS U/L 140*  --   --  117* 142*   ALT U/L 30  --   --  26 32   AST U/L 39  --   --  32 32       Recent Labs     07/26/21  0527 07/27/21  0618 07/28/21  0754   WBC 10 73* 11 85* 13 80*

## 2021-07-29 NOTE — CASE MANAGEMENT
LOS 7  Not a bundle  Not a readmission  Unplanned Readmission Risk Score: 15    CM s/w pt at bedside to introduce role of CM and discuss pt's needs upon discharge  No  utilized as pt is fluent in [place] despite preferring the [place] language  Pt resides with girlfrienmily Rodríguez in an apartment with 3 flights of stairs to enter  Pt reports being IPTA, declines use of DME/HHC/rehab  Pt reports shortness of breath upon ambulation  Pt does not drive as he recently sold his car  Pt denies hx or tx of D&SA/MI, but admits to smoking marijuana for pain management  Pt is unemployed  Pt does not have a POA, states [facility] is alterate decision maker  Pt does not have a PCP -- Agreeable to [facility] upon d/c  Pt utilizes [facility] in [facility]  CM reviewed therapy recommendation with pt; Pt is agreeable to STR  Freedom of choice was discussed; Pt agreeable to broad referral as CM discussed facility's hesitancy to accept MA Pending patients  List left at bedside with approximately 30 facilities listed for review  CM confirmed CM's contact information is present in the packet  CM reviewed d/c planning process including the following: identifying help at home, patient preference for d/c planning needs, Discharge Lounge, [facility] Meds to Bed program, availability of treatment team to discuss questions or concerns patient and/or family may have regarding understanding medications and recognizing signs and symptoms once discharged  CM also encouraged patient to follow up with all recommended appointments after discharge  Patient advised of importance for patient and family to participate in managing patients medical well being  Patient/caregiver received discharge checklist   Content reviewed  Patient/caregiver encouraged to participate in discharge plan of care prior to discharge home

## 2021-07-29 NOTE — PROGRESS NOTES
Consult Service: Gastroenterology      PATIENT INFORMATION      Peggy Rush 48 y o  male MRN: [id number]  Unit/Bed#: [facility] 914-01 Encounter: [id number]  PCP: No primary care provider on file  Date of Admission:  7/22/2021  Date of Consultation: 07/29/21  Requesting Physician: Brissa King MD       REASON FOR CONSULTATION      Pancreatic mass suspicious for pancreatic adenocarcinoma    ASSESSMENTS & PLAN   Peggy Rush is a 48 y o  old male with no significant PMHX who presented with unintentional weight loss of 35 lbs for 4 weeks  Patient was admitted for metastatic disease of unknown origin  Incisional bx of left neck mass and excisional bx of right neck pass preliminary resulted as malignant epithelioid neoplasm, possibly melanoma  Awaiting final path results  1  Pancreatic Mass -  significantly elevated 5,418  CEA 63 8  CT abdomen pelvis with IV contrast (7/24) remarkable for 2 8 x 2 2 x 1 8 cm ill-defined hypodense pancreatic lesions  Highly suspicious for pancreatic adenocarcinoma  Alk phos elevated 134  This can be due to CBD obstruction by pancreatic mass or due to metastatic osteolytic bone lesions  As per CT scan, liver metastatic lesions seem accessible to biopsy via IR  It is appropriate to consult IR at this time prior to attempting E US  Primary metastatic pancreatic adenocarcinoma vs Primary metastatic melanoma vs synchronous malignancies vs benign pancreatic mass  · Consult IR for biopsy of liver mets  · If biopsy is positive for pancreatic adenocarcinoma mets, suggestive of a primary pancreatic adenocarcinoma  · If biopsy is negative for pancreatic mets, additional Endoscopic US with pancreas biopsy to confirm    HISTORY OF PRESENT ILLNESS      Peggy Rush is a 48 y o  male who is originally admitted for metastatic disease of unknown origin and is being consulted for suspicious of primary pancreatic adenocarcinoma    47 y/o male admitted following recent CT chest, head and soft tissue neck showing numerous nodular opacities of both lungs bilaterally, large bilateral adrenal masses, effusions with hepatic with at metastatic disease, numerous deep neck soft tissue enhancing lesions concerning for possible metastatic disease  Multiple osteolytic lesions also present   MRI of thoracic spine significant for metastasis  Patient reports over the past few weeks he has had unintentional weight loss of 35 lb accompanied by loss of appetite he also reports shortness of breath and dizziness with exertion patient has had hot flashes multiple times a day accompanied by diaphoresis  Patient has no family history of cancer  Patient has no medical history except for tobacco use  Patient denies alcohol use  Patient is cachectic on exam  Multiple palpable lymph nodes are present on the abdomen  REVIEW OF SYSTEMS     A thorough 12-point review of systems has been conducted  Pertinent positives and negatives are mentioned in the history of present illness  PAST MEDICAL & SURGICAL HISTORY      History reviewed  No pertinent past medical history  Past Surgical History:   Procedure Laterality Date    FACIAL/NECK BIOPSY Bilateral 7/26/2021    Procedure: INCISIONAL BIOPSY LEFT NECK MASS,AND EXCISIOINAL BIOPSY RIGHT NECK MASS;  Surgeon: Johan Ramos MD;  Location: [facility];  Service: Surgical Oncology    HAND SURGERY      left hand         MEDICATIONS & ALLERGIES       Medications:   Prior to Admission medications    Medication Sig Start Date End Date Taking?  Authorizing Provider   clindamycin (CLINDAGEL) 1 % gel Apply topically 2 (two) times a day  Patient not taking: Reported on 7/23/2021 7/8/21   Sriram Irvin PA-C   cyclobenzaprine (FLEXERIL) 10 mg tablet Take 1 tablet (10 mg total) by mouth 2 (two) times a day as needed for muscle spasms for up to 10 days 6/10/21 6/20/21  Sharri Keyes MD       Allergies: No Known Allergies      SOCIAL HISTORY      Marital Status: Single    Substance Use History:   Social History     Substance and Sexual Activity   Alcohol Use Not Currently     Social History     Tobacco Use   Smoking Status Current Every Day Smoker    Packs/day: 0 25   Smokeless Tobacco Never Used     Social History     Substance and Sexual Activity   Drug Use Yes    Types: Marijuana         FAMILY HISTORY      Non-Contributory      PHYSICAL EXAM     Vitals:   Blood Pressure: 123/75 (07/29/21 0759)  Pulse: 101 (07/29/21 0759)  Temperature: 97 5 °F (36 4 °C) (07/29/21 0759)  Temp Source: Oral (07/26/21 2156)  Respirations: 18 (07/29/21 0759)  Height: 5' 9" (175 3 cm) (07/23/21 1033)  Weight - Scale: 51 kg (112 lb 7 oz) (07/23/21 1033)  SpO2: 94 % (07/29/21 0759)    Physical Exam:   GENERAL: NAD, cachetic   HEENT:  NC/AT, no scleral icterus  CARDIAC:  RRR, +S1/S2  PULMONARY:  CTA B/L, no wheezing/rales/rhonci, non-labored breathing  ABDOMEN: palpable lymph nodes in epigastric region and right flank   Soft, NT/ND, +BS, no rebound/guarding/rigidity  Extremities:  No edema, cyanosis, or clubbing  NEUROLOGIC:  Alert, oriented x3  SKIN:  No rashes or erythema   No Jaundice        ADDITIONAL DATA     Lab Results:     Results from last 7 days   Lab Units 07/29/21  0458   WBC Thousand/uL 10 08   HEMOGLOBIN g/dL 8 4*   HEMATOCRIT % 25 4*   PLATELETS Thousands/uL 239   NEUTROS PCT % 89*   LYMPHS PCT % 8*   MONOS PCT % 2*   EOS PCT % 0     Results from last 7 days   Lab Units 07/29/21  0458   POTASSIUM mmol/L 4 0   CHLORIDE mmol/L 110*   CO2 mmol/L 28   BUN mg/dL 10   CREATININE mg/dL 0 41*   CALCIUM mg/dL 9 2   ALK PHOS U/L 134*   ALT U/L 32   AST U/L 29     Results from last 7 days   Lab Units 07/23/21  0415   INR  1 08  1 05       Imaging:    Echo complete with contrast if indicated    Result Date: 7/27/2021  Narrative: [address] [facility], [address] [place] [address] [phone] Transthoracic Echocardiogram 2D, M-mode, Doppler, and Color Doppler Study date:  2021 Patient: Shantell Garcia MR number: [id number] Account number: [de-identified] : 1970 Age: 48 years Gender: Male Status: Inpatient Location: Bedside Height: 69 in Weight: 115 lb BP: 122/ 83 mmHg Indications: Abnormal Heart Sound Diagnoses: R01 1 - Cardiac murmur, unspecified Sonographer:  NAFISA Lopez Interpreting Physician:  Jina Noguera MD Primary Physician:  Mary Archer MD Referring Physician:  Don Schwarz DO Group:  [id number] Cardiology Fellow: Demetri Hernandez MD SUMMARY LEFT VENTRICLE: Systolic function was normal  Ejection fraction was estimated to be 60 %  There were no regional wall motion abnormalities  RIGHT VENTRICLE: The size was normal  Systolic function was normal  HISTORY: PRIOR HISTORY: Metastatic Cancer,Anemia PROCEDURE: The procedure was performed at the bedside  This was a routine study  The transthoracic approach was used  The study included complete 2D imaging, M-mode, complete spectral Doppler, and color Doppler  The heart rate was 103 bpm, at the start of the study  Images were obtained from the parasternal, apical, subcostal, and suprasternal notch acoustic windows  Echocardiographic views were limited due to decreased penetration and lung interference  Image quality was adequate  LEFT VENTRICLE: Size was normal  Systolic function was normal  Ejection fraction was estimated to be 60 %  There were no regional wall motion abnormalities  Wall thickness was normal  DOPPLER: Left ventricular diastolic function parameters were normal  RIGHT VENTRICLE: The size was normal  Systolic function was normal  LEFT ATRIUM: Size was normal  RIGHT ATRIUM: Size was normal  MITRAL VALVE: Valve structure was normal  There was normal leaflet separation  DOPPLER: The transmitral velocity was within the normal range  There was no evidence for stenosis  There was trace regurgitation  AORTIC VALVE: The valve was trileaflet   Leaflets exhibited normal thickness and normal cuspal separation  DOPPLER: Transaortic velocity was within the normal range  There was no evidence for stenosis  There was no significant regurgitation  TRICUSPID VALVE: The valve structure was normal  There was normal leaflet separation  DOPPLER: The transtricuspid velocity was within the normal range  There was no evidence for stenosis  There was trace regurgitation  PULMONIC VALVE: Leaflets exhibited normal thickness, no calcification, and normal cuspal separation  DOPPLER: The transpulmonic velocity was within the normal range  There was no significant regurgitation  PERICARDIUM: There was no pericardial effusion  AORTA: The root exhibited normal size  SYSTEMIC VEINS: IVC: The inferior vena cava was normal in size  Respirophasic changes were normal  SYSTEM MEASUREMENT TABLES 2D %FS: 27 35 % Ao Diam: 2 51 cm EDV(Teich): 102 69 ml EF(Teich): 53 18 % ESV(Teich): 48 08 ml IVSd: 0 54 cm LA Area: 14 26 cm2 LA Diam: 2 44 cm LVEDV MOD A4C: 65 75 ml LVEF MOD A4C: 57 11 % LVESV MOD A4C: 28 2 ml LVIDd: 4 71 cm LVIDs: 3 42 cm LVLd A4C: 6 56 cm LVLs A4C: 5 63 cm LVPWd: 0 6 cm RA Area: 10 34 cm2 RVIDd: 2 75 cm SV MOD A4C: 37 55 ml SV(Teich): 54 61 ml MM TAPSE: 2 22 cm PW E' Sept: 0 13 m/s E/E' Sept: 4 26 MV A Waldemar: 0 52 m/s MV Dec [place]: 2 78 m/s2 MV DecT: 200 68 ms MV E Waldemar: 0 56 m/s MV E/A Ratio: 1 07 MV PHT: 58 2 ms MVA By PHT: 3 78 cm2 [employer] Accredited Echocardiography Laboratory Prepared and electronically signed by Romayne Holter, MD Signed 27-Jul-2021 11:36:10     XR spine cervical 2 or 3 vw injury    Result Date: 7/26/2021  Narrative: CERVICAL SPINE INDICATION:   C2-C4 lesion  COMPARISON:  CT soft tissue neck 7/22/2021 VIEWS:  XR SPINE CERVICAL 2 OR 3 VW INJURY FINDINGS: No fracture or subluxation  Lytic lesion in the CT 3 vertebral body and posterior elements with destructive appearance    Questionable additional lytic lesions within the C4-C7 vertebral bodies  Alignment is anatomic The intervertebral disc spaces are preserved  The prevertebral soft tissues are within normal limits  The lung apices are clear  Impression: 1  Destructive lytic lesion within the C3 vertebral body extending into the posterior elements suspicious for metastatic disease  2   Additional ill-defined lytic appearing lesions in the C4-C7 vertebral bodies also suggestive of metastatic disease  Workstation performed: [id number]     XR spine thoracolumbar 2 vw    Result Date: 7/26/2021  Narrative: THORACOLUMBAR SPINE INDICATION:   T12 fracture  COMPARISON: CT abdomen pelvis 7/24/2021 VIEWS:  XR SPINE THORACOLUMBAR 2 VW FINDINGS: T11 vertebral body superior endplate compression fracture is redemonstrated  Lytic lesions in the T11, L3 vertebral body and posterior elements and 1st sacral segment better visualized on recent CT  Thoracic vertebral alignment is within normal limits  No significant degenerative changes  There is no displacement of the paraspinal line  The pedicles appear intact  Impression: Mild superior endplate compression fracture of T11  Multiple lytic lesions to include T11, L3 and S1 are better visualized on recent CT  Workstation performed: [id number]     XR abdomen 1 view kub    Result Date: 7/29/2021  Narrative: ABDOMEN INDICATION:   Constipation, increased abdominal pain  COMPARISON:  CT performed July 24, 2021 VIEWS:  AP supine FINDINGS: Larger than typical volume of stool seen over the colon consistent with reported history of constipation  No bowel obstruction  No discernible free air on this supine study  Upright or left lateral decubitus imaging is more sensitive to detect subtle free air in the appropriate setting  No pathologic calcifications or soft tissue masses  Visualized lung bases are clear  Visualized osseous structures are unremarkable for the patient's age       Impression: Larger than typical volume of stool seen over the colon consistent with reported history of constipation  No bowel obstruction  Workstation performed: [id number]     CT head without contrast    Result Date: 7/22/2021  Narrative: CT BRAIN - WITHOUT CONTRAST INDICATION:   Concern for septic emboli  Recent CT of the neck revealed rim enhancing necrotic lesions/collections  COMPARISON:  CT of the neck dated July 22, 2021  There are no prior imaging studies of the brain available for direct comparison at this time  TECHNIQUE:  CT examination of the brain was performed  In addition to axial images, sagittal and coronal 2D reformatted images were created and submitted for interpretation  Radiation dose length product (DLP) for this visit:  506 mGy-cm   This examination, like all CT scans performed in the [facility], was performed utilizing techniques to minimize radiation dose exposure, including the use of iterative reconstruction and automated exposure control  IMAGE QUALITY:  Diagnostic  FINDINGS: PARENCHYMA:  No intracranial mass, mass effect or midline shift  No CT signs of acute infarction  No acute parenchymal hemorrhage  VENTRICLES AND EXTRA-AXIAL SPACES:  Normal for the patient's age  VISUALIZED ORBITS AND PARANASAL SINUSES:  Unremarkable  CALVARIUM AND EXTRACRANIAL SOFT TISSUES:  There is a soft tissue lesion involving the scalp overlying the posterior left parietal region which measures approximately 2 4 x 1 4 x 2 2 cm  Impression: There is an approximately 2 4 x 1 4 x 2 2 cm masslike lesion involving the scalp overlying the posterior left parietal region  Neoplasm and/or infection should be excluded  Sampling is recommended  The study was marked in [facility] for immediate notification  Workstation performed: [id number]     CT soft tissue neck w contrast    Result Date: 7/22/2021  Narrative: CT NECK WITH CONTRAST INDICATION:   R22 1: Localized swelling, mass and lump, neck  COMPARISON:  None   TECHNIQUE:  Axial, sagittal, and coronal 2D reformatted images were created from the axial source data and submitted for interpretation  Radiation dose length product (DLP) for this visit:  275 mGy-cm   This examination, like all CT scans performed in the [facility], was performed utilizing techniques to minimize radiation dose exposure, including the use of iterative reconstruction and automated exposure control  IV Contrast:  100 mL of iohexol (OMNIPAQUE) IMAGE QUALITY:  Diagnostic  FINDINGS: VISUALIZED BRAIN PARENCHYMA:  No acute intracranial pathology of the visualized brain parenchyma  VISUALIZED ORBITS AND PARANASAL SINUSES:  Normal  NASAL CAVITY AND NASOPHARYNX:  Normal  SOFT TISSUE NECK: There are numerous superficial and deep neck soft tissue and intramuscular rim-enhancing lesions/collections  The soft tissue collections are involving multiple cervical ayden stations, left greater than right  The largest in the left neck subjacent to  the left SCM measures 2 3 x 1 4 x 4 6 cm (AP by transverse by craniocaudal)  Intramuscular collections predominantly involve paraspinal musculature and left levator scapula muscle  Rim-enhancing epidural thickening/collection involving posterior and lateral aspect of cervical spinal canal at the levels C2-C4 (series 3 images 26-40)  Oral cavity, tongue base, and palatine tonsils appear unremarkable  Epiglottis, aryepiglottic folds are within normal limits  Glottis and subglottic larynx are unremarkable  THYROID GLAND:  Unremarkable  PAROTID AND SUBMANDIBULAR GLANDS:  Normal  LYMPH NODES:  Findings described above  VASCULAR STRUCTURES:  Occluded left subclavian and brachiocephalic Left internal jugular vein is occluded from level of hyoid THORACIC INLET:  There are subpleural nodular opacities in the right upper lobe and superior segment of right lower lobe measuring up to 8 mm    There is suggested left hilar necrotic lymph node (series 3 image 87) BONY STRUCTURES: Indeterminate sclerotic foci within T3 and T4 vertebral bodies  Impression: 1  Left greater than right numerous superficial and deep neck soft tissue and intramuscular rim-enhancing necrotic lesions/collections as described  Rim-enhancing epidural thickening/collection within posterolateral spinal canal spanning levels C2-C4 resulting in canal narrowing  Finding is concerning for widespread necrotizing infection  Neoplastic process is within differential consideration  Recommend correlation with sampling  2   Occluded left subclavian and brachiocephalic veins as well as left internal jugular vein to the level of hyoid bone suggesting  3   Subpleural nodular opacities in the right upper lobe and superior segment of right lower lobe could indicate septic emboli  Suggested left hilar necrotic lymph node  4   Indeterminate sclerotic foci within T3 and T4 vertebral bodies  I personally discussed this study with Yehuda Garcia on 7/22/2021 at 2:10 PM  Workstation performed: [id number]     MRI thoracic spine wo contrast    Result Date: 7/28/2021  Narrative: MRI THORACIC SPINE WITHOUT CONTRAST INDICATION: Metastatic cancer  COMPARISON:  MR C-spine 7/27/2021, CT abdomen 7/24/2021, CT of the soft tissue neck 7/22/2021 TECHNIQUE:  Sagittal T1, sagittal T2, sagittal inversion recovery, axial T2,  axial 2D MERGE  IMAGE QUALITY: Study severely degraded by motion artifact  FINDINGS: ALIGNMENT: Overtly normal alignment of the thoracic spine  There is a superior plate depression at the T11 level on  This likely represents pathologic fracture given findings elsewhere  MARROW SIGNAL:  Diffusely abnormal marrow signal   Normal fatty marrow has been placed, heterogeneity on all sequences suggesting metastatic disease  No overt extraosseous tumor, no resultant cord or root compression  THORACIC CORD: Normal signal within the thoracic cord  Conus terminates at the L1 level   PARAVERTEBRAL SOFT TISSUES:  Scattered soft tissue masses are identified in the soft tissues of the left neck base, contiguous with the scalene muscles  There is also a soft tissue mass in the 1 4 cm soft tissue mass in the left posterior paraspinal region at the T2 level  Adjacent tumor along the posterior left chest wall  Incompletely evaluated left adrenal mass and numerous liver metastases  Several stable nodular posterior right hemithorax  Left IJ is occluded  THORACIC DEGENERATIVE CHANGE: No compressive disc herniation, canal stenosis or foraminal narrowing  No compressive degenerative changes  Small right paramedian T5-6 protrusion, right paramedian T8-9 protrusion, multilevel facet arthrosis  Impression: Widely metastatic disease to the thoracic spinal axis with early pathologic fracture superior T11 endplate  No extraosseous tumor within the spinal canal, no cord or root compression  Multiple soft tissue masses in the adjacent soft tissues  Workstation performed: [id number]     MRI cervical spine w wo contrast    Result Date: 7/27/2021  Narrative: MRI CERVICAL SPINE WITH AND WITHOUT CONTRAST INDICATION: MRI Cervical and Thoracic Spine w/ and w/out contrast  COMPARISON:  CT soft tissue neck 7/22/2021 TECHNIQUE:  Sagittal T1, sagittal T2, sagittal inversion recovery, axial 2D merge and axial T2  Sagittal T1 and axial T1 postcontrast   IV Contrast:  5 mL of Gadobutrol injection (SINGLE-DOSE) IMAGE QUALITY:  Diagnostic  FINDINGS: ALIGNMENT:  There is reversal of normal cervical lordosis with apex at C4-5  MARROW SIGNAL: There is infiltrative marrow signal abnormality and erosive change of C3 bilateral lamina and spinous process  There is lesser degree involvement of C4 spinous process, right lamina and spinous process of C2 as well as the right posterior  arch of C1  Also noted is heterogeneous marrow signal and enhancement of the cervical and visualized thoracic vertebras   CERVICAL AND VISUALIZED UPPER THORACIC CORD:  There is a significant canal narrowing and cord compression from inferior C2 through level C3-4 due to epidural enhancing soft tissue, more pronounced in the posterolateral aspect of the canal   No definite cord signal abnormality  PREVERTEBRAL AND PARASPINAL SOFT TISSUES:  There is abnormal enhancement involving posterior and lateral paravertebral soft tissue spanning from C2 through C4  Also multiple rim-enhancing lesions/collections are again noted within the paraspinal musculature and superficial and deep neck soft tissue  Small prevertebral/retropharyngeal effusion  Redemonstrated occluded left internal jugular vein  VISUALIZED POSTERIOR FOSSA:  The visualized posterior fossa demonstrates no abnormal signal  CERVICAL DISC SPACES: C2-C3:  Disc osteophyte complex and superimposed abnormal epidural enhancement resulting in severe canal stenosis  Left greater than right marginal spurring and facet arthropathy  There is mild left foraminal narrowing  C3-C4:  Disc osteophyte complex and superimposed abnormal epidural enhancement result in severe canal stenosis  Facet arthropathy and left greater than right uncovertebral spurring resulting in moderate to severe left and mild right foraminal stenosis  C4-C5:  Disc bulge  Right ureteral left facet arthropathy and uncovertebral spurring  There is mild canal narrowing  Mild right foraminal stenosis  C5-C6:  Small disc osteophyte complex  Right greater than left facet arthropathy  Mild uncovertebral spurring  Mild canal stenosis  No significant foraminal narrowing  C6-C7:  Mild disc bulge without significant canal or foraminal stenosis  C7-T1:  No significant disc bulge  No canal or significant foraminal narrowing  UPPER THORACIC DISC SPACES:  Normal      Impression: 1  Signal abnormality and erosive change of C3 laminae and spinous process along with lesser degree signal abnormality in the posterior elements of C1, C2, and C4    Associated epidural enhancement /enhancing soft tissue spanning from inferior C2-C4 resulting in severe canal stenosis and cord compression  Findings most favor metastatic disease  Infection is within differential consideration but less favored given findings elsewhere in the body  Correlate with sampling  2   Multiple rim-enhancing lesions within paraspinal musculature, superficial and deep soft tissue neck as demonstrated on recent soft tissue neck CT  Differential considerations are similar as above  3   Heterogeneous marrow signal and enhancement of the cervical and visualized thoracic vertebras concerning for infiltrative metastatic disease  4   Redemonstrated occluded left internal jugular vein  5   Degenerative change as above  I personally discussed this study with Temo Wilkes on 7/27/2021 at 9:35 AM  Workstation performed: [id number]     US head neck soft tissue    Result Date: 7/22/2021  Narrative: NECK SOFT TISSUE ULTRASOUND INDICATION:   R22 1: Localized swelling, mass and lump, neck  COMPARISON:  None TECHNIQUE:   Real-time ultrasound of the neck was performed with a linear transducer with both volumetric sweeps and still imaging techniques  FINDINGS:  There are bilateral masses identified in the neck on the right measuring 1 5 x 1 6 x 0 9 cm inferior to the right ear, with a 2nd mass identified on the left posterior neck along or within the sternocleidomastoid muscle measuring 3 5 x 1 0 x 2 1 cm  Both masses demonstrate solid echotexture  In addition, the left jugular vein appears thrombosed  Additional masses are also suspected  Further evaluation with contrast-enhanced CT of the neck is recommended  Impression: Several neck masses are identified corresponding to the palpable abnormalities within additional finding of the suspected thrombosed left jugular vein  Further evaluation with contrast-enhanced CT of the neck is recommended  The study was marked in [facility] for immediate notification   Workstation performed: [id number]     VAS upper limb venous duplex scan, complete, bilateral    Result Date: 7/26/2021  Narrative:  [facility] REPORT CLINICAL: Indications:  Patient presents to evaluate the subclavian, innominate and internal jugular veins s/p abnormal findings on CT  Patient is asymptomatic at this time  Operative History: No prior cardiovascular surgeries Risk Factors The patient has history of Metastatic cancer and smoking (current) 0 25 ppd  FINDINGS:  Segment          Right            Left                              Impression       Thrombus           Int  Jugular     Normal (Patent)  Acute - Occlusive  Innominate Vein  Normal (Patent)  Acute              Subclavian       Normal (Patent)  Acute              Axillary                          Acute              Brachial                          Acute                 CONCLUSION:  Impression  RIGHT UPPER LIMB: No evidence of acute or chronic deep vein thrombosis  No evidence of superficial thrombophlebitis noted  Doppler evaluation shows a normal response to augmentation maneuvers  LEFT UPPER LIMB: Evidence of acute and subacute mostly occlusive deep vein thrombosis noted in the internal jugular vein  Evidence of acute non occlusive deep vein thrombosis noted in the innominate, subclavian and axillary with extension into the most proximal brachial vein  No evidence of superficial thrombophlebitis noted  Doppler evaluation shows a normal response to respiration  SIGNATURE: Electronically Signed Khushi Naranjo on 2021-07-26 09:55:29 PM    CTA ED chest PE Study    Result Date: 7/23/2021  Narrative: CTA - CHEST WITH IV CONTRAST - PULMONARY ANGIOGRAM INDICATION:   Evaluate for septic emboli  Recent CT of the neck demonstrated multiple rim-enhancing necrotic lesions/collections  COMPARISON: CT of the neck dated July 22, 2021  TECHNIQUE: CTA examination of the chest was performed using angiographic technique according to a protocol specifically tailored to evaluate for pulmonary embolism    Axial, sagittal, and coronal 2D reformatted images were created from the source data and  submitted for interpretation  In addition, coronal 3D MIP postprocessing was performed on the acquisition scanner  Radiation dose length product (DLP) for this visit:  277 16 mGy-cm   This examination, like all CT scans performed in the [facility], was performed utilizing techniques to minimize radiation dose exposure, including the use of iterative  reconstruction and automated exposure control  IV Contrast:  100 mL of iohexol (OMNIPAQUE)  FINDINGS: PULMONARY ARTERIAL TREE:  No pulmonary embolus is seen  LUNGS:  Numerous noncavitary nodular opacities are present bilaterally within the lungs, the majority of which are peripherally oriented  For example, within the posterior aspect of the right upper lobe of the lung there are nodules measuring 9 mm and 7  mm (series 2 image 26-27)  There is a 8 x 5 mm nodule laterally in the right upper lobe (series 3 image 20)  There is a 10 x 7 mm nodule anteromedially in the left lingula (series 3 image 82)  There are several nodules in the left lower lobe, measuring up to 8 mm (series 3 image 86)  Several smaller nodules are also present bilaterally  Mild emphysematous changes are present  PLEURA:  No pleural effusions  HEART/GREAT VESSELS:  Possible thrombus right subclavian vein extending into brachiocephalic vein  MEDIASTINUM AND JOYA:  There is mediastinal and bilateral hilar lymphadenopathy, most pronounced in the anterior superior mediastinum, where nodes appear confluent and matted, measuring approximately 3 2 cm  CHEST WALL AND LOWER NECK:   Left axillary lymphadenopathy  VISUALIZED STRUCTURES IN THE UPPER ABDOMEN:  There are numerous somewhat ill-defined lesions throughout the visualized liver, the appearance of which suggests extensive hepatic metastatic disease  Large bilateral adrenal masses, measuring approximately 4 4 x 2 4 cm on the right and 4 x 2 8 cm on the left  Lymphadenopathy  OSSEOUS STRUCTURES:  Many of the osseous structures demonstrate a permeative appearance; this is most pronounced involving T11, where there is mild to moderate compression deformity of the superior endplate  Impression: No evidence of pulmonary embolism is seen  Measured RV/LV ratio is within normal limits at less than 0 9  Numerous nodular opacities are present bilaterally within the lungs  The appearances most suggestive of metastatic disease  Extensive hepatic metastatic disease  Large bilateral adrenal masses  Extensive lymphadenopathy  Probable osseous metastatic disease with mild to moderate compression deformity of the superior endplate of W13, suggesting pathologic compression  Possible thrombus right subclavian vein extending into the brachiocephalic vein  Consider venous duplex ultrasound for further evaluation  The study was marked in [facility] for immediate notification  Workstation performed: [id number]     CT abdomen pelvis w contrast    Result Date: 7/26/2021  Narrative: CT ABDOMEN AND PELVIS WITH IV CONTRAST INDICATION:   Unknown primary, adenocarcinoma or carcinoma NOS, inguinal nodes eval Malignancy, suspected, unclear primary  Dr Moraima Richey note from 7/26/2021 was reviewed  Patient has presumed metastatic carcinoma with unclear primary  COMPARISON:  No prior abdomen and pelvic imaging  Comparison is made with relevant images of the chest CT from 7/22/2021  TECHNIQUE:  CT examination of the abdomen and pelvis was performed  Axial, sagittal, and coronal 2D reformatted images were created from the source data and submitted for interpretation  Radiation dose length product (DLP) for this visit:  421 37 mGy-cm   This examination, like all CT scans performed in the [facility], was performed utilizing techniques to minimize radiation dose exposure, including the use of iterative  reconstruction and automated exposure control   IV Contrast:  70 mL of iohexol (OMNIPAQUE) Enteric Contrast:  Enteric contrast was not administered  FINDINGS: ABDOMEN LOWER CHEST:  There is a small left pleural effusion  LIVER/BILIARY TREE:  Numerous hypodense liver lesions with ill-defined borders consistent with metastatic disease, largest lesions are about 1 5 cm in diameter  There is no biliary ductal dilatation  GALLBLADDER:  No calcified gallstones  No pericholecystic inflammatory change  SPLEEN:  Unremarkable  PANCREAS:  There is a 2 8 x 2 2 x 1 8 cm ill-defined, hypodense pancreatic lesion, highly suspicious for pancreatic adenocarcinoma (series 3 images 20-22 and series 601 images 57 - 79 ) ADRENAL GLANDS:  Bilateral markedly enlarged adrenal glands consistent with metastases  KIDNEYS/URETERS:  Unremarkable  No hydronephrosis  STOMACH AND BOWEL:  Unremarkable  APPENDIX:  No findings to suggest appendicitis  ABDOMINOPELVIC CAVITY:  Small amount of ascites in the pelvis  No pneumoperitoneum  No lymphadenopathy  VESSELS:  Unremarkable for patient's age  PELVIS REPRODUCTIVE ORGANS:  Unremarkable for patient's age  URINARY BLADDER:  Unremarkable  ABDOMINAL WALL/INGUINAL REGIONS:  Unremarkable   OSSEOUS STRUCTURES: There is lytic metastasis in the T11 vertebral body with mild superior endplate compression fracture (series 602 image 54 ) There is a lytic metastasis in the left side of the L3 vertebral body, extending into pedicle and transverse process, with mild superior endplate compression fracture (series 3 images 39-40, series 602 image 53 ) There is a large lytic metastasis involving the 1st sacral segment, extending into the left sacral ala (series 3 images 58-65 ) There is a 2 1 x 1 9 cm intramuscular mass in the proximal right rectus femoris muscle (series 3 image 78 ) There is also a 0 7 x 0 8 cm intramuscular mass in the left lateral abdominal wall musculature (series 3 image 46 )     Impression: There is a 2 8 x 2 2 x 1 8 cm ill-defined, hypodense pancreatic lesion, highly suspicious for pancreatic adenocarcinoma (series 3 images 20-22 and series 601 images 62 - 67 ) There is widespread metastatic disease with numerous liver lesions, bilateral adrenal metastases, multiple lytic bone metastases, with resulting mild superior end plate compression fractures of T11 and L3 as above, and also intramuscular metastases as above  An alternative diagnosis to metastatic pancreatic adenocarcinoma, given the unusual intramuscular metastasis, would be metastatic melanoma  Please correlate with already performed biopsy results  Workstation performed: [id number]       EKG, Pathology, and Other Studies Reviewed on Admission:   · EKG: Reviewed      Counseling / Coordination of Care Time: 30 total mins spent n consult  Greater than 50% of total time spent on patient counseling and coordination of care     ** Please Note: This note is constructed using a voice recognition dictation system   **

## 2021-07-29 NOTE — PROGRESS NOTES
[facility]  Progress Note - Nidhi Going 1970, 48 y o  male MRN: [id number]  Unit/Bed#: [facility] 914-01 Encounter: [id number]  Primary Care Provider: No primary care provider on file  Date and time admitted to hospital: 7/22/2021  7:16 PM    Cervical spinal mass [facility])  Assessment & Plan  Epidural mass at C2-C4  · Presented to ED with neck and back pain and unintentional weight loss  · Noted on imaging with the above as well as metastatic masses to T11, L3 and lymphadenopathy  · Exam non-focal  States ambulatory dysfunction over past 3 weeks secondary to back pain  Imaging:  · CT soft tissue neck 7/22/21: Left greater than right numerous superficial and deep neck soft tissue and intramuscular rim-enhancing necrotic lesions/collections as described  Rim-enhancing epidural thickening/collection within posterolateral spinal canal spanning levels C2-C4 resulting in canal narrowing  Finding is concerning for widespread necrotizing infection  Neoplastic process is within differential consideration  Recommend correlation with sampling  2   Occluded left subclavian and brachiocephalic veins as well as left internal jugular vein to the level of hyoid bone suggesting  3   Subpleural nodular opacities in the right upper lobe and superior segment of right lower lobe could indicate septic emboli  Suggested left hilar necrotic lymph node  4   Indeterminate sclerotic foci within T3 and T4 vertebral bodies  Plan:  · Frequent neuro checks  · Maintain in [facility] cervical brace  Zoë brace for showers at this time and while undergoing radiation  · Upright cervical x-rays for baseline  · Medical and pain management per primary team   · Heme/onc consulted  · Surgical/onc consulted  · Pt had biopsy done  S/p Incisional biopsy left neck mass, excisional biopsy right neck mass  Biopsy results pending  · Palliative consult recommended  · Mobilize with PT/OT     · No neurosurgical intervention anticipated at this time  Given pt's advanced metastatic disease/ disease burden and poor performance , surgery is not recommended at this time  Okay to proceed with palliative radiation therapy from nsx standpoint  · Neurosurgery will see pt PRN during the remainder of this hospitalization  Will discuss if follow up required, if required will arrange appropriate follow up  Please call with any questions or concerns  Compression fracture of T11 vertebra (HCC)  Assessment & Plan  · T11 and L3 pathologic fractures  · Likely secondary to bony metastasis  · TLSO brace when upright > 45 degrees and OOB while undergoing radiation  · Mobilize with PT/OT  · Continue medical workup  · Pt to obtain MRI of the thoracic and lumbar spine w wo when able to or baseline  Scalp lesion  Assessment & Plan  Noted with palpable mass to left parietal scalp x weeks  CT head completed  Severe protein-calorie malnutrition ([address] )  Assessment & Plan  Malnutrition Findings:   Adult Malnutrition type: Acute illness  Adult Degree of Malnutrition: Other severe protein calorie malnutrition (related to medical condition <50% energy intake versus needs for > 5 days evidenced by BMI 16 98; lost 9#(7 4%) since 6/10/21; moderate fat loss somewhat hollow orbital, moderate muscle loss visible clavicle  Treat Regular diet &Ensure Enlive TID )    BMI Findings:  Adult BMI Classifications: Underweight < 18 5     Body mass index is 16 6 kg/m²  Subclavian vein thromboembolism, acute, right (HCC)  Assessment & Plan  On Heparin gtt  Subjective/Objective     Chief Complaint: "My neck hurts"    Subjective: Pt reports he continues to have severe neck pain  Pt reports he continues to have decreased sensation left side of neck and left shoulder  Pt denies any new numbness/tingling or weakness  Pt denies bowel or bladder incontinence  Pt reports he has been able to ambulate to the bathroom to void  Objective: Alert and awake, No acute distress  I/O       07/27 0701 - 07/28 0700 07/28 0701 - 07/29 0700 07/29 0701 - 07/30 0700    P  O  640 600 760    I V  (mL/kg) 4130 4 (79 1) 2080 (39 8) 1677 8 (32 1)    Total Intake(mL/kg) 4770 4 (91 4) 2680 (51 3) 2437 8 (46 7)    Urine (mL/kg/hr) 300 (0 2) 900 (0 7) 0 (0)    Total Output 300 900 0    Net +4470 4 +1780 +2437 8           Unmeasured Urine Occurrence  2 x           Invasive Devices     Peripheral Intravenous Line            Peripheral IV 07/28/21 Left;Upper;Ventral (anterior) Arm 1 day                Physical Exam:  Vitals: Blood pressure 120/87, pulse 101, temperature 97 5 °F (36 4 °C), resp  rate 18, height 5' 9" (1 753 m), weight 51 kg (112 lb 7 oz), SpO2 94 %  ,Body mass index is 16 6 kg/m²  General appearance:  Appears stated age  Head: Normocephalic, without obvious abnormality, atraumatic  Eyes: EOMI, PERRL  Neck: supple, symmetrical, trachea midline, cervical collar in place  Lungs: non labored breathing  Heart: regular heart rate  Neurologic:   Mental status: Alert, oriented , thought content appropriate  Cranial nerves: grossly intact (Cranial nerves II-XII)  Sensory: normal to LT X 4 except left neck/shoulder  Motor: moving all extremities, strength 4+/5 throughout     Coordination:  no drift in bilateral upper extremities      Lab Results:  Results from last 7 days   Lab Units 07/29/21  0458 07/28/21  0754 07/27/21  0618   WBC Thousand/uL 10 08 13 80* 11 85*   HEMOGLOBIN g/dL 8 4* 9 5* 8 9*   HEMATOCRIT % 25 4* 29 2* 27 6*   PLATELETS Thousands/uL 239 218 231   NEUTROS PCT % 89* 68 70   MONOS PCT % 2* 13* 11     Results from last 7 days   Lab Units 07/29/21  0458 07/28/21  0754 07/27/21  0618 07/23/21  0415   POTASSIUM mmol/L 4 0 4 3  4 3 4 0 3 9   CHLORIDE mmol/L 110* 105  104 103 105   CO2 mmol/L 28 29  31 26 27   BUN mg/dL 10 8  8 9 11   CREATININE mg/dL 0 41* 0 53*  0 52* 0 43* 0 50*   CALCIUM mg/dL 9 2 9 5  9 8 8 6 8 3   ALK PHOS U/L 134* 140*  --  117*   ALT U/L 32 30  --  26   AST U/L 29 39  --  32     Results from last 7 days   Lab Units 07/29/21  0458 07/27/21  0618 07/25/21  0507   MAGNESIUM mg/dL 1 7 1 6 1 7     Results from last 7 days   Lab Units 07/29/21  0458 07/27/21  0618 07/25/21  0507   PHOSPHORUS mg/dL 3 6 4 3 3 5     Results from last 7 days   Lab Units 07/29/21  1551 07/29/21  0458 07/28/21  2159 07/23/21  0415 07/22/21  2038   INR   --   --   --  1 08  1 05 0 96   PTT seconds 76* 51* 71* 28 27     Imaging Studies: I have personally reviewed pertinent reports and I have personally reviewed pertinent films in PACS    XR spine cervical 2 or 3 vw injury    Result Date: 7/26/2021  Impression: 1  Destructive lytic lesion within the C3 vertebral body extending into the posterior elements suspicious for metastatic disease  2   Additional ill-defined lytic appearing lesions in the C4-C7 vertebral bodies also suggestive of metastatic disease  Workstation performed: [id number]     XR spine thoracolumbar 2 vw    Result Date: 7/26/2021  Impression: Mild superior endplate compression fracture of T11  Multiple lytic lesions to include T11, L3 and S1 are better visualized on recent CT  Workstation performed: [id number]     CT head without contrast    Result Date: 7/22/2021  Impression: There is an approximately 2 4 x 1 4 x 2 2 cm masslike lesion involving the scalp overlying the posterior left parietal region  Neoplasm and/or infection should be excluded  Sampling is recommended  The study was marked in [facility] for immediate notification  Workstation performed: [id number]     CT soft tissue neck w contrast    Result Date: 7/22/2021  Impression: 1  Left greater than right numerous superficial and deep neck soft tissue and intramuscular rim-enhancing necrotic lesions/collections as described  Rim-enhancing epidural thickening/collection within posterolateral spinal canal spanning levels C2-C4 resulting in canal narrowing    Finding is concerning for widespread necrotizing infection  Neoplastic process is within differential consideration  Recommend correlation with sampling  2   Occluded left subclavian and brachiocephalic veins as well as left internal jugular vein to the level of hyoid bone suggesting  3   Subpleural nodular opacities in the right upper lobe and superior segment of right lower lobe could indicate septic emboli  Suggested left hilar necrotic lymph node  4   Indeterminate sclerotic foci within T3 and T4 vertebral bodies  I personally discussed this study with Juanis Jiang on 7/22/2021 at 2:10 PM  Workstation performed: [id number]     MRI cervical spine w wo contrast    Result Date: 7/27/2021  Impression: 1  Signal abnormality and erosive change of C3 laminae and spinous process along with lesser degree signal abnormality in the posterior elements of C1, C2, and C4  Associated epidural enhancement /enhancing soft tissue spanning from inferior C2-C4 resulting in severe canal stenosis and cord compression  Findings most favor metastatic disease  Infection is within differential consideration but less favored given findings elsewhere in the body  Correlate with sampling  2   Multiple rim-enhancing lesions within paraspinal musculature, superficial and deep soft tissue neck as demonstrated on recent soft tissue neck CT  Differential considerations are similar as above  3   Heterogeneous marrow signal and enhancement of the cervical and visualized thoracic vertebras concerning for infiltrative metastatic disease  4   Redemonstrated occluded left internal jugular vein  5   Degenerative change as above    I personally discussed this study with Alejandro Horton on 7/27/2021 at 9:35 AM  Workstation performed: [id number]     US head neck soft tissue    Result Date: 7/22/2021  Impression: Several neck masses are identified corresponding to the palpable abnormalities within additional finding of the suspected thrombosed left jugular vein  Further evaluation with contrast-enhanced CT of the neck is recommended  The study was marked in [facility] for immediate notification  Workstation performed: [id number]     CTA ED chest PE Study    Result Date: 7/23/2021  Impression: No evidence of pulmonary embolism is seen  Measured RV/LV ratio is within normal limits at less than 0 9  Numerous nodular opacities are present bilaterally within the lungs  The appearances most suggestive of metastatic disease  Extensive hepatic metastatic disease  Large bilateral adrenal masses  Extensive lymphadenopathy  Probable osseous metastatic disease with mild to moderate compression deformity of the superior endplate of X41, suggesting pathologic compression  Possible thrombus right subclavian vein extending into the brachiocephalic vein  Consider venous duplex ultrasound for further evaluation  The study was marked in [facility] for immediate notification  Workstation performed: [id number]     CT abdomen pelvis w contrast    Result Date: 7/26/2021  Impression: There is a 2 8 x 2 2 x 1 8 cm ill-defined, hypodense pancreatic lesion, highly suspicious for pancreatic adenocarcinoma (series 3 images 20-22 and series 601 images 57 - 67 ) There is widespread metastatic disease with numerous liver lesions, bilateral adrenal metastases, multiple lytic bone metastases, with resulting mild superior end plate compression fractures of T11 and L3 as above, and also intramuscular metastases as above  An alternative diagnosis to metastatic pancreatic adenocarcinoma, given the unusual intramuscular metastasis, would be metastatic melanoma  Please correlate with already performed biopsy results  Workstation performed: [id number]       EKG, Pathology, and Other Studies: I have personally reviewed pertinent reports        VTE Mechanical Prophylaxis: sequential compression device    PLEASE NOTE:  This encounter may have been completed utilizing the M- Modal/Fluency Direct Speech Voice Recognition Software  Grammatical errors, random word insertions, pronoun errors and incomplete sentences are occasional consequences of the system due to software limitations, ambient noise and hardware issues  These may be missed by proof reading prior to affixing electronic signature  Any questions or concerns about the content, text or information contained within the body of this dictation should be directly addressed to the advanced practitioner or physician for clarification

## 2021-07-29 NOTE — ASSESSMENT & PLAN NOTE
· T11 and L3 pathologic fractures  · Likely secondary to bony metastasis  · TLSO brace when upright > 45 degrees and OOB while undergoing radiation  · Mobilize with PT/OT  · Continue medical workup  · Pt to obtain MRI of the thoracic and lumbar spine w wo when able to or baseline

## 2021-07-29 NOTE — NURSING NOTE
Patient encouraged throughout the night to reposition self and shift weight  Patient complained of difficulty due to back pain and his neck brace  Patient was given foam wedges for support and rolled himself onto it throughout the shift with reminders  Patient ambulated throughout the room frequently and was educated on the importance of repositioning self

## 2021-07-29 NOTE — TELEMEDICINE
e-Consult (IPC)  - Interventional Radiology  Luisana Mart 48 y o  male MRN: [id number]  Unit/Bed#: [facility] 914-01 Encounter: [id number]    IR has been consulted to evaluate the patient, determine the appropriate procedure, and whether or not a procedure can and should be performed regarding the care of Luisana Mart  We were consulted by SOD concerning liver mets, and to possibly perform a liver biopsy if medically appropriate for the patient  IP Consult to IR  Consult performed by: KRISTIN Solis  Consult ordered by: Stevie Borden DO        07/29/21      Assessment/Recommendation:     48year old male with elevated CA 19-9, imaging suggestive of metastatic disease, initially planned for pancreatic biopsy via EUS, but will plan for ultrasound guided liver biopsy  Punch biopsy and lymph node biopsy pending     - NPO after MN  - heparin gtt to be held 4 hours prior to biopsy time and resume 4-6 hours after  - plan for ultrasound guided liver biopsy 7/30      Total time spent in review of data, discussion with requesting provider and rendering advice was 30 minutes  Patient or appropriate family member was verbally informed by SOD of this consultative service on their behalf to provide more timely access to specialty care in lieu of an in person consultation  Verbal consent was obtained  Thank you for allowing Interventional Radiology to participate in the care of Luisana Mart  Please don't hesitate to call or TigerText us with any questions       [address] [place] [address] Iris Encinas

## 2021-07-29 NOTE — PROGRESS NOTES
INTERNAL MEDICINE RESIDENCY PROGRESS NOTE     PATIENT INFORMATION     Name: Rocael Mcgregor   Age & Sex: 48 y o  male   MRN: [id number]  Hospital Stay Days: 6  [facility]   Encounter: [id number]  Team: SOD Team B     ASSESSMENT/PLAN     Principal Problem:    Metastatic cancer [facility])  Active Problems:    Subclavian vein thromboembolism, acute, right (HCC)    Compression fracture of T11 vertebra (HCC)    Cervical stenosis of spinal canal    Anemia    Cervical spinal mass ([facility] [address] )    Scalp lesion    S/P lymph node biopsy    Severe protein-calorie malnutrition ([facility] [address] )    Pressure ulcer of coccygeal region, unspecified pressure ulcer stage    Mass of thoracic vertebra    Constipation due to opioid therapy    Constipation due to opioid therapy  Assessment & Plan  Pt last bowel movement prior to admission  Increasing abd pain/tenderness/distension  Tympanic  (-) rebound  Constipation unresponsive to lactulose 10 g and Mag citrate  Most likely due to constipation  7/28/2021 KUB Larger than typical volume of stool seen over the colon consistent with reported history of constipation  No bowel obstruction    Mgcitrate given 7/28/2021, however pt still did not pass a BM  Plan   · Cont  · miralax   · sennakot   · Consider starting Relistor       Mass of thoracic vertebra  Assessment & Plan  7/28/2021 MRI TS Widely metastatic disease to the thoracic spinal axis with early pathologic fracture superior T11 endplate  No extraosseous tumor within the spinal canal, no cord or root compression  Multiple soft tissue masses in the adjacent soft tissues  Plan  · Neurosurgery consult   · MRI TS and LS w/wo contrast under anesthesia     Pressure ulcer of coccygeal region, unspecified pressure ulcer stage  Assessment & Plan  7/28/2021 Alerted by nursing that pt has ulcer on the coccyx  Pt had 1 occurrence of fever to 101 F overnight, with subsequent T 99 F  Pt has also been tachycardic   WBC trending upward from 11 85 to 13 80  Difficult to determine if change in pt status is due to stress of surgical LN bx vs  Pain vs  Stress of MRI (claustrophobia, pain) vs   Infection vs  Metastatic cancer    Pt with multiple sources of infection: recent surgical bx, ulcer  Per wound care: Mid sacrum - evolving DTI - HA  Wound presents as approx: 20% evolving open partial thickness appearing moist yellow tissue, and 80% intact non-evolved non-blanchable purple skin  All aspects measured together  Edges fragile and attached, no maceration  Tatianna-wound is intact with pink blanchable skin  Suspect mixed etiology of pressure and friction given the chaffed/irregular dry appearance    -will recommend foam dressing to protect  Plan  · Continue to observe, if clinically worsening, elevated temperatures, continuing increased in WBCs, will start sepsis work up  Repeat CBC, BCx, Ucx, CXR, antibiotics  · Sepsis workup initiated   · Pending Bcx  · LA (-)  · UA (-)  · Per wound care  · Frequent repositioning   · Apply hydraguard to b/l heels and b/l buttocks and sacrum BID and PRN for prevention and protection  · Apply skin nourishing cream the entire skin daily for moisture  · Turn and reposition patient every  2 hours   · Elevate heels off of bed with pillows to offload pressure   ·  Apply EHOB waffle cushion to chair when OOB, if able  · Cleanse sacral wound with NSS, pat dry, and apply Allevyn foam dressing  Feroz dressing with T  Change every other day and PRN for soilage/dislodgement     · Wound care will follow along with patient weekly     Severe protein-calorie malnutrition ([facility] [address] )  Assessment & Plan  Malnutrition Findings:   Adult Malnutrition type: Acute illness  Adult Degree of Malnutrition: Other severe protein calorie malnutrition (related to medical condition <50% energy intake versus needs for > 5 days evidenced by BMI 16 98; lost 9#(7 4%) since 6/10/21; moderate fat loss somewhat hollow orbital, moderate muscle loss visible clavicle  Treat Regular diet &Ensure Enlive TID )    BMI Findings:  Adult BMI Classifications: Underweight < 18 5          Body mass index is 16 6 kg/m²  S/P lymph node biopsy  Assessment & Plan  LN Bx done 7/26/2021; INCISIONAL BIOPSY LEFT NECK MASS,AND EXCISIOINAL BIOPSY RIGHT NECK MASS (Bilateral)    Path/labs of LN  Anaerobic cx and gram stain: (-)  fungal cx: pending   culture tissue and gram stain: melanoma    AFB culture and gram stain: (-)    leukemia/lyphoma flow cytometry: cancelled by pathology as cells were not viable for analysis                Scalp lesion  Assessment & Plan  Punch bx done  Well healing  Plan  F/u path results from punch bx  Determine primary source of met ca  Anemia  Assessment & Plan  Hgb 9 9 and Hct 29 6 at time of admission  Currently Hgb 9 5  Most likely anemia of chronic inflammation with some component of iron deficiency  Fe sat: 14, TIBC 205, Fe 28, Ferritin 1230  Retic: 5 12    Plan:  · Monitor Hgb and Hct  · Transfuse if patient becomes symptomatic or Hgb drops < 7    Cervical stenosis of spinal canal  Assessment & Plan  Pt with cervical canal narrowing likely due to rim-enhancing epidural thickening/collection within posterolateral spinal canal spanning levels C2-C4  Findings could be due to necrotizing infectious process or metastasis related  Multiple Concerns for this level of of cord compression: paralysis, dysfunction of the diaphragm  7/28/2021 MRI CS Signal abnormality and erosive change of C3 laminae and spinous process along with lesser degree signal abnormality in the posterior elements of C1, C2, and C4  Associated epidural enhancement /enhancing soft tissue spanning from inferior C2-C4 resulting in severe canal stenosis and cord compression  Findings most favor metastatic disease  Infection is within differential consideration but less favored given findings elsewhere in the body  Correlate with sampling   Multiple rim-enhancing lesions within paraspinal musculature, superficial and deep soft tissue neck as demonstrated on recent soft tissue neck CT  Differential considerations are similar as above  Heterogeneous marrow signal and enhancement of the cervical and visualized thoracic vertebras concerning for infiltrative metastatic disease  Plan:  · Neurosurgery consulted and appreciate recommendations  · Frequent neurochecks  · Maintain cervical collar  · Does not think surgical intervention is needed at this time  · Recommends radiation/chemo for tumor reduction   · Medical onc  · Start   · Iv Decadron 10 mg stat --> p o  Decadron 4 mg q 6  · Rad onc consulted   · palliative radiation therapy regimen (10 daily treatments) to the cervical spine  · CT simulation 7/29/2021  · await pathologic diagnosis and multidisciplinary consensus prior to proceeding with RT      Subclavian vein thromboembolism, acute, right [facility])  Assessment & Plan    CT Soft Tissue Neck with Contrast: Occluded left subclavian and brachiocephalic veins as well as left internal jugular vein to the level of hyoid bone suggesting  Originally concerns for potential infectious cause, pt was started on Vanco and heparin  Due to decreasing procal 0 27 to 0 14, absent fever, or leukocytosis patient was Dced from Vancomycin  CTA PE chest: Possible thrombus right subclavian vein extending into the brachiocephalic vein  7/27/2021 venous duplex US LEFT UPPER LIMB: Evidence of acute and subacute mostly occlusive deep vein thrombosis noted in the internal jugular vein  Evidence of acute non occlusive deep vein thrombosis noted in the innominate, subclavian and axillary with extension into the most proximal brachial vein      Plan:  · Heparin gtt  · Vascular aware  · Cont to monitor PTT     * Metastatic cancer [facility])  Assessment & Plan  47 yo M with recent abnormal findings on CT head, chest, and tissue neck concerning for extensive metastatic cancer:     7/22/21 - US Head Neck Soft Tissue: Several neck masses are identified corresponding to the palpable abnormalities within additional finding of the suspected thrombosed left jugular vein  Further evaluation with contrast-enhanced CT of the neck is recommended  7/22/21 - CT Soft Tissue Neck: Left greater than right numerous superficial and deep neck soft tissue and intramuscular rim-enhancing necrotic lesions/collections as described  Rim-enhancing epidural thickening/collection within posterolateral spinal canal spanning levels C2-C4 resulting in canal narrowing  Finding is concerning for widespread necrotizing infection  Neoplastic process is within differential consideration  Recommend correlation with sampling  Subpleural nodular opacities in the right upper lobe and superior segment of right lower lobe could indicate septic emboli  Suggested left hilar necrotic lymph node  Indeterminate sclerotic foci within T3 and T4 vertebral bodies  7/22/21 CT Head: There is an approximately 2 4 x 1 4 x 2 2 cm masslike lesion involving the scalp overlying the posterior left parietal region  Neoplasm and/or infection should be excluded  7/22/21 CTA PE study: Numerous nodular opacities are present bilaterally within the lungs  The appearances most suggestive of metastatic disease  Extensive hepatic metastatic disease  Large bilateral adrenal masses  Extensive lymphadenopathy  Probable osseous metastatic disease with mild to moderate compression deformity of the superior endplate of I84, suggesting pathologic compression  7/24/2021 CT Abd and pelvis:  There is a 2 8 x 2 2 x 1 8 cm ill-defined, hypodense pancreatic lesion, highly suspicious for pancreatic adenocarcinoma (series 3 images 20-22 and series 601 images 57 - 67 ) There is widespread metastatic disease with numerous liver lesions, bilateral adrenal metastases, multiple lytic bone metastases, with resulting mild superior end plate compression fractures of T11 and L3 as above, and also intramuscular metastases as   above      An alternative diagnosis to metastatic pancreatic adenocarcinoma, given the unusual intramuscular metastasis, would be metastatic melanoma  Please correlate with already performed biopsy results  BL LN Bx  Right neck mass:  - Malignant epithelioid neoplasm, possibly melanoma, morphologically identical to that in specimen B, at least 1 cm maximal dimension, present at specimen edges  Left neck mass:  - Malignant epithelioid neoplasm, possibly melanoma, at least 1 5 cm maximal dimension, present in dermal lymphatics and at specimen edges, likely metastatic  Benign overlying epidermis  CEA 63 8; CA 19-9: 5418, LDH: 1234    Plan:  · Pain Regimen:   · Oxycodone 5/10 mg q3 hours PRN mod/severe  · Dilaudid 0 5 mg q3 hours PRN for breakthrough  · Bowel regimen ordered  · Follow up results of CT CAP  · Medical Oncology Consulted and Appreciate Recommendation  · Surgical Oncology Consulted  · S/P Punch Biopsy on 7/23/21; follow up on pathology results  · Plan for Lymph Node Biopsy on 7/26/21  · Continue to monitor kidney fxn and electrolytes; potential for Tumor lysis syndrome  · Uric Acid: 3 1  · Labs ordered  · leukemia/lymphoma flow cytometry (blood)  · Possiblitity of multiple primary cancers: melanoma and pancreatic adenoca  · GI consult  · EUS bx of pancreatic mass        Disposition: inpatient for diagnosis of metastatic disease as well as stabilization of cord compression  Multiple specialities following: Rad Onc, Med Onc, Neuro Surgery  GI consult placed for EUS of pancreatic mass  SUBJECTIVE     Patient seen and examined  No acute events overnight; afebrile, tachycardic otherwise all other VSS  Sitting upright in bed eating fruit cup  Pt still states he has generalized pain, however his current pain regimen does relieve this  He still endorses the worst pain is in his neck as well as LUQ and left  He still has not passed a bowel movement   He has weakness and easy fatigability/dyspnea with mild exertion such as walking or tidying up his room  He also states he has been feeling warm recently and has needed to decrease the temperature in his room as well as some dizziness/lightheadedness and warm rush to head when he leans forward  Denies: HA, decreased visual acuity, dysphagia/odynphagia, CP, palpitations, SOB, cough, wheezing, N/V/D, dysuria, hematuria, numbness/tinlging  OBJECTIVE     Vitals:    21 0642 21 1546 21 2152 21 0759   BP: 124/79 99/59 128/75 123/75   Pulse: (!) 115 (!) 115 104 101   Resp: 20 18 18 18   Temp: 99 1 °F (37 3 °C) 99 4 °F (37 4 °C) 98 7 °F (37 1 °C) 97 5 °F (36 4 °C)   TempSrc:       SpO2: 93% 97%  94%   Weight:       Height:          Temperature:   Temp (24hrs), Av 5 °F (36 9 °C), Min:97 5 °F (36 4 °C), Max:99 4 °F (37 4 °C)    Temperature: 97 5 °F (36 4 °C)  Intake & Output:  I/O       701 -  07 -  07 -  0700    P  O  640 600 760    I V  (mL/kg) 4130 4 (79 1) 2080 (39 8) 1677 8 (32 1)    Total Intake(mL/kg) 4770 4 (91 4) 2680 (51 3) 2437 8 (46 7)    Urine (mL/kg/hr) 300 (0 2) 900 (0 7) 0 (0)    Total Output 300 900 0    Net +4470 4 +1780 +2437 8           Unmeasured Urine Occurrence  2 x           Intake/Output Summary (Last 24 hours) at 2021 1346  Last data filed at 2021 1338  Gross per 24 hour   Intake 4877 78 ml   Output 900 ml   Net 3977 78 ml     I/O this shift:  In: 2437 8 [P O :760; I V :1677 8]  Out: 0   Weights:   IBW (Ideal Body Weight): 70 7 kg    Body mass index is 16 6 kg/m²  Weight (last 2 days)     None        Physical Exam  Vitals reviewed  Constitutional:       General: He is not in acute distress  Appearance: He is not ill-appearing, toxic-appearing or diaphoretic  Comments: Cachetic  Not wearing Cervical collar       HENT:      Head: Normocephalic        Comments: Scalp lesion on left       Right Ear: External ear normal       Left Ear: External ear normal       Nose: Nose normal  No congestion or rhinorrhea  Mouth/Throat:      Mouth: Mucous membranes are moist       Pharynx: Oropharynx is clear  No oropharyngeal exudate or posterior oropharyngeal erythema  Eyes:      General: No scleral icterus  Right eye: No discharge  Left eye: No discharge  Extraocular Movements: Extraocular movements intact  Conjunctiva/sclera: Conjunctivae normal       Pupils: Pupils are equal, round, and reactive to light  Neck:      Vascular: No carotid bruit  Comments: Large mass left supraclavicular fossa    Cardiovascular:      Rate and Rhythm: Regular rhythm  Tachycardia present  Pulses: Normal pulses  Heart sounds: Normal heart sounds  No murmur heard  No friction rub  No gallop  Pulmonary:      Effort: Pulmonary effort is normal  No respiratory distress  Breath sounds: Normal breath sounds  No stridor  No wheezing, rhonchi or rales  Chest:      Chest wall: No tenderness  Abdominal:      General: Bowel sounds are normal  There is distension  Palpations: There is mass (left side)  Tenderness: There is abdominal tenderness (geralized but L >R)  There is right CVA tenderness and left CVA tenderness  There is no guarding or rebound  Hernia: No hernia is present  Comments: Tympanic    Musculoskeletal:         General: Normal range of motion  Cervical back: Neck supple  Tenderness (bilateral neck) present  Right lower leg: No edema  Left lower leg: No edema  Lymphadenopathy:      Cervical: Cervical adenopathy (bilateral, enlarging left post auricular mass) present  Skin:     General: Skin is warm and dry  Capillary Refill: Capillary refill takes less than 2 seconds  Findings: Lesion present  Neurological:      Mental Status: He is alert and oriented to person, place, and time  Cranial Nerves: No cranial nerve deficit        Sensory: No sensory deficit  Motor: Weakness (Strength BLE and BUE 4/5) present  Psychiatric:         Mood and Affect: Mood normal          Behavior: Behavior normal          Thought Content: Thought content normal          Judgment: Judgment normal         LABORATORY DATA     Labs: I have personally reviewed pertinent reports  Results from last 7 days   Lab Units 07/29/21  0458 07/28/21  0754 07/27/21  0618   WBC Thousand/uL 10 08 13 80* 11 85*   HEMOGLOBIN g/dL 8 4* 9 5* 8 9*   HEMATOCRIT % 25 4* 29 2* 27 6*   PLATELETS Thousands/uL 239 218 231   NEUTROS PCT % 89* 68 70   MONOS PCT % 2* 13* 11      Results from last 7 days   Lab Units 07/29/21  0458 07/28/21  0754 07/27/21  0618 07/23/21  0415   POTASSIUM mmol/L 4 0 4 3  4 3 4 0 3 9   CHLORIDE mmol/L 110* 105  104 103 105   CO2 mmol/L 28 29  31 26 27   BUN mg/dL 10 8  8 9 11   CREATININE mg/dL 0 41* 0 53*  0 52* 0 43* 0 50*   CALCIUM mg/dL 9 2 9 5  9 8 8 6 8 3   ALK PHOS U/L 134* 140*  --  117*   ALT U/L 32 30  --  26   AST U/L 29 39  --  32     Serum creatinine: 0 41 mg/dL (L) 07/29/21 0458  Estimated creatinine clearance: 155 5 mL/min (A)   Results from last 7 days   Lab Units 07/29/21  0458 07/27/21  0618 07/25/21  0507   MAGNESIUM mg/dL 1 7 1 6 1 7     Results from last 7 days   Lab Units 07/29/21  0458 07/27/21  0618 07/25/21  0507   PHOSPHORUS mg/dL 3 6 4 3 3 5      Results from last 7 days   Lab Units 07/29/21  0458 07/28/21  2159 07/28/21  1525 07/23/21  0415 07/22/21 2038   INR   --   --   --  1 08  1 05 0 96   PTT seconds 51* 71* 43* 28 27     Results from last 7 days   Lab Units 07/29/21  0458   LACTIC ACID mmol/L 1 5           IMAGING & DIAGNOSTIC TESTING     Radiology Results: I have personally reviewed pertinent reports  XR spine cervical 2 or 3 vw injury    Result Date: 7/26/2021  Impression: 1  Destructive lytic lesion within the C3 vertebral body extending into the posterior elements suspicious for metastatic disease   2   Additional ill-defined lytic appearing lesions in the C4-C7 vertebral bodies also suggestive of metastatic disease  Workstation performed: [id number]     XR spine thoracolumbar 2 vw    Result Date: 7/26/2021  Impression: Mild superior endplate compression fracture of T11  Multiple lytic lesions to include T11, L3 and S1 are better visualized on recent CT  Workstation performed: [id number]     CT head without contrast    Result Date: 7/22/2021  Impression: There is an approximately 2 4 x 1 4 x 2 2 cm masslike lesion involving the scalp overlying the posterior left parietal region  Neoplasm and/or infection should be excluded  Sampling is recommended  The study was marked in [facility] for immediate notification  Workstation performed: [id number]     CT soft tissue neck w contrast    Result Date: 7/22/2021  Impression: 1  Left greater than right numerous superficial and deep neck soft tissue and intramuscular rim-enhancing necrotic lesions/collections as described  Rim-enhancing epidural thickening/collection within posterolateral spinal canal spanning levels C2-C4 resulting in canal narrowing  Finding is concerning for widespread necrotizing infection  Neoplastic process is within differential consideration  Recommend correlation with sampling  2   Occluded left subclavian and brachiocephalic veins as well as left internal jugular vein to the level of hyoid bone suggesting  3   Subpleural nodular opacities in the right upper lobe and superior segment of right lower lobe could indicate septic emboli  Suggested left hilar necrotic lymph node  4   Indeterminate sclerotic foci within T3 and T4 vertebral bodies  I personally discussed this study with Walt Baumgarten on 7/22/2021 at 2:10 PM  Workstation performed: [id number]     MRI cervical spine w wo contrast    Result Date: 7/27/2021  Impression: 1    Signal abnormality and erosive change of C3 laminae and spinous process along with lesser degree signal abnormality in the posterior elements of C1, C2, and C4  Associated epidural enhancement /enhancing soft tissue spanning from inferior C2-C4 resulting in severe canal stenosis and cord compression  Findings most favor metastatic disease  Infection is within differential consideration but less favored given findings elsewhere in the body  Correlate with sampling  2   Multiple rim-enhancing lesions within paraspinal musculature, superficial and deep soft tissue neck as demonstrated on recent soft tissue neck CT  Differential considerations are similar as above  3   Heterogeneous marrow signal and enhancement of the cervical and visualized thoracic vertebras concerning for infiltrative metastatic disease  4   Redemonstrated occluded left internal jugular vein  5   Degenerative change as above  I personally discussed this study with Alejandro Horton on 7/27/2021 at 9:35 AM  Workstation performed: [id number]     US head neck soft tissue    Result Date: 7/22/2021  Impression: Several neck masses are identified corresponding to the palpable abnormalities within additional finding of the suspected thrombosed left jugular vein  Further evaluation with contrast-enhanced CT of the neck is recommended  The study was marked in [facility] for immediate notification  Workstation performed: [id number]     CTA ED chest PE Study    Result Date: 7/23/2021  Impression: No evidence of pulmonary embolism is seen  Measured RV/LV ratio is within normal limits at less than 0 9  Numerous nodular opacities are present bilaterally within the lungs  The appearances most suggestive of metastatic disease  Extensive hepatic metastatic disease  Large bilateral adrenal masses  Extensive lymphadenopathy  Probable osseous metastatic disease with mild to moderate compression deformity of the superior endplate of N14, suggesting pathologic compression  Possible thrombus right subclavian vein extending into the brachiocephalic vein    Consider venous duplex ultrasound for further evaluation  The study was marked in [facility] for immediate notification  Workstation performed: [id number]     CT abdomen pelvis w contrast    Result Date: 7/26/2021  Impression: There is a 2 8 x 2 2 x 1 8 cm ill-defined, hypodense pancreatic lesion, highly suspicious for pancreatic adenocarcinoma (series 3 images 20-22 and series 601 images 57 - 67 ) There is widespread metastatic disease with numerous liver lesions, bilateral adrenal metastases, multiple lytic bone metastases, with resulting mild superior end plate compression fractures of T11 and L3 as above, and also intramuscular metastases as above  An alternative diagnosis to metastatic pancreatic adenocarcinoma, given the unusual intramuscular metastasis, would be metastatic melanoma  Please correlate with already performed biopsy results  Workstation performed: [id number]     Other Diagnostic Testing: I have personally reviewed pertinent reports        ACTIVE MEDICATIONS     Current Facility-Administered Medications   Medication Dose Route Frequency    acetaminophen (TYLENOL) tablet 650 mg  650 mg Oral Q6H PRN    dexamethasone (DECADRON) tablet 4 mg  4 mg Oral Q6H [facility]    Gadobutrol injection (SINGLE-DOSE) SOLN 5 mL  5 mL Intravenous Once in imaging    heparin (porcine) 25,000 units in 0 45% NaCl 250 mL infusion (premix)  3-30 Units/kg/hr (Order-Specific) Intravenous Titrated    heparin (porcine) injection 2,000 Units  2,000 Units Intravenous Q1H PRN    heparin (porcine) injection 4,000 Units  4,000 Units Intravenous Q1H PRN    HYDROmorphone (DILAUDID) injection 0 5 mg  0 5 mg Intravenous Q3H PRN    oxyCODONE (ROXICODONE) immediate release tablet 10 mg  10 mg Oral Q6H PRN    Or    oxyCODONE (ROXICODONE) IR tablet 15 mg  15 mg Oral Q6H PRN    polyethylene glycol (MIRALAX) packet 17 g  17 g Oral Daily    senna-docusate sodium (SENOKOT S) 8 6-50 mg per tablet 1 tablet  1 tablet Oral HS    sodium chloride 0 9 % infusion  125 mL/hr Intravenous Continuous     VTE Pharmacologic Prophylaxis: Heparin  VTE Mechanical Prophylaxis: sequential compression device    ==  Emerita Patel MS4  [facility] Internal Medicine Residency

## 2021-07-29 NOTE — CONSULTS
Consultation - Radiation Oncology   Halima Castillo 48 y o  male MRN: [id number]  Unit/Bed#: [facility] 914-01 Encounter: [id number]        History of Present Illness   Physician Requesting Consult: Tatum Benitez MD  Reason for Consult / Principal Problem: Cervical Spinal Cord Compression  Hx and PE limited by: N/A      HPI: Halima Castillo is a 48 y o  male with no prior oncologic history admitted to [facility] with neck and back pain  Workup since admission has included:    US HN 7/22/21  Several neck masses are identified corresponding to the palpable abnormalities within additional finding of the suspected thrombosed left jugular vein  Further evaluation with contrast-enhanced CT of the neck is recommended  CT Soft Tissue Neck 7/22/21  1  Left greater than right numerous superficial and deep neck soft tissue and intramuscular rim-enhancing necrotic lesions/collections as described  Rim-enhancing epidural thickening/collection within posterolateral spinal canal spanning levels C2-C4   resulting in canal narrowing  Finding is concerning for widespread necrotizing infection  Neoplastic process is within differential consideration  Recommend correlation with sampling  2   Occluded left subclavian and brachiocephalic veins as well as left internal jugular vein to the level of hyoid bone suggesting  3   Subpleural nodular opacities in the right upper lobe and superior segment of right lower lobe could indicate septic emboli  Suggested left hilar necrotic lymph node  4   Indeterminate sclerotic foci within T3 and T4 vertebral bodies  CT Head 7/22/21  There is an approximately 2 4 x 1 4 x 2 2 cm masslike lesion involving the scalp overlying the posterior left parietal region  Neoplasm and/or infection should be excluded  Sampling is recommended  CTPA 7/22/21  No evidence of pulmonary embolism is seen  Measured RV/LV ratio is within normal limits at less than 0 9      Numerous nodular opacities are present bilaterally within the lungs  The appearances most suggestive of metastatic disease  Extensive hepatic metastatic disease  Large bilateral adrenal masses  Extensive lymphadenopathy  Probable osseous metastatic disease with mild to moderate compression deformity of the superior endplate of X56, suggesting pathologic compression  Possible thrombus right subclavian vein extending into the brachiocephalic vein  Consider venous duplex ultrasound for further evaluation  CT A/P 7/24/21  There is a 2 8 x 2 2 x 1 8 cm ill-defined, hypodense pancreatic lesion, highly suspicious for pancreatic adenocarcinoma (series 3 images 20-22 and series 601 images 57 - 67 )  There is widespread metastatic disease with numerous liver lesions, bilateral adrenal metastases, multiple lytic bone metastases, with resulting mild superior end plate compression fractures of T11 and L3 as above, and also intramuscular metastases as   above  An alternative diagnosis to metastatic pancreatic adenocarcinoma, given the unusual intramuscular metastasis, would be metastatic melanoma  Please correlate with already performed biopsy results  MRI C-Spine 7/27/21  1  Signal abnormality and erosive change of C3 laminae and spinous process along with lesser degree signal abnormality in the posterior elements of C1, C2, and C4  Associated epidural enhancement /enhancing soft tissue spanning from inferior C2-C4   resulting in severe canal stenosis and cord compression  Findings most favor metastatic disease  Infection is within differential consideration but less favored given findings elsewhere in the body  Correlate with sampling  2   Multiple rim-enhancing lesions within paraspinal musculature, superficial and deep soft tissue neck as demonstrated on recent soft tissue neck CT  Differential considerations are similar as above    3   Heterogeneous marrow signal and enhancement of the cervical and visualized thoracic vertebras concerning for infiltrative metastatic disease  4   Redemonstrated occluded left internal jugular vein  5   Degenerative change as above  Surgical oncology, medical oncology, neurosurgery, and radiation oncology consulted given findings suspicious for widely metastatic disease with cord compression  Patient underwent incisional biopsy of a left neck mass, and excisional biopsy of a right neck mass, results of which are pending  Per neurosurgery consultation, no neurosurgical intervention anticipated at this time  There is concern that given pt's advanced disease with extensive metatasis and cormobidities, surgery is not an ideal option at this time  Radiation and/or chemotherapy may be recommended/considered based on biopsy results  Today, the patient reports feeling continued neck and back pain  He also has soreness at the site of his biopsies  He also notes abdominal discomfort  He otherwise is able to ambulate from bed to the bathroom, but cannot ambulate long distances  He denies headache, blurry vision, nausea, vomiting, urinary retention, bowel incontinence, or saddle anesthesia  He used to work in the [employer] until a disabling muscle injury  [place]  Prior RT:  Patient denies  Prior Chemotherapy: Patient denies  Pacemaker: Patient denies    Inpatient consult to Radiation Oncology  Consult performed by: Falguni Sheikh MD  Consult ordered by: Nabila Avery DO        Review of Systems   Constitutional: Positive for appetite change, fatigue and unexpected weight change  Negative for fever  HENT: Negative for trouble swallowing  Eyes: Negative for visual disturbance  Respiratory: Negative for chest tightness and shortness of breath  Cardiovascular: Negative for chest pain  Gastrointestinal: Positive for abdominal pain  Negative for vomiting  No bowel incontinence   Genitourinary: Negative for difficulty urinating and dysuria     Musculoskeletal: Positive for back pain and neck pain         "bump on back of head"   Skin: Negative for rash  Neurological: Positive for weakness  Negative for dizziness, facial asymmetry, speech difficulty and light-headedness  Psychiatric/Behavioral: Negative for confusion  Historical Information   Oncology History    No history exists  History reviewed  No pertinent past medical history  Past Surgical History:   Procedure Laterality Date    FACIAL/NECK BIOPSY Bilateral 7/26/2021    Procedure: INCISIONAL BIOPSY LEFT NECK MASS,AND EXCISIOINAL BIOPSY RIGHT NECK MASS;  Surgeon: Анна Marc MD;  Location: [facility];  Service: Surgical Oncology    HAND SURGERY      left hand     History reviewed  No pertinent family history    Social History   Social History     Substance and Sexual Activity   Alcohol Use Not Currently     Social History     Substance and Sexual Activity   Drug Use Yes    Types: Marijuana     Social History     Tobacco Use   Smoking Status Current Every Day Smoker    Packs/day: 0 25   Smokeless Tobacco Never Used     Meds/Allergies   all current active meds have been reviewed, current meds:   Current Facility-Administered Medications   Medication Dose Route Frequency    acetaminophen (TYLENOL) tablet 650 mg  650 mg Oral Q6H PRN    dexamethasone (DECADRON) tablet 4 mg  4 mg Oral Q6H [address]    Gadobutrol injection (SINGLE-DOSE) SOLN 5 mL  5 mL Intravenous Once in imaging    heparin (porcine) 25,000 units in 0 45% NaCl 250 mL infusion (premix)  3-30 Units/kg/hr (Order-Specific) Intravenous Titrated    heparin (porcine) injection 2,000 Units  2,000 Units Intravenous Q1H PRN    heparin (porcine) injection 4,000 Units  4,000 Units Intravenous Q1H PRN    HYDROmorphone (DILAUDID) injection 0 5 mg  0 5 mg Intravenous Q3H PRN    methylnaltrexone (RELISTOR) subcutaneous injection 8 mg  8 mg Subcutaneous Once    oxyCODONE (ROXICODONE) immediate release tablet 10 mg  10 mg Oral Q6H PRN    Or    oxyCODONE (ROXICODONE) IR tablet 15 mg  15 mg Oral Q6H PRN    polyethylene glycol (MIRALAX) packet 17 g  17 g Oral Daily    senna-docusate sodium (SENOKOT S) 8 6-50 mg per tablet 1 tablet  1 tablet Oral HS    sodium chloride 0 9 % infusion  125 mL/hr Intravenous Continuous    and PTA meds:   Prior to Admission Medications   Prescriptions Last Dose Informant Patient Reported? Taking? clindamycin (CLINDAGEL) 1 % gel Not Taking at Unknown time  No No   Sig: Apply topically 2 (two) times a day   Patient not taking: Reported on 7/23/2021   cyclobenzaprine (FLEXERIL) 10 mg tablet   No No   Sig: Take 1 tablet (10 mg total) by mouth 2 (two) times a day as needed for muscle spasms for up to 10 days      Facility-Administered Medications: None     No Known Allergies    Objective     Intake/Output Summary (Last 24 hours) at 7/29/2021 0947  Last data filed at 7/29/2021 0801  Gross per 24 hour   Intake 3734 45 ml   Output 900 ml   Net 2834 45 ml     Invasive Devices     Peripheral Intravenous Line            Peripheral IV 07/28/21 Left;Upper;Ventral (anterior) Arm 1 day              Physical Exam   General Appearance:  Alert, cooperative, no distress, cachectic appearing  HEENT: Large nodule palpable on his posterior scalp  Cardiovascular:  Extremities warm and well perfused, no lower extremity edema  Lungs: Respirations unlabored, no cyanosis, able to speak in complete sentences without dyspnea  Abdomen: Soft, non-distended  Extremities: No cyanosis or edema, no joint swelling  Skin: No rash or dermatitis  Neurologic: AAOx3, CNII-XII grossly intact, muscle strength: RUE and LUE 5/5, RLE and LLE 4/5  Sensation intact  Gait exam deferred  Lab Results: I have personally reviewed pertinent reports      Imaging Studies: I have personally reviewed pertinent films in PACS  EKG, Pathology, and Other Studies: Not yet finalized    Assessment/Plan     Assessment and Plan:  52yo M with no prior oncologic history presenting with widely metastatic disease burden with large pancreatic mass  Osseous disease is present throughout but most concerning in the cervical spine where there is canal stenosis and cord compression  Patient has a nonfocal neurologic exam, but has difficulty ambulating and neck pain  Final pathology and multidisciplinary recommendations pending  Should he have malignant pathology and no intervention is planned, I discussed a palliative radiation therapy regimen (10 daily treatments) to the cervical spine  Risks, benefits and alternatives to these treatment options were also discussed  Side effects may include, but are not limited to,  fatigue, dermatitis, esophagitis, and pneumonitis  We discussed that the disease may persist/worsen despite radiation therapy  I discussed preparing for potential RT by scheduling CT simulation today, and this was completed this morning  We will await pathologic diagnosis and multidisciplinary consensus prior to proceeding with RT  Consent was signed for the simulation  Code Status: Level 1 - Full Code  Advance Directive and Living Will:      Power of :    POLST:      Counseling / Coordination of Care  Total floor / unit time spent today 60 minutes  Greater than 50% of total time was spent with the patient and / or family counseling and / or coordination of care  A description of the counseling / coordination of care: discussion and examination with patient, discussion with multidisciplinary teams

## 2021-07-29 NOTE — ASSESSMENT & PLAN NOTE
Epidural mass at C2-C4  · Presented to ED with neck and back pain and unintentional weight loss  · Noted on imaging with the above as well as metastatic masses to T11, L3 and lymphadenopathy  · Exam non-focal  States ambulatory dysfunction over past 3 weeks secondary to back pain  Imaging:  · CT soft tissue neck 7/22/21: Left greater than right numerous superficial and deep neck soft tissue and intramuscular rim-enhancing necrotic lesions/collections as described  Rim-enhancing epidural thickening/collection within posterolateral spinal canal spanning levels C2-C4 resulting in canal narrowing  Finding is concerning for widespread necrotizing infection  Neoplastic process is within differential consideration  Recommend correlation with sampling  2   Occluded left subclavian and brachiocephalic veins as well as left internal jugular vein to the level of hyoid bone suggesting  3   Subpleural nodular opacities in the right upper lobe and superior segment of right lower lobe could indicate septic emboli  Suggested left hilar necrotic lymph node  4   Indeterminate sclerotic foci within T3 and T4 vertebral bodies  Plan:  · Frequent neuro checks  · Maintain in [facility] cervical brace  Zoë brace for showers at this time and while undergoing radiation  · Upright cervical x-rays for baseline  · Medical and pain management per primary team   · Heme/onc consulted  · Surgical/onc consulted  · Pt had biopsy done  S/p Incisional biopsy left neck mass, excisional biopsy right neck mass  Biopsy results pending  · Palliative consult recommended  · Mobilize with PT/OT  · No neurosurgical intervention anticipated at this time  Given pt's advanced metastatic disease/ disease burden and poor performance , surgery is not recommended at this time  Okay to proceed with palliative radiation therapy from nsx standpoint  · Neurosurgery will see pt PRN during the remainder of this hospitalization   Will discuss if follow up required, if required will arrange appropriate follow up  Please call with any questions or concerns

## 2021-07-30 PROBLEM — I48.91 NEW ONSET A-FIB (HCC): Status: ACTIVE | Noted: 2021-01-01

## 2021-07-30 PROBLEM — A41.9 SEPSIS (HCC): Status: ACTIVE | Noted: 2021-01-01

## 2021-07-30 NOTE — QUICK NOTE
MEDICAL ONCOLOGY   Justin Nunez  Male, 47 yo  MRN; [id number]           Mr Justin Nunez is a 47 yo M, currently being worked up for possible metastatic cancer of unknown primary  Initial biopsy of neck mass demonstrated features of melanoma  Currently evaluating pancreatic mass to rule out primary pancreatic cancer vs synchronous vs pancreatic metastates    Today, patient was noted to be in AFib with RVR following liver biopsy  Denies chest pain, shortness of breath, dizziness or LOC  Vitally stable other than being tachycardic  Hemodynamically stable       Assessment and Plan    1  Metastatic malignancy of unknown primary:  Biopsy of right neck mass and left neck mass reports possible melanoma  · Discussed on need for MRI brain - ordered  · Will follow up with Attending as outpatient -       2  Melanoma:  Bilateral neck mass biopsy showed features of melanoma  To evaluate if pancreatic mass is primary vs metastatic   - will order MRI brain for complete staging  - follow hepatic mass biopsy    3  Pancreatic mass:  CT abdomen with pancreatic mass identified  CA 19-9: 5418 and CEA- 63 8   GI recommend an initial IR liver biopsy ]     Status post liver biopsy today  GI following  Will follow biopsy  4  Subclavian vein thrombosis:     5  Atrial fibrillation with RVR: New onset  Following IR liver biopsy  - On metoprolol  - patient off heparin due to ultrasound-guided liver biopsy         Physical Exam  Constitutional:       General: He is not in acute distress  Appearance: He is not ill-appearing, toxic-appearing or diaphoretic  Comments: Cachetic   Neck:      Vascular: No carotid bruit  Cardiovascular:      Rate and Rhythm: Tachycardia present  Rhythm irregular  Pulses: Normal pulses  Heart sounds: Normal heart sounds  No murmur heard  No friction rub  No gallop  Pulmonary:      Effort: Pulmonary effort is normal  No respiratory distress  Breath sounds: Normal breath sounds   No stridor  No wheezing, rhonchi or rales  Chest:      Chest wall: Tenderness (generalized) present  Lymphadenopathy:      Cervical: Cervical adenopathy (bilateral) present

## 2021-07-30 NOTE — PROGRESS NOTES
Pastoral Care Progress Note    2021  Patient: Chantale Cohn : 1970  Admission Date & Time: 2021  MRN: [id number] CSN: [id number]           21 1400   Clinical Encounter Type   Visited With Patient   Sikhism Encounters   Sikhism Needs Prayer   Sacramental Encounters   Sacrament of Sick-Anointing Patient declined anointing

## 2021-07-30 NOTE — PROGRESS NOTES
Heart rate prior to and during liver biopsy was between 120 and 150  He remains hemodynamically stable and asymptomatic  This is concerning for new onset arrhythmia of most likely AFib  This was communicated to medicine SOD

## 2021-07-30 NOTE — BRIEF OP NOTE (RAD/CATH)
INTERVENTIONAL RADIOLOGY PROCEDURE NOTE    Date: 7/30/2021    Procedure:  Liver biopsy    Preoperative diagnosis:   1  Abnormal CT scan, neck    2  Opacity of lung on imaging study    3  Scalp lesion    4  Metastatic cancer ([address] )    5  Cervical stenosis of spinal canal    6  Cervical spinal mass ([address] )    7  Bone metastasis ([facility] [address] )    8  Pancreatic lesion         Postoperative diagnosis: Same  Surgeon: Lloyd Garcia MD     Assistant: None  No qualified resident was available  Blood loss:  Minimal    Specimens:  4 passes 18 gauge core     Findings:  Lesion material were obtained preliminarily by touch prep    Complications: None immediate      Anesthesia: conscious sedation

## 2021-07-30 NOTE — DISCHARGE INSTRUCTIONS
Percutaneous Liver Biopsy   WHAT YOU NEED TO KNOW:   A PLB is a procedure to remove a sample of tissue from your liver  The sample can be sent to a lab and tested for liver disease, cancer, or infection  After the procedure you may have pain and bruising at the biopsy site  You may also have pain in your right shoulder  These symptoms should get better in 48 to 72 hours  DISCHARGE INSTRUCTIONS:     [address] and Lance Ko patients,  Contact Interventional Radiology at [phone] PATIENTS: Contact Interventional Radiology at [phone]   [facility] PATIENTS: Contact Interventional Radiology at [phone] if:    · Fever greater than 101 or chills  · You have severe pain in your abdomen  · Your abdomen is larger than usual and feels hard  · Your neck is more swollen and you have trouble swallowing  · You feel weak or dizzy  · Your heart is beating faster than usual    · Your pain does not get better after you take pain medicine  · Your wound is red, swollen, or draining pus  · You have nausea or are vomiting  · Your skin is itchy, swollen, or you have a rash  · You have questions or concerns about your condition or care  Medicines:   · Acetaminophen decreases pain and fever  It is available without a doctor's order  Acetaminophen can cause liver damage if not taken correctly  · Take your home medicine as directed  · Resume your normal diet  Small sips of flat soda will help with mild nausea  Self-care:   · Rest as directed  Do not play sports, exercise, or lift anything heavier than 5 pounds for up to 1 week  · Apply firm, steady pressure if bleeding occurs  A small amount of bleeding from your wound is possible  Apply pressure with a clean gauze or towel for 5 to 10 minutes  Call 911 if bleeding becomes heavy or does not stop  · Ask your healthcare provider when to take your blood thinner or antiplatelet medicine   You may need to wait 24 to 72 hours to take your medicine  This will prevent bleeding  Follow up with your healthcare provider as directed: Write down your questions so you remember to ask them during your visits

## 2021-07-30 NOTE — PROGRESS NOTES
Gastroenterology Progress Note      PATIENT INFORMATION      Patient: Payam Barlow 48 y o  male   MRN: [id number]  PCP: No primary care provider on file  Unit/Bed#: [facility] 914-01 Encounter: [id number]  Date Of Visit: 07/30/21  Current Length of Stay: 7 day(s)     ASSESSMENTS & PLAN    Payam Barlow is a 48 y o  old male with no significant past medical history who presented with unintentional weight loss of 35 lb for 4 weeks  Patient was admitted for metastatic disease of unknown origin  Incisional biopsy of left lymph nodes and excisional biopsy of right lymph nodes preliminary result as malignant epithelioid neoplasm possibly melanoma     Reason for GI consult:  Pancreatic mass suspicious for pancreatic adenocarcinoma    1  Pancreatic Mass - Patient underwent IR guided biopsy of liver nodules  Awaiting path results  Rule out primary metastatic malignant melanoma or synchronous pancreatic adenocarcinoma and melanoma  · As per last note-if biopsy is positive for pancreatic adenocarcinoma Mets, this is suggestive of a 2nd primary  · If biopsy is negative for pancreatic mass, consider EUS for pancreas biopsy to confirm  2  Constipation due to opoid therapy  - Patient has not had a bowel movement in over 9 days  He is able to pass gas  Xray abdomen (07/28) did not show bowel obstruction  · Bowel regimen updated: increased Miralax to BID, increased docusate to BID  · If patient does not have bowel movement with new regimen, prn suppository dulcolax ordered    SUBJECTIVE     27-year-old male who was originally admitted for metastatic disease of unknown origin is being consulted for suspicious of primary pancreatic adenocarcinoma  Patient is examined at bedside  He is seen wearing a cervical brace  Appears cachetic  Reports having IR liver biopsy done earlier this morning  Appetite has increased since admission and is tolerating food well  Endorses abdominal pain secondary to constipation   Has not had a bowel movement in 9 days  Has been passing gas      OBJECTIVE     Vitals:   Temp (24hrs), Av 7 °F (37 1 °C), Min:98 7 °F (37 1 °C), Max:98 7 °F (37 1 °C)    Temp:  [98 7 °F (37 1 °C)] 98 7 °F (37 1 °C)  Resp:  [18-19] 19  BP: (102-120)/(65-87) 102/65  Body mass index is 16 6 kg/m²  Input and Output Summary (last 24 hours): Intake/Output Summary (Last 24 hours) at 2021 0840  Last data filed at 2021 5397  Gross per 24 hour   Intake 3909 78 ml   Output 0 ml   Net 3909 78 ml       Physical Exam:   GENERAL: cachectic  HEENT:  NC/AT, no scleral icterus  CARDIAC:  RRR, +S1/S2  PULMONARY:  non-labored breathing  ABDOMEN:  Diffuse adenopathy,    Soft, NT/ND, +BS, no rebound/guarding/rigidity  Extremities:  No edema, cyanosis, or clubbing  NEUROLOGIC:  Alert  SKIN:  No rashes or erythema, no jaundice          ADDITIONAL DATA     Labs & Recent Cultures:     Results from last 7 days   Lab Units 21  0555   WBC Thousand/uL 24 53*   HEMOGLOBIN g/dL 8 6*   HEMATOCRIT % 26 8*   PLATELETS Thousands/uL 333   NEUTROS PCT % 88*   LYMPHS PCT % 6*   MONOS PCT % 5   EOS PCT % 0     Results from last 7 days   Lab Units 21  0555   POTASSIUM mmol/L 4 2   CHLORIDE mmol/L 113*   CO2 mmol/L 27   BUN mg/dL 14   CREATININE mg/dL 0 39*   CALCIUM mg/dL 8 8   ALK PHOS U/L 173*   ALT U/L 58   AST U/L 57*             Results from last 7 days   Lab Units 21  0136 21  0135 21  0940 21  0851   BLOOD CULTURE  No Growth at 24 hrs  No Growth at 24 hrs   --   --    GRAM STAIN RESULT   --   --  No Polys or Bacteria seen No Polys or Bacteria seen         Nutrition:  Diet NPO; Sips with meds  Radiology Results:   CT RADIATION THERAPY   Final Result by Darius Wright MD (1647)      Limited to CT for planning radiation therapy   Multiple bilateral neck masses corresponding to the abnormality noted on the previous CT neck         Largest in the left neck in the posterior triangle measuring about 2 5 cm x 1 2 cm, seen in image 99 series 2      Foci of air are noted in the lower neck on the left side with some surrounding infiltration these may may be sequela of  recent excisional biopsy             I personally discussed this study with [facility] on 7/29/2021 at 4:47 PM                            Workstation performed: [id number]         XR abdomen 1 view kub   Final Result by Alon Forrest MD (07/29 3454)      Larger than typical volume of stool seen over the colon consistent with reported history of constipation  No bowel obstruction  Workstation performed: [id number]         MRI thoracic spine wo contrast   Final Result by Landry Houston MD (07/28 8817)      Widely metastatic disease to the thoracic spinal axis with early pathologic fracture superior T11 endplate  No extraosseous tumor within the spinal canal, no cord or root compression  Multiple soft tissue masses in the adjacent soft tissues  Workstation performed: [id number]         MRI cervical spine w wo contrast   Final Result by Shirleyann Landau, MD (07/27 6810)      1  Signal abnormality and erosive change of C3 laminae and spinous process along with lesser degree signal abnormality in the posterior elements of C1, C2, and C4  Associated epidural enhancement /enhancing soft tissue spanning from inferior C2-C4    resulting in severe canal stenosis and cord compression  Findings most favor metastatic disease  Infection is within differential consideration but less favored given findings elsewhere in the body  Correlate with sampling  2   Multiple rim-enhancing lesions within paraspinal musculature, superficial and deep soft tissue neck as demonstrated on recent soft tissue neck CT  Differential considerations are similar as above  3   Heterogeneous marrow signal and enhancement of the cervical and visualized thoracic vertebras concerning for infiltrative metastatic disease        4   Redemonstrated occluded left internal jugular vein  5   Degenerative change as above  I personally discussed this study with Lorne Bright on 7/27/2021 at 9:35 AM                Workstation performed: [id number]         VAS upper limb venous duplex scan, complete, bilateral   Final Result by Carol Cardoza MD (07/26 2155)      CT abdomen pelvis w contrast   Final Result by Merari Trejo MD (07/26 1257)      There is a 2 8 x 2 2 x 1 8 cm ill-defined, hypodense pancreatic lesion, highly suspicious for pancreatic adenocarcinoma (series 3 images 20-22 and series 601 images 57 - 67 )      There is widespread metastatic disease with numerous liver lesions, bilateral adrenal metastases, multiple lytic bone metastases, with resulting mild superior end plate compression fractures of T11 and L3 as above, and also intramuscular metastases as    above  An alternative diagnosis to metastatic pancreatic adenocarcinoma, given the unusual intramuscular metastasis, would be metastatic melanoma  Please correlate with already performed biopsy results  Workstation performed: [id number]         XR spine cervical 2 or 3 vw injury   Final Result by Dev Moore MD (07/26 162)      1  Destructive lytic lesion within the C3 vertebral body extending into the posterior elements suspicious for metastatic disease  2   Additional ill-defined lytic appearing lesions in the C4-C7 vertebral bodies also suggestive of metastatic disease  Workstation performed: [id number]         XR spine thoracolumbar 2 vw   Final Result by Dev Moore MD (07/26 1620)      Mild superior endplate compression fracture of T11  Multiple lytic lesions to include T11, L3 and S1 are better visualized on recent CT        Workstation performed: [id number]         CT head without contrast   Final Result by Laura Rinaldi MD (07/22 8945)      There is an approximately 2 4 x 1 4 x 2 2 cm masslike lesion involving the scalp overlying the posterior left parietal region  Neoplasm and/or infection should be excluded  Sampling is recommended  The study was marked in [facility] for immediate notification  Workstation performed: [id number]         CTA ED chest PE Study   Final Result by Maria Esther Christine MD (07/23 9456)      No evidence of pulmonary embolism is seen  Measured RV/LV ratio is within normal limits at less than 0 9  Numerous nodular opacities are present bilaterally within the lungs  The appearances most suggestive of metastatic disease  Extensive hepatic metastatic disease  Large bilateral adrenal masses  Extensive lymphadenopathy  Probable osseous metastatic disease with mild to moderate compression deformity of the superior endplate of H89, suggesting pathologic compression  Possible thrombus right subclavian vein extending into the brachiocephalic vein  Consider venous duplex ultrasound for further evaluation  The study was marked in [facility] for immediate notification        Workstation performed: [id number]         MRI thoracic spine w wo contrast    (Results Pending)   MRI lumbar spine w wo contrast    (Results Pending)   IR biopsy liver mass    (Results Pending)     Scheduled Medications:  dexamethasone, 4 mg, Q6H CHULA  methylnaltrexone bromide, 8 mg, Once  polyethylene glycol, 17 g, Daily  senna-docusate sodium, 1 tablet, HS        PRN MEDS:  acetaminophen, 650 mg, Q6H PRN  Gadobutrol, 5 mL, Once in imaging  heparin (porcine), 2,000 Units, Q1H PRN  heparin (porcine), 4,000 Units, Q1H PRN  HYDROmorphone, 0 5 mg, Q3H PRN  oxyCODONE, 10 mg, Q6H PRN   Or  oxyCODONE, 15 mg, Q6H PRN        Last 24 Hours Medication List:   Current Facility-Administered Medications   Medication Dose Route Frequency Provider Last Rate    acetaminophen  650 mg Oral Q6H PRN Domi Choe DO      dexamethasone  4 mg Oral Q6H [address] Xavier Mcnamara MD      Gadobutrol  5 mL Intravenous Once in imaging Domi Shupp, DO      heparin (porcine)  3-30 Units/kg/hr (Order-Specific) Intravenous Titrated Domi Shupp, DO Stopped (07/30/21 0718)    heparin (porcine)  2,000 Units Intravenous Q1H PRN Domi Shupp, DO      heparin (porcine)  4,000 Units Intravenous Q1H PRN Domi Shupp, DO      HYDROmorphone  0 5 mg Intravenous Q3H PRN Domi Shupp, DO      methylnaltrexone bromide  8 mg Subcutaneous Once Domi Shupp, DO      oxyCODONE  10 mg Oral Q6H PRN Domi Shupp, DO      Or    oxyCODONE  15 mg Oral Q6H PRN Domi Shupp, DO      polyethylene glycol  17 g Oral Daily Domi Shupp, DO      senna-docusate sodium  1 tablet Oral HS Domi Shupp, DO            Time Spent for Care: 30 mins spent in total   More than 50% of total time spent on counseling and coordination of care as described above  Current Length of Stay: 7 day(s)      Code Status: Level 1 - Full Code          ** Please Note: This note is constructed using a voice recognition dictation system   **

## 2021-07-30 NOTE — SEDATION DOCUMENTATION
Spoke with patient's primary RN regarding patient's heart rate and rhythm  She was unaware  She is aware that we will do procedure

## 2021-07-30 NOTE — TREATMENT PLAN
Reviewed imaging again during rounds this morning  In setting of heavy disease burden and late stage metastatic disease, patient can wear TLSO brace as needed for comfort and soft cervical brace for comfort  No further neurosurgical intervention or follow up needed  Please call with any questions or concerns

## 2021-07-30 NOTE — PROGRESS NOTES
H&P reviewed  Underwent excisional lymph node biopsy revealing malignant melanoma  Also has pancreatic mass with multiple liver nodules concerning for 2nd primary  Consult for liver biopsy  /65   Pulse 101   Temp 98 7 °F (37 1 °C) (Oral)   Resp 19   Ht 5' 9" (1 753 m)   Wt 51 kg (112 lb 7 oz)   SpO2 94%   BMI 16 60 kg/m²     Prior imaging was reviewed  Procedure discussed and questions answered  Ultrasound-guided biopsy of liver mass  Informed written consent was obtained      Sydnie King MD

## 2021-07-30 NOTE — ASSESSMENT & PLAN NOTE
7/28/2021 Alerted by nursing that pt has ulcer on the coccyx  Pt had 1 occurrence of fever to 101 F overnight, with subsequent T 99 F  Pt has also been tachycardic  WBC trending upward from 10 08 to 24 53  Difficult to determine if change in pt status is due to stress of surgical LN bx vs  Pain vs  Stress of MRI (claustrophobia, pain) vs   Infection vs  Metastatic cancer vs due to Steroid use  Pt with multiple sources of infection: recent surgical bx, ulcer, yet septic workup is negative       Plan:  · Continue to monitor off antibiotics   · Monitor WBC count and trend fever curve

## 2021-07-30 NOTE — CONSULTS
Please see GI consultation note incorrectly labeled as progress note from 7/29 by Jonny Lee, [address], D O  Gastroenterology Fellow  [facility]  Available on Kristine Sings  [email or web address]

## 2021-07-30 NOTE — SEDATION DOCUMENTATION
Patient tolerated procedure without any immediate complications  Patient offers no complaints at this time  Gauze and tape applied  I have contacted to GI Attending regarding patient's heart rate and rhythm  Attempting to contact Dr Lawrence Espino via tiger text  primary attending to notify him  I will take patient back to his room on monitor

## 2021-07-30 NOTE — ASSESSMENT & PLAN NOTE
7/30/2021 alerted by nursing that pt was in Afib with RVR s/p IR US guided liver bx  He was stable at that time  Upon arrival to the floors pt was re-evaluated and found to still be in afib with RVR, -150s  /80s  Patient with inadequate response to Lopressor x2 and Cardizem bolus x2  Patient started on a Cardizem drip with improvement in heart rates  Transition off on 07/31/2021 and continued on metoprolol 50 mg b i d      Plan:   Metoprolol tartrate 50 mg BID   Heparin gtt   Telemetry monitoring   Monitor and replete to maintain Mg>2 and K>4

## 2021-07-30 NOTE — SEDATION DOCUMENTATION
Patient escorted to  on monitor with Rn and transporter  Care transferred to [facility], nurse small and Dr Jael Choe at bedside  Verbal report given to all of them  Aware that patient came to us in a rapid Atrial fib  Aware that had his procedure done and that the liver biopsy was completed  Aware that patient reports he's had mild "chest pain since he got here"   Care transferred at this time

## 2021-07-30 NOTE — QUICK NOTE
Notified by nursing at approx 9:50 AM that patient was found to be in Afib with RVR while down in IR for liver biopsy  Stable at that time  Patient returned to the floors S/P liver biopsy at approx 10:30 AM  Patient seen and examined at bedside at time of arrival to the floors  Patient complaining of mild abdominal/epigastric pain that is reproducible with palpation  Patient otherwise asymptomatic denying lightheadedness/dizziness, palpitations, diaphoresis, or shortness of breath  Overall,  patient lying in bed comfortably in no acute distress or visible discomfort  Cervical collar in place and patient lying on his back  Heart rate rapid in a regular rate and lungs clear to auscultation bilaterally  Abdominal examination remains unchanged  -150s and EKG completed demonstrating atrial fibrillation with rapid ventricular response  /80s  Patient administered 5 mg Lopressor with improvement in heart rates to 90s to 110  Electrolytes within normal limits on morning labs but Mg and phos pending - patient administered 1 g magnesium  Of note:  Patient was previously on heparin gtt due to presence of an acute and subacute mostly occlusive DVT in the left internal jugular vein  Patient also noted to have acute nonocclusive DVT in the innominate, subclavian, and axillary vein with extension into the proximal brachial vein on the left  Heparin gtt was held for 2 hours prior to the procedure  Per IR, heparin gtt to remain on hold and can be restarted 4 hour post-procedure        Plan:    S/P Lopressor 5 mg IV x1 and Mag Sulfate IV 1 g   Restarted Heparin gtt 4 hours post IR procedure approx at 2:00   Monitor on telemetry    Closely monitor hemodynamics    Monitor and replete to maintain Mg>2 and K>4    Domi Choe DO, PGY-2  [facility] [place] [facility]

## 2021-07-31 PROBLEM — A41.9 SEPSIS (HCC): Status: RESOLVED | Noted: 2021-01-01 | Resolved: 2021-01-01

## 2021-07-31 NOTE — PROGRESS NOTES
INTERNAL MEDICINE RESIDENCY PROGRESS NOTE     Name: Mignon Owens   Age & Sex: 48 y o  male   MRN: [id number]  Unit/Bed#: [facility] 914-01   Encounter: [id number]  Team: SOD Team B     PATIENT INFORMATION     Name: Mignon Owens   Age & Sex: 48 y o  male   MRN: [id number]  Hospital Stay Days: 8    ASSESSMENT/PLAN     Principal Problem:    Metastatic cancer [facility])  Active Problems:    Cervical stenosis of spinal canal    Subclavian vein thromboembolism, acute, right (HCC)    Compression fracture of T11 vertebra (HCC)    Anemia    Cervical spinal mass (HCC)    Scalp lesion    S/P lymph node biopsy    Severe protein-calorie malnutrition (HCC)    Pressure ulcer of coccygeal region, unspecified pressure ulcer stage    Mass of thoracic vertebra    Constipation due to opioid therapy    New onset a-fib ([address] )      * Metastatic cancer ([address] )  Assessment & Plan  49 yo M with recent abnormal findings on CT head, chest, and tissue neck concerning for extensive metastatic cancer:     7/22/21 - US Head Neck Soft Tissue: Several neck masses are identified corresponding to the palpable abnormalities within additional finding of the suspected thrombosed left jugular vein  Further evaluation with contrast-enhanced CT of the neck is recommended  7/22/21 - CT Soft Tissue Neck: Left greater than right numerous superficial and deep neck soft tissue and intramuscular rim-enhancing necrotic lesions/collections as described  Rim-enhancing epidural thickening/collection within posterolateral spinal canal spanning levels C2-C4 resulting in canal narrowing  Finding is concerning for widespread necrotizing infection  Neoplastic process is within differential consideration  Recommend correlation with sampling  Subpleural nodular opacities in the right upper lobe and superior segment of right lower lobe could indicate septic emboli  Suggested left hilar necrotic lymph node   Indeterminate sclerotic foci within T3 and T4 vertebral bodies  7/22/21 CT Head: There is an approximately 2 4 x 1 4 x 2 2 cm masslike lesion involving the scalp overlying the posterior left parietal region  Neoplasm and/or infection should be excluded  7/22/21 CTA PE study: Numerous nodular opacities are present bilaterally within the lungs  The appearances most suggestive of metastatic disease  Extensive hepatic metastatic disease  Large bilateral adrenal masses  Extensive lymphadenopathy  Probable osseous metastatic disease with mild to moderate compression deformity of the superior endplate of Y73, suggesting pathologic compression  7/24/2021 CT Abd and pelvis: There is a 2 8 x 2 2 x 1 8 cm ill-defined, hypodense pancreatic lesion, highly suspicious for pancreatic adenocarcinoma (series 3 images 20-22 and series 601 images 57 - 67 ) There is widespread metastatic disease with numerous liver lesions, bilateral adrenal metastases, multiple lytic bone metastases, with resulting mild superior end plate compression fractures of T11 and L3 as above, and also intramuscular metastases as   above      An alternative diagnosis to metastatic pancreatic adenocarcinoma, given the unusual intramuscular metastasis, would be metastatic melanoma  Please correlate with already performed biopsy results  BL LN Bx  · Right neck mass: Malignant epithelioid neoplasm, possibly melanoma, morphologically identical to that in specimen B, at least 1 cm maximal dimension, present at specimen edges  · Left neck mass: Malignant epithelioid neoplasm, possibly melanoma, at least 1 5 cm maximal dimension, present in dermal lymphatics and at specimen edges, likely metastatic  Benign overlying epidermis  CEA 63 8; CA 19-9: 5418, LDH: 1234  At this time, concern for synchronous malignancy given preliminary pathology reports demonstrating melanoma and CA 19 9 elevated in the setting of pancreatic lesion with liver metastasis concerning for pancreatic adenocarcinoma    There is a generic link between these 2 cancers raising concern  Plan:  · Pain Regimen:   · Oxycodone 5/10 mg q3 hours PRN mod/severe  · Dilaudid 0 5 mg q3 hours PRN for breakthrough  · Bowel regimen ordered  · Medical Oncology Consulted and Appreciate Recommendation  · MRI brain ordered and pending  · Surgical Oncology Consulted  · S/P Punch Biopsy on 7/23/21; follow up on pathology results  · Plan for Lymph Node Biopsy on 7/26/21  · Continue to monitor kidney fxn and electrolytes; potential for Tumor lysis syndrome   · GI consulted and appreciate recommendations   · IR bx liver mets 7/30/2021  · If negative for pancreatic mets will proceed with endoscopic ultrasound with pancreatic biopsy  Cervical stenosis of spinal canal  Assessment & Plan  Pt with cervical canal narrowing likely due to rim-enhancing epidural thickening/collection within posterolateral spinal canal spanning levels C2-C4  Findings could be due to necrotizing infectious process or metastasis related  Multiple Concerns for this level of of cord compression: paralysis, dysfunction of the diaphragm  7/28/2021 MRI CS Signal abnormality and erosive change of C3 laminae and spinous process along with lesser degree signal abnormality in the posterior elements of C1, C2, and C4  Associated epidural enhancement /enhancing soft tissue spanning from inferior C2-C4 resulting in severe canal stenosis and cord compression  Findings most favor metastatic disease  Infection is within differential consideration but less favored given findings elsewhere in the body  Correlate with sampling  Multiple rim-enhancing lesions within paraspinal musculature, superficial and deep soft tissue neck as demonstrated on recent soft tissue neck CT  Differential considerations are similar as above  Heterogeneous marrow signal and enhancement of the cervical and visualized thoracic vertebras concerning for infiltrative metastatic disease      Plan:  · Continue on IV Decadron 5mg q6 hours PO  · Radiation oncology consulted  · Patient status post CT stimulation 07/29/2021; started on palliative radiation therapy times 10 sessions on 7/30/2021  · Neurosurgery consulted and appreciate recommendations  · Frequent neurochecks  · In setting of heavy disease burden and late stage metastatic disease, patient can wear TLSO brace as needed for comfort and soft cervical brace for comfort  Subclavian vein thromboembolism, acute, right [facility])  Assessment & Plan  Patient noted to have evidence go subclavian, internal jugular, and brachiocephalic vein thrombus on imaging on arrival  Started on heparin gtt  Remains on heparin gtt in setting of new onset Afib and ongoing workup and possibility for intervention  7/22/21 - CT Soft Tissue Neck with Contrast: Occluded left subclavian and brachiocephalic veins as well as left internal jugular vein to the level of hyoid bone    7/22/21 - CTA PE chest: Possible thrombus right subclavian vein extending into the brachiocephalic vein  7/27/2021 venous duplex US LEFT UPPER LIMB: Evidence of acute and subacute mostly occlusive deep vein thrombosis noted in the internal jugular vein  Evidence of acute non occlusive deep vein thrombosis noted in the innominate, subclavian and axillary with extension into the most proximal brachial vein  Plan:  · Heparin gtt;will ule require transition to warfarin or DOAC; have not transition due to possible GI intervention  · Vascular surgery made aware; do not recommend any surgical intervention  · Cont to monitor PTT     New onset a-fib [facility])  Assessment & Plan  7/30/2021 alerted by nursing that pt was in Afib with RVR s/p IR US guided liver bx  He was stable at that time  Upon arrival to the floors pt was re-evaluated and found to still be in afib with RVR, -150s  /80s  Patient with inadequate response to Lopressor x2 and Cardizem bolus x2  Patient started on a Cardizem drip with improvement in heart rates  Transition off on 07/31/2021 and continued on metoprolol 50 mg b i d  Plan:   Metoprolol tartrate 50 mg BID   Heparin gtt   Telemetry monitoring   Monitor and replete to maintain Mg>2 and K>4      Constipation due to opioid therapy  Assessment & Plan  Pt last bowel movement prior to admission  Increasing abd pain/tenderness/distension  Tympanic  (-) rebound  Constipation unresponsive to lactulose 10 g, Mag citrate, and Relistor 8 mg  Most likely due to constipation which is opioid induced  7/28/2021 KUB Larger than typical volume of stool seen over the colon consistent with reported history of constipation  No bowel obstruction    Plan:  · Bowel regimen in place      Mass of thoracic vertebra  Assessment & Plan  7/28/2021 MRI TS Widely metastatic disease to the thoracic spinal axis with early pathologic fracture superior T11 endplate  No extraosseous tumor within the spinal canal, no cord or root compression  Multiple soft tissue masses in the adjacent soft tissues  Plan  · Neurosurgery consult   · MRI TS and LS w/wo contrast under anesthesia     Pressure ulcer of coccygeal region, unspecified pressure ulcer stage  Assessment & Plan  7/28/2021 Alerted by nursing that pt has ulcer on the coccyx  Per wound care: "Mid sacrum - evolving DTI - HA  Wound presents as approx: 20% evolving open partial thickness appearing moist yellow tissue, and 80% intact non-evolved non-blanchable purple skin  All aspects measured together  Edges fragile and attached, no maceration  Tatianna-wound is intact with pink blanchable skin   Suspect mixed etiology of pressure and friction given the chaffed/irregular dry appearance  -Will recommend foam dressing to protect "    Plan:  · Per wound care  · Frequent repositioning   · Apply hydraguard to b/l heels and b/l buttocks and sacrum BID and PRN for prevention and protection  · Apply skin nourishing cream the entire skin daily for moisture  · Turn and reposition patient every  2 hours   · Elevate heels off of bed with pillows to offload pressure   · Apply EHOB waffle cushion to chair when OOB, if able  · Cleanse sacral wound with NSS, pat dry, and apply Allevyn foam dressing  Feroz dressing with T  Change every other day and PRN for soilage/dislodgement  · Wound care will follow along with patient weekly     Severe protein-calorie malnutrition ([address] )  Assessment & Plan  Malnutrition Findings:   Adult Malnutrition type: Acute illness  Adult Degree of Malnutrition: Other severe protein calorie malnutrition (related to medical condition <50% energy intake versus needs for > 5 days evidenced by BMI 16 98; lost 9#(7 4%) since 6/10/21; moderate fat loss somewhat hollow orbital, moderate muscle loss visible clavicle  Treat Regular diet &Ensure Enlive TID )    BMI Findings:  Adult BMI Classifications: Underweight < 18 5     Body mass index is 16 6 kg/m²  Plan:  · Regular diet   · Ensure supplementation TID with meals  · Nutrition recommendations appreciated       S/P lymph node biopsy  Assessment & Plan  LN Bx done 7/26/2021; INCISIONAL BIOPSY LEFT NECK MASS,AND EXCISIOINAL BIOPSY RIGHT NECK MASS (Bilateral)  Preliminary report demonstrating melanoma  Scalp lesion  Assessment & Plan  Punch bx done  Preliminary pathology demonstrating melanoma  Plan  · Continue local wound care  Anemia  Assessment & Plan  Hgb 9 9 and Hct 29 6 at time of admission  Currently Hgb 8 6  Most likely anemia of chronic inflammation with some component of iron deficiency  Fe sat: 14, TIBC 205, Fe 28, Ferritin 1230  Retic: 5 12    Plan:  · Monitor Hgb and Hct  · Transfuse if patient becomes symptomatic or Hgb drops < 7    Sepsis (HCC)-resolved as of 7/31/2021  Assessment & Plan  7/28/2021 Alerted by nursing that pt has ulcer on the coccyx  Pt had 1 occurrence of fever to 101 F overnight, with subsequent T 99 F  Pt has also been tachycardic  WBC trending upward from 10 08 to 24 53   Difficult to determine if change in pt status is due to stress of surgical LN bx vs  Pain vs  Stress of MRI (claustrophobia, pain) vs   Infection vs  Metastatic cancer vs due to Steroid use  Pt with multiple sources of infection: recent surgical bx, ulcer, yet septic workup is negative  Plan:  · Continue to monitor off antibiotics   · Monitor WBC count and trend fever curve        Disposition: Will continue to require inpatient level of care  Awaiting further imaging for staging including MRI brain  S/P liver biopsy and awaiting path; possible EUS with pancreatitic lesion biopsy pending results  Remains on heparin gtt for right IJ thrombus  SUBJECTIVE     Yesterday evening patient started Cardizem gtt due to uncontrolled, asymptomatic atrial fibrillation  Improvement in rates overnight and Cardizem gtt discontinued this morning  No additional events overnight  Patient seen examined at bedside this morning  Patient lying in bed comfortably no acute distress or visible discomfort  Admits to having bowel movement overnight with significant improvement in abdominal pain  Patient continues to deny chest pain, palpitations, shortness of breath, lightheadedness/dizziness, weakness, or diaphoresis  Patient overall asymptomatic at this time and with no complaints  OBJECTIVE     Vitals:    21 2045 21 0305 21 0500 21 0900   BP: 122/78 (!) 118/22 125/83 130/76   BP Location: Right arm Right arm  Right arm   Pulse: 105  100 80   Resp:  18  16   Temp:  98 2 °F (36 8 °C)  98 5 °F (36 9 °C)   TempSrc:  Oral  Oral   SpO2:  97%  97%   Weight:       Height:          Temperature:   Temp (24hrs), Av 4 °F (36 9 °C), Min:98 2 °F (36 8 °C), Max:98 5 °F (36 9 °C)    Temperature: 98 5 °F (36 9 °C)  Intake & Output:  I/O       701 -  07 -  07 07 -  0700    P  O  1000 2880     I V  (mL/kg) 2867 2 (54 9) 2081 9 (39 9)     Total Intake(mL/kg) 3867 2 (74 1) 4961 9 (95 1)     Urine (mL/kg/hr) 0 (0) 5100 (4 1)     Total Output 0 5100     Net +3867 2 -138  1            Unmeasured Urine Occurrence 1 x          Weights:   IBW (Ideal Body Weight): 70 7 kg    Body mass index is 16 6 kg/m²  Weight (last 2 days)     None        Physical Exam  Constitutional:       General: He is not in acute distress  Appearance: Normal appearance  He is cachectic  He is ill-appearing  He is not diaphoretic  HENT:      Head: Mass present  Comments: Palpable, tender mass in left occipital region     Nose: Nose normal  No congestion  Eyes:      General: No scleral icterus  Conjunctiva/sclera: Conjunctivae normal    Neck:      Comments: Cervical collar in place  Cardiovascular:      Rate and Rhythm: Normal rate and regular rhythm  Heart sounds: Normal heart sounds  Pulmonary:      Effort: Pulmonary effort is normal       Breath sounds: Normal breath sounds  No wheezing  Abdominal:      General: Bowel sounds are normal       Tenderness: There is no abdominal tenderness  Musculoskeletal:      Cervical back: Pain with movement and muscular tenderness present  Right lower leg: No edema  Left lower leg: No edema  Skin:     General: Skin is warm and dry  Capillary Refill: Capillary refill takes less than 2 seconds  Neurological:      Mental Status: He is alert and oriented to person, place, and time  Psychiatric:         Mood and Affect: Mood normal          Behavior: Behavior normal        LABORATORY DATA     Labs: I have personally reviewed pertinent reports    Results from last 7 days   Lab Units 07/31/21  0534 07/30/21  0555 07/29/21  0458   WBC Thousand/uL 22 42* 24 53* 10 08   HEMOGLOBIN g/dL 8 8* 8 6* 8 4*   HEMATOCRIT % 28 8* 26 8* 25 4*   PLATELETS Thousands/uL 365 333 239   NEUTROS PCT % 87* 88* 89*   MONOS PCT % 3* 5 2*      Results from last 7 days   Lab Units 07/31/21  0534 07/30/21  0555 07/29/21  0458 07/28/21  0754   POTASSIUM mmol/L 4 2 4 2 4 0 4 3  4 3 CHLORIDE mmol/L 111* 113* 110* 105  104   CO2 mmol/L 24 27 28 29  31   BUN mg/dL 13 14 10 8  8   CREATININE mg/dL 0 70 0 39* 0 41* 0 53*  0 52*   CALCIUM mg/dL 8 8 8 8 9 2 9 5  9 8   ALK PHOS U/L  --  173* 134* 140*   ALT U/L  --  58 32 30   AST U/L  --  57* 29 39     Results from last 7 days   Lab Units 07/31/21  0534 07/30/21  0555 07/29/21  0458   MAGNESIUM mg/dL 2 1 1 9 1 7     Results from last 7 days   Lab Units 07/31/21  0534 07/30/21  0555 07/29/21  0458   PHOSPHORUS mg/dL 2 9 3 2 3 6      Results from last 7 days   Lab Units 07/31/21  0920 07/30/21 2037 07/30/21  1429   PTT seconds 76* 56* 28     Results from last 7 days   Lab Units 07/29/21  0458   LACTIC ACID mmol/L 1 5           IMAGING & DIAGNOSTIC TESTING     Radiology Results: I have personally reviewed pertinent reports  XR spine cervical 2 or 3 vw injury    Result Date: 7/26/2021  Impression: 1  Destructive lytic lesion within the C3 vertebral body extending into the posterior elements suspicious for metastatic disease  2   Additional ill-defined lytic appearing lesions in the C4-C7 vertebral bodies also suggestive of metastatic disease  Workstation performed: [id number]     XR spine thoracolumbar 2 vw    Result Date: 7/26/2021  Impression: Mild superior endplate compression fracture of T11  Multiple lytic lesions to include T11, L3 and S1 are better visualized on recent CT  Workstation performed: [id number]     CT head without contrast    Result Date: 7/22/2021  Impression: There is an approximately 2 4 x 1 4 x 2 2 cm masslike lesion involving the scalp overlying the posterior left parietal region  Neoplasm and/or infection should be excluded  Sampling is recommended  The study was marked in [facility] for immediate notification  Workstation performed: [id number]     CT soft tissue neck w contrast    Result Date: 7/22/2021  Impression: 1    Left greater than right numerous superficial and deep neck soft tissue and intramuscular rim-enhancing necrotic lesions/collections as described  Rim-enhancing epidural thickening/collection within posterolateral spinal canal spanning levels C2-C4 resulting in canal narrowing  Finding is concerning for widespread necrotizing infection  Neoplastic process is within differential consideration  Recommend correlation with sampling  2   Occluded left subclavian and brachiocephalic veins as well as left internal jugular vein to the level of hyoid bone suggesting  3   Subpleural nodular opacities in the right upper lobe and superior segment of right lower lobe could indicate septic emboli  Suggested left hilar necrotic lymph node  4   Indeterminate sclerotic foci within T3 and T4 vertebral bodies  I personally discussed this study with Chrissy Cooper on 7/22/2021 at 2:10 PM  Workstation performed: [id number]     MRI cervical spine w wo contrast    Result Date: 7/27/2021  Impression: 1  Signal abnormality and erosive change of C3 laminae and spinous process along with lesser degree signal abnormality in the posterior elements of C1, C2, and C4  Associated epidural enhancement /enhancing soft tissue spanning from inferior C2-C4 resulting in severe canal stenosis and cord compression  Findings most favor metastatic disease  Infection is within differential consideration but less favored given findings elsewhere in the body  Correlate with sampling  2   Multiple rim-enhancing lesions within paraspinal musculature, superficial and deep soft tissue neck as demonstrated on recent soft tissue neck CT  Differential considerations are similar as above  3   Heterogeneous marrow signal and enhancement of the cervical and visualized thoracic vertebras concerning for infiltrative metastatic disease  4   Redemonstrated occluded left internal jugular vein  5   Degenerative change as above    I personally discussed this study with Scott Chino on 7/27/2021 at 9:35 AM  Workstation performed: [id number]     US head neck soft tissue    Result Date: 7/22/2021  Impression: Several neck masses are identified corresponding to the palpable abnormalities within additional finding of the suspected thrombosed left jugular vein  Further evaluation with contrast-enhanced CT of the neck is recommended  The study was marked in [facility] for immediate notification  Workstation performed: [id number]     CTA ED chest PE Study    Result Date: 7/23/2021  Impression: No evidence of pulmonary embolism is seen  Measured RV/LV ratio is within normal limits at less than 0 9  Numerous nodular opacities are present bilaterally within the lungs  The appearances most suggestive of metastatic disease  Extensive hepatic metastatic disease  Large bilateral adrenal masses  Extensive lymphadenopathy  Probable osseous metastatic disease with mild to moderate compression deformity of the superior endplate of I85, suggesting pathologic compression  Possible thrombus right subclavian vein extending into the brachiocephalic vein  Consider venous duplex ultrasound for further evaluation  The study was marked in [facility] for immediate notification  Workstation performed: [id number]     CT abdomen pelvis w contrast    Result Date: 7/26/2021  Impression: There is a 2 8 x 2 2 x 1 8 cm ill-defined, hypodense pancreatic lesion, highly suspicious for pancreatic adenocarcinoma (series 3 images 20-22 and series 601 images 57 - 67 ) There is widespread metastatic disease with numerous liver lesions, bilateral adrenal metastases, multiple lytic bone metastases, with resulting mild superior end plate compression fractures of T11 and L3 as above, and also intramuscular metastases as above  An alternative diagnosis to metastatic pancreatic adenocarcinoma, given the unusual intramuscular metastasis, would be metastatic melanoma  Please correlate with already performed biopsy results   Workstation performed: [id number]     Other Diagnostic Testing: I have personally reviewed pertinent reports  ACTIVE MEDICATIONS     Current Facility-Administered Medications   Medication Dose Route Frequency    acetaminophen (TYLENOL) tablet 650 mg  650 mg Oral Q6H PRN    bisacodyl (DULCOLAX) rectal suppository 10 mg  10 mg Rectal Daily PRN    dexamethasone (DECADRON) tablet 4 mg  4 mg Oral Q6H [address]    Gadobutrol injection (SINGLE-DOSE) SOLN 5 mL  5 mL Intravenous Once in imaging    heparin (porcine) 25,000 units in 0 45% NaCl 250 mL infusion (premix)  3-30 Units/kg/hr (Order-Specific) Intravenous Titrated    heparin (porcine) injection 2,000 Units  2,000 Units Intravenous Q1H PRN    heparin (porcine) injection 4,000 Units  4,000 Units Intravenous Q1H PRN    HYDROmorphone (DILAUDID) injection 0 5 mg  0 5 mg Intravenous Q3H PRN    LORazepam (ATIVAN) injection 1 mg  1 mg Intravenous Once PRN    metoprolol tartrate (LOPRESSOR) tablet 50 mg  50 mg Oral Q12H CHULA    oxyCODONE (ROXICODONE) immediate release tablet 10 mg  10 mg Oral Q6H PRN    Or    oxyCODONE (ROXICODONE) IR tablet 15 mg  15 mg Oral Q6H PRN    polyethylene glycol (MIRALAX) packet 17 g  17 g Oral BID    senna-docusate sodium (SENOKOT S) 8 6-50 mg per tablet 1 tablet  1 tablet Oral BID       VTE Pharmacologic Prophylaxis: Heparin  VTE Mechanical Prophylaxis: sequential compression device    Portions of the record may have been created with voice recognition software  Occasional wrong word or "sound a like" substitutions may have occurred due to the inherent limitations of voice recognition software    Read the chart carefully and recognize, using context, where substitutions have occurred   ==  Domi Choe, [facility]  Internal Medicine Residency PGY-2

## 2021-08-01 NOTE — PROGRESS NOTES
[address] [facility]  Progress Note - Hazeline Masker 1970, 48 y o  male MRN: [id number]  Unit/Bed[facility] 914-01 Encounter: [id number]  Primary Care Provider: No primary care provider on file  Date and time admitted to hospital: 7/22/2021  7:16 PM    New onset a-fib [facility])  Assessment & Plan  7/30/2021 alerted by nursing that pt was in Afib with RVR s/p IR US guided liver bx  He was stable at that time  Upon arrival to the floors pt was re-evaluated and found to still be in afib with RVR, -150s  /80s  Patient with inadequate response to Lopressor x2 and Cardizem bolus x2  Patient started on a Cardizem drip with improvement in heart rates  Transition off on 07/31/2021 and continued on metoprolol 50 mg b i d  Plan:   Metoprolol tartrate 50 mg BID   Heparin gtt   Telemetry monitoring   Monitor and replete to maintain Mg>2 and K>4        Constipation due to opioid therapy  Assessment & Plan  Pt last bowel movement prior to admission  Increasing abd pain/tenderness/distension  Tympanic  (-) rebound  Constipation unresponsive to lactulose 10 g, Mag citrate, and Relistor 8 mg  Most likely due to constipation which is opioid induced  7/28/2021 KUB Larger than typical volume of stool seen over the colon consistent with reported history of constipation  No bowel obstruction    Plan:  · Bowel regimen in place      Mass of thoracic vertebra  Assessment & Plan  7/28/2021 MRI TS Widely metastatic disease to the thoracic spinal axis with early pathologic fracture superior T11 endplate  No extraosseous tumor within the spinal canal, no cord or root compression  Multiple soft tissue masses in the adjacent soft tissues  Plan  · Neurosurgery consult   · MRI TS and LS w/wo contrast under anesthesia     Pressure ulcer of coccygeal region, unspecified pressure ulcer stage  Assessment & Plan  7/28/2021 Alerted by nursing that pt has ulcer on the coccyx    Per wound care: "Mid sacrum - evolving DTI - SHANNON  Wound presents as approx: 20% evolving open partial thickness appearing moist yellow tissue, and 80% intact non-evolved non-blanchable purple skin  All aspects measured together  Edges fragile and attached, no maceration  Tatianna-wound is intact with pink blanchable skin  Suspect mixed etiology of pressure and friction given the chaffed/irregular dry appearance  -Will recommend foam dressing to protect "    Plan:  · Per wound care  · Frequent repositioning   · Apply hydraguard to b/l heels and b/l buttocks and sacrum BID and PRN for prevention and protection  · Apply skin nourishing cream the entire skin daily for moisture  · Turn and reposition patient every  2 hours   · Elevate heels off of bed with pillows to offload pressure   · Apply EHOB waffle cushion to chair when OOB, if able  · Cleanse sacral wound with NSS, pat dry, and apply Allevyn foam dressing  Feroz dressing with T  Change every other day and PRN for soilage/dislodgement  · Wound care will follow along with patient weekly     Severe protein-calorie malnutrition ([facility] [address] )  Assessment & Plan  Malnutrition Findings:   Adult Malnutrition type: Acute illness  Adult Degree of Malnutrition: Other severe protein calorie malnutrition (related to medical condition <50% energy intake versus needs for > 5 days evidenced by BMI 16 98; lost 9#(7 4%) since 6/10/21; moderate fat loss somewhat hollow orbital, moderate muscle loss visible clavicle  Treat Regular diet &Ensure Enlive TID )    BMI Findings:  Adult BMI Classifications: Underweight < 18 5     Body mass index is 16 6 kg/m²  Plan:  · Regular diet   · Ensure supplementation TID with meals  · Nutrition recommendations appreciated       S/P lymph node biopsy  Assessment & Plan  LN Bx done 7/26/2021; INCISIONAL BIOPSY LEFT NECK MASS,AND EXCISIOINAL BIOPSY RIGHT NECK MASS (Bilateral)  Preliminary report demonstrating melanoma                 Scalp lesion  Assessment & Plan  Punch bx done  Preliminary pathology demonstrating melanoma  Plan  · Continue local wound care  Anemia  Assessment & Plan  Hgb 9 9 and Hct 29 6 at time of admission  Currently Hgb 8 6  Most likely anemia of chronic inflammation with some component of iron deficiency  Fe sat: 14, TIBC 205, Fe 28, Ferritin 1230  Retic: 5 12    Plan:  · Monitor Hgb and Hct  · Transfuse if patient becomes symptomatic or Hgb drops < 7    Cervical stenosis of spinal canal  Assessment & Plan  Pt with cervical canal narrowing likely due to rim-enhancing epidural thickening/collection within posterolateral spinal canal spanning levels C2-C4  Findings could be due to necrotizing infectious process or metastasis related  Multiple Concerns for this level of of cord compression: paralysis, dysfunction of the diaphragm  7/28/2021 MRI CS Signal abnormality and erosive change of C3 laminae and spinous process along with lesser degree signal abnormality in the posterior elements of C1, C2, and C4  Associated epidural enhancement /enhancing soft tissue spanning from inferior C2-C4 resulting in severe canal stenosis and cord compression  Findings most favor metastatic disease  Infection is within differential consideration but less favored given findings elsewhere in the body  Correlate with sampling  Multiple rim-enhancing lesions within paraspinal musculature, superficial and deep soft tissue neck as demonstrated on recent soft tissue neck CT  Differential considerations are similar as above  Heterogeneous marrow signal and enhancement of the cervical and visualized thoracic vertebras concerning for infiltrative metastatic disease      Plan:  · Continue on IV Decadron 5mg q6 hours PO  · Radiation oncology consulted  · Patient status post CT stimulation 07/29/2021; started on palliative radiation therapy times 10 sessions on 7/30/2021  · Neurosurgery consulted and appreciate recommendations  · Frequent neurochecks  · In setting of heavy disease canal spanning levels C2-C4 resulting in canal narrowing  Finding is concerning for widespread necrotizing infection  Neoplastic process is within differential consideration  Recommend correlation with sampling  Subpleural nodular opacities in the right upper lobe and superior segment of right lower lobe could indicate septic emboli  Suggested left hilar necrotic lymph node  Indeterminate sclerotic foci within T3 and T4 vertebral bodies  7/22/21 CT Head: There is an approximately 2 4 x 1 4 x 2 2 cm masslike lesion involving the scalp overlying the posterior left parietal region  Neoplasm and/or infection should be excluded  7/22/21 CTA PE study: Numerous nodular opacities are present bilaterally within the lungs  The appearances most suggestive of metastatic disease  Extensive hepatic metastatic disease  Large bilateral adrenal masses  Extensive lymphadenopathy  Probable osseous metastatic disease with mild to moderate compression deformity of the superior endplate of U96, suggesting pathologic compression  7/24/2021 CT Abd and pelvis: There is a 2 8 x 2 2 x 1 8 cm ill-defined, hypodense pancreatic lesion, highly suspicious for pancreatic adenocarcinoma (series 3 images 20-22 and series 601 images 57 - 67 ) There is widespread metastatic disease with numerous liver lesions, bilateral adrenal metastases, multiple lytic bone metastases, with resulting mild superior end plate compression fractures of T11 and L3 as above, and also intramuscular metastases as   above      An alternative diagnosis to metastatic pancreatic adenocarcinoma, given the unusual intramuscular metastasis, would be metastatic melanoma  Please correlate with already performed biopsy results  BL LN Bx  · Right neck mass: Malignant epithelioid neoplasm, possibly melanoma, morphologically identical to that in specimen B, at least 1 cm maximal dimension, present at specimen edges    · Left neck mass: Malignant epithelioid neoplasm, possibly melanoma, at least 1 5 cm maximal dimension, present in dermal lymphatics and at specimen edges, likely metastatic  Benign overlying epidermis  CEA 63 8; CA 19-9: 5418, LDH: 1234  At this time, concern for synchronous malignancy given preliminary pathology reports demonstrating melanoma and CA 19 9 elevated in the setting of pancreatic lesion with liver metastasis concerning for pancreatic adenocarcinoma  There is a generic link between these 2 cancers raising concern  Plan:  · Pain Regimen:   · Oxycodone 5/10 mg q3 hours PRN mod/severe  · Dilaudid 0 5 mg q3 hours PRN for breakthrough  · Bowel regimen ordered  · Medical Oncology Consulted and Appreciate Recommendation  · MRI brain ordered and pending  · Surgical Oncology Consulted  · S/P Punch Biopsy on 7/23/21; follow up on pathology results  · Plan for Lymph Node Biopsy on 7/26/21  · Continue to monitor kidney fxn and electrolytes; potential for Tumor lysis syndrome   · GI consulted and appreciate recommendations   · IR bx liver mets 7/30/2021  · If negative for pancreatic mets will proceed with endoscopic ultrasound with pancreatic biopsy  Sepsis (HCC)-resolved as of 7/31/2021  Assessment & Plan  7/28/2021 Alerted by nursing that pt has ulcer on the coccyx  Pt had 1 occurrence of fever to 101 F overnight, with subsequent T 99 F  Pt has also been tachycardic  WBC trending upward from 10 08 to 24 53  Difficult to determine if change in pt status is due to stress of surgical LN bx vs  Pain vs  Stress of MRI (claustrophobia, pain) vs   Infection vs  Metastatic cancer vs due to Steroid use  Pt with multiple sources of infection: recent surgical bx, ulcer, yet septic workup is negative       Plan:  · Continue to monitor off antibiotics   · Monitor WBC count and trend fever curve              INTERNAL MEDICINE RESIDENCY PROGRESS NOTE     Name: Sayda Trujillo   Age & Sex: 48 y o  male   MRN: [id number]  Unit/Bed#: [facility] 914-01   Encounter: [id number]  Team: SOD Team B     PATIENT INFORMATION     Name: Sayda Trujillo   Age & Sex: 48 y o  male   MRN: [id number]  Hospital Stay Days: 9    ASSESSMENT/PLAN     Principal Problem:    Metastatic cancer [facility])  Active Problems:    Subclavian vein thromboembolism, acute, right ([facility] [address] )    Compression fracture of T11 vertebra (HCC)    Cervical stenosis of spinal canal    Anemia    Cervical spinal mass (HCC)    Scalp lesion    S/P lymph node biopsy    [facility] )    Pressure ulcer of coccygeal region, unspecified pressure ulcer stage    Mass of thoracic vertebra    Constipation due to opioid therapy    [facility]      New onset a-fib [facility])  Assessment & Plan  7/30/2021 alerted by nursing that pt was in Afib with RVR s/p IR US guided liver bx  He was stable at that time  Upon arrival to the floors pt was re-evaluated and found to still be in afib with RVR, -150s  /80s  Patient with inadequate response to Lopressor x2 and Cardizem bolus x2  Patient started on a Cardizem drip with improvement in heart rates  Transition off on 07/31/2021 and continued on metoprolol 50 mg b i d  Plan:   Metoprolol tartrate 50 mg BID   Heparin gtt   Telemetry monitoring   Monitor and replete to maintain Mg>2 and K>4        Constipation due to opioid therapy  Assessment & Plan  Pt last bowel movement prior to admission  Increasing abd pain/tenderness/distension  Tympanic  (-) rebound  Constipation unresponsive to lactulose 10 g, Mag citrate, and Relistor 8 mg  Most likely due to constipation which is opioid induced  7/28/2021 KUB Larger than typical volume of stool seen over the colon consistent with reported history of constipation  No bowel obstruction    Plan:  · Bowel regimen in place      Mass of thoracic vertebra  Assessment & Plan  7/28/2021 MRI TS Widely metastatic disease to the thoracic spinal axis with early pathologic fracture superior T11 endplate    No extraosseous tumor within the spinal canal, no cord or root compression  Multiple soft tissue masses in the adjacent soft tissues  Plan  · Neurosurgery consult   · MRI TS and LS w/wo contrast under anesthesia     Pressure ulcer of coccygeal region, unspecified pressure ulcer stage  Assessment & Plan  7/28/2021 Alerted by nursing that pt has ulcer on the coccyx  Per wound care: "Mid sacrum - evolving DTI - HA  Wound presents as approx: 20% evolving open partial thickness appearing moist yellow tissue, and 80% intact non-evolved non-blanchable purple skin  All aspects measured together  Edges fragile and attached, no maceration  Tatianna-wound is intact with pink blanchable skin  Suspect mixed etiology of pressure and friction given the chaffed/irregular dry appearance  -Will recommend foam dressing to protect "    Plan:  · Per wound care  · Frequent repositioning   · Apply hydraguard to b/l heels and b/l buttocks and sacrum BID and PRN for prevention and protection  · Apply skin nourishing cream the entire skin daily for moisture  · Turn and reposition patient every  2 hours   · Elevate heels off of bed with pillows to offload pressure   · Apply EHOB waffle cushion to chair when OOB, if able  · Cleanse sacral wound with NSS, pat dry, and apply Allevyn foam dressing  Feroz dressing with T  Change every other day and PRN for soilage/dislodgement  · Wound care will follow along with patient weekly     Severe protein-calorie malnutrition ([address] )  Assessment & Plan  Malnutrition Findings:   Adult Malnutrition type: Acute illness  Adult Degree of Malnutrition: Other severe protein calorie malnutrition (related to medical condition <50% energy intake versus needs for > 5 days evidenced by BMI 16 98; lost 9#(7 4%) since 6/10/21; moderate fat loss somewhat hollow orbital, moderate muscle loss visible clavicle  Treat Regular diet &Ensure Enlive TID )    BMI Findings:  Adult BMI Classifications: Underweight < 18 5     Body mass index is 16 6 kg/m²  Plan:  · Regular diet   · Ensure supplementation TID with meals  · Nutrition recommendations appreciated       S/P lymph node biopsy  Assessment & Plan  LN Bx done 7/26/2021; INCISIONAL BIOPSY LEFT NECK MASS,AND EXCISIOINAL BIOPSY RIGHT NECK MASS (Bilateral)  Preliminary report demonstrating melanoma                 Scalp lesion  Assessment & Plan  Punch bx done  Preliminary pathology demonstrating melanoma  Plan  · Continue local wound care  Anemia  Assessment & Plan  Hgb 9 9 and Hct 29 6 at time of admission  Currently Hgb 8 6  Most likely anemia of chronic inflammation with some component of iron deficiency  Fe sat: 14, TIBC 205, Fe 28, Ferritin 1230  Retic: 5 12    Plan:  · Monitor Hgb and Hct  · Transfuse if patient becomes symptomatic or Hgb drops < 7    Cervical stenosis of spinal canal  Assessment & Plan  Pt with cervical canal narrowing likely due to rim-enhancing epidural thickening/collection within posterolateral spinal canal spanning levels C2-C4  Findings could be due to necrotizing infectious process or metastasis related  Multiple Concerns for this level of of cord compression: paralysis, dysfunction of the diaphragm  7/28/2021 MRI CS Signal abnormality and erosive change of C3 laminae and spinous process along with lesser degree signal abnormality in the posterior elements of C1, C2, and C4  Associated epidural enhancement /enhancing soft tissue spanning from inferior C2-C4 resulting in severe canal stenosis and cord compression  Findings most favor metastatic disease  Infection is within differential consideration but less favored given findings elsewhere in the body  Correlate with sampling  Multiple rim-enhancing lesions within paraspinal musculature, superficial and deep soft tissue neck as demonstrated on recent soft tissue neck CT  Differential considerations are similar as above   Heterogeneous marrow signal and enhancement of the cervical and visualized thoracic vertebras concerning for infiltrative metastatic disease  Plan:  · Continue on IV Decadron 5mg q6 hours PO  · Radiation oncology consulted  · Patient status post CT stimulation 07/29/2021; started on palliative radiation therapy times 10 sessions on 7/30/2021  · Neurosurgery consulted and appreciate recommendations  · Frequent neurochecks  · In setting of heavy disease burden and late stage metastatic disease, patient can wear TLSO brace as needed for comfort and soft cervical brace for comfort  Subclavian vein thromboembolism, acute, right [facility])  Assessment & Plan  Patient noted to have evidence go subclavian, internal jugular, and brachiocephalic vein thrombus on imaging on arrival  Started on heparin gtt  Remains on heparin gtt in setting of new onset Afib and ongoing workup and possibility for intervention  7/22/21 - CT Soft Tissue Neck with Contrast: Occluded left subclavian and brachiocephalic veins as well as left internal jugular vein to the level of hyoid bone    7/22/21 - CTA PE chest: Possible thrombus right subclavian vein extending into the brachiocephalic vein  7/27/2021 venous duplex US LEFT UPPER LIMB: Evidence of acute and subacute mostly occlusive deep vein thrombosis noted in the internal jugular vein  Evidence of acute non occlusive deep vein thrombosis noted in the innominate, subclavian and axillary with extension into the most proximal brachial vein      Plan:  · Heparin gtt;will ule require transition to warfarin or DOAC; have not transition due to possible GI intervention  · Vascular surgery made aware; do not recommend any surgical intervention  · Cont to monitor PTT     * Metastatic cancer [facility])  Assessment & Plan  49 yo M with recent abnormal findings on CT head, chest, and tissue neck concerning for extensive metastatic cancer:     7/22/21 - US Head Neck Soft Tissue: Several neck masses are identified corresponding to the palpable abnormalities within additional finding of the suspected thrombosed left jugular vein  Further evaluation with contrast-enhanced CT of the neck is recommended  7/22/21 - CT Soft Tissue Neck: Left greater than right numerous superficial and deep neck soft tissue and intramuscular rim-enhancing necrotic lesions/collections as described  Rim-enhancing epidural thickening/collection within posterolateral spinal canal spanning levels C2-C4 resulting in canal narrowing  Finding is concerning for widespread necrotizing infection  Neoplastic process is within differential consideration  Recommend correlation with sampling  Subpleural nodular opacities in the right upper lobe and superior segment of right lower lobe could indicate septic emboli  Suggested left hilar necrotic lymph node  Indeterminate sclerotic foci within T3 and T4 vertebral bodies  7/22/21 CT Head: There is an approximately 2 4 x 1 4 x 2 2 cm masslike lesion involving the scalp overlying the posterior left parietal region  Neoplasm and/or infection should be excluded  7/22/21 CTA PE study: Numerous nodular opacities are present bilaterally within the lungs  The appearances most suggestive of metastatic disease  Extensive hepatic metastatic disease  Large bilateral adrenal masses  Extensive lymphadenopathy  Probable osseous metastatic disease with mild to moderate compression deformity of the superior endplate of J13, suggesting pathologic compression  7/24/2021 CT Abd and pelvis:  There is a 2 8 x 2 2 x 1 8 cm ill-defined, hypodense pancreatic lesion, highly suspicious for pancreatic adenocarcinoma (series 3 images 20-22 and series 601 images 57 - 67 ) There is widespread metastatic disease with numerous liver lesions, bilateral adrenal metastases, multiple lytic bone metastases, with resulting mild superior end plate compression fractures of T11 and L3 as above, and also intramuscular metastases as   above      An alternative diagnosis to metastatic pancreatic adenocarcinoma, given the unusual intramuscular metastasis, would be metastatic melanoma  Please correlate with already performed biopsy results  BL LN Bx  · Right neck mass: Malignant epithelioid neoplasm, possibly melanoma, morphologically identical to that in specimen B, at least 1 cm maximal dimension, present at specimen edges  · Left neck mass: Malignant epithelioid neoplasm, possibly melanoma, at least 1 5 cm maximal dimension, present in dermal lymphatics and at specimen edges, likely metastatic  Benign overlying epidermis  CEA 63 8; CA 19-9: 5418, LDH: 1234  At this time, concern for synchronous malignancy given preliminary pathology reports demonstrating melanoma and CA 19 9 elevated in the setting of pancreatic lesion with liver metastasis concerning for pancreatic adenocarcinoma  There is a generic link between these 2 cancers raising concern  Plan:  · Pain Regimen:   · Oxycodone 5/10 mg q3 hours PRN mod/severe  · Dilaudid 0 5 mg q3 hours PRN for breakthrough  · Bowel regimen ordered  · Medical Oncology Consulted and Appreciate Recommendation  · MRI brain ordered and pending  · Surgical Oncology Consulted  · S/P Punch Biopsy on 7/23/21; follow up on pathology results  · Plan for Lymph Node Biopsy on 7/26/21  · Continue to monitor kidney fxn and electrolytes; potential for Tumor lysis syndrome   · GI consulted and appreciate recommendations   · IR bx liver mets 7/30/2021  · If negative for pancreatic mets will proceed with endoscopic ultrasound with pancreatic biopsy  Sepsis (HCC)-resolved as of 7/31/2021  Assessment & Plan  7/28/2021 Alerted by nursing that pt has ulcer on the coccyx  Pt had 1 occurrence of fever to 101 F overnight, with subsequent T 99 F  Pt has also been tachycardic  WBC trending upward from 10 08 to 24 53   Difficult to determine if change in pt status is due to stress of surgical LN bx vs  Pain vs  Stress of MRI (claustrophobia, pain) vs   Infection vs  Metastatic cancer vs due to Steroid use  Pt with multiple sources of infection: recent surgical bx, ulcer, yet septic workup is negative  Plan:  · Continue to monitor off antibiotics   · Monitor WBC count and trend fever curve          Disposition:  Will continue to require inpatient level of care  Awaiting further imaging for staging including MRI brain  Status post liver biopsy and awaiting pathology, possible EUS with pancreatic lesion biopsy pending results  Remain on heparin GTT for right IJ thrombus  SUBJECTIVE     Patient seen and examined  No acute events overnight  Complains of left sided neck pain  No overnight events  Had a bowel movement with improvement in abdominal pain  Denies chest pain, palpitations, shortness of breath  OBJECTIVE     Vitals:    21 0334 21 0526 21 0802 21 0900   BP: 129/77 119/75 132/84    BP Location:       Pulse: 78 100 98    Resp: 18 18     Temp: 97 9 °F (36 6 °C)      TempSrc: Oral      SpO2: 97% 96%  97%   Weight:       Height:          Temperature:   Temp (24hrs), Av 9 °F (36 6 °C), Min:97 6 °F (36 4 °C), Max:98 2 °F (36 8 °C)    Temperature: 97 9 °F (36 6 °C)  Intake & Output:  I/O       701 -  0700  07 -  0700  07 -  0700    P  O  2880 3340 480    I V  (mL/kg) 2081 9 (39 9) 292 3 (5 6)     Total Intake(mL/kg) 4961 9 (95 1) 3632 3 (69 6) 480 (9 2)    Urine (mL/kg/hr) 5100 (4 1) 3325 (2 7) 650 (3 3)    Total Output 5100 3325 650    Net -138 1 +307 3 -170               Weights:   IBW (Ideal Body Weight): 70 7 kg    Body mass index is 16 6 kg/m²  Weight (last 2 days)     None        Physical Exam  Constitutional:       Appearance: He is ill-appearing  Comments: cachectic   HENT:      Head:      Comments: Palpable tender mass in the left occipital region       Mouth/Throat:      Mouth: Mucous membranes are moist    Eyes:      Pupils: Pupils are equal, round, and reactive to light     Neck: Comments: Pain with movement, muscular tenderness  Lymphadenopathy hard    Cardiovascular:      Rate and Rhythm: Normal rate and regular rhythm  Pulses: Normal pulses  Pulmonary:      Effort: Pulmonary effort is normal       Breath sounds: Normal breath sounds  Abdominal:      General: Abdomen is flat  Bowel sounds are normal       Palpations: Abdomen is soft  Musculoskeletal:         General: Normal range of motion  Skin:     General: Skin is warm  Neurological:      Mental Status: He is oriented to person, place, and time  Mental status is at baseline  Psychiatric:         Mood and Affect: Mood normal        LABORATORY DATA     Labs: I have personally reviewed pertinent reports    Results from last 7 days   Lab Units 08/01/21  0608 07/31/21  0534 07/30/21  0555 07/29/21  0458   WBC Thousand/uL 24 86* 22 42* 24 53* 10 08   HEMOGLOBIN g/dL 9 4* 8 8* 8 6* 8 4*   HEMATOCRIT % 30 3* 28 8* 26 8* 25 4*   PLATELETS Thousands/uL 412* 365 333 239   NEUTROS PCT %  --  87* 88* 89*   MONOS PCT %  --  3* 5 2*   MONO PCT % 4  --   --   --       Results from last 7 days   Lab Units 08/01/21  0608 07/31/21  0534 07/30/21  0555 07/29/21  0458 07/28/21  0754   POTASSIUM mmol/L 3 7 4 2 4 2 4 0 4 3  4 3   CHLORIDE mmol/L 107 111* 113* 110* 105  104   CO2 mmol/L 27 24 27 28 29  31   BUN mg/dL 13 13 14 10 8  8   CREATININE mg/dL 0 63 0 70 0 39* 0 41* 0 53*  0 52*   CALCIUM mg/dL 8 9 8 8 8 8 9 2 9 5  9 8   ALK PHOS U/L  --   --  173* 134* 140*   ALT U/L  --   --  58 32 30   AST U/L  --   --  57* 29 39     Results from last 7 days   Lab Units 08/01/21  0608 07/31/21  0534 07/30/21  0555   MAGNESIUM mg/dL 2 0 2 1 1 9     Results from last 7 days   Lab Units 08/01/21  0608 07/31/21  0534 07/30/21  0555   PHOSPHORUS mg/dL 2 9 2 9 3 2      Results from last 7 days   Lab Units 08/01/21  0608 07/31/21  1737 07/31/21  0920   PTT seconds 82* 68* 76*     Results from last 7 days   Lab Units 07/29/21  0458   LACTIC ACID mmol/L 1  5           IMAGING & DIAGNOSTIC TESTING     Radiology Results: I have personally reviewed pertinent reports  XR spine cervical 2 or 3 vw injury    Result Date: 7/26/2021  Impression: 1  Destructive lytic lesion within the C3 vertebral body extending into the posterior elements suspicious for metastatic disease  2   Additional ill-defined lytic appearing lesions in the C4-C7 vertebral bodies also suggestive of metastatic disease  Workstation performed: [id number]     XR spine thoracolumbar 2 vw    Result Date: 7/26/2021  Impression: Mild superior endplate compression fracture of T11  Multiple lytic lesions to include T11, L3 and S1 are better visualized on recent CT  Workstation performed: [id number]     CT head without contrast    Result Date: 7/22/2021  Impression: There is an approximately 2 4 x 1 4 x 2 2 cm masslike lesion involving the scalp overlying the posterior left parietal region  Neoplasm and/or infection should be excluded  Sampling is recommended  The study was marked in [facility] for immediate notification  Workstation performed: [id number]     CT soft tissue neck w contrast    Result Date: 7/22/2021  Impression: 1  Left greater than right numerous superficial and deep neck soft tissue and intramuscular rim-enhancing necrotic lesions/collections as described  Rim-enhancing epidural thickening/collection within posterolateral spinal canal spanning levels C2-C4 resulting in canal narrowing  Finding is concerning for widespread necrotizing infection  Neoplastic process is within differential consideration  Recommend correlation with sampling  2   Occluded left subclavian and brachiocephalic veins as well as left internal jugular vein to the level of hyoid bone suggesting  3   Subpleural nodular opacities in the right upper lobe and superior segment of right lower lobe could indicate septic emboli  Suggested left hilar necrotic lymph node   4   Indeterminate sclerotic foci within T3 and T4 vertebral bodies  I personally discussed this study with Celina Velez on 7/22/2021 at 2:10 PM  Workstation performed: [id number]     MRI cervical spine w wo contrast    Result Date: 7/27/2021  Impression: 1  Signal abnormality and erosive change of C3 laminae and spinous process along with lesser degree signal abnormality in the posterior elements of C1, C2, and C4  Associated epidural enhancement /enhancing soft tissue spanning from inferior C2-C4 resulting in severe canal stenosis and cord compression  Findings most favor metastatic disease  Infection is within differential consideration but less favored given findings elsewhere in the body  Correlate with sampling  2   Multiple rim-enhancing lesions within paraspinal musculature, superficial and deep soft tissue neck as demonstrated on recent soft tissue neck CT  Differential considerations are similar as above  3   Heterogeneous marrow signal and enhancement of the cervical and visualized thoracic vertebras concerning for infiltrative metastatic disease  4   Redemonstrated occluded left internal jugular vein  5   Degenerative change as above  I personally discussed this study with Temo Wilkes on 7/27/2021 at 9:35 AM  Workstation performed: [id number]     US head neck soft tissue    Result Date: 7/22/2021  Impression: Several neck masses are identified corresponding to the palpable abnormalities within additional finding of the suspected thrombosed left jugular vein  Further evaluation with contrast-enhanced CT of the neck is recommended  The study was marked in [facility] for immediate notification  Workstation performed: [id number]     CTA ED chest PE Study    Result Date: 7/23/2021  Impression: No evidence of pulmonary embolism is seen  Measured RV/LV ratio is within normal limits at less than 0 9  Numerous nodular opacities are present bilaterally within the lungs  The appearances most suggestive of metastatic disease   Extensive hepatic metastatic disease  Large bilateral adrenal masses  Extensive lymphadenopathy  Probable osseous metastatic disease with mild to moderate compression deformity of the superior endplate of F95, suggesting pathologic compression  Possible thrombus right subclavian vein extending into the brachiocephalic vein  Consider venous duplex ultrasound for further evaluation  The study was marked in [facility] for immediate notification  Workstation performed: [id number]     CT abdomen pelvis w contrast    Result Date: 7/26/2021  Impression: There is a 2 8 x 2 2 x 1 8 cm ill-defined, hypodense pancreatic lesion, highly suspicious for pancreatic adenocarcinoma (series 3 images 20-22 and series 601 images 57 - 67 ) There is widespread metastatic disease with numerous liver lesions, bilateral adrenal metastases, multiple lytic bone metastases, with resulting mild superior end plate compression fractures of T11 and L3 as above, and also intramuscular metastases as above  An alternative diagnosis to metastatic pancreatic adenocarcinoma, given the unusual intramuscular metastasis, would be metastatic melanoma  Please correlate with already performed biopsy results  Workstation performed: [id number]     Other Diagnostic Testing: I have personally reviewed pertinent reports      ACTIVE MEDICATIONS     Current Facility-Administered Medications   Medication Dose Route Frequency    acetaminophen (TYLENOL) tablet 650 mg  650 mg Oral Q6H PRN    bisacodyl (DULCOLAX) rectal suppository 10 mg  10 mg Rectal Daily PRN    dexamethasone (DECADRON) tablet 4 mg  4 mg Oral Q6H [facility]    Gadobutrol injection (SINGLE-DOSE) SOLN 5 mL  5 mL Intravenous Once in imaging    heparin (porcine) 25,000 units in 0 45% NaCl 250 mL infusion (premix)  3-30 Units/kg/hr (Order-Specific) Intravenous Titrated    heparin (porcine) injection 2,000 Units  2,000 Units Intravenous Q1H PRN    heparin (porcine) injection 4,000 Units  4,000 Units Intravenous Q1H PRN    HYDROmorphone (DILAUDID) injection 0 5 mg  0 5 mg Intravenous Q3H PRN    LORazepam (ATIVAN) injection 1 mg  1 mg Intravenous Once PRN    metoprolol tartrate (LOPRESSOR) tablet 50 mg  50 mg Oral Q12H [address]    oxyCODONE (ROXICODONE) immediate release tablet 10 mg  10 mg Oral Q6H PRN    Or    oxyCODONE (ROXICODONE) IR tablet 15 mg  15 mg Oral Q6H PRN    polyethylene glycol (MIRALAX) packet 17 g  17 g Oral BID    senna-docusate sodium (SENOKOT S) 8 6-50 mg per tablet 1 tablet  1 tablet Oral BID       VTE Pharmacologic Prophylaxis: Heparin  VTE Mechanical Prophylaxis: sequential compression device    Portions of the record may have been created with voice recognition software  Occasional wrong word or "sound a like" substitutions may have occurred due to the inherent limitations of voice recognition software    Read the chart carefully and recognize, using context, where substitutions have occurred   ==  Maria Del Rosario Sweet MD  [address]  Internal Medicine Residency PGY-3

## 2021-08-02 NOTE — ANESTHESIA POSTPROCEDURE EVALUATION
Post-Op Assessment Note    CV Status:  Stable  Pain Score: 0    Pain management: adequate     Mental Status:  Sleepy   Hydration Status:  Stable   PONV Controlled:  None   Airway Patency:  Patent      Post Op Vitals Reviewed: Yes      Staff: Anesthesiologist         No complications documented      BP      Temp      Pulse     Resp      SpO2

## 2021-08-02 NOTE — PROGRESS NOTES
INTERNAL MEDICINE RESIDENCY PROGRESS NOTE     PATIENT INFORMATION     Name: Michelle Jaramillo   Age & Sex: 48 y o  male   MRN: [id number]  Hospital Stay Days: 10  [facility]   Encounter: [id number]  Team: SOD Team B     ASSESSMENT/PLAN     Principal Problem:    Metastatic cancer ([facility] )  Active Problems:    Subclavian vein thromboembolism, acute, right (HCC)    Compression fracture of T11 vertebra (HCC)    Cervical stenosis of spinal canal    Anemia    Cervical spinal mass (HCC)    Scalp lesion    S/P lymph node biopsy    Severe protein-calorie malnutrition ([facility] )    Pressure ulcer of coccygeal region, unspecified pressure ulcer stage    Mass of thoracic vertebra    Constipation due to opioid therapy    New onset a-fib ([place] [address] )    New onset a-fib [facility]  Assessment & Plan  7/30/2021 alerted by nursing that pt was in Afib with RVR s/p IR US guided liver bx  He was stable at that time  Upon arrival to the floors pt was re-evaluated and found to still be in afib with RVR, -150s  /80s  Patient with inadequate response to Lopressor x2 and Cardizem bolus x2  Patient started on a Cardizem drip with improvement in heart rates  Transition off on 07/31/2021 and continued on metoprolol 50 mg b i d  Plan:   Metoprolol tartrate 50 mg BID   Heparin gtt   Telemetry monitoring   Monitor and replete to maintain Mg>2 and K>4        Constipation due to opioid therapy  Assessment & Plan  Pt last bowel movement prior to admission  Increasing abd pain/tenderness/distension  Tympanic  (-) rebound  Constipation unresponsive to lactulose 10 g, Mag citrate, and Relistor 8 mg  Most likely due to constipation which is opioid induced  7/28/2021 KUB Larger than typical volume of stool seen over the colon consistent with reported history of constipation  No bowel obstruction    After 2nd dose of Relistor 8 mg, pt has had daily bowel movements   Pt states the bowel movements were initially dark/black in color, however BM 8/1/2021 was starting to become lighter in color  Plan:  · Bowel regimen in place      Mass of thoracic vertebra  Assessment & Plan  7/28/2021 MRI TS Widely metastatic disease to the thoracic spinal axis with early pathologic fracture superior T11 endplate  No extraosseous tumor within the spinal canal, no cord or root compression  Multiple soft tissue masses in the adjacent soft tissues  Plan  · Neurosurgery consult   · MRI TS and LS w/wo contrast under anesthesia     Pressure ulcer of coccygeal region, unspecified pressure ulcer stage  Assessment & Plan  7/28/2021 Alerted by nursing that pt has ulcer on the coccyx  Per wound care: "Mid sacrum - evolving DTI - HA  Wound presents as approx: 20% evolving open partial thickness appearing moist yellow tissue, and 80% intact non-evolved non-blanchable purple skin  All aspects measured together  Edges fragile and attached, no maceration  Tatianna-wound is intact with pink blanchable skin  Suspect mixed etiology of pressure and friction given the chaffed/irregular dry appearance  -Will recommend foam dressing to protect "    Plan:  · Per wound care  · Frequent repositioning   · Apply hydraguard to b/l heels and b/l buttocks and sacrum BID and PRN for prevention and protection  · Apply skin nourishing cream the entire skin daily for moisture  · Turn and reposition patient every  2 hours   · Elevate heels off of bed with pillows to offload pressure   · Apply EHOB waffle cushion to chair when OOB, if able  · Cleanse sacral wound with NSS, pat dry, and apply Allevyn foam dressing  Feroz dressing with T  Change every other day and PRN for soilage/dislodgement     · Wound care will follow along with patient weekly     Severe protein-calorie malnutrition ([address] )  Assessment & Plan  Malnutrition Findings:   Adult Malnutrition type: Acute illness  Adult Degree of Malnutrition: Other severe protein calorie malnutrition (related to medical condition <50% energy intake versus needs for > 5 days evidenced by BMI 16 98; lost 9#(7 4%) since 6/10/21; moderate fat loss somewhat hollow orbital, moderate muscle loss visible clavicle  Treat Regular diet &Ensure Enlive TID )    BMI Findings:  Adult BMI Classifications: Underweight < 18 5     Body mass index is 16 6 kg/m²  Plan:  · Regular diet   · Ensure supplementation TID with meals  · Nutrition recommendations appreciated       S/P lymph node biopsy  Assessment & Plan  LN Bx done 7/26/2021; INCISIONAL BIOPSY LEFT NECK MASS,AND EXCISIOINAL BIOPSY RIGHT NECK MASS (Bilateral)  Preliminary report demonstrating melanoma                 Scalp lesion  Assessment & Plan  Punch bx done on left scalp  Preliminary pathology demonstrating melanoma  8/2/2021 pt complaining of increasing tenderness/pain and size of scalp lesion  Plan  · Continue local wound care  Anemia  Assessment & Plan  Hgb 9 9 and Hct 29 6 at time of admission  Currently Hgb 9 4   Most likely anemia of chronic inflammation with some component of iron deficiency  Fe sat: 14, TIBC 205, Fe 28, Ferritin 1230  Retic: 5 12    Plan:  · Monitor Hgb and Hct  · Transfuse if patient becomes symptomatic or Hgb drops < 7    Cervical stenosis of spinal canal  Assessment & Plan  Pt with cervical canal narrowing likely due to rim-enhancing epidural thickening/collection within posterolateral spinal canal spanning levels C2-C4  Findings could be due to necrotizing infectious process or metastasis related  Multiple Concerns for this level of of cord compression: paralysis, dysfunction of the diaphragm  7/28/2021 MRI CS Signal abnormality and erosive change of C3 laminae and spinous process along with lesser degree signal abnormality in the posterior elements of C1, C2, and C4  Associated epidural enhancement /enhancing soft tissue spanning from inferior C2-C4 resulting in severe canal stenosis and cord compression  Findings most favor metastatic disease    Infection is within differential consideration but less favored given findings elsewhere in the body  Correlate with sampling  Multiple rim-enhancing lesions within paraspinal musculature, superficial and deep soft tissue neck as demonstrated on recent soft tissue neck CT  Differential considerations are similar as above  Heterogeneous marrow signal and enhancement of the cervical and visualized thoracic vertebras concerning for infiltrative metastatic disease  Plan:  · Continue on IV Decadron 5mg q6 hours PO  · Radiation oncology consulted  · Patient status post CT stimulation 07/29/2021; started on palliative radiation therapy times 10 sessions on 7/30/2021 (300 cGy, 1-10 MeV photons)  · Neurosurgery consulted and appreciate recommendation  · Frequent neurochecks  · In setting of heavy disease burden and late stage metastatic disease, patient can wear TLSO brace as needed for comfort and soft cervical brace for comfort  Subclavian vein thromboembolism, acute, right [facility])  Assessment & Plan  Patient noted to have evidence go subclavian, internal jugular, and brachiocephalic vein thrombus on imaging on arrival  Started on heparin gtt  Remains on heparin gtt in setting of new onset Afib and ongoing workup and possibility for intervention  7/22/21 - CT Soft Tissue Neck with Contrast: Occluded left subclavian and brachiocephalic veins as well as left internal jugular vein to the level of hyoid bone    7/22/21 - CTA PE chest: Possible thrombus right subclavian vein extending into the brachiocephalic vein  7/27/2021 venous duplex US LEFT UPPER LIMB: Evidence of acute and subacute mostly occlusive deep vein thrombosis noted in the internal jugular vein  Evidence of acute non occlusive deep vein thrombosis noted in the innominate, subclavian and axillary with extension into the most proximal brachial vein      Plan:  · Heparin gtt;will ule require transition to warfarin or DOAC; have not transition due to possible GI intervention  · Vascular surgery made aware; do not recommend any surgical intervention  · Cont to monitor PTT     * Metastatic cancer [facility])  Assessment & Plan  47 yo M with recent abnormal findings on CT head, chest, and tissue neck concerning for extensive metastatic cancer:     7/22/21 - US Head Neck Soft Tissue: Several neck masses are identified corresponding to the palpable abnormalities within additional finding of the suspected thrombosed left jugular vein  Further evaluation with contrast-enhanced CT of the neck is recommended  7/22/21 - CT Soft Tissue Neck: Left greater than right numerous superficial and deep neck soft tissue and intramuscular rim-enhancing necrotic lesions/collections as described  Rim-enhancing epidural thickening/collection within posterolateral spinal canal spanning levels C2-C4 resulting in canal narrowing  Finding is concerning for widespread necrotizing infection  Neoplastic process is within differential consideration  Recommend correlation with sampling  Subpleural nodular opacities in the right upper lobe and superior segment of right lower lobe could indicate septic emboli  Suggested left hilar necrotic lymph node  Indeterminate sclerotic foci within T3 and T4 vertebral bodies  7/22/21 CT Head: There is an approximately 2 4 x 1 4 x 2 2 cm masslike lesion involving the scalp overlying the posterior left parietal region  Neoplasm and/or infection should be excluded  7/22/21 CTA PE study: Numerous nodular opacities are present bilaterally within the lungs  The appearances most suggestive of metastatic disease  Extensive hepatic metastatic disease  Large bilateral adrenal masses  Extensive lymphadenopathy  Probable osseous metastatic disease with mild to moderate compression deformity of the superior endplate of Q76, suggesting pathologic compression  7/24/2021 CT Abd and pelvis:  There is a 2 8 x 2 2 x 1 8 cm ill-defined, hypodense pancreatic lesion, highly suspicious for pancreatic adenocarcinoma (series 3 images 20-22 and series 601 images 62 - 67 ) There is widespread metastatic disease with numerous liver lesions, bilateral adrenal metastases, multiple lytic bone metastases, with resulting mild superior end plate compression fractures of T11 and L3 as above, and also intramuscular metastases as   above      An alternative diagnosis to metastatic pancreatic adenocarcinoma, given the unusual intramuscular metastasis, would be metastatic melanoma  Please correlate with already performed biopsy results  BL LN Bx  · Right neck mass: Malignant epithelioid neoplasm, possibly melanoma, morphologically identical to that in specimen B, at least 1 cm maximal dimension, present at specimen edges  · Left neck mass: Malignant epithelioid neoplasm, possibly melanoma, at least 1 5 cm maximal dimension, present in dermal lymphatics and at specimen edges, likely metastatic  Benign overlying epidermis  CEA 63 8; CA 19-9: 5418, LDH: 1234  At this time, concern for synchronous malignancy given preliminary pathology reports demonstrating melanoma and CA 19 9 elevated in the setting of pancreatic lesion with liver metastasis concerning for pancreatic adenocarcinoma  There is a generic link between these 2 cancers raising concern  Plan:  · Pain Regimen:   · Oxycodone 5/10 mg q3 hours PRN mod/severe  · Dilaudid 0 5 mg q3 hours PRN for breakthrough  · Bowel regimen ordered  · Medical Oncology Consulted and Appreciate Recommendation  · MRI brain ordered and pending  · Surgical Oncology Consulted  · S/P Punch Biopsy on 7/23/21; follow up on pathology results  · Plan for Lymph Node Biopsy on 7/26/21  · Continue to monitor kidney fxn and electrolytes; potential for Tumor lysis syndrome   · GI consulted and appreciate recommendations   · IR bx liver mets 7/30/2021  · If negative for pancreatic mets will proceed with endoscopic ultrasound with pancreatic biopsy          Disposition: inpatient, awaiting pathology reports of scalp mass and liver mets; patient undergoing radiation therapy for CS cord compression due to metastatic disease; patient is to have MRI brain w/wo contrast, TS, and LS under anesthesia (due to claustrophobia, pain) this Wednesday  SUBJECTIVE     Patient seen and examined  No acute events overnight  AVSS, patient has has not had recurrent episodes of afib with RVR overnight  Patient expressing increased pain/tenderness and enlargement around the area of the left scalp punch bx site  Patient is still having neck and back pain; states pain medication is slowly becoming less effective in providing him relief  Patient states he has easy fatigability and exertional SOB  Denies weakness, however states he is not as strong as he used to be  He has had bowel movements over the last few days  States the few BMs were loose and black/dark in color, denies rachel blood  His last bowel movement yesterday was more formed and less dark  He is still having abdominal pain/tenderness, which is generalized however more acute in the LUQ and RUQ radiating to bilateral flanks  States this pain is worsened after he eats or has to take medicine PO and is associated with diaphoresis and warm sensation  He has an appetite and is eating more  Patient ambulated up and down the hallways with the walker yesterday  States his left knee pain has improved  At night he has night sweats and feels very warm  Denies: fever, numbness/tingling, HA, lightheadedness, dizziness, sinus pressure/pain, visual changes, hearing changes, dysphagia/odynphagia, chest pain/tenderness, palpitations, coughing, wheezing, N/V/C, hematuria, dysuria       OBJECTIVE     Vitals:    08/01/21 2050 08/01/21 2300 08/02/21 0300 08/02/21 0735   BP: 126/83 129/84 124/80 126/83   BP Location:    Right arm   Pulse: 101 102 84 96   Resp:  18 20 18   Temp:  98 4 °F (36 9 °C) 98 3 °F (36 8 °C) 98 3 °F (36 8 °C) TempSrc:  Oral Oral Oral   SpO2:  98% 96% 95%   Weight:       Height:          Temperature:   Temp (24hrs), Av 1 °F (36 7 °C), Min:97 6 °F (36 4 °C), Max:98 4 °F (36 9 °C)    Temperature: 98 3 °F (36 8 °C)  Intake & Output:  I/O        07 - / 0700 08 07 -  0700 08/ 07 -  0700    P  O  3340 960     I V  (mL/kg) 292 3 (5 6)      Total Intake(mL/kg) 3632 3 (69 6) 960 (18 4)     Urine (mL/kg/hr) 3325 (2 7) 4775 (3 8) 900 (4 3)    Stool  0     Total Output 3325 4775 900    Net +307 3 -3815 -900           Unmeasured Urine Occurrence  1 x     Unmeasured Stool Occurrence  2 x           Intake/Output Summary (Last 24 hours) at 2021 1103  Last data filed at 2021 0747  Gross per 24 hour   Intake 480 ml   Output 5025 ml   Net -4545 ml     I/O this shift:  In: -   Out: 900 [Urine:900]  Weights:   IBW (Ideal Body Weight): 70 7 kg    Body mass index is 16 6 kg/m²  Weight (last 2 days)     None        Physical Exam  Constitutional:       General: He is not in acute distress  Appearance: He is ill-appearing  He is not toxic-appearing or diaphoretic  Comments: Cachetic     HENT:      Head:      Comments: Left upper scalp lesion at punch bx site: increased size/erythema, firm induration, TTP, no rachel pus/ozzing     Left lower scalp lesion: stable in size, TTP        Right Ear: External ear normal       Left Ear: External ear normal       Ears:      Comments: Right post auricular mass        Nose: Nose normal  No congestion or rhinorrhea  Mouth/Throat:      Mouth: Mucous membranes are moist       Pharynx: Oropharynx is clear  No oropharyngeal exudate or posterior oropharyngeal erythema  Eyes:      General: No scleral icterus  Right eye: No discharge  Left eye: No discharge  Extraocular Movements: Extraocular movements intact  Conjunctiva/sclera: Conjunctivae normal       Pupils: Pupils are equal, round, and reactive to light     Neck:      Vascular: No carotid bruit  Comments: Large continuous mass on the left sided neck: most likely IJV thrombus     Cardiovascular:      Rate and Rhythm: Normal rate and regular rhythm  Pulses: Normal pulses  Heart sounds: Normal heart sounds  Pulmonary:      Effort: Pulmonary effort is normal       Breath sounds: Normal breath sounds  Chest:      Chest wall: No tenderness  Abdominal:      General: Bowel sounds are normal  There is distension (mild, tympanic )  Palpations: There is mass (abd wall mass left sided)  Tenderness: There is abdominal tenderness (LUQ, RUQ; generalized )  There is right CVA tenderness  There is no left CVA tenderness, guarding or rebound  Hernia: No hernia is present  Musculoskeletal:         General: Normal range of motion  Cervical back: No rigidity  Right lower leg: No edema  Left lower leg: No edema  Lymphadenopathy:      Cervical: Cervical adenopathy present  Skin:     General: Skin is warm and dry  Capillary Refill: Capillary refill takes less than 2 seconds  Neurological:      General: No focal deficit present  Mental Status: He is alert and oriented to person, place, and time  Cranial Nerves: No cranial nerve deficit  Sensory: No sensory deficit  Motor: Weakness (BUE and BLE strength 5/5) present  Coordination: Coordination normal (slowed finger to nose)  Psychiatric:         Mood and Affect: Mood normal          Behavior: Behavior normal          Thought Content: Thought content normal          Judgment: Judgment normal       Comments: Patient sleepy, states due to poor sleep the night before           LABORATORY DATA     Labs: I have personally reviewed pertinent reports      Results from last 7 days   Lab Units 08/02/21  0613 08/01/21  0608 07/31/21  0534 07/30/21  0555 07/29/21  0458   WBC Thousand/uL 29 72* 24 86* 22 42* 24 53* 10 08   HEMOGLOBIN g/dL 9 6* 9 4* 8 8* 8 6* 8 4*   HEMATOCRIT % 30 8* 30 3* 28 8* 26 8* 25 4*   PLATELETS Thousands/uL 438* 412* 365 333 239   NEUTROS PCT %  --   --  87* 88* 89*   MONOS PCT %  --   --  3* 5 2*   MONO PCT %  --  4  --   --   --       Results from last 7 days   Lab Units 08/02/21  0613 08/01/21  0608 07/31/21  0534 07/30/21  0555 07/29/21  0458   POTASSIUM mmol/L 4 2 3 7 4 2 4 2 4 0   CHLORIDE mmol/L 106 107 111* 113* 110*   CO2 mmol/L 32 27 24 27 28   BUN mg/dL 15 13 13 14 10   CREATININE mg/dL 0 52* 0 63 0 70 0 39* 0 41*   CALCIUM mg/dL 8 9 8 9 8 8 8 8 9 2   ALK PHOS U/L 254*  --   --  173* 134*   ALT U/L 65  --   --  58 32   AST U/L 49*  --   --  57* 29     Serum creatinine: 0 52 mg/dL (L) 08/02/21 5195  Estimated creatinine clearance: 122 6 mL/min (A)   Results from last 7 days   Lab Units 08/01/21  0608 07/31/21  0534 07/30/21  0555   MAGNESIUM mg/dL 2 0 2 1 1 9     Results from last 7 days   Lab Units 08/01/21  0608 07/31/21  0534 07/30/21  0555   PHOSPHORUS mg/dL 2 9 2 9 3 2      Results from last 7 days   Lab Units 08/02/21  0613 08/01/21  0608 07/31/21  1737   PTT seconds 85* 82* 68*     Results from last 7 days   Lab Units 07/29/21  0458   LACTIC ACID mmol/L 1 5           IMAGING & DIAGNOSTIC TESTING     Radiology Results: I have personally reviewed pertinent reports  XR spine cervical 2 or 3 vw injury    Result Date: 7/26/2021  Impression: 1  Destructive lytic lesion within the C3 vertebral body extending into the posterior elements suspicious for metastatic disease  2   Additional ill-defined lytic appearing lesions in the C4-C7 vertebral bodies also suggestive of metastatic disease  Workstation performed: [id number]     XR spine thoracolumbar 2 vw    Result Date: 7/26/2021  Impression: Mild superior endplate compression fracture of T11  Multiple lytic lesions to include T11, L3 and S1 are better visualized on recent CT   Workstation performed: [id number]     CT head without contrast    Result Date: 7/22/2021  Impression: There is an approximately 2 4 x 1 4 x 2 2 cm masslike lesion involving the scalp overlying the posterior left parietal region  Neoplasm and/or infection should be excluded  Sampling is recommended  The study was marked in [facility] for immediate notification  Workstation performed: [id number]     CT soft tissue neck w contrast    Result Date: 7/22/2021  Impression: 1  Left greater than right numerous superficial and deep neck soft tissue and intramuscular rim-enhancing necrotic lesions/collections as described  Rim-enhancing epidural thickening/collection within posterolateral spinal canal spanning levels C2-C4 resulting in canal narrowing  Finding is concerning for widespread necrotizing infection  Neoplastic process is within differential consideration  Recommend correlation with sampling  2   Occluded left subclavian and brachiocephalic veins as well as left internal jugular vein to the level of hyoid bone suggesting  3   Subpleural nodular opacities in the right upper lobe and superior segment of right lower lobe could indicate septic emboli  Suggested left hilar necrotic lymph node  4   Indeterminate sclerotic foci within T3 and T4 vertebral bodies  I personally discussed this study with Lucas Bran on 7/22/2021 at 2:10 PM  Workstation performed: [id number]     MRI cervical spine w wo contrast    Result Date: 7/27/2021  Impression: 1  Signal abnormality and erosive change of C3 laminae and spinous process along with lesser degree signal abnormality in the posterior elements of C1, C2, and C4  Associated epidural enhancement /enhancing soft tissue spanning from inferior C2-C4 resulting in severe canal stenosis and cord compression  Findings most favor metastatic disease  Infection is within differential consideration but less favored given findings elsewhere in the body  Correlate with sampling   2   Multiple rim-enhancing lesions within paraspinal musculature, superficial and deep soft tissue neck as demonstrated on recent soft tissue neck CT  Differential considerations are similar as above  3   Heterogeneous marrow signal and enhancement of the cervical and visualized thoracic vertebras concerning for infiltrative metastatic disease  4   Redemonstrated occluded left internal jugular vein  5   Degenerative change as above  I personally discussed this study with Isaac Hay on 7/27/2021 at 9:35 AM  Workstation performed: [id number]     US head neck soft tissue    Result Date: 7/22/2021  Impression: Several neck masses are identified corresponding to the palpable abnormalities within additional finding of the suspected thrombosed left jugular vein  Further evaluation with contrast-enhanced CT of the neck is recommended  The study was marked in [facility] for immediate notification  Workstation performed: [id number]     CTA ED chest PE Study    Result Date: 7/23/2021  Impression: No evidence of pulmonary embolism is seen  Measured RV/LV ratio is within normal limits at less than 0 9  Numerous nodular opacities are present bilaterally within the lungs  The appearances most suggestive of metastatic disease  Extensive hepatic metastatic disease  Large bilateral adrenal masses  Extensive lymphadenopathy  Probable osseous metastatic disease with mild to moderate compression deformity of the superior endplate of G05, suggesting pathologic compression  Possible thrombus right subclavian vein extending into the brachiocephalic vein  Consider venous duplex ultrasound for further evaluation  The study was marked in [facility] for immediate notification   Workstation performed: [id number]     CT abdomen pelvis w contrast    Result Date: 7/26/2021  Impression: There is a 2 8 x 2 2 x 1 8 cm ill-defined, hypodense pancreatic lesion, highly suspicious for pancreatic adenocarcinoma (series 3 images 20-22 and series 601 images 57 - 67 ) There is widespread metastatic disease with numerous liver lesions, bilateral adrenal metastases, multiple lytic bone metastases, with resulting mild superior end plate compression fractures of T11 and L3 as above, and also intramuscular metastases as above  An alternative diagnosis to metastatic pancreatic adenocarcinoma, given the unusual intramuscular metastasis, would be metastatic melanoma  Please correlate with already performed biopsy results  Workstation performed: [id number]     Other Diagnostic Testing: I have personally reviewed pertinent reports        ACTIVE MEDICATIONS     Current Facility-Administered Medications   Medication Dose Route Frequency    acetaminophen (TYLENOL) tablet 650 mg  650 mg Oral Q6H PRN    bisacodyl (DULCOLAX) rectal suppository 10 mg  10 mg Rectal Daily PRN    dexamethasone (DECADRON) tablet 4 mg  4 mg Oral Q6H [facility]    Gadobutrol injection (SINGLE-DOSE) SOLN 5 mL  5 mL Intravenous Once in imaging    heparin (porcine) 25,000 units in 0 45% NaCl 250 mL infusion (premix)  3-30 Units/kg/hr (Order-Specific) Intravenous Titrated    heparin (porcine) injection 2,000 Units  2,000 Units Intravenous Q1H PRN    heparin (porcine) injection 4,000 Units  4,000 Units Intravenous Q1H PRN    HYDROmorphone (DILAUDID) injection 0 5 mg  0 5 mg Intravenous Q3H PRN    LORazepam (ATIVAN) injection 1 mg  1 mg Intravenous Once PRN    metoprolol tartrate (LOPRESSOR) tablet 50 mg  50 mg Oral Q12H CHULA    oxyCODONE (ROXICODONE) immediate release tablet 10 mg  10 mg Oral Q6H PRN    Or    oxyCODONE (ROXICODONE) IR tablet 15 mg  15 mg Oral Q6H PRN    polyethylene glycol (MIRALAX) packet 17 g  17 g Oral BID    senna-docusate sodium (SENOKOT S) 8 6-50 mg per tablet 1 tablet  1 tablet Oral BID     Facility-Administered Medications Ordered in Other Encounters   Medication Dose Route Frequency    lactated ringers infusion   Intravenous Continuous PRN    lidocaine (PF) (XYLOCAINE-MPF) 1 % injection   Intravenous PRN    propofol (DIPRIVAN) 200 MG/20ML bolus injection   Intravenous PRN     VTE Pharmacologic Prophylaxis: Heparin  VTE Mechanical Prophylaxis: sequential compression device    ==  Emerita Patel MS4  [facility]'s Internal Medicine Residency

## 2021-08-02 NOTE — ASSESSMENT & PLAN NOTE
7/30/2021 alerted by nursing that pt was in Afib with RVR s/p IR US guided liver bx  He was stable at that time  Upon arrival to the floors pt was re-evaluated and found to still be in afib with RVR, -150s  /80s  Patient with inadequate response to Lopressor x2 and Cardizem bolus x2  Patient started on a Cardizem drip with improvement in heart rates  Transition off on 07/31/2021 and continued on metoprolol 50 mg b i d       Plan:   Metoprolol tartrate 100 mg BID   Add metoprolol PRN   Lovenox 50 mg IJ Q12H   Telemetry monitoring   Monitor and replete to maintain Mg>2 and K>4

## 2021-08-02 NOTE — ASSESSMENT & PLAN NOTE
47 yo M with recent abnormal findings on CT head, chest, and tissue neck concerning for extensive metastatic cancer:     7/22/21 - US Head Neck Soft Tissue: Several neck masses are identified corresponding to the palpable abnormalities within additional finding of the suspected thrombosed left jugular vein  Further evaluation with contrast-enhanced CT of the neck is recommended  7/22/21 - CT Soft Tissue Neck: Left greater than right numerous superficial and deep neck soft tissue and intramuscular rim-enhancing necrotic lesions/collections as described  Rim-enhancing epidural thickening/collection within posterolateral spinal canal spanning levels C2-C4 resulting in canal narrowing  Finding is concerning for widespread necrotizing infection  Neoplastic process is within differential consideration  Recommend correlation with sampling  Subpleural nodular opacities in the right upper lobe and superior segment of right lower lobe could indicate septic emboli  Suggested left hilar necrotic lymph node  Indeterminate sclerotic foci within T3 and T4 vertebral bodies  7/22/21 CT Head: There is an approximately 2 4 x 1 4 x 2 2 cm masslike lesion involving the scalp overlying the posterior left parietal region  Neoplasm and/or infection should be excluded  7/22/21 CTA PE study: Numerous nodular opacities are present bilaterally within the lungs  The appearances most suggestive of metastatic disease  Extensive hepatic metastatic disease  Large bilateral adrenal masses  Extensive lymphadenopathy  Probable osseous metastatic disease with mild to moderate compression deformity of the superior endplate of L98, suggesting pathologic compression  7/24/2021 CT Abd and pelvis:  There is a 2 8 x 2 2 x 1 8 cm ill-defined, hypodense pancreatic lesion, highly suspicious for pancreatic adenocarcinoma (series 3 images 20-22 and series 601 images 57 - 67 ) There is widespread metastatic disease with numerous liver lesions, bilateral adrenal metastases, multiple lytic bone metastases, with resulting mild superior end plate compression fractures of T11 and L3 as above, and also intramuscular metastases as   above      8/2/2021 MRI TS w wo Contrast Diffuse metastatic disease within the thoracic spine, lower cervical spine and upper lumbar spine  No extraosseous extension of tumor noted on this examination  pathologic adenopathy with central necrosis within the left inferior neck and supraclavicular region and mediastinum and small left-sided pleural effusion  Multiple liver lesions are noted  8/2/2021 MRI Head w wo contrast Ill-defined area of abnormal signal within the right superior cerebellum with T2 and FLAIR hyperintensity and postcontrast enhancement --> solitary parenchymal metastasis  Abnormal appearance of the upper cervical spine with replacement of normal fatty marrow within several vertebral bodies with soft tissue signal extending into the epidural space, demonstrating irregular enhancement suspicious for osseous metastasis with   epidural extension  Suspected cord compression at the C3 and upper C4 level  8/2/2021 MRI LS w wo Contrast Diffuse osseous metastatic disease  There is slight extraosseous extension of tumor noted at the L2-3 and L3-4 levels on the left  Ureter extends from the left posterior elements into the adjacent paraspinal soft tissues  Diffuse sacral metastasis with mild narrowing of the S1 and S2 sacral foramen  Soft tissue metastasis as described above  BL LN Bx  · Right neck mass: Malignant epithelioid neoplasm, possibly melanoma, morphologically identical to that in specimen B, at least 1 cm maximal dimension, present at specimen edges  · Left neck mass: Malignant epithelioid neoplasm, possibly melanoma, at least 1 5 cm maximal dimension, present in dermal lymphatics and at specimen edges, likely metastatic  Benign overlying epidermis  CEA 63 8; CA 19-9: 5418, LDH: 1234   At this time, concern for synchronous malignancy given preliminary pathology reports demonstrating melanoma and CA 19 9 elevated in the setting of pancreatic lesion with liver metastasis concerning for pancreatic adenocarcinoma  There is a generic link between these 2 cancers raising concern  Plan:  · Palliative medicine consulted; appreciate recommendations for pain management  · Change to oxyCONTIN 20-30 mg BID for baseline pain; 5mg-10mg q4h PRN for moderate to severe pain  · Dilaudid 0 5mg IV q3h PRN breakthrough pain  · APAP 650mg q6h PRN mild pain   · Bowel regimen  · Miralax 17g daily  · Senna 8 6mg BID  · Medical Oncology Consulted and Appreciate Recommendation  · appt with Oncologist in [facility] in 1-2 weeks to discuss treatment options  · Rad onc  · solitary brain metastasis: stereotactic radiosurgery/radiotherapy; After he completes radiation therapy to the cervical spine, he can then be evaluated for stereotactic radiation at [place] [id number]  signed consent form for stereotactic radiation  · Surgical Oncology Consulted  · S/P Punch Biopsy on 7/23/21; follow up on pathology results  · S/P 2x LN bx 7/26/21: Non-small cell carcinoma, 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

## 2021-08-02 NOTE — ASSESSMENT & PLAN NOTE
Pt last bowel movement prior to admission  Increasing abd pain/tenderness/distension  Tympanic  (-) rebound  Constipation unresponsive to lactulose 10 g, Mag citrate, and Relistor 8 mg  Most likely due to constipation which is opioid induced  7/28/2021 KUB Larger than typical volume of stool seen over the colon consistent with reported history of constipation  No bowel obstruction    After 2nd dose of Relistor 8 mg, pt has had daily bowel movements  Pt states the bowel movements were initially dark/black in color, however BM 8/1/2021 was starting to become lighter in color       Plan:  · Bowel regimen in place

## 2021-08-02 NOTE — OCCUPATIONAL THERAPY NOTE
Occupational Therapy Evaluation         Patient Name: Giselle HAQUEHHUBERT Date: 8/2/2021 08/02/21 1149   OT Last Visit   OT Visit Date 08/02/21   Note Type   Note Type Treatment   Cancel Reasons Patient off floor/test       OT orders received  Chart reviewed  Pt currently off floor at MRI  Will continue to follow and evaulate/treat pt as appropriate       Enrique Cardoza MS, OTR/L

## 2021-08-02 NOTE — NURSING NOTE
MRI completed, patient tolerated procedure  Post vital signs taken and recorded  Report given to floor nurse Coby  Patient placed in transport to be taken to room 914  Patient asking for water informed that once he becomes more alert and goes up to his room he can drink water

## 2021-08-02 NOTE — ASSESSMENT & PLAN NOTE
Punch bx done on left scalp  Preliminary pathology demonstrating melanoma  8/3/2021 pt complaining of increasing tenderness/pain and size of scalp lesion  Lesion is erythematous, non fluctuant and not warm to palpation     Plan  · Continue local wound care     · Wound care consult placed

## 2021-08-02 NOTE — CASE MANAGEMENT
Pt needs rehab when medically cleared  Noted in prior CM notes pt agreeable to blanket search for MA pending facilities  Referrals sent

## 2021-08-02 NOTE — ASSESSMENT & PLAN NOTE
Patient noted to have evidence go subclavian, internal jugular, and brachiocephalic vein thrombus on imaging on arrival  Started on heparin gtt  Remains on heparin gtt in setting of new onset Afib and ongoing workup and possibility for intervention  7/22/21 - CT Soft Tissue Neck with Contrast: Occluded left subclavian and brachiocephalic veins as well as left internal jugular vein to the level of hyoid bone    7/22/21 - CTA PE chest: Possible thrombus right subclavian vein extending into the brachiocephalic vein  7/27/2021 venous duplex US LEFT UPPER LIMB: Evidence of acute and subacute mostly occlusive deep vein thrombosis noted in the internal jugular vein  Evidence of acute non occlusive deep vein thrombosis noted in the innominate, subclavian and axillary with extension into the most proximal brachial vein      Plan:  · 8/3/2021 pt switched from Heparin gtt to Lovenox 50 mg Q12H inj  · Vascular surgery made aware; do not recommend any surgical intervention  · Cont to monitor PTT

## 2021-08-02 NOTE — ASSESSMENT & PLAN NOTE
Hgb 9 9 and Hct 29 6 at time of admission  Currently Hgb 10 8  Most likely anemia of chronic inflammation with some component of iron deficiency  Fe sat: 14, TIBC 205, Fe 28, Ferritin 1230  Retic: 5 12    Plan:  · Monitor Hgb and Hct    · Transfuse if patient becomes symptomatic or Hgb drops < 7

## 2021-08-02 NOTE — ANESTHESIA PREPROCEDURE EVALUATION
Procedure:  MRI BRAIN W WO CONTRAST    Relevant Problems   CARDIO   (+) New onset a-fib (HCC)   (+) Subclavian vein thromboembolism, acute, right (HCC)      HEMATOLOGY   (+) Anemia      Other   (+) Cervical stenosis of spinal canal   (+) Compression fracture of T11 vertebra (HCC)   (+) Metastatic cancer (HCC)   (+) Severe protein-calorie malnutrition (HCC)      BMI 16  New onset AFIB, currently off cardizem gtt  On heparin gtt  Widely metastatic disease to the thoracic spinal axis with early pathologic fracture superior T11 endplate  Coccyx ulcer  subclavian, internal jugular, and brachiocephalic vein thrombus on imaging on arrival  Started on heparin gtt  Remains on heparin gtt in setting of new onset Afib and ongoing workup and possibility for intervention  Physical Exam    Airway    Mallampati score: II  TM Distance: >3 FB  Neck ROM: full     Dental   lower dentures and upper dentures,     Cardiovascular      Pulmonary      Other Findings        Anesthesia Plan  ASA Score- 3     Anesthesia Type- general with ASA Monitors  Additional Monitors:   Airway Plan: LMA  Plan Factors-    Chart reviewed  EKG reviewed  Existing labs reviewed  Patient summary reviewed  Patient is not a current smoker  Patient did not smoke on day of surgery  Induction- intravenous  Postoperative Plan-   Planned trial extubation    Informed Consent- Anesthetic plan and risks discussed with patient  I personally reviewed this patient with the CRNA  Discussed and agreed on the Anesthesia Plan with the CRNA  Georgette Ormond

## 2021-08-02 NOTE — PHYSICAL THERAPY NOTE
Physical Therapy Cancellation Note       08/02/21 1150   PT Last Visit   PT Visit Date 08/02/21   Note Type   Note Type Treatment   Cancel Reasons Patient off floor/test     Pt chart reviewed  Pt currently off floor at MRI   PT to continue to follow and see pt as appropriate and able       Rafat Olmos, PT, DPT

## 2021-08-02 NOTE — ASSESSMENT & PLAN NOTE
LN Bx done 7/26/2021; INCISIONAL BIOPSY LEFT NECK MASS,AND EXCISIOINAL BIOPSY RIGHT NECK MASS (Bilateral)   Non-small cell carcinoma, 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,

## 2021-08-02 NOTE — ASSESSMENT & PLAN NOTE
Pt with cervical canal narrowing likely due to rim-enhancing epidural thickening/collection within posterolateral spinal canal spanning levels C2-C4  Findings could be due to necrotizing infectious process or metastasis related  Multiple Concerns for this level of of cord compression: paralysis, dysfunction of the diaphragm  7/28/2021 MRI CS Signal abnormality and erosive change of C3 laminae and spinous process along with lesser degree signal abnormality in the posterior elements of C1, C2, and C4  Associated epidural enhancement /enhancing soft tissue spanning from inferior C2-C4 resulting in severe canal stenosis and cord compression  Findings most favor metastatic disease  Infection is within differential consideration but less favored given findings elsewhere in the body  Correlate with sampling  Multiple rim-enhancing lesions within paraspinal musculature, superficial and deep soft tissue neck as demonstrated on recent soft tissue neck CT  Differential considerations are similar as above  Heterogeneous marrow signal and enhancement of the cervical and visualized thoracic vertebras concerning for infiltrative metastatic disease  Plan:  · Continue on  Decadron 5mg q6 hours PO  · Radiation oncology consulted  · Patient status post CT stimulation 07/29/2021; started on palliative radiation therapy times 10 sessions on 7/30/2021 (300 cGy, 1-10 MeV photons), 8/2/2021, 8/3/2021  · Neurosurgery consulted and appreciate recommendation  · Frequent neurochecks  · In setting of heavy disease burden and late stage metastatic disease, patient can wear TLSO brace as needed for comfort and soft cervical brace for comfort

## 2021-08-03 PROBLEM — K12.1 ORAL ULCER: Status: ACTIVE | Noted: 2021-01-01

## 2021-08-03 NOTE — CONSULTS
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  He is currently receiving radiation therapy to the cervical spine and has had 3 fractions/900 cGy out of a total planned 10 fractions  MRI of the brain reveals a 1 5 x 2 3 cm right superior cerebellar metastasis  Right and left neck biopsies are positive for non-small cell carcinoma consistent with poorly differentiated adenocarcinoma  Liver biopsy is pending and skin punch biopsy is pending  The patient may have  2 primary cancers with a pancreatic adenocarcinoma and metastatic melanoma  He is being seen consultation for the solitary brain metastasis  Consults    Review of Systems   Fourteen point review of systems is negative except for as noted history of the present illness  Historical Information   Previous Oncology History:   He denies any previous history of malignancy as well as no prior chemotherapy nor radiation therapy prior to the cervical spine that was just started  History reviewed  No pertinent past medical history  Past Surgical History:   Procedure Laterality Date    FACIAL/NECK BIOPSY Bilateral 7/26/2021    Procedure: INCISIONAL BIOPSY LEFT NECK MASS,AND EXCISIOINAL BIOPSY RIGHT NECK MASS;  Surgeon: Анна Marc MD;  Location: [facility];  Service: Surgical Oncology    HAND SURGERY      left hand    IR BIOPSY LIVER MASS  7/30/2021     History reviewed  No pertinent family history    Social History   Social History     Substance and Sexual Activity   Alcohol Use Not Currently Social History     Substance and Sexual Activity   Drug Use Yes    Types: Marijuana     Social History     Tobacco Use   Smoking Status Current Every Day Smoker    Packs/day: 0 25   Smokeless Tobacco Never Used       Meds/Allergies   all current active meds have been reviewed    No Known Allergies    Objective     Intake/Output Summary (Last 24 hours) at 8/3/2021 1700  Last data filed at 8/3/2021 1536  Gross per 24 hour   Intake 2710 78 ml   Output 3445 ml   Net -734 22 ml     Invasive Devices     Peripheral Intravenous Line            Peripheral IV 07/30/21 Right Forearm 4 days              Physical Exam  Vitals and nursing note reviewed  Constitutional:       General: He is not in acute distress  Appearance: He is well-developed  He is ill-appearing  He is not diaphoretic  HENT:      Head: Normocephalic and atraumatic  Mouth/Throat:      Pharynx: No oropharyngeal exudate  Eyes:      General: No scleral icterus  Conjunctiva/sclera: Conjunctivae normal       Pupils: Pupils are equal, round, and reactive to light  Neck:      Thyroid: No thyromegaly  Trachea: No tracheal deviation  Cardiovascular:      Rate and Rhythm: Normal rate and regular rhythm  Heart sounds: Normal heart sounds  Pulmonary:      Effort: Pulmonary effort is normal  No respiratory distress  Breath sounds: Normal breath sounds  No wheezing or rales  Abdominal:      General: Abdomen is flat  Bowel sounds are normal  There is no distension  Palpations: Abdomen is soft  There is no mass  Tenderness: There is no abdominal tenderness  Musculoskeletal:         General: No swelling or tenderness  Normal range of motion  Cervical back: Normal range of motion and neck supple  Lymphadenopathy:      Cervical: No cervical adenopathy  Skin:     General: Skin is warm and dry  Coloration: Skin is not pale  Findings: No erythema or rash     Neurological:      General: No focal deficit present  Mental Status: He is alert and oriented to person, place, and time  Cranial Nerves: No cranial nerve deficit  Sensory: No sensory deficit  Motor: No weakness  Coordination: Coordination normal    Psychiatric:         Mood and Affect: Mood normal          Behavior: Behavior normal          Thought Content: Thought content normal          Judgment: Judgment normal        Lab Results: I have personally reviewed pertinent reports  Imaging Studies: I have personally reviewed pertinent reports  EKG, Pathology, and Other Studies: I have personally reviewed pertinent reports  Assessment/Plan     Assessment and Plan:  Peggy Rush is a 48y o  year old male who presents with 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  He is currently receiving radiation therapy to the cervical spine and has had 3 fractions/900 cGy out of a total planned 10 fractions  MRI of the brain reveals a 1 5 x 2 3 cm right superior cerebellar metastasis  Right and left neck biopsies are positive for non-small cell carcinoma consistent with poorly differentiated adenocarcinoma  Liver biopsy is pending and skin punch biopsy is pending  The patient may have  2 primary cancers with a pancreatic adenocarcinoma and metastatic melanoma  He is being seen consultation for the solitary brain metastasis  We discussed a solitary brain metastasis can be treated with craniotomy and resection versus stereotactic radiosurgery/radiotherapy  He is not enthusiastic about having surgery but is willing to consider stereotactic radiation  After he completes radiation therapy to the cervical spine, he can then be evaluated for stereotactic radiation at [facility] as an outpatient  We discussed rationale for the radiation therapy including the acute side effects and the potential chronic complications with him    He did sign consent form for stereotactic radiation  Code Status: Level 1 - Full Code  Advance Directive and Living Will:      Power of :    POLST:      Counseling / Coordination of Care  Total floor / unit time spent today 50 minutes  Greater than 50% of total time was spent with the patient and / or family counseling and / or coordination of care   A description of the counseling / coordination of care: See Above

## 2021-08-03 NOTE — ASSESSMENT & PLAN NOTE
8/3/2021 oral ulcer noted on the left hard palate; erythematous around the white head; not easy to scrape off; not tender     In the setting of pt on decadron treatment leading to likely immunocompromised state and pt complaining of intermittent mild dysphagia/odynphagia there are concerns for oral candidiasis     8/4/2021 white ulcer head no longer present; erythematous base     Plan   · Cont Diflucan 200 mg QD for 3 days total  · Scraping sent for cx

## 2021-08-03 NOTE — PROGRESS NOTES
INTERNAL MEDICINE RESIDENCY PROGRESS NOTE     PATIENT INFORMATION     Name: Nisha Wade   Age & Sex: 48 y o  male   MRN: [id number]  Hospital Stay Days: 11  [facility]   Encounter: [id number]  Team: SOD Team B     ASSESSMENT/PLAN     Principal Problem:    Metastatic cancer ([facility] )  Active Problems:    Subclavian vein thromboembolism, acute, right (HCC)    Compression fracture of T11 vertebra (HCC)    Cervical stenosis of spinal canal    Anemia    Cervical spinal mass (HCC)    Scalp lesion    S/P lymph node biopsy    Severe protein[facility]calorie malnutrition ([facility] )    Pressure ulcer of coccygeal region, unspecified pressure ulcer stage    Mass of thoracic vertebra    Constipation due to opioid therapy    New onset a-fib ([facility] )    Oral ulcer    Oral ulcer  Assessment & Plan  8/3/2021 oral ulcer noted on the left hard palate; erythematous around the white head; not easy to scrape off; not tender     In the setting of pt on decadron treatment leading to likely immunocompromised state and pt complaining of intermittent mild dysphagia/odynphagia there are concerns for oral candidiasis     Plan   · Start diflucan   · Scraping sent for cx      New onset a-fib [facility])  Assessment & Plan  7/30/2021 alerted by nursing that pt was in Afib with RVR s/p IR US guided liver bx  He was stable at that time  Upon arrival to the floors pt was re-evaluated and found to still be in afib with RVR, -150s  /80s  Patient with inadequate response to Lopressor x2 and Cardizem bolus x2  Patient started on a Cardizem drip with improvement in heart rates  Transition off on 07/31/2021 and continued on metoprolol 50 mg b i d  Plan:   Metoprolol tartrate 50 mg BID   Heparin gtt   Telemetry monitoring   Monitor and replete to maintain Mg>2 and K>4        Constipation due to opioid therapy  Assessment & Plan  Pt last bowel movement prior to admission  Increasing abd pain/tenderness/distension  Tympanic  (-) rebound  Constipation unresponsive to lactulose 10 g, Mag citrate, and Relistor 8 mg  Most likely due to constipation which is opioid induced  7/28/2021 KUB Larger than typical volume of stool seen over the colon consistent with reported history of constipation  No bowel obstruction    After 2nd dose of Relistor 8 mg, pt has had daily bowel movements  Pt states the bowel movements were initially dark/black in color, however BM 8/1/2021 was starting to become lighter in color  Plan:  · Bowel regimen in place      Mass of thoracic vertebra  Assessment & Plan  7/28/2021 MRI TS Widely metastatic disease to the thoracic spinal axis with early pathologic fracture superior T11 endplate  No extraosseous tumor within the spinal canal, no cord or root compression  Multiple soft tissue masses in the adjacent soft tissues  Plan  · Neurosurgery consult   · MRI TS and LS w/wo contrast under anesthesia     Pressure ulcer of coccygeal region, unspecified pressure ulcer stage  Assessment & Plan  7/28/2021 Alerted by nursing that pt has ulcer on the coccyx  Per wound care: "Mid sacrum - evolving DTI - HA  Wound presents as approx: 20% evolving open partial thickness appearing moist yellow tissue, and 80% intact non-evolved non-blanchable purple skin  All aspects measured together  Edges fragile and attached, no maceration  Tatianna-wound is intact with pink blanchable skin   Suspect mixed etiology of pressure and friction given the chaffed/irregular dry appearance  -Will recommend foam dressing to protect "    Plan:  · Per wound care  · Frequent repositioning   · Apply hydraguard to b/l heels and b/l buttocks and sacrum BID and PRN for prevention and protection  · Apply skin nourishing cream the entire skin daily for moisture  · Turn and reposition patient every  2 hours   · Elevate heels off of bed with pillows to offload pressure   · Apply EHOB waffle cushion to chair when OOB, if able  · Cleanse sacral wound with NSS, pat dry, and apply Allevyn foam dressing  Feroz dressing with T  Change every other day and PRN for soilage/dislodgement  · Wound care will follow along with patient weekly     Severe protein-calorie malnutrition ([address] )  Assessment & Plan  Malnutrition Findings:   Adult Malnutrition type: Acute illness  Adult Degree of Malnutrition: Other severe protein calorie malnutrition (related to medical condition <50% energy intake versus needs for > 5 days evidenced by BMI 16 98; lost 9#(7 4%) since 6/10/21; moderate fat loss somewhat hollow orbital, moderate muscle loss visible clavicle  Treat Regular diet &Ensure Enlive TID )    BMI Findings:  Adult BMI Classifications: Underweight < 18 5     Body mass index is 16 6 kg/m²  Plan:  · Regular diet   · Ensure supplementation TID with meals  · Nutrition recommendations appreciated       S/P lymph node biopsy  Assessment & Plan  LN Bx done 7/26/2021; INCISIONAL BIOPSY LEFT NECK MASS,AND EXCISIOINAL BIOPSY RIGHT NECK MASS (Bilateral)  Preliminary report demonstrating melanoma                 Scalp lesion  Assessment & Plan  Punch bx done on left scalp  Preliminary pathology demonstrating melanoma  8/3/2021 pt complaining of increasing tenderness/pain and size of scalp lesion  Lesion is erythematous, non fluctuant and not warm to palpation     Plan  · Continue local wound care  Anemia  Assessment & Plan  Hgb 9 9 and Hct 29 6 at time of admission  Currently Hgb 9 4   Most likely anemia of chronic inflammation with some component of iron deficiency  Fe sat: 14, TIBC 205, Fe 28, Ferritin 1230  Retic: 5 12    Plan:  · Monitor Hgb and Hct  · Transfuse if patient becomes symptomatic or Hgb drops < 7    Cervical stenosis of spinal canal  Assessment & Plan  Pt with cervical canal narrowing likely due to rim-enhancing epidural thickening/collection within posterolateral spinal canal spanning levels C2-C4  Findings could be due to necrotizing infectious process or metastasis related  Multiple Concerns for this level of of cord compression: paralysis, dysfunction of the diaphragm  7/28/2021 MRI CS Signal abnormality and erosive change of C3 laminae and spinous process along with lesser degree signal abnormality in the posterior elements of C1, C2, and C4  Associated epidural enhancement /enhancing soft tissue spanning from inferior C2-C4 resulting in severe canal stenosis and cord compression  Findings most favor metastatic disease  Infection is within differential consideration but less favored given findings elsewhere in the body  Correlate with sampling  Multiple rim-enhancing lesions within paraspinal musculature, superficial and deep soft tissue neck as demonstrated on recent soft tissue neck CT  Differential considerations are similar as above  Heterogeneous marrow signal and enhancement of the cervical and visualized thoracic vertebras concerning for infiltrative metastatic disease  Plan:  · Continue on IV Decadron 5mg q6 hours PO  · Radiation oncology consulted  · Patient status post CT stimulation 07/29/2021; started on palliative radiation therapy times 10 sessions on 7/30/2021 (300 cGy, 1-10 MeV photons)  · Neurosurgery consulted and appreciate recommendation  · Frequent neurochecks  · In setting of heavy disease burden and late stage metastatic disease, patient can wear TLSO brace as needed for comfort and soft cervical brace for comfort  Subclavian vein thromboembolism, acute, right [facility])  Assessment & Plan  Patient noted to have evidence go subclavian, internal jugular, and brachiocephalic vein thrombus on imaging on arrival  Started on heparin gtt  Remains on heparin gtt in setting of new onset Afib and ongoing workup and possibility for intervention       7/22/21 - CT Soft Tissue Neck with Contrast: Occluded left subclavian and brachiocephalic veins as well as left internal jugular vein to the level of hyoid bone    7/22/21 - CTA PE chest: Possible thrombus right subclavian vein extending into the brachiocephalic vein  7/27/2021 venous duplex US LEFT UPPER LIMB: Evidence of acute and subacute mostly occlusive deep vein thrombosis noted in the internal jugular vein  Evidence of acute non occlusive deep vein thrombosis noted in the innominate, subclavian and axillary with extension into the most proximal brachial vein  Plan:  · Heparin gtt;will ule require transition to warfarin or DOAC; have not transition due to possible GI intervention  · Vascular surgery made aware; do not recommend any surgical intervention  · Cont to monitor PTT     * Metastatic cancer [facility])  Assessment & Plan  47 yo M with recent abnormal findings on CT head, chest, and tissue neck concerning for extensive metastatic cancer:     7/22/21 - US Head Neck Soft Tissue: Several neck masses are identified corresponding to the palpable abnormalities within additional finding of the suspected thrombosed left jugular vein  Further evaluation with contrast-enhanced CT of the neck is recommended  7/22/21 - CT Soft Tissue Neck: Left greater than right numerous superficial and deep neck soft tissue and intramuscular rim-enhancing necrotic lesions/collections as described  Rim-enhancing epidural thickening/collection within posterolateral spinal canal spanning levels C2-C4 resulting in canal narrowing  Finding is concerning for widespread necrotizing infection  Neoplastic process is within differential consideration  Recommend correlation with sampling  Subpleural nodular opacities in the right upper lobe and superior segment of right lower lobe could indicate septic emboli  Suggested left hilar necrotic lymph node  Indeterminate sclerotic foci within T3 and T4 vertebral bodies  7/22/21 CT Head: There is an approximately 2 4 x 1 4 x 2 2 cm masslike lesion involving the scalp overlying the posterior left parietal region  Neoplasm and/or infection should be excluded        7/22/21 CTA PE study: Numerous nodular opacities are present bilaterally within the lungs  The appearances most suggestive of metastatic disease  Extensive hepatic metastatic disease  Large bilateral adrenal masses  Extensive lymphadenopathy  Probable osseous metastatic disease with mild to moderate compression deformity of the superior endplate of R30, suggesting pathologic compression  7/24/2021 CT Abd and pelvis: There is a 2 8 x 2 2 x 1 8 cm ill-defined, hypodense pancreatic lesion, highly suspicious for pancreatic adenocarcinoma (series 3 images 20-22 and series 601 images 57 - 67 ) There is widespread metastatic disease with numerous liver lesions, bilateral adrenal metastases, multiple lytic bone metastases, with resulting mild superior end plate compression fractures of T11 and L3 as above, and also intramuscular metastases as   above      8/2/2021 MRI TS w wo Contrast Diffuse metastatic disease within the thoracic spine, lower cervical spine and upper lumbar spine  No extraosseous extension of tumor noted on this examination  pathologic adenopathy with central necrosis within the left inferior neck and supraclavicular region and mediastinum and small left-sided pleural effusion  Multiple liver lesions are noted  8/2/2021 MRI Head w wo contrast Ill-defined area of abnormal signal within the right superior cerebellum with T2 and FLAIR hyperintensity and postcontrast enhancement --> solitary parenchymal metastasis  Abnormal appearance of the upper cervical spine with replacement of normal fatty marrow within several vertebral bodies with soft tissue signal extending into the epidural space, demonstrating irregular enhancement suspicious for osseous metastasis with   epidural extension  Suspected cord compression at the C3 and upper C4 level  8/2/2021 MRI LS w wo Contrast Diffuse osseous metastatic disease  There is slight extraosseous extension of tumor noted at the L2-3 and L3-4 levels on the left    Ureter extends from the left posterior elements into the adjacent paraspinal soft tissues  Diffuse sacral metastasis with mild narrowing of the S1 and S2 sacral foramen  Soft tissue metastasis as described above  BL LN Bx  · Right neck mass: Malignant epithelioid neoplasm, possibly melanoma, morphologically identical to that in specimen B, at least 1 cm maximal dimension, present at specimen edges  · Left neck mass: Malignant epithelioid neoplasm, possibly melanoma, at least 1 5 cm maximal dimension, present in dermal lymphatics and at specimen edges, likely metastatic  Benign overlying epidermis  CEA 63 8; CA 19-9: 5418, LDH: 1234  At this time, concern for synchronous malignancy given preliminary pathology reports demonstrating melanoma and CA 19 9 elevated in the setting of pancreatic lesion with liver metastasis concerning for pancreatic adenocarcinoma  There is a generic link between these 2 cancers raising concern  Plan:  · Palliative medicine consulted; appreciate recommendations for pain management   · Pain Regimen:   · Oxycodone 5/10 mg q3 hours PRN mod/severe  · Dilaudid 0 5 mg q3 hours PRN for breakthrough  · Bowel regimen ordered  · Medical Oncology Consulted and Appreciate Recommendation  · Surgical Oncology Consulted  · S/P Punch Biopsy on 7/23/21; follow up on pathology results  · S/P LN bx 7/26/21; F/u path results   · Continue to monitor kidney fxn and electrolytes; potential for Tumor lysis syndrome   · GI consulted and appreciate recommendations   · IR bx liver mets 7/30/2021  · If negative for pancreatic mets will proceed with endoscopic ultrasound with pancreatic biopsy  Disposition: inpatient; awaiting pathology results for 1) skin punch bx 2) liver met bx which will determine if pt has primary melanoma vs  two primary cancers most likely melanoma and pancreatic adenocarcinoma; potential for EUS of pancreatic mass depending on liver met bx results;  Patient has had 1/10 treatments of palliative radiation for CS cord compression due to mets; plan to discharge to rehab      SUBJECTIVE     Patient seen and examined  No acute events overnight  Patient had one episode of fever to 100 8 F yesterday, however most recent temperature down to 96 3 F  Otherwise AVSS  Pt had MRI of head, TS, and LS yesterday under anesthesia; he had increase in his neck pain due to this as well as SHANNON overnight  He continues to have abd pain/tenderness, however it is less generalized than yesterday and seems to be more localized to the LUQ  No longer complaining of bilateral flank pain/tenderness  His last bowel movement was yesterday, which was stated by the patient to be loose/watery and lighter in color than it has been in previous days  Patient states he still has an appetite and is tolerating PO without N/V  However, he does endorse intermittent dysphagia/odynphagia  He is still having pain/tenderness at the left scalp lesion  Pt believes this has grown in size  Fatigue/SOB with exertion  Pt states at night he has elevated temperatures and diaphoresis  Denies: weakness, numbness/tingling, Dizziness, lightheadedness, visual changes, hearing difficulties, sinus pain/pressure/tenderness, CP, palpitations, SOB, cough, wheezing, N/V/D/C, hematuria, dysuria, changes in urinary frequency/urgency    Pt seen by Palliative  Patient has concerns about letting his daughter and girlfriend know about his diagnosis       OBJECTIVE     Vitals:    21 2256 21 0348 21 0700 21 0845   BP: 122/79 129/78 126/79    BP Location: Right arm  Right arm    Pulse: 80 87 78    Resp: 16 20 20    Temp: 97 5 °F (36 4 °C) 98 7 °F (37 1 °C) (!) 96 3 °F (35 7 °C)    TempSrc:  Oral Oral    SpO2: 98% 97% 95% 95%   Weight:       Height:          Temperature:   Temp (24hrs), Av °F (36 7 °C), Min:96 3 °F (35 7 °C), Max:100 8 °F (38 2 °C)    Temperature: (!) 96 3 °F (35 7 °C)  Intake & Output:  I/O       701 -  0700 08/02 0701 - 08/03 0700 08/03 0701 - 08/04 0700    P  O  960 1920 300    I V  (mL/kg)  430 8 (8 3)     Total Intake(mL/kg) 960 (18 4) 2350 8 (45) 300 (5 7)    Urine (mL/kg/hr) 4775 (3 8) 2950 (2 4) 780 (3 2)    Stool 0      Total Output 4775 2950 780    Net -3815 -599 2 -480           Unmeasured Urine Occurrence 1 x      Unmeasured Stool Occurrence 2 x            Intake/Output Summary (Last 24 hours) at 8/3/2021 1223  Last data filed at 8/3/2021 1150  Gross per 24 hour   Intake 2650 78 ml   Output 3170 ml   Net -519 22 ml     I/O this shift:  In: 300 [P O :300]  Out: 1120 [Urine:1120]  Weights:   IBW (Ideal Body Weight): 70 7 kg    Body mass index is 16 6 kg/m²  Weight (last 2 days)     None        Physical Exam  Constitutional:       General: He is not in acute distress  Appearance: He is ill-appearing  He is not toxic-appearing or diaphoretic  Comments: Cachetic      HENT:      Head: Normocephalic  Comments: #1 Left scalp lesion w/ punch bx  -hard raised area around punch bx site with defined border  -TTP  -erythematous   -not warm to touch  -non fluctuant  #2 left scalp mass   -TTP, non erythematous, firm, non-fluctuant         Right Ear: External ear normal       Left Ear: External ear normal       Ears:      Comments: Post auricular masses L larger than R; tender     Nose: Nose normal  No congestion or rhinorrhea  Mouth/Throat:      Mouth: Mucous membranes are moist       Comments: Ulcer one the right hard palate; erythematous around border with white head; not able to scrape off  Swabs sent for culture    Eyes:      General: No scleral icterus  Right eye: No discharge  Left eye: No discharge  Extraocular Movements: Extraocular movements intact  Conjunctiva/sclera: Conjunctivae normal       Pupils: Pupils are equal, round, and reactive to light  Neck:      Vascular: No carotid bruit  Cardiovascular:      Rate and Rhythm: Normal rate and regular rhythm  Pulses: Normal pulses  Heart sounds: Normal heart sounds  Pulmonary:      Effort: Pulmonary effort is normal  No respiratory distress  Breath sounds: Normal breath sounds  No stridor  No wheezing, rhonchi or rales  Chest:      Chest wall: No tenderness  Abdominal:      General: Abdomen is flat  Bowel sounds are normal  There is distension (mild, typanic)  Palpations: There is mass (left abd wall 2 cm)  Tenderness: There is abdominal tenderness (LUQ)  There is no right CVA tenderness, left CVA tenderness, guarding or rebound  Hernia: No hernia is present  Musculoskeletal:         General: Normal range of motion  Cervical back: Tenderness present  No rigidity  Right lower leg: No edema  Left lower leg: No edema  Lymphadenopathy:      Cervical: Cervical adenopathy (bilateral) present  Skin:     General: Skin is warm and dry  Capillary Refill: Capillary refill takes less than 2 seconds  Findings: Lesion (left scalp 2/2 punch bx ) present  Neurological:      General: No focal deficit present  Mental Status: He is alert and oriented to person, place, and time  Cranial Nerves: No cranial nerve deficit  Sensory: No sensory deficit  Motor: No weakness  Comments: BUE and BLE 5/5 strength    Psychiatric:         Mood and Affect: Mood normal          Behavior: Behavior normal          Thought Content: Thought content normal          Judgment: Judgment normal         LABORATORY DATA     Labs: I have personally reviewed pertinent reports      Results from last 7 days   Lab Units 08/03/21  0634 08/02/21  3884 08/01/21  0608 07/31/21  0534 07/30/21  0555 07/29/21  0458   WBC Thousand/uL 29 95* 29 72* 24 86* 22 42* 24 53* 10 08   HEMOGLOBIN g/dL 10 4* 9 6* 9 4* 8 8* 8 6* 8 4*   HEMATOCRIT % 33 2* 30 8* 30 3* 28 8* 26 8* 25 4*   PLATELETS Thousands/uL 433* 438* 412* 365 333 239   NEUTROS PCT %  --   --   --  87* 88* 89*   MONOS PCT %  --   --   -- 3* 5 2*   MONO PCT % 5  --  4  --   --   --       Results from last 7 days   Lab Units 08/03/21  0634 08/02/21  0613 08/01/21  0608 07/30/21  0555   POTASSIUM mmol/L 4 6 4 2 3 7 4 2   CHLORIDE mmol/L 104 106 107 113*   CO2 mmol/L 31 32 27 27   BUN mg/dL 21 15 13 14   CREATININE mg/dL 0 58* 0 52* 0 63 0 39*   CALCIUM mg/dL 9 0 8 9 8 9 8 8   ALK PHOS U/L 256* 254*  --  173*   ALT U/L 77 65  --  58   AST U/L 70* 49*  --  57*     Serum creatinine: 0 58 mg/dL (L) 08/03/21 0634  Estimated creatinine clearance: 109 9 mL/min (A)   Results from last 7 days   Lab Units 08/01/21  0608 07/31/21  0534 07/30/21  0555   MAGNESIUM mg/dL 2 0 2 1 1 9     Results from last 7 days   Lab Units 08/01/21  0608 07/31/21  0534 07/30/21  0555   PHOSPHORUS mg/dL 2 9 2 9 3 2      Results from last 7 days   Lab Units 08/03/21  0634 08/02/21  0613 08/01/21  0608   PTT seconds 74* 85* 82*     Results from last 7 days   Lab Units 07/29/21  0458   LACTIC ACID mmol/L 1 5           IMAGING & DIAGNOSTIC TESTING     Radiology Results: I have personally reviewed pertinent reports  XR spine cervical 2 or 3 vw injury    Result Date: 7/26/2021  Impression: 1  Destructive lytic lesion within the C3 vertebral body extending into the posterior elements suspicious for metastatic disease  2   Additional ill-defined lytic appearing lesions in the C4-C7 vertebral bodies also suggestive of metastatic disease  Workstation performed: [id number]     XR spine thoracolumbar 2 vw    Result Date: 7/26/2021  Impression: Mild superior endplate compression fracture of T11  Multiple lytic lesions to include T11, L3 and S1 are better visualized on recent CT  Workstation performed: [id number]     CT head without contrast    Result Date: 7/22/2021  Impression: There is an approximately 2 4 x 1 4 x 2 2 cm masslike lesion involving the scalp overlying the posterior left parietal region  Neoplasm and/or infection should be excluded  Sampling is recommended   The study was marked in EPIC for immediate notification  Workstation performed: [id number]     CT soft tissue neck w contrast    Result Date: 7/22/2021  Impression: 1  Left greater than right numerous superficial and deep neck soft tissue and intramuscular rim-enhancing necrotic lesions/collections as described  Rim-enhancing epidural thickening/collection within posterolateral spinal canal spanning levels C2-C4 resulting in canal narrowing  Finding is concerning for widespread necrotizing infection  Neoplastic process is within differential consideration  Recommend correlation with sampling  2   Occluded left subclavian and brachiocephalic veins as well as left internal jugular vein to the level of hyoid bone suggesting  3   Subpleural nodular opacities in the right upper lobe and superior segment of right lower lobe could indicate septic emboli  Suggested left hilar necrotic lymph node  4   Indeterminate sclerotic foci within T3 and T4 vertebral bodies  I personally discussed this study with Anabel Will on 7/22/2021 at 2:10 PM  Workstation performed: [id number]     MRI cervical spine w wo contrast    Result Date: 7/27/2021  Impression: 1  Signal abnormality and erosive change of C3 laminae and spinous process along with lesser degree signal abnormality in the posterior elements of C1, C2, and C4  Associated epidural enhancement /enhancing soft tissue spanning from inferior C2-C4 resulting in severe canal stenosis and cord compression  Findings most favor metastatic disease  Infection is within differential consideration but less favored given findings elsewhere in the body  Correlate with sampling  2   Multiple rim-enhancing lesions within paraspinal musculature, superficial and deep soft tissue neck as demonstrated on recent soft tissue neck CT  Differential considerations are similar as above   3   Heterogeneous marrow signal and enhancement of the cervical and visualized thoracic vertebras concerning for infiltrative metastatic disease  4   Redemonstrated occluded left internal jugular vein  5   Degenerative change as above  I personally discussed this study with Deneen Willingham on 7/27/2021 at 9:35 AM  Workstation performed: [id number]     US head neck soft tissue    Result Date: 7/22/2021  Impression: Several neck masses are identified corresponding to the palpable abnormalities within additional finding of the suspected thrombosed left jugular vein  Further evaluation with contrast-enhanced CT of the neck is recommended  The study was marked in [facility] for immediate notification  Workstation performed: [id number]     CTA ED chest PE Study    Result Date: 7/23/2021  Impression: No evidence of pulmonary embolism is seen  Measured RV/LV ratio is within normal limits at less than 0 9  Numerous nodular opacities are present bilaterally within the lungs  The appearances most suggestive of metastatic disease  Extensive hepatic metastatic disease  Large bilateral adrenal masses  Extensive lymphadenopathy  Probable osseous metastatic disease with mild to moderate compression deformity of the superior endplate of F17, suggesting pathologic compression  Possible thrombus right subclavian vein extending into the brachiocephalic vein  Consider venous duplex ultrasound for further evaluation  The study was marked in [facility] for immediate notification  Workstation performed: [id number]     CT abdomen pelvis w contrast    Result Date: 7/26/2021  Impression: There is a 2 8 x 2 2 x 1 8 cm ill-defined, hypodense pancreatic lesion, highly suspicious for pancreatic adenocarcinoma (series 3 images 20-22 and series 601 images 57 - 67 ) There is widespread metastatic disease with numerous liver lesions, bilateral adrenal metastases, multiple lytic bone metastases, with resulting mild superior end plate compression fractures of T11 and L3 as above, and also intramuscular metastases as above   An alternative diagnosis to metastatic pancreatic adenocarcinoma, given the unusual intramuscular metastasis, would be metastatic melanoma  Please correlate with already performed biopsy results  Workstation performed: [id number]     Other Diagnostic Testing: I have personally reviewed pertinent reports        ACTIVE MEDICATIONS     Current Facility-Administered Medications   Medication Dose Route Frequency    acetaminophen (TYLENOL) tablet 650 mg  650 mg Oral Q6H PRN    bisacodyl (DULCOLAX) rectal suppository 10 mg  10 mg Rectal Daily PRN    calcium carbonate (TUMS) chewable tablet 500 mg  500 mg Oral Daily PRN    dexamethasone (DECADRON) tablet 4 mg  4 mg Oral Q6H [facility]    Gadobutrol injection (SINGLE-DOSE) SOLN 5 mL  5 mL Intravenous Once in imaging    heparin (porcine) 25,000 units in 0 45% NaCl 250 mL infusion (premix)  3-30 Units/kg/hr (Order-Specific) Intravenous Titrated    heparin (porcine) injection 2,000 Units  2,000 Units Intravenous Q1H PRN    heparin (porcine) injection 4,000 Units  4,000 Units Intravenous Q1H PRN    HYDROmorphone (DILAUDID) injection 0 5 mg  0 5 mg Intravenous Q3H PRN    LORazepam (ATIVAN) injection 1 mg  1 mg Intravenous Once PRN    metoprolol tartrate (LOPRESSOR) tablet 50 mg  50 mg Oral Q12H [facility]UL[facility]    oxyCODONE (ROXICODONE) immediate release tablet 10 mg  10 mg Oral Q4H    oxyCODONE (ROXICODONE) IR tablet 5 mg  5 mg Oral Q4H PRN    polyethylene glycol (MIRALAX) packet 17 g  17 g Oral BID    senna-docusate sodium (SENOKOT S) 8 6-50 mg per tablet 1 tablet  1 tablet Oral BID     VTE Pharmacologic Prophylaxis: Heparin  VTE Mechanical Prophylaxis: sequential compression device    ==  Emerita Patel MS4  [facility] Internal Medicine Residency

## 2021-08-03 NOTE — PLAN OF CARE
Problem: MOBILITY - ADULT  Goal: Maintain or return to baseline ADL function  Description: INTERVENTIONS:  -  Assess patient's ability to carry out ADLs; assess patient's baseline for ADL function and identify physical deficits which impact ability to perform ADLs (bathing, care of mouth/teeth, toileting, grooming, dressing, etc )  - Assess/evaluate cause of self-care deficits   - Assess range of motion  - Assess patient's mobility; develop plan if impaired  - Assess patient's need for assistive devices and provide as appropriate  - Encourage maximum independence but intervene and supervise when necessary  - Involve family in performance of ADLs  - Assess for home care needs following discharge   - Consider OT consult to assist with ADL evaluation and planning for discharge  - Provide patient education as appropriate  Outcome: Progressing  Goal: Maintains/Returns to pre admission functional level  Description: INTERVENTIONS:  - Perform BMAT or MOVE assessment daily    - Set and communicate daily mobility goal to care team and patient/family/caregiver  - Collaborate with rehabilitation services on mobility goals if consulted  - Perform Range of Motion 3 times a day  - Reposition patient every 2 hours  - Dangle patient 3 times a day  - Stand patient 3 times a day  - Ambulate patient 3 times a day  - Out of bed to chair 3 times a day   - Out of bed for meals 3 times a day  - Out of bed for toileting  - Record patient progress and toleration of activity level   Outcome: Progressing     Problem: Nutrition/Hydration-ADULT  Goal: Nutrient/Hydration intake appropriate for improving, restoring or maintaining nutritional needs  Description: Monitor and assess patient's nutrition/hydration status for malnutrition  Collaborate with interdisciplinary team and initiate plan and interventions as ordered  Monitor patient's weight and dietary intake as ordered or per policy   Utilize nutrition screening tool and intervene as necessary  Determine patient's food preferences and provide high-protein, high-caloric foods as appropriate       INTERVENTIONS:  - Monitor oral intake, urinary output, labs, and treatment plans  - Assess nutrition and hydration status and recommend course of action  - Evaluate amount of meals eaten  - Assist patient with eating if necessary   - Allow adequate time for meals  - Recommend/ encourage appropriate diets, oral nutritional supplements, and vitamin/mineral supplements  - Order, calculate, and assess calorie counts as needed  - Recommend, monitor, and adjust tube feedings and TPN/PPN based on assessed needs  - Assess need for intravenous fluids  - Provide specific nutrition/hydration education as appropriate  - Include patient/family/caregiver in decisions related to nutrition  Outcome: Progressing     Problem: Prexisting or High Potential for Compromised Skin Integrity  Goal: Skin integrity is maintained or improved  Description: INTERVENTIONS:  - Identify patients at risk for skin breakdown  - Assess and monitor skin integrity  - Assess and monitor nutrition and hydration status  - Monitor labs   - Assess for incontinence   - Turn and reposition patient  - Assist with mobility/ambulation  - Relieve pressure over bony prominences  - Avoid friction and shearing  - Provide appropriate hygiene as needed including keeping skin clean and dry  - Evaluate need for skin moisturizer/barrier cream  - Collaborate with interdisciplinary team   - Patient/family teaching  - Consider wound care consult   Outcome: Progressing

## 2021-08-03 NOTE — QUICK NOTE
GI Quick Note:    Chart reviewed  Status post IR biopsy of liver lesion suspected to be metastasis from pancreatic adenocarcinoma vs metastatic melanoma  Markedly elevated CA 19-9 suggestive of pancreatic malignancy  Pathology results remain pending  If biopsy results are inconclusive, can consider EUS for biopsy of pancreatic mass  Follow-up oncology recommendations  GI will sign off for now  Please contact the GI fellow on-call with any questions or concerns  CASTILLO Millan  Gastroenterology Fellow  [facility]  Available on [employer]  [email or web address]

## 2021-08-03 NOTE — CONSULTS
Consultation - Palliative and Supportive Care   Michelle Jaramillo 48 y o  male [phone]    Patient Active Problem List   Diagnosis    Metastatic cancer ([facility] [address] )    Subclavian vein thromboembolism, acute, right (HCC)    Compression fracture of T11 vertebra (HCC)    Cervical stenosis of spinal canal    Anemia    Cervical spinal mass ([facility] [address] )    Scalp lesion    S/P lymph node biopsy    Severe protein-calorie malnutrition (HCC)    Pressure ulcer of coccygeal region, unspecified pressure ulcer stage    Mass of thoracic vertebra    Constipation due to opioid therapy    New onset a-fib [facility])     Active issues specifically addressed today include:   Metastatic cancer - melanoma versus pancreatic adenocarcinoma  Cancer related pain  Severe protein calorie malnutrition  Palliative care patient  Goals of care    Plan:  1  Symptom management -     Cancer pain:  -Oxycodone 10 mg q4h oral scheduled with 5mg-10mg q4h PRN for moderate to severe pain  -Dilaudid 0 5mg IV q3h PRN breakthrough pain  -APAP 650mg q6h PRN mild pain    OME in past 24 hours: 90   1 5 mg IV dilaudid (30 OME)  40 mg PO Oxycodone IR (60 OME)    Bowel regimen to prevent OIC  -Miralax 17g daily  -Senna 8 6mg BID    2  Goals - pending path report  Briefly discussed who he would want as his preferred decision maker, apparently his wife's passing from cancer was very hard on his (then quite young) daughter  He has not completed any advanced care planning documents in the past  Asked him to reach out to us when his girlfriend is present to discuss possible documentation if patient and his significant other decide that she should be named as a decision maker  3  Psychosocial support- Girlfriend of 13 years, daughter  Code Status: Full - Level 1   Decisional apparatus:  Patient is competent on my exam today  If competence is lost, patient's substitute decision maker would default to daughter by [place]     Advance Directive / Living Will / POLST: no     I have reviewed the patient's controlled substance dispensing history in the Prescription Drug Monitoring Program in compliance with the [facility] regulations before prescribing any controlled substances  We appreciate the invitation to be involved in this patient's care  We will continue to follow  Please do not hesitate to reach our on call provider through our clinic answering service at  should you have acute symptom control concerns  Debbie Thornton MD  Palliative and Supportive Care  Clinic/Answering Service: [phone]  You can find me on [employer]! IDENTIFICATION:        Inpatient consult to Palliative Care     Performed by  Debbie Thornton MD     Authorized by Bridgette Tobar MD            Physician Requesting Consult: Aparna Tai MD  Reason for Consult / Principal Problem: goals of care  Hx and PE limited by: nothing    HISTORY OF PRESENT ILLNESS:       Ana Porter is a 48 y o  male who presented on 7/23 for abnormal outpatient imagine  Patient had a CT that showed concern of widely metastatic disease with a skull mass, soft tissue neck mass, adrenal masses, hepatic masses all associated with a 35 pound weigh loss over the last 4 weeks  During his hospitalization he was also noted to have acute subclavian vein thrombosis and other imaging revealed more concerning areas of metastatic disease  Biopsies showed concern of possible 2 primary cancers melanoma versus pancreatic adenocarcinoma  Oncology has been following  PSC was consulted for goals of care in setting of very advanced malignancy  Patient has a good understanding of his underlying medical conditions  Primary complaint of pain in neck/back as well as scalp and abdomen  No nausea  Good PO intake  Sleep is impeded by pain  Moving his bowels  Review of Systems   Constitutional: Positive for weight loss  Negative for weight gain  HENT: Negative for sore throat      Eyes: Negative for visual disturbance  Cardiovascular: Negative for chest pain  Respiratory: Negative for shortness of breath  Endocrine: Negative for polyphagia  Hematologic/Lymphatic: Does not bruise/bleed easily  Skin: Negative for itching  Musculoskeletal: Positive for back pain and neck pain  Negative for falls and joint swelling  Gastrointestinal: Negative for abdominal pain and constipation  Genitourinary: Negative for dysuria  Neurological: Positive for weakness  Negative for focal weakness  Psychiatric/Behavioral: Negative for altered mental status  Allergic/Immunologic: Negative for environmental allergies  History reviewed  No pertinent past medical history  Past Surgical History:   Procedure Laterality Date    FACIAL/NECK BIOPSY Bilateral 7/26/2021    Procedure: INCISIONAL BIOPSY LEFT NECK MASS,AND EXCISIOINAL BIOPSY RIGHT NECK MASS;  Surgeon: Ana Arana MD;  Location: [facility];  Service: Surgical Oncology    HAND SURGERY      left hand    IR BIOPSY LIVER MASS  7/30/2021     Social History     Socioeconomic History    Marital status: Single     Spouse name: Not on file    Number of children: Not on file    Years of education: Not on file    Highest education level: Not on file   Occupational History    Not on file   Tobacco Use    Smoking status: Current Every Day Smoker     Packs/day: 0 25    Smokeless tobacco: Never Used   Vaping Use    Vaping Use: Never used   Substance and Sexual Activity    Alcohol use: Not Currently    Drug use: Yes     Types: Marijuana    Sexual activity: Not on file   Other Topics Concern    Not on file   Social History Narrative    Not on file     Social Determinants of Health     Financial Resource Strain:     Difficulty of Paying Living Expenses:    Food Insecurity:     Worried About Running Out of Food in the Last Year:     Power of Food in the Last Year:    Transportation Needs:     Lack of Transportation (Medical):      Lack of Transportation (Non-Medical):    Physical Activity:     Days of Exercise per Week:     Minutes of Exercise per Session:    Stress:     Feeling of Stress :    Social Connections:     Frequency of Communication with Friends and Family:     Frequency of Social Gatherings with Friends and Family:     Attends Hindu Services:     Active Member of Clubs or Organizations:     Attends Club or Organization Meetings:     Marital Status:    Intimate Partner Violence:     Fear of Current or Ex-Partner:     Emotionally Abused:     Physically Abused:     Sexually Abused:      History reviewed  No pertinent family history      MEDICATIONS / ALLERGIES:    all current active meds have been reviewed and current meds:   Current Facility-Administered Medications   Medication Dose Route Frequency    acetaminophen (TYLENOL) tablet 650 mg  650 mg Oral Q6H PRN    bisacodyl (DULCOLAX) rectal suppository 10 mg  10 mg Rectal Daily PRN    calcium carbonate (TUMS) chewable tablet 500 mg  500 mg Oral Daily PRN    dexamethasone (DECADRON) tablet 4 mg  4 mg Oral Q6H [facility]    Gadobutrol injection (SINGLE-DOSE) SOLN 5 mL  5 mL Intravenous Once in imaging    heparin (porcine) 25,000 units in 0 45% NaCl 250 mL infusion (premix)  3-30 Units/kg/hr (Order-Specific) Intravenous Titrated    heparin (porcine) injection 2,000 Units  2,000 Units Intravenous Q1H PRN    heparin (porcine) injection 4,000 Units  4,000 Units Intravenous Q1H PRN    HYDROmorphone (DILAUDID) injection 0 5 mg  0 5 mg Intravenous Q3H PRN    LORazepam (ATIVAN) injection 1 mg  1 mg Intravenous Once PRN    metoprolol tartrate (LOPRESSOR) tablet 50 mg  50 mg Oral Q12H [facility]    oxyCODONE (ROXICODONE) immediate release tablet 10 mg  10 mg Oral Q6H PRN    Or    oxyCODONE (ROXICODONE) IR tablet 15 mg  15 mg Oral Q6H PRN    polyethylene glycol (MIRALAX) packet 17 g  17 g Oral BID    senna-docusate sodium (SENOKOT S) 8 6-50 mg per tablet 1 tablet  1 tablet Oral BID       No Known Allergies    OBJECTIVE:    Physical Exam  Physical Exam  Vitals and nursing note reviewed  Constitutional:       General: He is not in acute distress  HENT:      Head: Normocephalic  Right Ear: External ear normal       Left Ear: External ear normal       Nose: Nose normal       Mouth/Throat:      Mouth: Mucous membranes are moist    Eyes:      Extraocular Movements: Extraocular movements intact  Cardiovascular:      Rate and Rhythm: Normal rate  Pulmonary:      Effort: Pulmonary effort is normal    Abdominal:      General: Abdomen is flat  Comments: Multiple palpable tender lesions on the abdominal wall   Musculoskeletal:         General: No swelling  Cervical back: Neck supple  Skin:     General: Skin is dry  Neurological:      Mental Status: He is alert  Cranial Nerves: No cranial nerve deficit  Psychiatric:         Mood and Affect: Mood normal          Lab Results:   I have personally reviewed pertinent labs  , CBC:   Lab Results   Component Value Date    WBC 29 95 (H) 08/03/2021    HGB 10 4 (L) 08/03/2021    HCT 33 2 (L) 08/03/2021    MCV 97 08/03/2021     (H) 08/03/2021    MCH 30 2 08/03/2021    MCHC 31 3 (L) 08/03/2021    RDW 18 8 (H) 08/03/2021    MPV 9 7 08/03/2021    NRBC 18 08/03/2021    NRBC 27 (H) 08/03/2021   , CMP:   Lab Results   Component Value Date    SODIUM 139 08/03/2021    K 4 6 08/03/2021     08/03/2021    CO2 31 08/03/2021    BUN 21 08/03/2021    CREATININE 0 58 (L) 08/03/2021    CALCIUM 9 0 08/03/2021    AST 70 (H) 08/03/2021    ALT 77 08/03/2021    ALKPHOS 256 (H) 08/03/2021    EGFR 119 08/03/2021   , PT/PTT:  Lab Results   Component Value Date    PTT 74 (H) 08/03/2021     Imaging Studies: reviewed  EKG, Pathology, and Other Studies: reviewed    Counseling / Coordination of Care    Total floor / unit time spent today 45 minutes  Greater than 50% of total time was spent with the patient and / or family counseling and / or coordination of care   A description of the counseling / coordination of care: chart review, lab review, imaging review, symptom assessment and management, emotional support

## 2021-08-04 NOTE — PLAN OF CARE
Problem: PHYSICAL THERAPY ADULT  Goal: Performs mobility at highest level of function for planned discharge setting  See evaluation for individualized goals  Description: Treatment/Interventions: Functional transfer training, LE strengthening/ROM, Patient/family training, Bed mobility, Gait training, Spoke to nursing, Spoke to case management, OT  Equipment Recommended:  (TBD )       See flowsheet documentation for full assessment, interventions and recommendations  Outcome: Progressing  Note: Prognosis: Fair  Problem List: Decreased strength, Decreased endurance, Impaired balance, Decreased mobility, Pain  Assessment: Pt seen for PT treatment session this date  Therapy session focused on bed mobility, functional transfers, gait training, and therapeutic exercise in order to improve overall mobility and independence  Pt requires assist of 1 for all mobility performed this date  Pt refusing to don c/s or TLSO this date- per chart review braces for comfort  Pt educated on spinal precautions prior to mobilization  Pt performed STS from EOB at S level  Pt ambulated in room at S level without AD  Pt deferring further mobility 2* increased neck pain, requesting to lay down  Pt making progress toward goals  Pt was left supine in bed at the end of PT session with all needs in reach  Pt would benefit from continued PT services while in hospital to address remaining limitations  PT to continue to follow pt and recommends STR vs OPPT pending stair trial/ further ambulation  The patient's AM-PAC Basic Mobility Inpatient Short Form Raw Score is 18, Standardized Score is 41 05  A standardized score less than 42 9 suggests the patient may benefit from discharge to post-acute rehabilitation services  Please also refer to the recommendation of the Physical Therapist for safe discharge planning    Barriers to Discharge: Inaccessible home environment, Decreased caregiver support        PT Discharge Recommendation: Home with outpatient rehabilitation (pending progress/ stair trial vs STR)     PT - OK to Discharge: No (pending stair trial/ increased ambulation )    See flowsheet documentation for full assessment

## 2021-08-04 NOTE — PROGRESS NOTES
INTERNAL MEDICINE RESIDENCY PROGRESS NOTE     PATIENT INFORMATION     Name: Joana Mckinley   Age & Sex: 48 y o  male   MRN: [id number]  Hospital Stay Days: 12  [facility]   Encounter: [id number]  Team: SOD Team B     ASSESSMENT/PLAN     Principal Problem:    Metastatic cancer ([facility] )  Active Problems:    Subclavian vein thromboembolism, acute, right (HCC)    Compression fracture of T11 vertebra (HCC)    Cervical stenosis of spinal canal    Anemia    Cervical spinal mass (HCC)    Scalp lesion    S/P lymph node biopsy    Severe protein-calorie malnutrition ([facility] )    Pressure ulcer of coccygeal region, unspecified pressure ulcer stage    Mass of thoracic vertebra    Constipation due to opioid therapy    New onset a-fib ([place] [address] )    Oral ulcer    Oral ulcer  Assessment & Plan  8/3/2021 oral ulcer noted on the left hard palate; erythematous around the white head; not easy to scrape off; not tender     In the setting of pt on decadron treatment leading to likely immunocompromised state and pt complaining of intermittent mild dysphagia/odynphagia there are concerns for oral candidiasis     8/4/2021 white ulcer head no longer present; erythematous base     Plan   · Cont Diflucan 200 mg QD for 3 days total  · Scraping sent for cx      New onset a-fib [facility])  Assessment & Plan  7/30/2021 alerted by nursing that pt was in Afib with RVR s/p IR US guided liver bx  He was stable at that time  Upon arrival to the floors pt was re-evaluated and found to still be in afib with RVR, -150s  /80s  Patient with inadequate response to Lopressor x2 and Cardizem bolus x2  Patient started on a Cardizem drip with improvement in heart rates  Transition off on 07/31/2021 and continued on metoprolol 50 mg b i d       Plan:   Metoprolol tartrate 50 mg BID   Add metoprolol PRN   Lovenox 50 mg IJ Q12H   Telemetry monitoring   Monitor and replete to maintain Mg>2 and K>4        Constipation due to opioid therapy  Assessment & Plan  Pt last bowel movement prior to admission  Increasing abd pain/tenderness/distension  Tympanic  (-) rebound  Constipation unresponsive to lactulose 10 g, Mag citrate, and Relistor 8 mg  Most likely due to constipation which is opioid induced  7/28/2021 KUB Larger than typical volume of stool seen over the colon consistent with reported history of constipation  No bowel obstruction    After 2nd dose of Relistor 8 mg, pt has had daily bowel movements  Pt states the bowel movements were initially dark/black in color, however BM 8/1/2021 was starting to become lighter in color  Plan:  · Bowel regimen in place      Mass of thoracic vertebra  Assessment & Plan  7/28/2021 MRI TS Widely metastatic disease to the thoracic spinal axis with early pathologic fracture superior T11 endplate  No extraosseous tumor within the spinal canal, no cord or root compression  Multiple soft tissue masses in the adjacent soft tissues  Plan  · Neurosurgery consult   · MRI TS and LS w/wo contrast under anesthesia     Pressure ulcer of coccygeal region, unspecified pressure ulcer stage  Assessment & Plan  7/28/2021 Alerted by nursing that pt has ulcer on the coccyx  Per wound care: "Mid sacrum - evolving DTI - HA  Wound presents as approx: 20% evolving open partial thickness appearing moist yellow tissue, and 80% intact non-evolved non-blanchable purple skin  All aspects measured together  Edges fragile and attached, no maceration  Tatianna-wound is intact with pink blanchable skin   Suspect mixed etiology of pressure and friction given the chaffed/irregular dry appearance  -Will recommend foam dressing to protect "    Plan:  · Per wound care  · Frequent repositioning   · Apply hydraguard to b/l heels and b/l buttocks and sacrum BID and PRN for prevention and protection  · Apply skin nourishing cream the entire skin daily for moisture  · Turn and reposition patient every  2 hours   · Elevate heels off of bed with pillows to offload pressure   · Apply EHOB waffle cushion to chair when OOB, if able  · Cleanse sacral wound with NSS, pat dry, and apply Allevyn foam dressing  Feroz dressing with T  Change every other day and PRN for soilage/dislodgement  · Wound care will follow along with patient weekly     Severe protein-calorie malnutrition ([facility] )  Assessment & Plan  Malnutrition Findings:   Adult Malnutrition type: Acute illness  Adult Degree of Malnutrition: Other severe protein calorie malnutrition (related to medical condition <50% energy intake versus needs for > 5 days evidenced by BMI 16 98; lost 9#(7 4%) since 6/10/21; moderate fat loss somewhat hollow orbital, moderate muscle loss visible clavicle  Treat Regular diet &Ensure Enlive TID )    BMI Findings:  Adult BMI Classifications: Underweight < 18 5     Body mass index is 16 6 kg/m²  Plan:  · Regular diet   · Ensure supplementation TID with meals  · Nutrition recommendations appreciated       S/P lymph node biopsy  Assessment & Plan  LN Bx done 7/26/2021; INCISIONAL BIOPSY LEFT NECK MASS,AND EXCISIOINAL BIOPSY RIGHT NECK MASS (Bilateral)  Non-small cell carcinoma, 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,           Scalp lesion  Assessment & Plan  Punch bx done on left scalp  Preliminary pathology demonstrating melanoma  8/3/2021 pt complaining of increasing tenderness/pain and size of scalp lesion  Lesion is erythematous, non fluctuant and not warm to palpation     Plan  · Continue local wound care  · Wound care consult placed    Anemia  Assessment & Plan  Hgb 9 9 and Hct 29 6 at time of admission  Currently Hgb 10 8  Most likely anemia of chronic inflammation with some component of iron deficiency  Fe sat: 14, TIBC 205, Fe 28, Ferritin 1230  Retic: 5 12    Plan:  · Monitor Hgb and Hct    · Transfuse if patient becomes symptomatic or Hgb drops < 7    Cervical stenosis of spinal canal  Assessment & Plan  Pt with cervical canal narrowing likely due to rim-enhancing epidural thickening/collection within posterolateral spinal canal spanning levels C2-C4  Findings could be due to necrotizing infectious process or metastasis related  Multiple Concerns for this level of of cord compression: paralysis, dysfunction of the diaphragm  7/28/2021 MRI CS Signal abnormality and erosive change of C3 laminae and spinous process along with lesser degree signal abnormality in the posterior elements of C1, C2, and C4  Associated epidural enhancement /enhancing soft tissue spanning from inferior C2-C4 resulting in severe canal stenosis and cord compression  Findings most favor metastatic disease  Infection is within differential consideration but less favored given findings elsewhere in the body  Correlate with sampling  Multiple rim-enhancing lesions within paraspinal musculature, superficial and deep soft tissue neck as demonstrated on recent soft tissue neck CT  Differential considerations are similar as above  Heterogeneous marrow signal and enhancement of the cervical and visualized thoracic vertebras concerning for infiltrative metastatic disease  Plan:  · Continue on IV Decadron 5mg q6 hours PO  · Radiation oncology consulted  · Patient status post CT stimulation 07/29/2021; started on palliative radiation therapy times 10 sessions on 7/30/2021 (300 cGy, 1-10 MeV photons), 8/2/2021, 8/3/2021  · Neurosurgery consulted and appreciate recommendation  · Frequent neurochecks  · In setting of heavy disease burden and late stage metastatic disease, patient can wear TLSO brace as needed for comfort and soft cervical brace for comfort  Subclavian vein thromboembolism, acute, right [facility])  Assessment & Plan  Patient noted to have evidence go subclavian, internal jugular, and brachiocephalic vein thrombus on imaging on arrival  Started on heparin gtt  Remains on heparin gtt in setting of new onset Afib and ongoing workup and possibility for intervention  7/22/21 - CT Soft Tissue Neck with Contrast: Occluded left subclavian and brachiocephalic veins as well as left internal jugular vein to the level of hyoid bone    7/22/21 - CTA PE chest: Possible thrombus right subclavian vein extending into the brachiocephalic vein  7/27/2021 venous duplex US LEFT UPPER LIMB: Evidence of acute and subacute mostly occlusive deep vein thrombosis noted in the internal jugular vein  Evidence of acute non occlusive deep vein thrombosis noted in the innominate, subclavian and axillary with extension into the most proximal brachial vein  Plan:  · 8/3/2021 pt switched from Heparin gtt to Lovenox 50 mg Q12H inj  · Vascular surgery made aware; do not recommend any surgical intervention  · Cont to monitor PTT     * Metastatic cancer [facility])  Assessment & Plan  47 yo M with recent abnormal findings on CT head, chest, and tissue neck concerning for extensive metastatic cancer:     7/22/21 - US Head Neck Soft Tissue: Several neck masses are identified corresponding to the palpable abnormalities within additional finding of the suspected thrombosed left jugular vein  Further evaluation with contrast-enhanced CT of the neck is recommended  7/22/21 - CT Soft Tissue Neck: Left greater than right numerous superficial and deep neck soft tissue and intramuscular rim-enhancing necrotic lesions/collections as described  Rim-enhancing epidural thickening/collection within posterolateral spinal canal spanning levels C2-C4 resulting in canal narrowing  Finding is concerning for widespread necrotizing infection  Neoplastic process is within differential consideration  Recommend correlation with sampling  Subpleural nodular opacities in the right upper lobe and superior segment of right lower lobe could indicate septic emboli  Suggested left hilar necrotic lymph node  Indeterminate sclerotic foci within T3 and T4 vertebral bodies      7/22/21 CT Head: There is an approximately 2 4 x 1 4 x 2 2 cm masslike lesion involving the scalp overlying the posterior left parietal region  Neoplasm and/or infection should be excluded  7/22/21 CTA PE study: Numerous nodular opacities are present bilaterally within the lungs  The appearances most suggestive of metastatic disease  Extensive hepatic metastatic disease  Large bilateral adrenal masses  Extensive lymphadenopathy  Probable osseous metastatic disease with mild to moderate compression deformity of the superior endplate of M44, suggesting pathologic compression  7/24/2021 CT Abd and pelvis: There is a 2 8 x 2 2 x 1 8 cm ill-defined, hypodense pancreatic lesion, highly suspicious for pancreatic adenocarcinoma (series 3 images 20-22 and series 601 images 57 - 67 ) There is widespread metastatic disease with numerous liver lesions, bilateral adrenal metastases, multiple lytic bone metastases, with resulting mild superior end plate compression fractures of T11 and L3 as above, and also intramuscular metastases as   above      8/2/2021 MRI TS w wo Contrast Diffuse metastatic disease within the thoracic spine, lower cervical spine and upper lumbar spine  No extraosseous extension of tumor noted on this examination  pathologic adenopathy with central necrosis within the left inferior neck and supraclavicular region and mediastinum and small left-sided pleural effusion  Multiple liver lesions are noted  8/2/2021 MRI Head w wo contrast Ill-defined area of abnormal signal within the right superior cerebellum with T2 and FLAIR hyperintensity and postcontrast enhancement --> solitary parenchymal metastasis  Abnormal appearance of the upper cervical spine with replacement of normal fatty marrow within several vertebral bodies with soft tissue signal extending into the epidural space, demonstrating irregular enhancement suspicious for osseous metastasis with   epidural extension  Suspected cord compression at the C3 and upper C4 level      8/2/2021 MRI LS w wo Contrast Diffuse osseous metastatic disease  There is slight extraosseous extension of tumor noted at the L2-3 and L3-4 levels on the left  Ureter extends from the left posterior elements into the adjacent paraspinal soft tissues  Diffuse sacral metastasis with mild narrowing of the S1 and S2 sacral foramen  Soft tissue metastasis as described above  BL LN Bx  · Right neck mass: Malignant epithelioid neoplasm, possibly melanoma, morphologically identical to that in specimen B, at least 1 cm maximal dimension, present at specimen edges  · Left neck mass: Malignant epithelioid neoplasm, possibly melanoma, at least 1 5 cm maximal dimension, present in dermal lymphatics and at specimen edges, likely metastatic  Benign overlying epidermis  CEA 63 8; CA 19-9: 5418, LDH: 1234  At this time, concern for synchronous malignancy given preliminary pathology reports demonstrating melanoma and CA 19 9 elevated in the setting of pancreatic lesion with liver metastasis concerning for pancreatic adenocarcinoma  There is a generic link between these 2 cancers raising concern  Plan:  · Palliative medicine consulted; appreciate recommendations for pain management  · Change to oxyCONTIN 20-30 mg BID for baseline pain; 5mg-10mg q4h PRN for moderate to severe pain  · Dilaudid 0 5mg IV q3h PRN breakthrough pain  · APAP 650mg q6h PRN mild pain   · Bowel regimen  · Miralax 17g daily  · Senna 8 6mg BID  · Medical Oncology Consulted and Appreciate Recommendation  · appt with Oncologist in [facility] in 1-2 weeks to discuss treatment options  · Rad onc  · solitary brain metastasis: stereotactic radiosurgery/radiotherapy; After he completes radiation therapy to the cervical spine, he can then be evaluated for stereotactic radiation at [facility] [address]  signed consent form for stereotactic radiation    · Surgical Oncology Consulted  · S/P Punch Biopsy on 7/23/21; follow up on pathology results  · S/P 2x LN bx 7/26/21: Non-small cell carcinoma, 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  · Continue to monitor kidney fxn and electrolytes; potential for Tumor lysis syndrome   · GI consulted and appreciate recommendations       Disposition: inpatient; LN Bx results demonstrating [place] adenocarcinoma and 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  Plan for OP f/u with oncologist to discuss treatment options  Mets to brain with plan for OP stereotactic radiation at [facility] after completion of 10 courses radiation for CS mets; Pt recently changed to Lovenox for left IJV thrombus  Palliative following for pain management recommendations and coordination of informing patient's family of dx  SUBJECTIVE     Patient seen and examined  No acute events overnight  Pt has remained afebrile overnight, however has had recurrence of tachycardia, to the 104s  Sitting upright at the edge of the bed eating breakfast  Pt has had good appetite  Pt states he was able to sleep comfortably overnight; this is the first time in a while that he has been able to do so  States he was able to sleep more comfortably due to changes in his pain medication relieving the pain in his neck and back; previous pain medication regiment reduced his pain to 7/10 from 10/10; current pain regiment reduced it to 5/10  Patient states his abd pain is minimal today  Still TTP in the LUQ, but not radiating to bilateral flanks  Still passing flatulence and having loose non bloody bowel movements; bowel movements are less dark in color  Patient was up and walking the halls yesterday  He has fatigue and SOB with exertion  Feels decreased strength in BUE and BLE       Denies: numbness/tingling, chills, fevers, diaphoresis, HA, dizziness, lightheadedness, changes in vision, changes in hearing, dysphagia/odynphagia, CP, palpitations, cough, wheezing, N/V/C, melena/hematochezia, hematuria, dysuria, changes in urinary frequency/urgency     OBJECTIVE     Vitals:    21 2143 21 0157 21 0300 21 0715   BP: 118/78  121/90 122/96   BP Location:   Right arm Right arm   Pulse: 104  93 104   Resp:   18 18   Temp:   98 4 °F (36 9 °C) 98 6 °F (37 °C)   TempSrc:   Oral Tympanic   SpO2:  95% 96% 98%   Weight:       Height:          Temperature:   Temp (24hrs), Av 4 °F (36 9 °C), Min:98 1 °F (36 7 °C), Max:98 6 °F (37 °C)    Temperature: 98 6 °F (37 °C)  Intake & Output:  I/O        07 -  07 07 -  0700  0701 -  0700    P  O  1920 1860     I V  (mL/kg) 430 8 (8 3)      Total Intake(mL/kg) 2350 8 (45) 1860 (35 6)     Urine (mL/kg/hr) 2950 (2 4) 3620 (2 9)     Stool       Total Output 2950 3620     Net -599 2 -1760                  Intake/Output Summary (Last 24 hours) at 2021 1433  Last data filed at 2021 1300  Gross per 24 hour   Intake 2040 ml   Output 2200 ml   Net -160 ml     I/O this shift: In: 480 [P O :480]  Out: -   Weights:   IBW (Ideal Body Weight): 70 7 kg    Body mass index is 16 6 kg/m²  Weight (last 2 days)     None        Physical Exam  Constitutional:       General: He is not in acute distress  Appearance: He is ill-appearing  He is not toxic-appearing or diaphoretic  Comments: Cachetic     HENT:      Head: Normocephalic  Comments: Left sided 2x scalp lesion; indurated non fluctuant, TTP, erythematous, not warm to touch       Right Ear: External ear normal       Left Ear: External ear normal       Nose: Nose normal  No congestion or rhinorrhea  Mouth/Throat:      Mouth: Mucous membranes are moist       Comments: Small Round erythematous lesion on left hard palate in the same location as previous non scrappable white head    Eyes:      General: No scleral icterus  Right eye: No discharge  Left eye: No discharge        Extraocular Movements: Extraocular movements intact  Conjunctiva/sclera: Conjunctivae normal       Pupils: Pupils are equal, round, and reactive to light  Neck:      Vascular: No carotid bruit  Comments: Large mass left supraclavicular region    Cardiovascular:      Rate and Rhythm: Regular rhythm  Tachycardia present  Pulses: Normal pulses  Heart sounds: Normal heart sounds  Pulmonary:      Effort: Pulmonary effort is normal  No respiratory distress  Breath sounds: Normal breath sounds  No stridor  No wheezing, rhonchi or rales  Chest:      Chest wall: No tenderness  Abdominal:      General: Abdomen is flat  Bowel sounds are normal  There is no distension  Palpations: There is mass (left abd wall mass)  Tenderness: There is abdominal tenderness (LUQ)  There is no right CVA tenderness, left CVA tenderness, guarding or rebound  Hernia: No hernia is present  Musculoskeletal:         General: No swelling, tenderness or signs of injury  Normal range of motion  Cervical back: Normal range of motion and neck supple  No rigidity  Right lower leg: No edema  Left lower leg: No edema  Lymphadenopathy:      Cervical: Cervical adenopathy (bilateral) present  Skin:     General: Skin is warm and dry  Capillary Refill: Capillary refill takes less than 2 seconds  Neurological:      General: No focal deficit present  Mental Status: He is alert and oriented to person, place, and time  Cranial Nerves: No cranial nerve deficit  Sensory: No sensory deficit  Motor: Weakness (BUE and BLE) present  Psychiatric:         Mood and Affect: Mood normal          Behavior: Behavior normal          Thought Content: Thought content normal          Judgment: Judgment normal         LABORATORY DATA     Labs: I have personally reviewed pertinent reports      Results from last 7 days   Lab Units 08/04/21  6880 08/03/21  4863 08/02/21  3977 08/01/21  4125 07/31/21  0534 07/30/21  0555 07/29/21  0458   WBC Thousand/uL 32 56* 29 95* 29 72* 24 86* 22 42* 24 53* 10 08   HEMOGLOBIN g/dL 10 8* 10 4* 9 6* 9 4* 8 8* 8 6* 8 4*   HEMATOCRIT % 35 2* 33 2* 30 8* 30 3* 28 8* 26 8* 25 4*   PLATELETS Thousands/uL 382 433* 438* 412* 365 333 239   NEUTROS PCT %  --   --   --   --  87* 88* 89*   MONOS PCT %  --   --   --   --  3* 5 2*   MONO PCT %  --  5  --  4  --   --   --       Results from last 7 days   Lab Units 08/04/21  0632 08/03/21  0634 08/02/21  0613   POTASSIUM mmol/L 4 1 4 6 4 2   CHLORIDE mmol/L 99* 104 106   CO2 mmol/L 30 31 32   BUN mg/dL 21 21 15   CREATININE mg/dL 0 63 0 58* 0 52*   CALCIUM mg/dL 9 6 9 0 8 9   ALK PHOS U/L 275* 256* 254*   ALT U/L 75 77 65   AST U/L 49* 70* 49*     Serum creatinine: 0 63 mg/dL 08/04/21 9548  Estimated creatinine clearance: 101 2 mL/min   Results from last 7 days   Lab Units 08/01/21  0608 07/31/21  0534 07/30/21  0555   MAGNESIUM mg/dL 2 0 2 1 1 9     Results from last 7 days   Lab Units 08/01/21  0608 07/31/21  0534 07/30/21  0555   PHOSPHORUS mg/dL 2 9 2 9 3 2      Results from last 7 days   Lab Units 08/04/21  0632 08/03/21  0634 08/02/21  0613   PTT seconds 22* 74* 85*     Results from last 7 days   Lab Units 07/29/21  0458   LACTIC ACID mmol/L 1 5           IMAGING & DIAGNOSTIC TESTING     Radiology Results: I have personally reviewed pertinent reports  XR spine cervical 2 or 3 vw injury    Result Date: 7/26/2021  Impression: 1  Destructive lytic lesion within the C3 vertebral body extending into the posterior elements suspicious for metastatic disease  2   Additional ill-defined lytic appearing lesions in the C4-C7 vertebral bodies also suggestive of metastatic disease  Workstation performed: [id number]     XR spine thoracolumbar 2 vw    Result Date: 7/26/2021  Impression: Mild superior endplate compression fracture of T11  Multiple lytic lesions to include T11, L3 and S1 are better visualized on recent CT   Workstation performed: [id number]     CT head without contrast    Result Date: 7/22/2021  Impression: There is an approximately 2 4 x 1 4 x 2 2 cm masslike lesion involving the scalp overlying the posterior left parietal region  Neoplasm and/or infection should be excluded  Sampling is recommended  The study was marked in [facility] for immediate notification  Workstation performed: [id number]     CT soft tissue neck w contrast    Result Date: 7/22/2021  Impression: 1  Left greater than right numerous superficial and deep neck soft tissue and intramuscular rim-enhancing necrotic lesions/collections as described  Rim-enhancing epidural thickening/collection within posterolateral spinal canal spanning levels C2-C4 resulting in canal narrowing  Finding is concerning for widespread necrotizing infection  Neoplastic process is within differential consideration  Recommend correlation with sampling  2   Occluded left subclavian and brachiocephalic veins as well as left internal jugular vein to the level of hyoid bone suggesting  3   Subpleural nodular opacities in the right upper lobe and superior segment of right lower lobe could indicate septic emboli  Suggested left hilar necrotic lymph node  4   Indeterminate sclerotic foci within T3 and T4 vertebral bodies  I personally discussed this study with Leonora Pradhan on 7/22/2021 at 2:10 PM  Workstation performed: [id number]     MRI cervical spine w wo contrast    Result Date: 7/27/2021  Impression: 1  Signal abnormality and erosive change of C3 laminae and spinous process along with lesser degree signal abnormality in the posterior elements of C1, C2, and C4  Associated epidural enhancement /enhancing soft tissue spanning from inferior C2-C4 resulting in severe canal stenosis and cord compression  Findings most favor metastatic disease  Infection is within differential consideration but less favored given findings elsewhere in the body  Correlate with sampling   2   Multiple rim-enhancing lesions within paraspinal musculature, superficial and deep soft tissue neck as demonstrated on recent soft tissue neck CT  Differential considerations are similar as above  3   Heterogeneous marrow signal and enhancement of the cervical and visualized thoracic vertebras concerning for infiltrative metastatic disease  4   Redemonstrated occluded left internal jugular vein  5   Degenerative change as above  I personally discussed this study with Alejandro Horton on 7/27/2021 at 9:35 AM  Workstation performed: [id number]     US head neck soft tissue    Result Date: 7/22/2021  Impression: Several neck masses are identified corresponding to the palpable abnormalities within additional finding of the suspected thrombosed left jugular vein  Further evaluation with contrast-enhanced CT of the neck is recommended  The study was marked in [facility] for immediate notification  Workstation performed: [id number]     CTA ED chest PE Study    Result Date: 7/23/2021  Impression: No evidence of pulmonary embolism is seen  Measured RV/LV ratio is within normal limits at less than 0 9  Numerous nodular opacities are present bilaterally within the lungs  The appearances most suggestive of metastatic disease  Extensive hepatic metastatic disease  Large bilateral adrenal masses  Extensive lymphadenopathy  Probable osseous metastatic disease with mild to moderate compression deformity of the superior endplate of A52, suggesting pathologic compression  Possible thrombus right subclavian vein extending into the brachiocephalic vein  Consider venous duplex ultrasound for further evaluation  The study was marked in [facility] for immediate notification   Workstation performed: [id number]     CT abdomen pelvis w contrast    Result Date: 7/26/2021  Impression: There is a 2 8 x 2 2 x 1 8 cm ill-defined, hypodense pancreatic lesion, highly suspicious for pancreatic adenocarcinoma (series 3 images 20-22 and series 601 images 57 - 67 ) There is widespread metastatic disease with numerous liver lesions, bilateral adrenal metastases, multiple lytic bone metastases, with resulting mild superior end plate compression fractures of T11 and L3 as above, and also intramuscular metastases as above  An alternative diagnosis to metastatic pancreatic adenocarcinoma, given the unusual intramuscular metastasis, would be metastatic melanoma  Please correlate with already performed biopsy results  Workstation performed: [id number]     Other Diagnostic Testing: I have personally reviewed pertinent reports        ACTIVE MEDICATIONS     Current Facility-Administered Medications   Medication Dose Route Frequency    acetaminophen (TYLENOL) tablet 650 mg  650 mg Oral Q6H PRN    bisacodyl (DULCOLAX) rectal suppository 10 mg  10 mg Rectal Daily PRN    calcium carbonate (TUMS) chewable tablet 500 mg  500 mg Oral Daily PRN    dexamethasone (DECADRON) tablet 4 mg  4 mg Oral Q6H CHULA    enoxaparin (LOVENOX) subcutaneous injection 50 mg  1 mg/kg Subcutaneous Q12H [address]    Gadobutrol injection (SINGLE-DOSE) SOLN 5 mL  5 mL Intravenous Once in imaging    HYDROmorphone (DILAUDID) injection 0 5 mg  0 5 mg Intravenous Q3H PRN    LORazepam (ATIVAN) injection 1 mg  1 mg Intravenous Once PRN    metoprolol tartrate (LOPRESSOR) tablet 50 mg  50 mg Oral Q12H CHULA    morphine (MS CONTIN) ER tablet 15 mg  15 mg Oral Q8H [address]    oxyCODONE (ROXICODONE) immediate release tablet 10 mg  10 mg Oral Q4H PRN    oxyCODONE (ROXICODONE) IR tablet 5 mg  5 mg Oral Q4H PRN    polyethylene glycol (MIRALAX) packet 17 g  17 g Oral BID    senna-docusate sodium (SENOKOT S) 8 6-50 mg per tablet 1 tablet  1 tablet Oral BID     VTE Pharmacologic Prophylaxis: Enoxaparin (Lovenox)  VTE Mechanical Prophylaxis: sequential compression device    ==  Emerita Patel MS4  [facility] Internal Medicine Residency

## 2021-08-04 NOTE — PROGRESS NOTES
Progress note - Palliative and Supportive Care   Michael Dominguez 48 y o  male [id number]    Patient Active Problem List   Diagnosis    Metastatic cancer ([facility] )    Subclavian vein thromboembolism, acute, right (HCC)    Compression fracture of T11 vertebra (HCC)    Cervical stenosis of spinal canal    Anemia    Cervical spinal mass ([facility] )    Scalp lesion    S/P lymph node biopsy    Severe protein-calorie malnutrition (HCC)    Pressure ulcer of coccygeal region, unspecified pressure ulcer stage    Mass of thoracic vertebra    Constipation due to opioid therapy    New onset a-fib ([facility] )    Oral ulcer     Active issues specifically addressed today include:   Metastatic cancer - melanoma versus pancreatic adenocarcinoma  Cancer related pain  Severe protein calorie malnutrition  Palliative care patient  Goals of care     Plan:  1  Symptom management -      Cancer pain:  -Change to MS Contin 15mg q8h for baseline pain (45 OME)   -5mg-10mg q4h PRN for moderate to severe pain  -Dilaudid 0 5mg IV q3h PRN breakthrough pain  -APAP 650mg q6h PRN mild pain     OME in past 24 hours: 112 5   75 mg PO Oxycodone IR (112 5 OME)     Bowel regimen to prevent OIC  -Miralax 17g daily  -Senna 8 6mg BID    2  Goals -      Patient is reluctant to communicate too much with his family about his condition for concern of putting too much stress on them  He is still waiting for the pathology reports prior to giving them more information  His girlfriend may be coming in today, patient knows to contact us when she is here to discuss possibly naming her as a healthcare Agent  Further goals conversations pending path report     Code Status: full - Level 1   Decisional apparatus:  Patient is competent on my exam today  If competence is lost, patient's substitute decision maker would default to adult child [place]  Advance Directive / Living Will / POLST:  no    Interval history:         Pain much better controlled at this time   BM yesterday but none yet today  Patient anxious and struggling with telling his family what is going on  MEDICATIONS / ALLERGIES:     all current active meds have been reviewed    No Known Allergies    OBJECTIVE:    Physical Exam  Physical Exam  Vitals and nursing note reviewed  Constitutional:       General: He is not in acute distress  Comments: Thin, frail   HENT:      Head: Normocephalic  Right Ear: External ear normal       Left Ear: External ear normal       Nose: Nose normal       Mouth/Throat:      Mouth: Mucous membranes are moist    Eyes:      Extraocular Movements: Extraocular movements intact  Cardiovascular:      Rate and Rhythm: Normal rate  Pulmonary:      Effort: Pulmonary effort is normal    Abdominal:      General: Abdomen is flat  Comments: Multiple palpable tender lesions on the abdominal wall   Musculoskeletal:         General: No swelling  Cervical back: Neck supple  Skin:     General: Skin is dry  Neurological:      Mental Status: He is alert  Cranial Nerves: No cranial nerve deficit  Psychiatric:         Mood and Affect: Mood normal          Lab Results: I have personally reviewed pertinent labs  Imaging Studies: reviewed  EKG, Pathology, and Other Studies: reviewed    Counseling / Coordination of Care    Total floor / unit time spent today 25+ minutes  Greater than 50% of total time was spent with the patient and / or family counseling and / or coordination of care   A description of the counseling / coordination of care: symptom assessment and management, complex opioid therapy adjustment, emotional support

## 2021-08-04 NOTE — WOUND OSTOMY CARE
Progress Note - Wound   Michael Dominguez 48 y o  male MRN: [id number]  [facility] Encounter: [id number]      Assessment:  Wound care to see patient for weekly follow-up visit  Patient admitted with metastatic cancer  Patient is primarily [address] speaking but is able to follow commands and communicate for the assessment  Independent with turning in the bed for the assessment  Continent of bowel and bladder  Nutrition is following along  Patient is thin and frail appearing  Seen with the primary RN      B/l heels and b/l buttocks are dry and intact, no with no redness or wounds      1  Mid sacrum - evolving DTI - HA  Measurements stable, however improved appearance  Wound presents as approx: 30% evolving open partial thickness appearing moist yellow tissue, and 70% intact non-evolved non-blanchable purple skin (which appears to be reabsorbing, and is dry and peeling)  All aspects measured together  Edges fragile and attached, no maceration  Tatianna-wound is dry intact with pink/hyperpigmented blanchable skin  Suspect mixed etiology of pressure and friction given the chaffed/irregular dry appearance  Noted bony sacral prominence    -no dressing in place at of time of the assessment  New dressing applied  Recommend to continue with foam dressing to protect      No induration, fluctuance, odor, warmth, redness, or purulence noted to the above noted wounds  New dressings applied  Patient tolerated well- no s/s of non-verbal pain or discomfort observed during the encounter  Primary nurse aware of plan of care  See flow sheets for more detailed assessment findings  Will follow along      Plan:   1  Apply hydraguard to b/l heels and b/l buttocks and sacrum BID and PRN for prevention and protection  2  Apply skin nourishing cream the entire skin daily for moisture  3  Turn and reposition patient every  2 hours   4  Elevate heels off of bed with pillows to offload pressure   5   Apply EHOB waffle cushion to chair when OOB, if able  6  Cleanse sacral wound with NSS, pat dry, and apply Allevyn foam dressing  Feroz dressing with T  Change every other day and PRN for soilage/dislodgement  7  Wound care will follow along with patient weekly, please call or tiger text with questions and concerns    Objective:    Vitals: Blood pressure 122/96, pulse 104, temperature 98 6 °F (37 °C), temperature source Tympanic, resp  rate 18, height 5' 9" (1 753 m), weight 51 kg (112 lb 7 oz), SpO2 98 %  ,Body mass index is 16 6 kg/m²  Wound 07/28/21 Pressure Injury Sacrum Mid (Active)   Wound Image   08/04/21 0909   Wound Description Slough; Yellow;Light purple 08/04/21 0909   Pressure Injury Stage DTPI 08/04/21 0909   Tatianna-wound Assessment Dry; Intact; Hyperpigmented;Pink 08/04/21 0909   Wound Length (cm) 1 5 cm 08/04/21 0909   Wound Width (cm) 2 cm 08/04/21 0909   Wound Depth (cm) 0 1 cm 08/04/21 0909   Wound Surface Area (cm^2) 3 cm^2 08/04/21 0909   Wound Volume (cm^3) 0 3 cm^3 08/04/21 0909   Calculated Wound Volume (cm^3) 0 3 cm^3 08/04/21 0909   Change in Wound Size % 9 09 08/04/21 0909   Tunneling 0 cm 08/04/21 0909   Tunneling in depth located at 0 08/04/21 0909   Undermining 0 08/04/21 0909   Undermining is depth extending from 00 08/04/21 0909   Wound Site Closure Open to air 07/29/21 0759   Drainage Amount Scant 08/04/21 0909   Drainage Description Serosanguineous 08/04/21 0909   Non-staged Wound Description Partial thickness 08/04/21 0909   Treatments Cleansed;Irrigation with NSS;Site care;Elevated 08/04/21 0909   Dressing Foam, Silicon (eg  Allevyn, etc) 08/04/21 0909   Wound packed? No 08/04/21 0909   Packing- # removed 0 08/04/21 0909   Packing- # inserted 0 08/04/21 0909   Dressing Changed New 08/04/21 0909   Patient Tolerance Tolerated well 08/04/21 0909   Dressing Status Clean;Dry; Intact 08/04/21 0909       Recommendations written as orders  AVS updated    Bertha Mares RN BSN CWON

## 2021-08-04 NOTE — PHYSICAL THERAPY NOTE
PHYSICAL THERAPY NOTE          Patient Name: Pyaam Barlow  VBVVL'I Date: 8/4/2021 08/04/21 1101   PT Last Visit   PT Visit Date 08/04/21   Note Type   Note Type Treatment   Pain Assessment   Pain Assessment Tool FLACC   Pain Location/Orientation Location: Neck   Hospital Pain Intervention(s) Ambulation/increased activity; Emotional support;Repositioned   Pain Rating: FLACC (Rest) - Face 1   Pain Rating: FLACC (Rest) - Legs 0   Pain Rating: FLACC (Rest) - Activity 1   Pain Rating: FLACC (Rest) - Cry 0   Pain Rating: FLACC (Rest) - Consolability 0   Score: FLACC (Rest) 2   Pain Rating: FLACC (Activity) - Face 1   Pain Rating: FLACC (Activity) - Legs 1   Pain Rating: FLACC (Activity) - Activity 1   Pain Rating: FLACC (Activity) - Cry 1   Pain Rating: FLACC (Activity) - Consolability 1   Score: FLACC (Activity) 5   Restrictions/Precautions   Weight Bearing Precautions Per Order No   Braces or Orthoses C/S Collar;TLSO  (for comfort per chart review )   Other Precautions Fall Risk;Pain;Spinal precautions; Telemetry   General   Chart Reviewed Yes   Response to Previous Treatment Patient with no complaints from previous session  Family/Caregiver Present No   Cognition   Overall Cognitive Status WFL   Arousal/Participation Alert; Responsive; Cooperative   Attention Attends with cues to redirect   Orientation Level Oriented X4   Memory Decreased recall of precautions   Following Commands Follows one step commands without difficulty   Comments Pt with increased pain, refusing to don c/s collar/ TLSO   Agreeable to some mobility    Bed Mobility   Supine to Sit Unable to assess   Sit to Supine 5  Supervision   Additional items Increased time required;Assist x 1   Additional Comments Pt sitting EOB upon arrival  pt supine in bed with all needs within reach at the end of therapy session    Transfers   Sit to Stand 5  Supervision   Additional items Assist x 1; Increased time required   Stand to Sit 5  Supervision   Additional items Assist x 1; Increased time required   Additional Comments Transfers without AD    Ambulation/Elevation   Gait pattern Excessively slow; Short stride;Decreased foot clearance   Gait Assistance 5  Supervision   Additional items Assist x 1   Assistive Device None   Distance 2 x 8ft    Stair Management Assistance Not tested  (deferring further mobility 2* increased neck pain )   Balance   Static Sitting Fair +   Dynamic Sitting Fair   Static Standing Fair   Dynamic Standing Fair   Ambulatory Fair -   Activity Tolerance   Activity Tolerance Patient limited by fatigue;Patient limited by pain   Nurse Made Aware RN cleared pt to participate in therapy session    Exercises   Knee AROM Long Arc Quad Sitting;Bilateral;10 reps   Assessment   Prognosis Fair   Problem List Decreased strength;Decreased endurance; Impaired balance;Decreased mobility;Pain   Assessment Pt seen for PT treatment session this date  Therapy session focused on bed mobility, functional transfers, gait training, and therapeutic exercise in order to improve overall mobility and independence  Pt requires assist of 1 for all mobility performed this date  Pt refusing to don c/s or TLSO this date- per chart review braces for comfort  Pt educated on spinal precautions prior to mobilization  Pt performed STS from EOB at S level  Pt ambulated in room at S level without AD  Pt deferring further mobility 2* increased neck pain, requesting to lay down  Pt making progress toward goals  Pt was left supine in bed at the end of PT session with all needs in reach  Pt would benefit from continued PT services while in hospital to address remaining limitations  PT to continue to follow pt and recommends STR vs OPPT pending stair trial/ further ambulation   The patient's AM-PAC Basic Mobility Inpatient Short Form Raw Score is 18, Standardized Score is 41 05  A standardized score less than 42 9 suggests the patient may benefit from discharge to post-acute rehabilitation services  Please also refer to the recommendation of the Physical Therapist for safe discharge planning  Barriers to Discharge Inaccessible home environment;Decreased caregiver support   Goals   Patient Goals to rest    STG Expiration Date 08/10/21   PT Treatment Day 1   Plan   Treatment/Interventions Functional transfer training;LE strengthening/ROM; Therapeutic exercise;Patient/family training;Bed mobility;Gait training;Spoke to case management;Spoke to nursing;OT   Progress Progressing toward goals   PT Frequency Other (Comment)  (3-5x/wk )   Recommendation   PT Discharge Recommendation Home with outpatient rehabilitation  (pending progress/ stair trial vs STR)   Equipment Recommended   (TBD )   PT - OK to Discharge No  (pending stair trial/ increased ambulation )   AM-PAC Basic Mobility Inpatient   Turning in Bed Without Bedrails 4   Lying on Back to Sitting on Edge of Flat Bed 3   Moving Bed to Chair 3   Standing Up From Chair 3   Walk in Room 3   Climb 3-5 Stairs 2   Basic Mobility Inpatient Raw Score 18   Basic Mobility Standardized Score 41 05     Trish Ordaz, PT, DPT

## 2021-08-04 NOTE — QUICK NOTE
Pathology reads were inconclusive; Today, liver biopsy came back as non-small cell carcinoma favoring pancreatobiliary primary; LN path final review now shows [place] carcinoma - poorly differentiated adenocarcinoma  This patient appears to have a very aggressive adenocarcinoma of the pancreas  This aligns with  elevation  Requested appt with Oncologist in [facility] in 1-2 weeks to discuss treatment options       Discussed this all with the primary team       AZRA Moore-BC  Hematology/Oncology Nurse Practitioner

## 2021-08-05 NOTE — PROGRESS NOTES
[address] [facility]  Progress Note - Tony Tucker 1970, 48 y o  male MRN: [id number]  Unit/Bed#: [facility] 914-01 Encounter: [id number]  Primary Care Provider: No primary care provider on file  Date and time admitted to hospital: 7/22/2021  7:16 PM    * Metastatic cancer [facility])  Assessment & Plan  49 yo M with recent abnormal findings on CT head, chest, and tissue neck concerning for extensive metastatic cancer:     7/22/21 - US Head Neck Soft Tissue: Several neck masses are identified corresponding to the palpable abnormalities within additional finding of the suspected thrombosed left jugular vein  Further evaluation with contrast-enhanced CT of the neck is recommended  7/22/21 - CT Soft Tissue Neck: Left greater than right numerous superficial and deep neck soft tissue and intramuscular rim-enhancing necrotic lesions/collections as described  Rim-enhancing epidural thickening/collection within posterolateral spinal canal spanning levels C2-C4 resulting in canal narrowing  Finding is concerning for widespread necrotizing infection  Neoplastic process is within differential consideration  Recommend correlation with sampling  Subpleural nodular opacities in the right upper lobe and superior segment of right lower lobe could indicate septic emboli  Suggested left hilar necrotic lymph node  Indeterminate sclerotic foci within T3 and T4 vertebral bodies  7/22/21 CT Head: There is an approximately 2 4 x 1 4 x 2 2 cm masslike lesion involving the scalp overlying the posterior left parietal region  Neoplasm and/or infection should be excluded  7/22/21 CTA PE study: Numerous nodular opacities are present bilaterally within the lungs  The appearances most suggestive of metastatic disease  Extensive hepatic metastatic disease  Large bilateral adrenal masses  Extensive lymphadenopathy  Probable osseous metastatic disease with mild to moderate compression deformity of the superior endplate of H29, suggesting pathologic compression  7/24/2021 CT Abd and pelvis: There is a 2 8 x 2 2 x 1 8 cm ill-defined, hypodense pancreatic lesion, highly suspicious for pancreatic adenocarcinoma (series 3 images 20-22 and series 601 images 57 - 67 ) There is widespread metastatic disease with numerous liver lesions, bilateral adrenal metastases, multiple lytic bone metastases, with resulting mild superior end plate compression fractures of T11 and L3 as above, and also intramuscular metastases as   above      8/2/2021 MRI TS w wo Contrast Diffuse metastatic disease within the thoracic spine, lower cervical spine and upper lumbar spine  No extraosseous extension of tumor noted on this examination  pathologic adenopathy with central necrosis within the left inferior neck and supraclavicular region and mediastinum and small left-sided pleural effusion  Multiple liver lesions are noted  8/2/2021 MRI Head w wo contrast Ill-defined area of abnormal signal within the right superior cerebellum with T2 and FLAIR hyperintensity and postcontrast enhancement --> solitary parenchymal metastasis  Abnormal appearance of the upper cervical spine with replacement of normal fatty marrow within several vertebral bodies with soft tissue signal extending into the epidural space, demonstrating irregular enhancement suspicious for osseous metastasis with   epidural extension  Suspected cord compression at the C3 and upper C4 level  8/2/2021 MRI LS w wo Contrast Diffuse osseous metastatic disease  There is slight extraosseous extension of tumor noted at the L2-3 and L3-4 levels on the left  Ureter extends from the left posterior elements into the adjacent paraspinal soft tissues  Diffuse sacral metastasis with mild narrowing of the S1 and S2 sacral foramen  Soft tissue metastasis as described above      BL LN Bx  · Right neck mass: Malignant epithelioid neoplasm, possibly melanoma, morphologically identical to that in specimen B, at least 1 cm maximal dimension, present at specimen edges  · Left neck mass: Malignant epithelioid neoplasm, possibly melanoma, at least 1 5 cm maximal dimension, present in dermal lymphatics and at specimen edges, likely metastatic  Benign overlying epidermis  CEA 63 8; CA 19-9: 5418, LDH: 1234  At this time, concern for synchronous malignancy given preliminary pathology reports demonstrating melanoma and CA 19 9 elevated in the setting of pancreatic lesion with liver metastasis concerning for pancreatic adenocarcinoma  There is a generic link between these 2 cancers raising concern  Plan:  · Palliative medicine consulted; appreciate recommendations for pain management  · Change to oxyCONTIN 20-30 mg BID for baseline pain; 5mg-10mg q4h PRN for moderate to severe pain  · Dilaudid 0 5mg IV q3h PRN breakthrough pain  · APAP 650mg q6h PRN mild pain   · Bowel regimen  · Miralax 17g daily  · Senna 8 6mg BID  · Medical Oncology Consulted and Appreciate Recommendation  · appt with Oncologist in [facility] in 1-2 weeks to discuss treatment options  · Rad onc  · solitary brain metastasis: stereotactic radiosurgery/radiotherapy; After he completes radiation therapy to the cervical spine, he can then be evaluated for stereotactic radiation at [place] [address]  signed consent form for stereotactic radiation  · Surgical Oncology Consulted  · S/P Punch Biopsy on 7/23/21; follow up on pathology results  · S/P 2x LN bx 7/26/21: Non-small cell carcinoma, 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        Oral ulcer  Assessment & Plan  8/3/2021 oral ulcer noted on the left hard palate; erythematous around the white head; not easy to scrape off; not tender     In the setting of pt on decadron treatment leading to likely immunocompromised state and pt complaining of intermittent mild dysphagia/odynphagia there are concerns for oral candidiasis     8/4/2021 white ulcer head no longer present; erythematous base     Plan   · Cont Diflucan 200 mg QD for 3 days total  · Scraping sent for cx      New onset a-fib [facility])  Assessment & Plan  7/30/2021 alerted by nursing that pt was in Afib with RVR s/p IR US guided liver bx  He was stable at that time  Upon arrival to the floors pt was re-evaluated and found to still be in afib with RVR, -150s  /80s  Patient with inadequate response to Lopressor x2 and Cardizem bolus x2  Patient started on a Cardizem drip with improvement in heart rates  Transition off on 07/31/2021 and continued on metoprolol 50 mg b i d  Plan:   Metoprolol tartrate 100 mg BID   Add metoprolol PRN   Lovenox 50 mg IJ Q12H   Telemetry monitoring   Monitor and replete to maintain Mg>2 and K>4        Constipation due to opioid therapy  Assessment & Plan  Pt last bowel movement prior to admission  Increasing abd pain/tenderness/distension  Tympanic  (-) rebound  Constipation unresponsive to lactulose 10 g, Mag citrate, and Relistor 8 mg  Most likely due to constipation which is opioid induced  7/28/2021 KUB Larger than typical volume of stool seen over the colon consistent with reported history of constipation  No bowel obstruction    After 2nd dose of Relistor 8 mg, pt has had daily bowel movements  Pt states the bowel movements were initially dark/black in color, however BM 8/1/2021 was starting to become lighter in color  Plan:  · Bowel regimen in place      Mass of thoracic vertebra  Assessment & Plan  7/28/2021 MRI TS Widely metastatic disease to the thoracic spinal axis with early pathologic fracture superior T11 endplate  No extraosseous tumor within the spinal canal, no cord or root compression  Multiple soft tissue masses in the adjacent soft tissues      Plan  · Neurosurgery consult   · MRI TS and LS w/wo contrast under anesthesia     Pressure ulcer of coccygeal region, unspecified pressure ulcer stage  Assessment & Plan  7/28/2021 Alerted by nursing that pt has ulcer on the coccyx  Per wound care: "Mid sacrum - evolving DTI - HA  Wound presents as approx: 20% evolving open partial thickness appearing moist yellow tissue, and 80% intact non-evolved non-blanchable purple skin  All aspects measured together  Edges fragile and attached, no maceration  Tatianna-wound is intact with pink blanchable skin  Suspect mixed etiology of pressure and friction given the chaffed/irregular dry appearance  -Will recommend foam dressing to protect "    Plan:  · Per wound care  · Frequent repositioning   · Apply hydraguard to b/l heels and b/l buttocks and sacrum BID and PRN for prevention and protection  · Apply skin nourishing cream the entire skin daily for moisture  · Turn and reposition patient every  2 hours   · Elevate heels off of bed with pillows to offload pressure   · Apply EHOB waffle cushion to chair when OOB, if able  · Cleanse sacral wound with NSS, pat dry, and apply Allevyn foam dressing  Feroz dressing with T  Change every other day and PRN for soilage/dislodgement  · Wound care will follow along with patient weekly     Severe protein-calorie malnutrition ([address] )  Assessment & Plan  Malnutrition Findings:   Adult Malnutrition type: Acute illness  Adult Degree of Malnutrition: Other severe protein calorie malnutrition (related to medical condition <50% energy intake versus needs for > 5 days evidenced by BMI 16 98; lost 9#(7 4%) since 6/10/21; moderate fat loss somewhat hollow orbital, moderate muscle loss visible clavicle  Treat Regular diet &Ensure Enlive TID )    BMI Findings:  Adult BMI Classifications: Underweight < 18 5     Body mass index is 16 6 kg/m²       Plan:  · Regular diet   · Ensure supplementation TID with meals  · Nutrition recommendations appreciated       S/P lymph node biopsy  Assessment & Plan  LN Bx done 7/26/2021; INCISIONAL BIOPSY LEFT NECK MASS,AND EXCISIOINAL BIOPSY RIGHT NECK MASS (Bilateral)  Non-small cell carcinoma, 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,           Scalp lesion  Assessment & Plan  Punch bx done on left scalp  Preliminary pathology demonstrating melanoma  8/3/2021 pt complaining of increasing tenderness/pain and size of scalp lesion  Lesion is erythematous, non fluctuant and not warm to palpation     Plan  · Continue local wound care  · Wound care consult placed    Anemia  Assessment & Plan  Hgb 9 9 and Hct 29 6 at time of admission  Currently Hgb 10 8  Most likely anemia of chronic inflammation with some component of iron deficiency  Fe sat: 14, TIBC 205, Fe 28, Ferritin 1230  Retic: 5 12    Plan:  · Monitor Hgb and Hct  · Transfuse if patient becomes symptomatic or Hgb drops < 7    Cervical stenosis of spinal canal  Assessment & Plan  Pt with cervical canal narrowing likely due to rim-enhancing epidural thickening/collection within posterolateral spinal canal spanning levels C2-C4  Findings could be due to necrotizing infectious process or metastasis related  Multiple Concerns for this level of of cord compression: paralysis, dysfunction of the diaphragm  7/28/2021 MRI CS Signal abnormality and erosive change of C3 laminae and spinous process along with lesser degree signal abnormality in the posterior elements of C1, C2, and C4  Associated epidural enhancement /enhancing soft tissue spanning from inferior C2-C4 resulting in severe canal stenosis and cord compression  Findings most favor metastatic disease  Infection is within differential consideration but less favored given findings elsewhere in the body  Correlate with sampling  Multiple rim-enhancing lesions within paraspinal musculature, superficial and deep soft tissue neck as demonstrated on recent soft tissue neck CT  Differential considerations are similar as above   Heterogeneous marrow signal and enhancement of the cervical and visualized thoracic vertebras concerning for infiltrative metastatic disease  Plan:  · Continue on  Decadron 5mg q6 hours PO  · Radiation oncology consulted  · Patient status post CT stimulation 07/29/2021; started on palliative radiation therapy times 10 sessions on 7/30/2021 (300 cGy, 1-10 MeV photons), 8/2/2021, 8/3/2021  · Neurosurgery consulted and appreciate recommendation  · Frequent neurochecks  · In setting of heavy disease burden and late stage metastatic disease, patient can wear TLSO brace as needed for comfort and soft cervical brace for comfort  Subclavian vein thromboembolism, acute, right [facility])  Assessment & Plan  Patient noted to have evidence go subclavian, internal jugular, and brachiocephalic vein thrombus on imaging on arrival  Started on heparin gtt  Remains on heparin gtt in setting of new onset Afib and ongoing workup and possibility for intervention  7/22/21 - CT Soft Tissue Neck with Contrast: Occluded left subclavian and brachiocephalic veins as well as left internal jugular vein to the level of hyoid bone    7/22/21 - CTA PE chest: Possible thrombus right subclavian vein extending into the brachiocephalic vein  7/27/2021 venous duplex US LEFT UPPER LIMB: Evidence of acute and subacute mostly occlusive deep vein thrombosis noted in the internal jugular vein  Evidence of acute non occlusive deep vein thrombosis noted in the innominate, subclavian and axillary with extension into the most proximal brachial vein  Plan:  · 8/3/2021 pt switched from Heparin gtt to Lovenox 50 mg Q12H inj  · Vascular surgery made aware; do not recommend any surgical intervention  · Cont to monitor PTT         Disposition- patient has been cleared to discharge to home by Physical therapy  Patient seems to have very poor resources,  Case management team has been consulted, patient would need visit to radiotherapy sessions, transport for same      VTE Pharmacologic Prophylaxis:   VTE Score: 4 High Risk (Score >/= 5) - Pharmacological DVT Prophylaxis Ordered: Enoxaparin (Lovenox)  Sequential Compression Devices Ordered  Mechanical VTE Prophylaxis in Place: Yes    Patient Centered Rounds: I have performed bedside rounds with nursing staff today  Discussions with Specialists or Other Care Team Provider: faheem    Education and Discussions with Family / Patient: Patient declined call to   Current Length of Stay: 13 day(s)    Current Patient Status: Inpatient     Discharge Plan / Estimated Discharge Date: Anticipate discharge in 48 hrs to home  Code Status: Level 1 - Full Code      Subjective:   Patient seen at bedside today morning  Appears in no acute apparent distress  Alert and oriented to time place and person  Patient is emaciated  Seems to have very poor strength  Discussed about follow-up  Patient does not want physicians to discuss his medical condition with his significant other  Objective:     Vitals:   Temp (24hrs), Av 3 °F (36 8 °C), Min:98 3 °F (36 8 °C), Max:98 3 °F (36 8 °C)    Temp:  [98 3 °F (36 8 °C)] 98 3 °F (36 8 °C)  HR:  [] 117  Resp:  [16] 16  BP: (112-129)/(64-92) 129/92  SpO2:  [97 %-98 %] 98 %  Body mass index is 16 6 kg/m²  Input and Output Summary (last 24 hours): Intake/Output Summary (Last 24 hours) at 2021 1242  Last data filed at 2021 0854  Gross per 24 hour   Intake 2540 ml   Output 1700 ml   Net 840 ml       Physical Exam:     Physical Exam  Constitutional:       General: He is not in acute distress  Appearance: He is ill-appearing  He is not toxic-appearing or diaphoretic  Comments: Cachetic     HENT:      Head: Normocephalic  Comments: Left sided 2x scalp lesion; indurated non fluctuant, TTP, erythematous, not warm to touch       Right Ear: External ear normal       Left Ear: External ear normal       Nose: Nose normal  No congestion or rhinorrhea        Mouth/Throat:      Mouth: Mucous membranes are moist       Comments: Small Round erythematous lesion on left hard palate in the same location as previous non scrappable white head    Eyes:      General: No scleral icterus  Right eye: No discharge  Left eye: No discharge  Extraocular Movements: Extraocular movements intact  Conjunctiva/sclera: Conjunctivae normal       Pupils: Pupils are equal, round, and reactive to light  Neck:      Vascular: No carotid bruit  Comments: Large mass left supraclavicular region    Cardiovascular:      Rate and Rhythm: Regular rhythm  Tachycardia present  Pulses: Normal pulses  Heart sounds: Normal heart sounds  Pulmonary:      Effort: Pulmonary effort is normal  No respiratory distress  Breath sounds: Normal breath sounds  No stridor  No wheezing, rhonchi or rales  Chest:      Chest wall: No tenderness  Abdominal:      General: Abdomen is flat  Bowel sounds are normal  There is no distension  Palpations: There is mass (left abd wall mass)  Tenderness: There is abdominal tenderness (LUQ)  There is no right CVA tenderness, left CVA tenderness, guarding or rebound  Hernia: No hernia is present  Musculoskeletal:         General: No swelling, tenderness or signs of injury  Normal range of motion  Cervical back: Normal range of motion and neck supple  No rigidity  Right lower leg: No edema  Left lower leg: No edema  Lymphadenopathy:      Cervical: Cervical adenopathy (bilateral) present  Skin:     General: Skin is warm and dry  Capillary Refill: Capillary refill takes less than 2 seconds  Neurological:      General: No focal deficit present  Mental Status: He is alert and oriented to person, place, and time  Cranial Nerves: No cranial nerve deficit  Sensory: No sensory deficit  Motor: Weakness (BUE and BLE) present     Psychiatric:         Mood and Affect: Mood normal          Behavior: Behavior normal          Thought Content:  Thought content normal          Judgment: Judgment normal           Additional Data:     Labs:  Results from last 7 days   Lab Units 08/05/21  0603 08/03/21  0634 07/31/21  0534   WBC Thousand/uL 37 10* 29 95* 22 42*   HEMOGLOBIN g/dL 11 2* 10 4* 8 8*   HEMATOCRIT % 36 2* 33 2* 28 8*   PLATELETS Thousands/uL 383 433* 365   NEUTROS PCT %  --   --  87*   LYMPHS PCT %  --   --  8*   LYMPHO PCT %  --  9*  --    MONOS PCT %  --   --  3*   MONO PCT %  --  5  --    EOS PCT %  --  1 0     Results from last 7 days   Lab Units 08/05/21  0603   SODIUM mmol/L 133*   POTASSIUM mmol/L 4 4   CHLORIDE mmol/L 97*   CO2 mmol/L 29   BUN mg/dL 27*   CREATININE mg/dL 0 70   ANION GAP mmol/L 7   CALCIUM mg/dL 9 4   ALBUMIN g/dL 2 7*   TOTAL BILIRUBIN mg/dL 0 37   ALK PHOS U/L 266*   ALT U/L 76   AST U/L 46*   GLUCOSE RANDOM mg/dL 108         Results from last 7 days   Lab Units 08/02/21  1347   POC GLUCOSE mg/dl 156*               Recent Cultures (last 7 days):           Lines/Drains:  Invasive Devices     Peripheral Intravenous Line            Peripheral IV 08/03/21 Left Forearm 1 day                Telemetry:   Telemetry Orders (From admission, onward)             48 Hour Telemetry Monitoring  Continuous x 48 hours     Expiring   Question:  Reason for 48 Hour Telemetry  Answer:  Arrhythmias Requiring Medical Therapy (eg  SVT, Vtach/fib, Bradycardia, Uncontrolled A-fib)                    Last 24 Hours Medication List:   Current Facility-Administered Medications   Medication Dose Route Frequency Provider Last Rate    acetaminophen  650 mg Oral Q6H PRN Domi Alexanderupmartha, DO      bisacodyl  10 mg Rectal Daily PRN Conradit Kalli, DO      calcium carbonate  500 mg Oral Daily PRN Juan Ng MD      dexamethasone  4 mg Oral Q6H [address] Ricardo Smith MD      enoxaparin  1 mg/kg Subcutaneous Q12H Eliot Kramer MD      fluconazole  200 mg Oral Daily Juan Ng MD      Gadobutrol  5 mL Intravenous Once in imaging [facility] Catherineupp, DO      HYDROmorphone  0 5 mg Intravenous Q3H PRN Domi Choe,       lactated ringers  100 mL/hr Intravenous Continuous Eav Denson  mL/hr (08/05/21 1128)    LORazepam  1 mg Intravenous Once PRN Tj Solis MD      metoprolol  2 5 mg Intravenous Q6H PRN Eva Denson MD      metoprolol tartrate  100 mg Oral Q12H [facility] Eva Denson MD      morphine  15 mg Oral [facility] Howie Alexander MD      morphine  30 mg Oral Q12H [facility] Howie Alexander MD      oxyCODONE  10 mg Oral Q4H PRN Howie Alexander MD      oxyCODONE  5 mg Oral Q4H PRN Howie Alexander MD      polyethylene glycol  17 g Oral BID Kassy Galloway DO      senna-docusate sodium  1 tablet Oral BID Godwin Phipps DO          Today, Patient Was Seen By: Eva Denson MD    ** Please Note: This note has been constructed using a voice recognition system   **

## 2021-08-05 NOTE — PROGRESS NOTES
Progress note - Palliative and Supportive Care   Peggy Rush 48 y o  male [id number]    Patient Active Problem List   Diagnosis    Metastatic cancer ([facility] )    Subclavian vein thromboembolism, acute, right (HCC)    Compression fracture of T11 vertebra (HCC)    Cervical stenosis of spinal canal    Anemia    Cervical spinal mass ([facility] )    Scalp lesion    S/P lymph node biopsy    Severe protein-calorie malnutrition (HCC)    Pressure ulcer of coccygeal region, unspecified pressure ulcer stage    Mass of thoracic vertebra    Constipation due to opioid therapy    [facility]    Oral ulcer     Active issues specifically addressed today include:   Metastatic cancer - melanoma versus pancreatic adenocarcinoma  Cancer related pain  Severe protein calorie malnutrition  Palliative care patient  Goals of care     Plan:  1  Symptom management -      Cancer pain:  -Continue MS contin 15mg oral q8h scheduled   -Oxycodone 5mg-10mg q4h PRN for moderate to severe pain  -Dilaudid 0 5mg IV q3h PRN breakthrough pain  -APAP 650mg q6h PRN mild pain     OME in past 24 hours: 115  40 mg PO Oxycodone IR (60 OME)  45 mg MS contin (45 OME)  0 5mg IV dilaudid (10 OME)     Bowel regimen to prevent OIC  -Miralax 17g daily  -Senna 8 6mg BID    2  Goals - Pathology concerning for primary pancreatic origin, atypical in presentation, concern that it is likely highly aggressive  Patient still hesitant to have in depth conversations with his family/loved ones  Will continue these conversations outpatient  Plan for discharge home with services tomorrow  Code Status: full - Level 1   Decisional apparatus:  Patient is competent on my exam today  If competence is lost, patient's substitute decision maker would default to adult child by [place]  Advance Directive / Living Will / POLST:  no    Interval history:         Pain better controlled on MS contin  Declining occasional PRN doses today      MEDICATIONS / ALLERGIES:     all current active meds have been reviewed    No Known Allergies    OBJECTIVE:    Physical Exam  Physical Exam  Vitals and nursing note reviewed  Constitutional:       General: He is not in acute distress  Comments: Thin, frail   HENT:      Head: Normocephalic  Right Ear: External ear normal       Left Ear: External ear normal       Nose: Nose normal       Mouth/Throat:      Mouth: Mucous membranes are moist    Eyes:      Extraocular Movements: Extraocular movements intact  Cardiovascular:      Rate and Rhythm: Normal rate  Pulmonary:      Effort: Pulmonary effort is normal    Abdominal:      General: Abdomen is flat  Comments: Multiple palpable tender lesions on the abdominal wall   Musculoskeletal:         General: No swelling  Cervical back: Neck supple  Skin:     General: Skin is dry  Neurological:      Mental Status: He is alert  Cranial Nerves: No cranial nerve deficit  Psychiatric:         Mood and Affect: Mood normal          Lab Results: I have personally reviewed pertinent labs  Imaging Studies: reviewed  EKG, Pathology, and Other Studies: reviewed    Counseling / Coordination of Care    Total floor / unit time spent today 25+ minutes  Greater than 50% of total time was spent with the patient and / or family counseling and / or coordination of care   A description of the counseling / coordination of care: symptom assessment and management, complex opioid therapy adjustment, emotional support

## 2021-08-05 NOTE — PLAN OF CARE
Problem: MOBILITY - ADULT  Goal: Maintain or return to baseline ADL function  Description: INTERVENTIONS:  -  Assess patient's ability to carry out ADLs; assess patient's baseline for ADL function and identify physical deficits which impact ability to perform ADLs (bathing, care of mouth/teeth, toileting, grooming, dressing, etc )  - Assess/evaluate cause of self-care deficits   - Assess range of motion  - Assess patient's mobility; develop plan if impaired  - Assess patient's need for assistive devices and provide as appropriate  - Encourage maximum independence but intervene and supervise when necessary  - Involve family in performance of ADLs  - Assess for home care needs following discharge   - Consider OT consult to assist with ADL evaluation and planning for discharge  - Provide patient education as appropriate  Outcome: Progressing  Goal: Maintains/Returns to pre admission functional level  Description: INTERVENTIONS:  - Perform BMAT or MOVE assessment daily    - Set and communicate daily mobility goal to care team and patient/family/caregiver  - Collaborate with rehabilitation services on mobility goals if consulted  - Perform Range of Motion   - Reposition patient every  - Dangle patient  - Stand patient   - Ambulate patient   - Out of bed to chair   - Out of bed for meals  - Out of bed for toileting  - Record patient progress and toleration of activity level   Outcome: Progressing     Problem: Nutrition/Hydration-ADULT  Goal: Nutrient/Hydration intake appropriate for improving, restoring or maintaining nutritional needs  Description: Monitor and assess patient's nutrition/hydration status for malnutrition  Collaborate with interdisciplinary team and initiate plan and interventions as ordered  Monitor patient's weight and dietary intake as ordered or per policy  Utilize nutrition screening tool and intervene as necessary   Determine patient's food preferences and provide high-protein, high-caloric foods as appropriate       INTERVENTIONS:  - Monitor oral intake, urinary output, labs, and treatment plans  - Assess nutrition and hydration status and recommend course of action  - Evaluate amount of meals eaten  - Assist patient with eating if necessary   - Allow adequate time for meals  - Recommend/ encourage appropriate diets, oral nutritional supplements, and vitamin/mineral supplements  - Order, calculate, and assess calorie counts as needed  - Recommend, monitor, and adjust tube feedings and TPN/PPN based on assessed needs  - Assess need for intravenous fluids  - Provide specific nutrition/hydration education as appropriate  - Include patient/family/caregiver in decisions related to nutrition  Outcome: Progressing     Problem: Prexisting or High Potential for Compromised Skin Integrity  Goal: Skin integrity is maintained or improved  Description: INTERVENTIONS:  - Identify patients at risk for skin breakdown  - Assess and monitor skin integrity  - Assess and monitor nutrition and hydration status  - Monitor labs   - Assess for incontinence   - Turn and reposition patient  - Assist with mobility/ambulation  - Relieve pressure over bony prominences  - Avoid friction and shearing  - Provide appropriate hygiene as needed including keeping skin clean and dry  - Evaluate need for skin moisturizer/barrier cream  - Collaborate with interdisciplinary team   - Patient/family teaching  - Consider wound care consult   Outcome: Progressing

## 2021-08-05 NOTE — CASE MANAGEMENT
Notified by MD pt will need transport for radiation txs, will need to be seen at [facility] for stereotactic radiation  Will need [facility] for lov[employer] teaching  Script to [employer] for [employer]   Pt does not have insurance  Advised will need to use [facility] for meds    TCT [facility]-cost >$7000 00 without insurance for [employer]    Met with pt, reviewed [facility] transport with him  Discussed [facility] for SN and PT, agreeable  Discussed Lovinox and cost and if he can afford meds  States he has insurance, provided [employer] card  Email to [employer] verification    TCT [facility], pt active coverage as of 8/1    Insurance info faxed to [employer] for cost of [employer] application completed and [facility] [facility]  for lovinox teaching and home PT, pt agreeable  Referral to [facility] via [facility]    TCT [employer] of Lov[employer], states insurance declined as birth date wrong on records  Script also to [facility], cost $375 00, no insurance    Met with pt, TCT CBC, birthdate is incorrect and needs to be corrected    PT on phone with CBC awaiting rep to change birthdate

## 2021-08-05 NOTE — PROGRESS NOTES
MEDICAL ONCOLOGY - PROGRESS NOTE  Pia Conti  Male, 47 yo  MRN; [id number]    Assessment and Plan  1  Metastatic adenocarcinoma, poorly differentiated, likely pancreatic (liver, lungs, brain, bone, soft tissue):    - S/P Punch Biopsy on 7/23/21; follow up on pathology results  - S/P 2x LN bx 7/26/21: Non-small cell carcinoma, 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   - S/P liver mets bx 7/30/2021: Non-small cell carcinoma, favor pancreatobiliary primary  -  >5000; this is likely metastatic pancreatic cancer  -Reviewed this diagnosis with him again today  - Requested follow up with oncologist in [facility] in next 1-2 weeks to discuss treatment options  - placed "ambulatory oncology SW referral" for pt to see SW in [facility]    2  Concern for cord compression:   - Appreciate radiation oncology radiating this   - Continue steroids 4mg Q6 hours; decreasing this steroid dose is dependent on neurosurgery evaluating and determining stability of his cervical spine      Communication with patient/family:  I did update the patient    Communication with team:  Did talk with Dr Lottie Rodríguez, primary team     I did see this patient with Dr Neelam Edge and she is in agreement with this plan  Geraldo Caldwell [facility]-BC  Hematology/Oncology Nurse Practitioner        Subjective  He has pain in his neck mostly  He doesn't understand his diagnosis  Denies weakness or numbness in any limb or extremity  He has questions about compensation from his employer and paperwork  Objective  Physical Exam   Physical Exam   Constitutional: He appears cachectic  He appears chronically ill  Eyes: Pupils are equal, round, and reactive to light  Neck: Neck adenopathy present  Pulmonary/Chest: Breath sounds normal  He has no wheezes  He exhibits no tenderness  Abdominal: Soft  Bowel sounds are normal  He exhibits mass  He exhibits no distension     Musculoskeletal:         General: Tenderness (neck tenderness) present  No edema  Neurological: He is alert and oriented to person, place, and time  Skin: Skin is warm and dry       Results from last 7 days   Lab Units 08/05/21  0603 08/04/21  0632 08/03/21  0634   SODIUM mmol/L 133* 135* 139   POTASSIUM mmol/L 4 4 4 1 4 6   CHLORIDE mmol/L 97* 99* 104   CO2 mmol/L 29 30 31   ANION GAP mmol/L 7 6 4   BUN mg/dL 27* 21 21   CREATININE mg/dL 0 70 0 63 0 58*   CALCIUM mg/dL 9 4 9 6 9 0   ALK PHOS U/L 266* 275* 256*   ALT U/L 76 75 77   AST U/L 46* 49* 70*       Recent Labs     08/03/21  0634 08/04/21  0632 08/05/21  0603   WBC 29 95* 32 56* 37 10*

## 2021-08-05 NOTE — PLAN OF CARE
Problem: OCCUPATIONAL THERAPY ADULT  Goal: Performs self-care activities at highest level of function for planned discharge setting  See evaluation for individualized goals  Description: Treatment Interventions: ADL retraining, Functional transfer training, UE strengthening/ROM, Endurance training, Patient/family training, Equipment evaluation/education, Compensatory technique education, Continued evaluation, Energy conservation, Activityengagement          See flowsheet documentation for full assessment, interventions and recommendations  Outcome: Adequate for Discharge  Note: Limitation: Decreased ADL status, Decreased UE ROM, Decreased UE strength, Decreased endurance, Decreased self-care trans, Decreased high-level ADLs  Prognosis: Fair  Assessment: Patient participated in Skilled OT session this date with interventions consisting of ADL re training with the use of correct body mechnaics, safety awareness and fall prevention techniques, maintaining back safety precautions,  therapeutic activities to: increase activity tolerance and increase OOB/ sitting tolerance   Upon arrival patient was found supine in bed  Pt donned C-collar and TLSO at S level  Independent to don/doff gown and don socks seated EOB using cross-legged position, S for simulated pulling up pants  Pt performs transfers at MI/I level  S for fnxl ambulation  Pt reports GF works in the medical field and can assist at home  Has no fnxl concerns about returning home at this time  Educated pt on spinal precautions - verbalizes understanding  Patient continues to be functioning below baseline level, occupational performance remains limited secondary to factors listed above and increased risk for falls and injury  From OT standpoint, recommendation at time of d/c would be home with increased social support  No further acute OT needs at this time  Will d/c OT orders  Please re-consult if needed/appropriate   The patient's raw score on the AM-PAC Daily Activity inpatient short form is 20, standardized score is 42 03, greater than 39 4  Patients at this level are likely to benefit from discharge to home  Please refer to the recommendation of the Occupational Therapist for safe discharge planning       OT Discharge Recommendation: No rehabilitation needs (home with increased social support )  OT - OK to Discharge: Yes (when medically stable )

## 2021-08-05 NOTE — PLAN OF CARE
Problem: PHYSICAL THERAPY ADULT  Goal: Performs mobility at highest level of function for planned discharge setting  See evaluation for individualized goals  Description: Treatment/Interventions: Functional transfer training, LE strengthening/ROM, Patient/family training, Bed mobility, Gait training, Spoke to nursing, Spoke to case management, OT         See flowsheet documentation for full assessment, interventions and recommendations  Outcome: Adequate for Discharge  Note: Prognosis: Fair  Problem List: Pain  Assessment: Pt seen for PT treatment session this date  Therapy session focused on bed mobility, functional transfers, gait training, stair training, and endurance training in order to improve overall mobility and independence  Pt performed bed mobility tasks at mod I level  Pt sat EOB at S level  Pt donned c/s collar and TLSO sitting EOB at S level  Pt educated on spinal precautions before mobilization and expressed understanding  Pt performed STS at S level without AD  Pt ambulated in hallway without AD at S level  Pt negotiated 21 steps using L HR at S level using alternating pattern  Pt was left supine in bed at the end of PT session with all needs in reach  Pt with no questions or concerns regarding d/c home ;pt with no further acute inpatient PT needs at this time- please re-consult if needed  PT to d/c pt and recommends home with increased social support and OPPT  The patient's AM-PAC Basic Mobility Inpatient Short Form Raw Score is 20, Standardized Score is 43 99  A standardized score greater than 42 9 suggests the patient may benefit from discharge to home  Please also refer to the recommendation of the Physical Therapist for safe discharge planning  PT Discharge Recommendation: Home with outpatient rehabilitation (+ increased social support )     PT - OK to Discharge: Yes (once medically cleared )    See flowsheet documentation for full assessment

## 2021-08-05 NOTE — PHYSICAL THERAPY NOTE
PHYSICAL THERAPY NOTE          Patient Name: Pia Conti  XYFDT'F Date: 8/5/2021 08/05/21 1053   PT Last Visit   PT Visit Date 08/05/21   Note Type   Note Type Treatment   Pain Assessment   Pain Assessment Tool 0-10   Pain Score 9   Pain Location/Orientation Location: Back; Location: Neck   Patient's Stated Pain Goal No pain   Hospital Pain Intervention(s) Repositioned; Ambulation/increased activity   Restrictions/Precautions   Weight Bearing Precautions Per Order No   Braces or Orthoses TLSO;C/S Collar  (for comfort- per neurosx )   Other Precautions Fall Risk;Pain   General   Chart Reviewed Yes   Response to Previous Treatment Patient with no complaints from previous session  Family/Caregiver Present No   Cognition   Overall Cognitive Status WFL   Arousal/Participation Alert; Responsive; Cooperative   Attention Within functional limits   Orientation Level Oriented X4   Memory Decreased recall of precautions   Following Commands Follows one step commands without difficulty   Comments Pt pleasant and cooperative to participate in therapy session    Bed Mobility   Supine to Sit 6  Modified independent   Additional items HOB elevated; Increased time required   Sit to Supine 6  Modified independent   Additional items HOB elevated; Increased time required   Additional Comments Pt supine in bed upon arrival  Pt returned to supine in bed with all needs within reach at the end of therapy session    Transfers   Sit to Stand 5  Supervision   Stand to Sit 5  Supervision   Additional Comments transfers without AD    Ambulation/Elevation   Gait pattern Excessively slow; Short stride; Foward flexed   Gait Assistance 5  Supervision   Assistive Device None   Distance 2 x 60ft    Stair Management Assistance 5  Supervision   Stair Management Technique One rail L;Alternating pattern; Foreward   Number of Stairs 21  (standing rest break at top of steps ~2 min )   Balance   Static Sitting Fair +   Dynamic Sitting Fair   Static Standing Fair   Dynamic Standing Fair   Ambulatory Fair    Activity Tolerance   Activity Tolerance Patient tolerated treatment well;Patient limited by pain; Patient limited by fatigue   Nurse Made Aware RN cleared pt to participate in therapy session    Assessment   Prognosis Fair   Problem List Pain   Assessment Pt seen for PT treatment session this date  Therapy session focused on bed mobility, functional transfers, gait training, stair training, and endurance training in order to improve overall mobility and independence  Pt performed bed mobility tasks at mod I level  Pt sat EOB at S level  Pt donned c/s collar and TLSO sitting EOB at S level  Pt educated on spinal precautions before mobilization and expressed understanding  Pt performed STS at S level without AD  Pt ambulated in hallway without AD at S level  Pt negotiated 21 steps using L HR at S level using alternating pattern  Pt was left supine in bed at the end of PT session with all needs in reach  Pt with no questions or concerns regarding d/c home ;pt with no further acute inpatient PT needs at this time- please re-consult if needed  PT to d/c pt and recommends home with increased social support and OPPT  The patient's AM-PAC Basic Mobility Inpatient Short Form Raw Score is 20, Standardized Score is 43 99  A standardized score greater than 42 9 suggests the patient may benefit from discharge to home  Please also refer to the recommendation of the Physical Therapist for safe discharge planning  Goals   Patient Goals to rest    STG Expiration Date 08/10/21   PT Treatment Day 2   Plan   Treatment/Interventions Functional transfer training;LE strengthening/ROM; Elevations; Endurance training;Patient/family training;Bed mobility;Gait training;Spoke to nursing;Spoke to case management;OT   Progress Discontinue PT   PT Frequency   (d/c PT )   Recommendation   PT Discharge Recommendation Home with outpatient rehabilitation  (+ increased social support )   Equipment Recommended   (none )   PT - OK to Discharge Yes  (once medically cleared )   AM-PAC Basic Mobility Inpatient   Turning in Bed Without Bedrails 4   Lying on Back to Sitting on Edge of Flat Bed 4   Moving Bed to Chair 3   Standing Up From Chair 3   Walk in Room 3   Climb 3-5 Stairs 3   Basic Mobility Inpatient Raw Score 20   Basic Mobility Standardized Score 43 99     Dany Sullivan, PT, DPT

## 2021-08-05 NOTE — OCCUPATIONAL THERAPY NOTE
[address] Progress Note     Patient Name: Alice Barroso  HLBGT'Z Date: 8/5/2021  Problem List  Principal Problem:    Metastatic cancer [facility])  Active Problems:    Subclavian vein thromboembolism, acute, right ([address] )    Compression fracture of T11 vertebra ([facility] [address] )    Cervical stenosis of spinal canal    Anemia    Cervical spinal mass (HCC)    Scalp lesion    S/P lymph node biopsy    Severe protein-calorie malnutrition (HCC)    Pressure ulcer of coccygeal region, unspecified pressure ulcer stage    Mass of thoracic vertebra    Constipation due to opioid therapy    New onset a-fib 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    Intrinsic Gratification Being outside    Pain Assessment   Pain Assessment Tool 0-10   Pain Score 9   Pain Location/Orientation Location: Neck; Location: Back   Hospital Pain Intervention(s) Ambulation/increased activity;Repositioned   ADL   Where Assessed Edge of bed   UB Dressing Assistance 7  Independent   UB Dressing Deficit Thread RUE; Thread LUE   UB Dressing Comments Pt dons gown w/o A    LB Dressing Assistance 5  Supervision/Setup   LB Dressing Deficit Don/doff R sock; Don/doff L sock   LB Dressing Comments Completed in cross-legged position   (S for simulated pulling up pants )   Toileting Assistance  7  Independent   Toileting Comments pt urinates standing in bathroom   Bed Mobility   Supine to Sit 6  Modified independent Additional items HOB elevated   Sit to Supine 6  Modified independent   Additional items HOB elevated   Transfers   Sit to Stand 6  Modified independent   Stand to Sit 6  Modified independent   Additional Comments Transfers w/o AD    Functional Mobility   Functional Mobility 5  Supervision   Coordination   Gross Motor X10 mini/half squats, simulating pulling up pants with S    Cognition   Overall Cognitive Status [facility]   Arousal/Participation Alert; Responsive; Cooperative   Attention Within functional limits   Orientation Level Oriented X4   Memory Decreased recall of precautions   Following Commands Follows one step commands without difficulty   Comments Pt pleasant and cooperative t/o session    Activity Tolerance   Activity Tolerance Patient limited by fatigue   Medical Staff Made Aware RN clearance for session    Assessment   Assessment Patient participated in Skilled OT session this date with interventions consisting of ADL re training with the use of correct body mechnaics, safety awareness and fall prevention techniques, maintaining back safety precautions,  therapeutic activities to: increase activity tolerance and increase OOB/ sitting tolerance   Upon arrival patient was found supine in bed  Pt donned C-collar and TLSO at S level  Independent to don/doff gown and don socks seated EOB using cross-legged position, S for simulated pulling up pants  Pt performs transfers at MI/I level  S for fnxl ambulation  Pt reports GF works in the medical field and can assist at home  Has no fnxl concerns about returning home at this time  Educated pt on spinal precautions - verbalizes understanding  Patient continues to be functioning below baseline level, occupational performance remains limited secondary to factors listed above and increased risk for falls and injury  From OT standpoint, recommendation at time of d/c would be home with increased social support  No further acute OT needs at this time  Will d/c OT orders  Please re-consult if needed/appropriate  The patient's raw score on the AM-PAC Daily Activity inpatient short form is 20, standardized score is 42 03, greater than 39 4  Patients at this level are likely to benefit from discharge to home  Please refer to the recommendation of the Occupational Therapist for safe discharge planning  Plan   Treatment Interventions ADL retraining;Functional transfer training;UE strengthening/ROM; Endurance training;Patient/family training;Equipment evaluation/education; Compensatory technique education;Continued evaluation; Energy conservation; Activityengagement   Goal Expiration Date 08/10/21   OT Treatment Day 1   OT Frequency 3-5x/wk   Recommendation   OT Discharge Recommendation No rehabilitation needs  (home with increased social support )   OT - OK to Discharge Yes  (when medically stable )   AM-[facility] Daily Activity Inpatient   Lower Body Dressing 3   Bathing 3   Toileting 3   Upper Body Dressing 3   Grooming 4   Eating 4   Daily Activity Raw Score 20   Daily Activity Standardized Score (Calc for Raw Score >=11) 42 03   AM-[facility] Applied Cognition Inpatient   Following a Speech/Presentation 4   Understanding Ordinary Conversation 4   Taking Medications 4   Remembering Where Things Are Placed or Put Away 4   Remembering List of 4-5 Errands 3   Taking Care of Complicated Tasks 3   Applied Cognition Raw Score 22   Applied Cognition Standardized Score 47 83     Andreina Adam MS, OTR/L

## 2021-08-05 NOTE — WOUND OSTOMY CARE
Consult Note - Wound   Cori [place] 48 y o  male MRN: [id number]  Unit/Bed#: [facility] 914-01 Encounter: [id number]      History and Present Illness:  Patient is a 48 year male admitted with metastatic cancer being followed by wound care for sacral wound, however he is being seen today for scalp wound  He is awake, alert in bed with no complaints, he is agreeable for wound care nurse to assess skin and place orders  Assessment Findings:   1-L occi-temporal head with raised lesion of unknown etiology  Lesion/wound with dry black eschar, minimal erythema and no drainage  On palpation hard bony like structure is felt  2-Sacrum with fully evolved DTI, residual wound is very small measuring 0 5cm x 0 3cm x unknown depth with 100% yellow base  Periwound with pink blanching erythema and distally scabbed peeling dry skin remains from [address]  Heels are intact as well bilateral buttocks, no other skin issues seen  Plan:   -Scalp wound non draining with dry eschar, no dressing needed, unable to determine etiology, important to obtain biopsy result before proceeding with any dressing recommendations, please leave wound open to air  Vitals: Blood pressure 115/70, pulse 93, temperature (!) 97 4 °F (36 3 °C), temperature source Oral, resp  rate 18, height 5' 9" (1 753 m), weight 51 kg (112 lb 7 oz), SpO2 99 %  ,Body mass index is 16 6 kg/m²  Wound 07/26/21 Neck Bilateral (Active)   Wound Description Clean;Dry; Intact 08/05/21 0800   Tatianna-wound Assessment Clean;Dry; Intact 08/05/21 0800   Wound Site Closure Approximated; Exofin 08/05/21 0800   Drainage Amount None 08/05/21 0800   Dressing Open to air 08/05/21 0800   Dressing Status Intact 07/31/21 2047       Wound 07/28/21 Pressure Injury Sacrum Mid (Active)   Wound Image   08/05/21 1156   Wound Description Slough; Yellow;Light purple 08/04/21 0909   Pressure Injury Stage DTPI 08/04/21 0909   Tatianna-wound Assessment Dry; Intact; Hyperpigmented;Pink 08/04/21 0909   Wound Length (cm) 0 5 cm 08/05/21 1156   Wound Width (cm) 0 3 cm 08/05/21 1156   Wound Depth (cm) 0 1 cm 08/04/21 0909   Wound Surface Area (cm^2) 0 15 cm^2 08/05/21 1156   Wound Volume (cm^3) 0 3 cm^3 08/04/21 0909   Calculated Wound Volume (cm^3) 0 3 cm^3 08/04/21 0909   Change in Wound Size % 9 09 08/04/21 0909   Tunneling 0 cm 08/04/21 0909   Tunneling in depth located at 0 08/04/21 0909   Undermining 0 08/04/21 0909   Undermining is depth extending from 00 08/04/21 0909   Wound Site Closure Open to air 07/29/21 0759   Drainage Amount Scant 08/04/21 0909   Drainage Description Serosanguineous 08/04/21 0909   Non-staged Wound Description Partial thickness 08/04/21 0909   Treatments Cleansed;Irrigation with NSS;Site care;Elevated 08/04/21 0909   Dressing Foam, Silicon (eg  Allevyn, etc) 08/05/21 0800   Wound 08/05/21 Head Left;Posterior;Superior (Active)   Wound Image    08/05/21 1154   Wound Description Dry;Black 08/05/21 1154   Pressure Injury Stage O 08/05/21 1154   Tatianna-wound Assessment Intact 08/05/21 1154   Wound Length (cm) 4 cm 08/05/21 1154   Wound Width (cm) 4 cm 08/05/21 1154   Wound Surface Area (cm^2) 16 cm^2 08/05/21 1154   Drainage Amount None 08/05/21 1154   Dressing Open to air 08/05/21 1154   Patient Tolerance Tolerated well 08/05/21 1154         Our recommendations are place as nursing orders, wound care to continue following, please call or tiger text with questions or concerns        Kathy Hernandez RN, BSN, [facility]

## 2021-08-06 PROBLEM — G95.20 CERVICAL SPINAL CORD COMPRESSION (HCC): Status: ACTIVE | Noted: 2021-01-01

## 2021-08-06 NOTE — ASSESSMENT & PLAN NOTE
7/28/2021 Alerted by nursing that pt has ulcer on the coccyx  Per wound care: "Mid sacrum - evolving DTI - HA  Wound presents as approx: 20% evolving open partial thickness appearing moist yellow tissue, and 80% intact non-evolved non-blanchable purple skin  All aspects measured together  Edges fragile and attached, no maceration  Tatianna-wound is intact with pink blanchable skin  Suspect mixed etiology of pressure and friction given the chaffed/irregular dry appearance  -Will recommend foam dressing to protect "    8/5/2021 Sacrum with fully evolved DTI, residual wound is very small measuring 0 5cm x 0 3cm x unknown depth with 100% yellow base  Periwound with pink blanching erythema and distally scabbed peeling dry skin remains from the original [address]  Plan:  · Per wound care  · Frequent repositioning   · Apply hydraguard to b/l heels and b/l buttocks and sacrum BID and PRN for prevention and protection  · Apply skin nourishing cream the entire skin daily for moisture  · Turn and reposition patient every  2 hours   · Elevate heels off of bed with pillows to offload pressure   · Apply EHOB waffle cushion to chair when OOB, if able  · Cleanse sacral wound with NSS, pat dry, and apply Allevyn foam dressing  Feroz dressing with T  Change every other day and PRN for soilage/dislodgement     · Wound care will follow along with patient weekly

## 2021-08-06 NOTE — UTILIZATION REVIEW
Pt arrived ED 07/22 as having no insurnace  On 08/05, Insurance verifiers found CBC active as of 08/01  Attempted auth in portal, but unable to process because of admission date  Requesting Retro review for start date of coverage 08/01/2021          Inpatient Admission Authorization Request   NOTIFICATION OF INPATIENT ADMISSION/INPATIENT AUTHORIZATION REQUEST   SERVICING FACILITY:   [facility]  Address: [address] [place] [address], [address] [place] [address]  Tax ID: [id number]  NPI: [id number]  Place of Service: Inpatient [address] [facility] Code: 24     ATTENDING PROVIDER:  Attending Name and NPI#: Tivis Nyhan, [address] [[id number]]  Address: [address] [place] [address], [address] [place] [address]  Phone: [phone]     UTILIZATION REVIEW CONTACT:  [facility] Utilization   Network Utilization Review Department  Phone: [phone]  Fax: [phone]  Email: [email or web address]     PHYSICIAN ADVISORY SERVICES:  FOR [id number]-[id number] REVIEW - MEDICAL NECESSITY DENIAL  Phone: [phone]  Fax: [phone]  Email: [email or web address]     TYPE OF REQUEST:  Inpatient Status     ADMISSION INFORMATION:  ADMISSION DATE/TIME: 7/23/21  1:10 AM  PATIENT DIAGNOSIS CODE/DESCRIPTION:  Metastatic cancer (HCC) [C79 9]  SOB (shortness of breath) [R06 02]  Cervical stenosis of spinal canal [M48 02]  Scalp lesion [L98 9]  Abnormal CT scan, neck [R93 89]  Opacity of lung on imaging study [R91 8]  DISCHARGE DATE/TIME: No discharge date for patient encounter  DISCHARGE DISPOSITION (IF DISCHARGED): Home/Self Care     IMPORTANT INFORMATION:  Please contact the [facility] directly with any questions or concerns regarding this request  Department voicemails are confidential     Send requests for admission clinical reviews, concurrent reviews, approvals, and administrative denials due to lack of clinical to fax [phone]

## 2021-08-06 NOTE — PROGRESS NOTES
Progress Note - Surgical Oncology   Nisha Wade 48 y o  male MRN: [id number]  Unit/Bed#: [facility] 842-49 Encounter: [id number]  08/06/21  7:57 AM      Subjective/Objective   Chief Complaint   Patient presents with    Medical Problem     pt states he was sent here for L sided neck mass + for subclavian vein thrombosis  pt reports neck, back and chest pain        Subjective:  No complaint    Objective:  Pathology revealed poorly differentiated carcinoma  Likely pancreatic    Blood pressure 90/70, pulse (!) 124, temperature (!) 97 2 °F (36 2 °C), temperature source Tympanic, resp  rate 19, height 5' 9" (1 753 m), weight 51 kg (112 lb 7 oz), SpO2 96 %  ,Body mass index is 16 6 kg/m²  Intake/Output Summary (Last 24 hours) at 8/6/2021 0757  Last data filed at 8/6/2021 0501  Gross per 24 hour   Intake 2940 ml   Output 2400 ml   Net 540 ml       Invasive Devices     Peripheral Intravenous Line            Peripheral IV 08/05/21 Left Forearm <1 day                Physical Exam:   Head and neck: is normocephalic  Neck is supple without adenopathy  Sclerae are anicteric  Mucous membranes are moist  No JVD    Incisions are C/D/I  Abdomen: Soft, nontender, nondistended, and without masses  Extremities: Without cyanosis, clubbing, or edema, symmetric  Neuro: Grossly nonfocal  Lymphatics:  Bilateral cervical adenopathy  Skin is warm and anicteric  Psych: Patient is pleasant and talkative              Lab Results:  Lab Results   Component Value Date    WBC 30 71 (HH) 08/06/2021    HGB 11 8 (L) 08/06/2021    HCT 37 2 08/06/2021    MCV 97 08/06/2021     08/06/2021     Lab Results   Component Value Date    CALCIUM 9 3 08/06/2021    K 4 6 08/06/2021    CO2 30 08/06/2021    CL 98 (L) 08/06/2021    BUN 30 (H) 08/06/2021    CREATININE 0 65 08/06/2021     No results found for: AFP  Lab Results   Component Value Date    CALCIUM 9 3 08/06/2021    PHOS 2 9 08/01/2021     Lab Results   Component Value Date    CEA 63 8 (H) 07/28/2021 CA 19-9 is greater than 5000      Assessment:  59-year-old male with newly diagnosed poorly differentiated carcinoma, this is likely pancreatic given his markedly elevated CA 19-9  Cord compression    Plan:  Outpatient chemotherapy by Medical Oncology  Continue steroids as per radiation oncology and neurosurgery  Discussed pathology with the patient  I will sign off  Please call if there are any questions or concerns      Lionel Loredo MD  7:57 AM

## 2021-08-06 NOTE — ASSESSMENT & PLAN NOTE
Patient with diffuse pancreatic adenocarcinoma mets to the CS  Compression of the spinal cord at C2-C4    7/28/2021 MRI CS w wo contrast  Signal abnormality and erosive change of C3 laminae and spinous process along with lesser degree signal abnormality in the posterior elements of C1, C2, and C4  Associated epidural enhancement /enhancing soft tissue spanning from inferior C2-C4 resulting in severe canal stenosis and cord compression  Findings most favor metastatic disease  Multiple rim-enhancing lesions within paraspinal musculature, superficial and deep soft tissue neck as demonstrated on recent soft tissue neck CT  Heterogeneous marrow signal and enhancement of the cervical and visualized thoracic vertebras concerning for infiltrative metastatic disease  Redemonstrated occluded left internal jugular vein  Degenerative change as above  Plan  · Rad onc  · Cont OP radiation of the CS for cervical mets; plan for 10 sessions     · Plan for decadron taper  · 4 mg Q6H x3 days --> 4 mg Q12 H x3 days --> 2 mg Q12 H x 3 days --> 2 mg QD x3 days --> stop

## 2021-08-06 NOTE — CASE MANAGEMENT
Narcotics require preauth  [employer] checked and not approved at present time  PT for DC today    Discussed with pt, he does not have money to pay for meds    Discussed with [employer] if SL could be reimbursed if meds approved after pt DC'd, they stated yes    Discussed with Director MW and can approve payment    Financial form faxed to [facility] for Oxycontin, MSO4, decadron and Lopressor    Waiver and release form signed for free local transportation-form to medical records

## 2021-08-06 NOTE — UTILIZATION REVIEW
Continued Stay Review    Date: 8/1-8/6/21                        Current Patient Class:  Inpatient   Current Level of Care:  Med Surg     HPI:50 y o  male initially admitted on 7/23/2021 due to metastatic cancer, with unknown primary  He had abnormal OP  CT scan , Multiple neck masses, CT neck showed multiple  Ring- enhancing  And necrotic lesiosn and possibel septc emboli  Assessment/Plan: 8/1 - Pt with afib  on lopressor, hep gtt, Mass of thoracic vertebra, Pressure ulcer  Coccygeal region,Malnutrition, s/p Lymphnode biopsy on 7/26_ preliminary - report - melanoma  Saalp lesion -  {unch Bx - demonstrating melanoma  Anemia-  Currently 8 6 monitor and transfuse  Prn  Subclavian vein thromboembolosm - right,  Heparin gtt  Metastatic cancer -   CEA 63 8; CA 19-9: 5418, LDH: 1234  At this time, concern for synchronous malignancy given preliminary pathology reports demonstrating melanoma and CA 19 9 elevated in the setting of pancreatic lesion with liver metastasis concerning for pancreatic adenocarcinoma  There is a generic link between these 2 cancers raising concern      Plan:  · Pain Regimen:   ? Oxycodone 5/10 mg q3 hours PRN mod/severe  ? Dilaudid 0 5 mg q3 hours PRN for breakthrough  ? Bowel regimen ordered  · Medical Oncology Consulted and Appreciate Recommendation  ? MRI brain ordered and pending  · Surgical Oncology Consulted  ? S/P Punch Biopsy on 7/23/21; follow up on pathology results  ? Plan for Lymph Node Biopsy on 7/26/21  · Continue to monitor kidney fxn and electrolytes; potential for Tumor lysis syndrome   · GI consulted and appreciate recommendations   · IR bx liver mets 7/30/2021  ? If negative for pancreatic mets will proceed with endoscopic ultrasound with pancreatic biopsy  Sepsis -  Monitor off abx's    8/2 - Patient seen and examined  No acute events overnight  AVSS, patient has has not had recurrent episodes of afib with RVR overnight   Patient expressing increased pain/tenderness and enlargement around the area of the left scalp punch bx site  Patient is still having neck and back pain; states pain medication is slowly becoming less effective in providing him relief       Patient states he has easy fatigability and exertional SOB  Denies weakness, however states he is not as strong as he used to be        He has had bowel movements over the last few days  States the few BMs were loose and black/dark in color, denies rachel blood  His last bowel movement yesterday was more formed and less dark  He is still having abdominal pain/tenderness, which is generalized however more acute in the LUQ and RUQ radiating to bilateral flanks  States this pain is worsened after he eats or has to take medicine PO and is associated with diaphoresis and warm sensation  He has an appetite and is eating more       Patient ambulated up and down the hallways with the walker yesterday  States his left knee pain has improved       At night he has night sweats and feels very warm       Denies: fever, numbness/tingling, HA, lightheadedness, dizziness, sinus pressure/pain, visual changes, hearing changes, dysphagia/odynphagia, chest pain/tenderness, palpitations, coughing, wheezing, N/V/C, hematuria, dysuria  8/3 Radiation Oncology - [facility] is a 48y o  year old male who presents with 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  He is currently receiving radiation therapy to the cervical spine and has had 3 fractions/900 cGy out of a total planned 10 fractions  MRI of the brain reveals a 1 5 x 2 3 cm right superior cerebellar metastasis  Right and left neck biopsies are positive for non-small cell carcinoma consistent with poorly differentiated adenocarcinoma  Liver biopsy is pending and skin punch biopsy is pending  The patient may have  2 primary cancers with a pancreatic adenocarcinoma and metastatic melanoma    He is being seen consultation for the solitary brain metastasis      We discussed a solitary brain metastasis can be treated with craniotomy and resection versus stereotactic radiosurgery/radiotherapy  He is not enthusiastic about having surgery but is willing to consider stereotactic radiation  After he completes radiation therapy to the cervical spine, he can then be evaluated for stereotactic radiation at [facility] as an outpatient  We discussed rationale for the radiation therapy including the acute side effects and the potential chronic complications with him  He did sign consent form for stereotactic radiation  8/4 - Patient seen and examined  No acute events overnight  Pt has remained afebrile overnight, however has had recurrence of tachycardia, to the 104s      Sitting upright at the edge of the bed eating breakfast  Pt has had good appetite       Pt states he was able to sleep comfortably overnight; this is the first time in a while that he has been able to do so  States he was able to sleep more comfortably due to changes in his pain medication relieving the pain in his neck and back; previous pain medication regiment reduced his pain to 7/10 from 10/10; current pain regiment reduced it to 5/10       Patient states his abd pain is minimal today  Still TTP in the LUQ, but not radiating to bilateral flanks  Still passing flatulence and having loose non bloody bowel movements; bowel movements are less dark in color        Patient was up and walking the halls yesterday  He has fatigue and SOB with exertion  Feels decreased strength in BUE and BLE       Denies: numbness/tingling, chills, fevers, diaphoresis, HA, dizziness, lightheadedness, changes in vision, changes in hearing, dysphagia/odynphagia, CP, palpitations, cough, wheezing, N/V/C, melena/hematochezia, hematuria, dysuria, changes in urinary frequency/urgency       8/5 - Mr Vishnu Murphy is a 48 yr male with newly diagnosed pancreatic cancer with concern for cord compression in his cervical neck  He is followed by radiation oncology receiving radiation to the site  He is on steroids, dexamethasone 4 mg every 6 hours; would defer to Neurosurgery in terms of tapering this medication as this is more for cord compression and need for determining stability of his cervical spine  8/6 - Patient seen and examined  No acute events overnight  Pt has remained afebrile overnight and has had intermittent tachycardia to the 110s  Pt denies all ROS except for neck and back pain  Last bowel movement was yesterday, more formed and return to normal color stools   Pt offers no complaints, comments, concerns at this time            Vital Signs:   Date/Time  Temp  Pulse  Resp  BP  MAP (mmHg)  SpO2  O2 Device  Patient Position - Orthostatic VS   08/06/21 1100  --  116Abnormal   --  110/76  --  --  --  --   08/06/21 0708  97 2 °F (36 2 °C)Abnormal   124Abnormal   19  90/70  65  96 %  None (Room air)  Lying   08/06/21 0300  98 5 °F (36 9 °C)  109Abnormal   18  105/79  63  81 %Abnormal   --  Lying   08/05/21 2126  98 °F (36 7 °C)  110Abnormal   18  104/58  112  99 %  --  Lying   08/05/21 2100  --  --  --  --  --  --  None (Room air)  --   08/05/21 19:16:51  --  --  18  124/78  --  --  --  --   08/05/21 15:53:57  97 4 °F (36 3 °C)Abnormal   93  18  115/70   --  99 %  --  Lying   BP: TRN at 08/05/21 1553   08/05/21 0800  --  --  --  --  --  98 %  None (Room air)  --   08/05/21 07:00:46  98 3 °F (36 8 °C)  117Abnormal   --  129/92  --  98 %  --  --   08/04/21 18:54:36  --  111Abnormal   16  115/64  --  97 %  --  --   08/04/21 18:54:03  --  107Abnormal   16  --  --  97 %  --  --   08/04/21 1640  --  --  --  --  --  --  None (Room air)  --   08/04/21 15:12:37  --  64  16  112/75  --  97 %  --  --   08/04/21 0715  98 6 °F (37 °C)  104  18  122/96  80  98 %  None (Room air)  Lying   08/04/21 0300  98 4 °F (36 9 °C)  93  18  121/90  77  96 %  None (Room air)  Lying   08/04/21 0157  --  --  --  --  --  95 %  None (Room air)  --     Vitals:     21 0334 21 0526 21 0802 21 0900   BP: 129/77 119/75 132/84     BP Location:           Pulse: 78 100 98     Resp: 18 18       Temp: 97 9 °F (36 6 °C)         TempSrc: Oral         SpO2: 97% 96%   97%   Weight:           Height:                 Temperature:   Temp (24hrs), Av 9 °F (36 6 °C), Min:97 6 °F (36 4 °C), Max:98 2 °F (36 8 °C)     Vitals:     210 21 2300 21 0300 21 0735   BP: 126/83 129/84 124/80 126/83   BP Location:       Right arm   Pulse: 101 102 84 96   Resp:   18 20 18   Temp:   98 4 °F (36 9 °C) 98 3 °F (36 8 °C) 98 3 °F (36 8 °C)   TempSrc:   Oral Oral Oral   SpO2:   98% 96% 95%   Weight:           Height:                 Temperature:   Temp (24hrs), Av 1 °F (36 7 °C), Min:97 6 °F (36 4 °C), Max:98 4 °F (36 9 °C)     Vitals[]Expand by Default          Vitals:     21 2256 21 0348 21 0700 21 0845   BP: 122/79 129/78 126/79     BP Location: Right arm   Right arm     Pulse: 80 87 78     Resp: 16 20 20     Temp: 97 5 °F (36 4 °C) 98 7 °F (37 1 °C) (!) 96 3 °F (35 7 °C)     TempSrc:   Oral Oral     SpO2: 98% 97% 95% 95%   Weight:           Height:                 Temperature:   Temp (24hrs), Av °F (36 7 °C), Min:96 3 °F (35 7 °C), Max:100 8 °F (38 2 °C)    Vitals[]Expand by Default          Vitals:     21 2143 21 0157 21 0300 21 0715   BP: 118/78   121/90 122/96   BP Location:     Right arm Right arm   Pulse: 104   93 104   Resp:     18 18   Temp:     98 4 °F (36 9 °C) 98 6 °F (37 °C)   TempSrc:     Oral Tympanic   SpO2:   95% 96% 98%   Weight:           Height:                 Temperature:   Temp (24hrs), Av 4 °F (36 9 °C), Min:98 1 °F (36 7 °C), Max:98 6 °F (37 °C)       8/ - Patient seen and examined  No acute events overnight  AVSS, patient has has not had recurrent episodes of afib with RVR overnight   Patient expressing increased pain/tenderness and enlargement around the area of the left scalp punch bx site  Patient is still having neck and back pain; states pain medication is slowly becoming less effective in providing him relief       Patient states he has easy fatigability and exertional SOB  Denies weakness, however states he is not as strong as he used to be        He has had bowel movements over the last few days  States the few BMs were loose and black/dark in color, denies rachel blood  His last bowel movement yesterday was more formed and less dark  He is still having abdominal pain/tenderness, which is generalized however more acute in the LUQ and RUQ radiating to bilateral flanks  States this pain is worsened after he eats or has to take medicine PO and is associated with diaphoresis and warm sensation  He has an appetite and is eating more       Patient ambulated up and down the hallways with the walker yesterday  States his left knee pain has improved       At night he has night sweats and feels very warm       Denies: fever, numbness/tingling, HA, lightheadedness, dizziness, sinus pressure/pain, visual changes, hearing changes, dysphagia/odynphagia, chest pain/tenderness, palpitations, coughing, wheezing, N/V/C, hematuria, dysuria  8/3 Radiation Oncology - America Shaver is a 48y o  year old male who presents with 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  He is currently receiving radiation therapy to the cervical spine and has had 3 fractions/900 cGy out of a total planned 10 fractions  MRI of the brain reveals a 1 5 x 2 3 cm right superior cerebellar metastasis  Right and left neck biopsies are positive for non-small cell carcinoma consistent with poorly differentiated adenocarcinoma  Liver biopsy is pending and skin punch biopsy is pending  The patient may have  2 primary cancers with a pancreatic adenocarcinoma and metastatic melanoma    He is being seen consultation for the solitary brain metastasis      We discussed a solitary brain metastasis can be treated with craniotomy and resection versus stereotactic radiosurgery/radiotherapy  He is not enthusiastic about having surgery but is willing to consider stereotactic radiation  After he completes radiation therapy to the cervical spine, he can then be evaluated for stereotactic [facility] at [facility] as an outpatient  We discussed rationale for the radiation therapy including the acute side effects and the potential chronic complications with him  He did sign consent form for stereotactic radiation  8/5 - Mr Pita Stearns is a 48 yr male with newly diagnosed pancreatic cancer with concern for cord compression in his cervical neck  He is followed by radiation oncology receiving radiation to the site  He is on steroids, dexamethasone 4 mg every 6 hours; would defer to Neurosurgery in terms of tapering this medication as this is more for cord compression and need for determining stability of his cervical spine         Pertinent Labs/Diagnostic Results:       Results from last 7 days   Lab Units 08/06/21  0541 08/05/21  0603 08/04/21  9500 08/03/21  0634 08/02/21  0613 07/31/21  0534   WBC Thousand/uL 30 71* 37 10* 32 56* 29 95* 29 72* 22 42*   HEMOGLOBIN g/dL 11 8* 11 2* 10 8* 10 4* 9 6* 8 8*   HEMATOCRIT % 37 2 36 2* 35 2* 33 2* 30 8* 28 8*   PLATELETS Thousands/uL 299 383 382 433* 438* 365   NEUTROS ABS Thousands/µL  --   --   --   --   --  19 48*     Results from last 7 days   Lab Units 08/06/21  0541 08/05/21  0603 08/04/21  1566 08/03/21  0634 08/02/21  0613 08/01/21  0608 07/31/21  0534   SODIUM mmol/L 131* 133* 135* 139 140 143 143   POTASSIUM mmol/L 4 6 4 4 4 1 4 6 4 2 3 7 4 2   CHLORIDE mmol/L 98* 97* 99* 104 106 107 111*   CO2 mmol/L 30 29 30 31 32 27 24   ANION GAP mmol/L 3* 7 6 4 2* 9 8   BUN mg/dL 30* 27* 21 21 15 13 13   CREATININE mg/dL 0 65 0 70 0 63 0 58* 0 52* 0 63 0 70   EGFR ml/min/1 73sq m 113 110 115 119 124 115 110 CALCIUM mg/dL 9 3 9 4 9 6 9 0 8 9 8 9 8 8   MAGNESIUM mg/dL  --   --   --   --   --  2 0 2 1   PHOSPHORUS mg/dL  --   --   --   --   --  2 9 2 9     Results from last 7 days   Lab Units 08/05/21  0603 08/04/21  0632 08/03/21  0634 08/02/21  0613   AST U/L 46* 49* 70* 49*   ALT U/L 76 75 77 65   ALK PHOS U/L 266* 275* 256* 254*   TOTAL PROTEIN g/dL 7 3 7 3 6 6 6 2*   ALBUMIN g/dL 2 7* 2 6* 2 3* 2 2*   TOTAL BILIRUBIN mg/dL 0 37 0 40 0 32 0 29   BILIRUBIN DIRECT mg/dL  --   --   --  0 12     Results from last 7 days   Lab Units 08/02/21  1347   POC GLUCOSE mg/dl 156*     Results from last 7 days   Lab Units 08/06/21  0541 08/05/21  0603 08/04/21  2393 08/03/21  0634 08/02/21  0613 08/01/21  0608 07/31/21  0534   GLUCOSE RANDOM mg/dL 96 108 116 122 101 151* 199*       Results from last 7 days   Lab Units 08/04/21  0632 08/03/21  0634 08/02/21  0613   PTT seconds 22* 74* 85*     7/22/21 - US Head Neck Soft Tissue: Several neck masses are identified corresponding to the palpable abnormalities within additional finding of the suspected thrombosed left jugular vein   Further evaluation with contrast-enhanced CT of the neck is recommended      7/22/21 - CT Soft Tissue Neck: Left greater than right numerous superficial and deep neck soft tissue and intramuscular rim-enhancing necrotic lesions/collections as described   Rim-enhancing epidural thickening/collection within posterolateral spinal canal spanning levels C2-C4 resulting in canal narrowing   Finding is concerning for widespread necrotizing infection   Neoplastic process is within differential consideration   Recommend correlation with sampling  Subpleural nodular opacities in the right upper lobe and superior segment of right lower lobe could indicate septic emboli  Suggested left hilar necrotic lymph node   Indeterminate sclerotic foci within T3 and T4 vertebral bodies      7/22/21 CT Head: There is an approximately 2 4 x 1 4 x 2 2 cm masslike lesion involving the scalp overlying the posterior left parietal region   Neoplasm and/or infection should be excluded        7/22/21 CTA PE study: Numerous nodular opacities are present bilaterally within the lungs   The appearances most suggestive of metastatic disease  Extensive hepatic metastatic disease  Large bilateral adrenal masses  Extensive lymphadenopathy  Probable osseous metastatic disease with mild to moderate compression deformity of the superior endplate of D53, suggesting pathologic compression      7/24/2021 CT Abd and pelvis: There is a 2 8 x 2 2 x 1 8 cm ill-defined, hypodense pancreatic lesion, highly suspicious for pancreatic adenocarcinoma (series 3 images 20-22 and series 601 images 57 - 67 ) There is widespread metastatic disease with numerous liver lesions, bilateral adrenal metastases, multiple lytic bone metastases, with resulting mild superior end plate compression fractures of T11 and L3 as above, and also intramuscular metastases as   above      8/2/2021 MRI TS w wo Contrast Diffuse metastatic disease within the thoracic spine, lower cervical spine and upper lumbar spine   No extraosseous extension of tumor noted on this examination   pathologic adenopathy with central necrosis within the left inferior neck and supraclavicular region and mediastinum and small left-sided pleural effusion  Multiple liver lesions are noted      8/2/2021 MRI Head w wo contrast Ill-defined area of abnormal signal within the right superior cerebellum with T2 and FLAIR hyperintensity and postcontrast enhancement --> solitary parenchymal metastasis   Abnormal appearance of the upper cervical spine with replacement of normal fatty marrow within several vertebral bodies with soft tissue signal extending into the epidural space, demonstrating irregular enhancement suspicious for osseous metastasis with   epidural extension   Suspected cord compression at the C3 and upper C4 level      8/2/2021 MRI LS w wo Contrast Diffuse osseous metastatic disease  Kaelyn Cordoba is slight extraosseous extension of tumor noted at the L2-3 and L3-4 levels on the left  Teodoro Quale extends from the left posterior elements into the adjacent paraspinal soft tissues  Diffuse sacral metastasis with mild narrowing of the S1 and S2 sacral foramen  Soft tissue metastasis as described above      BL LN Bx  · Right neck mass: Malignant epithelioid neoplasm, possibly melanoma, morphologically identical to that in specimen B, at least 1 cm maximal dimension, present at specimen edges  · Left neck mass: Malignant epithelioid neoplasm, possibly melanoma, at least 1 5 cm maximal dimension, present in dermal lymphatics and at specimen edges, likely metastatic  Benign overlying epidermis        Medications:   Scheduled Medications:  dexamethasone, 4 mg, Oral, Q12H CHULA  enoxaparin, 1 mg/kg, Subcutaneous, Q12H CHULA  metoprolol tartrate, 100 mg, Oral, Q12H CHULA  morphine, 15 mg, Oral, Q8H CHULA  polyethylene glycol, 17 g, Oral, BID  senna-docusate sodium, 1 tablet, Oral, BID      Continuous IV Infusions:    LR  @ 100 ml /hr - d/c'd 8/5  Heparin gtt -- d/c' d  8/3       PRN Meds:  acetaminophen, 650 mg, Oral, Q6H PRN  bisacodyl, 10 mg, Rectal, Daily PRN  calcium carbonate, 500 mg, Oral, Daily PRN  Gadobutrol, 5 mL, Intravenous, Once in imaging  HYDROmorphone, 0 5 mg, Intravenous, Q3H PRN - 8/1x 3  -  8/2 x 3 - 8/3 x 1 - 8/5x 1   LORazepam, 1 mg, Intravenous, Once PRN  metoprolol, 2 5 mg, Intravenous, Q6H PRN - 8/4 x 1   oxyCODONE, 10 mg, Oral, Q4H PRN - 8/4x 2 - 8/5 x 3   oxyCODONE, 5 mg, Oral, Q4H PRN        Discharge Plan:     Network Utilization Review Department  ATTENTION: Please call with any questions or concerns to [phone] and carefully listen to the prompts so that you are directed to the right person   All voicemails are confidential   Dl Epp all requests for admission clinical reviews, approved or denied determinations and any other requests to dedicated fax number below belonging to the campus where the patient is receiving treatment   List of dedicated fax numbers for the Facilities:  [address] [place] [address] DENIALS (Administrative/Medical Necessity) [phone]   [address] (Maternity/NICU/Pediatrics) [phone] [address] Rick Shirley [id number] [place] [address] [address] Radha Dumont [address] [facility] [place] [address] [phone]

## 2021-08-06 NOTE — TELEPHONE ENCOUNTER
RN called office stating that patient needs a Prior Authorization on morphine sulfate er 15 mg tablets      Oxycodone 5 mg tablets

## 2021-08-06 NOTE — CASE MANAGEMENT
Spoke with Cristina Craft, advised pt had birthdate updated yesterday and could meds be run again  States still denied for incorrect birthdate    TCT CBC, Je Beams update information was received  Advised pharmacy tried to run meds and is being rejected for incorrect birthdate  She checked and advised system will be updated within the hour to correct birthdate  Christophe Griffin notified    Notified by Cristina Craft, meds rejected x 2 by CBC  TCT CBC, Leticia, states  is updated in main HUB, may be delayed on pharmacy side  She will investigate and call back    Verified with [facility], they will transport pt Monday for radiation at 9:30 and T-F at 1:30  Verified with Jonathon Suresh, radiation dept  Notified pt    Spoke with pt regarding transport on DC, stated his friend would pick him up  Per MD pt stated he does not have ride    Met with pt, offered to call girlfriend to see if she could pick him up, stated she works  Advised he would not be DC'd until after radiation later this afternoon    Offered w/c Notice Kiosk, states his friend could pick him up around 6    [facility]  referrals sent as [facility] is closed to referrals    [facility], [facility] , Aaliyah Marshall, they can accept

## 2021-08-06 NOTE — TELEPHONE ENCOUNTER
Prior authorization for pt's   OXYCODONE 5 MG IR has been initiated [address] My Meds      Awaiting determination  Request urgent        Prior authorization for pt's   MS Contin ER 15 mg  has been initiated via Cover My meds      Awaiting determination  Request urgent      Notified pharmacy  Plan for pt to be dc [facility]  [facility] to cover cost of meds until approval from danie  See Case Management note

## 2021-08-06 NOTE — ASSESSMENT & PLAN NOTE
Hgb 9 9 and Hct 29 6 at time of admission  Currently Hgb 11 8  Most likely anemia of chronic inflammation with some component of iron deficiency  Fe sat: 14, TIBC 205, Fe 28, Ferritin 1230  Retic: 5 12    Plan:  · Monitor Hgb and Hct    · Transfuse if patient becomes symptomatic or Hgb drops < 7

## 2021-08-06 NOTE — ASSESSMENT & PLAN NOTE
7/28/2021 MRI TS Widely metastatic disease to the thoracic spinal axis with early pathologic fracture superior T11 endplate  No extraosseous tumor within the spinal canal, no cord or root compression  Multiple soft tissue masses in the adjacent soft tissues

## 2021-08-06 NOTE — PROGRESS NOTES
INTERNAL MEDICINE RESIDENCY PROGRESS NOTE     PATIENT INFORMATION     Name: Sayda Trujillo   Age & Sex: 48 y o  male   MRN: [id number]  Hospital Stay Days: 14  Unit/Bed#: PPHP 914-01   Encounter: [id number]  Team: SOD Team B     ASSESSMENT/PLAN     Principal Problem:    Metastatic cancer ([facility] )  Active Problems:    Subclavian vein thromboembolism, acute, right (HCC)    Compression fracture of T11 vertebra (HCC)    Cervical stenosis of spinal canal    Anemia    Cervical spinal mass (HCC)    Scalp lesion    S/P lymph node biopsy    Severe protein-calorie malnutrition ([facility] [address] )    Pressure ulcer of coccygeal region, unspecified pressure ulcer stage    Mass of thoracic vertebra    Constipation due to opioid therapy    New onset a-fib ([facility] [address] )    Oral ulcer    [facility]    Cervical spinal cord compression [facility])  Assessment & Plan  Patient with diffuse pancreatic adenocarcinoma mets to the CS  Compression of the spinal cord at C2-C4    7/28/2021 MRI CS w wo contrast  Signal abnormality and erosive change of C3 laminae and spinous process along with lesser degree signal abnormality in the posterior elements of C1, C2, and C4  Associated epidural enhancement /enhancing soft tissue spanning from inferior C2-C4 resulting in severe canal stenosis and cord compression  Findings most favor metastatic disease  Multiple rim-enhancing lesions within paraspinal musculature, superficial and deep soft tissue neck as demonstrated on recent soft tissue neck CT  Heterogeneous marrow signal and enhancement of the cervical and visualized thoracic vertebras concerning for infiltrative metastatic disease  Redemonstrated occluded left internal jugular vein  Degenerative change as above  Plan  · Rad onc  · Cont OP radiation of the CS for cervical mets; plan for 10 sessions     · Plan for decadron taper  · 4 mg Q6H x3 days --> 4 mg Q12 H x3 days --> 2 mg Q12 H x 3 days --> 2 mg QD x3 days --> stop     Oral ulcer  Assessment & Plan  8/3/2021 oral ulcer noted on the left hard palate; erythematous around the white head; not easy to scrape off; not tender     In the setting of pt on decadron treatment leading to likely immunocompromised state and pt complaining of intermittent mild dysphagia/odynphagia there are concerns for oral candidiasis     8/4/2021 white ulcer head no longer present; erythematous base     Plan   · Cont Diflucan 200 mg QD for 3 days total  · Scraping sent for cx      New onset a-fib [facility])  Assessment & Plan  7/30/2021 alerted by nursing that pt was in Afib with RVR s/p IR US guided liver bx  He was stable at that time  Upon arrival to the floors pt was re-evaluated and found to still be in afib with RVR, -150s  /80s  Patient with inadequate response to Lopressor x2 and Cardizem bolus x2  Patient started on a Cardizem drip with improvement in heart rates  Transition off on 07/31/2021 and continued on metoprolol 50 mg b i d  Plan:   Metoprolol tartrate 100 mg BID   Add metoprolol PRN   Lovenox 50 mg IJ Q12H; will consider switching to Eliquis depending on insurance coverage    Monitor and replete to maintain Mg>2 and K>4        Constipation due to opioid therapy  Assessment & Plan  Pt last bowel movement prior to admission  Increasing abd pain/tenderness/distension  Tympanic  (-) rebound  Constipation unresponsive to lactulose 10 g, Mag citrate, and Relistor 8 mg  Most likely due to constipation which is opioid induced  7/28/2021 KUB Larger than typical volume of stool seen over the colon consistent with reported history of constipation  No bowel obstruction    After 2nd dose of Relistor 8 mg, pt has had daily bowel movements  Pt states the bowel movements were initially dark/black in color, however BM 8/1/2021 was starting to become lighter in color       Plan:  · Bowel regimen in place      Mass of thoracic vertebra  Assessment & Plan  7/28/2021 MRI TS Widely metastatic disease to the thoracic spinal axis with early pathologic fracture superior T11 endplate  No extraosseous tumor within the spinal canal, no cord or root compression  Multiple soft tissue masses in the adjacent soft tissues  Pressure ulcer of coccygeal region, unspecified pressure ulcer stage  Assessment & Plan  7/28/2021 Alerted by nursing that pt has ulcer on the coccyx  Per wound care: "Mid sacrum - evolving DTI - HA  Wound presents as approx: 20% evolving open partial thickness appearing moist yellow tissue, and 80% intact non-evolved non-blanchable purple skin  All aspects measured together  Edges fragile and attached, no maceration  Tatianna-wound is intact with pink blanchable skin  Suspect mixed etiology of pressure and friction given the chaffed/irregular dry appearance  -Will recommend foam dressing to protect "    8/5/2021 Sacrum with fully evolved DTI, residual wound is very small measuring 0 5cm x 0 3cm x unknown depth with 100% yellow base  Periwound with pink blanching erythema and distally scabbed peeling dry skin remains from the original [id number]  Plan:  · Per wound care  · Frequent repositioning   · Apply hydraguard to b/l heels and b/l buttocks and sacrum BID and PRN for prevention and protection  · Apply skin nourishing cream the entire skin daily for moisture  · Turn and reposition patient every  2 hours   · Elevate heels off of bed with pillows to offload pressure   · Apply EHOB waffle cushion to chair when OOB, if able  · Cleanse sacral wound with NSS, pat dry, and apply Allevyn foam dressing  Feroz dressing with T  Change every other day and PRN for soilage/dislodgement     · Wound care will follow along with patient weekly     Severe protein-calorie malnutrition ([facility] [address] )  Assessment & Plan  Malnutrition Findings:   Adult Malnutrition type: Acute illness  Adult Degree of Malnutrition: Other severe protein calorie malnutrition (related to medical condition <50% energy intake versus needs for > 5 days evidenced by BMI 16 98; lost 9#(7 4%) since 6/10/21; moderate fat loss somewhat hollow orbital, moderate muscle loss visible clavicle  Treat Regular diet &Ensure Enlive TID )    BMI Findings:  Adult BMI Classifications: Underweight < 18 5     Body mass index is 16 6 kg/m²  Plan:  · Regular diet   · Ensure supplementation TID with meals  · Nutrition recommendations appreciated       S/P lymph node biopsy  Assessment & Plan  LN Bx done 7/26/2021; INCISIONAL BIOPSY LEFT NECK MASS,AND EXCISIOINAL BIOPSY RIGHT NECK MASS (Bilateral)  Non-small cell carcinoma, 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,           Scalp lesion  Assessment & Plan  Punch bx done on left scalp  8/3/2021 pt complaining of increasing tenderness/pain and size of scalp lesion  Lesion is erythematous, non fluctuant and not warm to palpation     Wound care 8/5/2021 L occi-temporal head with raised lesion of unknown etiology  Lesion/wound with dry black eschar, minimal erythema and no drainage  On palpation hard bony like structure is felt  Plan  · no dressing needed, unable to determine etiology, important to obtain biopsy result before proceeding with any dressing recommendations, please leave wound open to air  Anemia  Assessment & Plan  Hgb 9 9 and Hct 29 6 at time of admission  Currently Hgb 11 8  Most likely anemia of chronic inflammation with some component of iron deficiency  Fe sat: 14, TIBC 205, Fe 28, Ferritin 1230  Retic: 5 12    Plan:  · Monitor Hgb and Hct  · Transfuse if patient becomes symptomatic or Hgb drops < 7    Subclavian vein thromboembolism, acute, right [facility])  Assessment & Plan  Patient noted to have evidence go subclavian, internal jugular, and brachiocephalic vein thrombus on imaging on arrival  Started on heparin gtt  Remains on heparin gtt in setting of new onset Afib and ongoing workup and possibility for intervention       7/22/21 - CT Soft Tissue Neck with Contrast: Occluded left subclavian and brachiocephalic veins as well as left internal jugular vein to the level of hyoid bone    7/22/21 - CTA PE chest: Possible thrombus right subclavian vein extending into the brachiocephalic vein  7/27/2021 venous duplex US LEFT UPPER LIMB: Evidence of acute and subacute mostly occlusive deep vein thrombosis noted in the internal jugular vein  Evidence of acute non occlusive deep vein thrombosis noted in the innominate, subclavian and axillary with extension into the most proximal brachial vein  Plan:  · 8/3/2021 pt switched from Heparin gtt to Lovenox 50 mg Q12H inj  May need to switch to Eliquis depending on insurance coverage   · Vascular surgery made aware; do not recommend any surgical intervention  · Cont to monitor PTT     * Metastatic cancer [facility])  Assessment & Plan  47 yo M with recent abnormal findings on CT head, chest, and tissue neck concerning for extensive metastatic cancer:     7/22/21 - US Head Neck Soft Tissue: Several neck masses are identified corresponding to the palpable abnormalities within additional finding of the suspected thrombosed left jugular vein  Further evaluation with contrast-enhanced CT of the neck is recommended  7/22/21 - CT Soft Tissue Neck: Left greater than right numerous superficial and deep neck soft tissue and intramuscular rim-enhancing necrotic lesions/collections as described  Rim-enhancing epidural thickening/collection within posterolateral spinal canal spanning levels C2-C4 resulting in canal narrowing  Finding is concerning for widespread necrotizing infection  Neoplastic process is within differential consideration  Recommend correlation with sampling  Subpleural nodular opacities in the right upper lobe and superior segment of right lower lobe could indicate septic emboli  Suggested left hilar necrotic lymph node  Indeterminate sclerotic foci within T3 and T4 vertebral bodies      7/22/21 CT Head: There is an approximately 2 4 x 1 4 x 2 2 cm masslike lesion involving the scalp overlying the posterior left parietal region  Neoplasm and/or infection should be excluded  7/22/21 CTA PE study: Numerous nodular opacities are present bilaterally within the lungs  The appearances most suggestive of metastatic disease  Extensive hepatic metastatic disease  Large bilateral adrenal masses  Extensive lymphadenopathy  Probable osseous metastatic disease with mild to moderate compression deformity of the superior endplate of M34, suggesting pathologic compression  7/24/2021 CT Abd and pelvis: There is a 2 8 x 2 2 x 1 8 cm ill-defined, hypodense pancreatic lesion, highly suspicious for pancreatic adenocarcinoma (series 3 images 20-22 and series 601 images 57 - 67 ) There is widespread metastatic disease with numerous liver lesions, bilateral adrenal metastases, multiple lytic bone metastases, with resulting mild superior end plate compression fractures of T11 and L3 as above, and also intramuscular metastases as   above      8/2/2021 MRI TS w wo Contrast Diffuse metastatic disease within the thoracic spine, lower cervical spine and upper lumbar spine  No extraosseous extension of tumor noted on this examination  pathologic adenopathy with central necrosis within the left inferior neck and supraclavicular region and mediastinum and small left-sided pleural effusion  Multiple liver lesions are noted  8/2/2021 MRI Head w wo contrast Ill-defined area of abnormal signal within the right superior cerebellum with T2 and FLAIR hyperintensity and postcontrast enhancement --> solitary parenchymal metastasis  Abnormal appearance of the upper cervical spine with replacement of normal fatty marrow within several vertebral bodies with soft tissue signal extending into the epidural space, demonstrating irregular enhancement suspicious for osseous metastasis with   epidural extension  Suspected cord compression at the C3 and upper C4 level      8/2/2021 MRI LS w wo Contrast Diffuse osseous metastatic disease  There is slight extraosseous extension of tumor noted at the L2-3 and L3-4 levels on the left  Ureter extends from the left posterior elements into the adjacent paraspinal soft tissues  Diffuse sacral metastasis with mild narrowing of the S1 and S2 sacral foramen  Soft tissue metastasis as described above  BL LN Bx  · Right neck mass: Malignant epithelioid neoplasm, possibly melanoma, morphologically identical to that in specimen B, at least 1 cm maximal dimension, present at specimen edges  · Left neck mass: Malignant epithelioid neoplasm, possibly melanoma, at least 1 5 cm maximal dimension, present in dermal lymphatics and at specimen edges, likely metastatic  Benign overlying epidermis  CEA 63 8; CA 19-9: 5418, LDH: 1234  At this time, concern for synchronous malignancy given preliminary pathology reports demonstrating melanoma and CA 19 9 elevated in the setting of pancreatic lesion with liver metastasis concerning for pancreatic adenocarcinoma  There is a generic link between these 2 cancers raising concern  Plan:  · Palliative medicine consulted; appreciate recommendations for pain management  · Continue MS contin 15mg oral q8h scheduled   · Oxycodone 5mg-10mg q4h PRN for moderate to severe pain  · Dilaudid 0 5mg IV q3h PRN breakthrough pain  · APAP 650mg q6h PRN mild pain  · Bowel Regimen  · Miralax 17g daily  · Senna 8 6mg BID  · Medical Oncology Consulted and Appreciate Recommendation  · appt with Oncologist in [facility] in 1-2 weeks to discuss treatment options  · Rad onc  · solitary brain metastasis: stereotactic radiosurgery/radiotherapy; After he completes radiation therapy to the cervical spine, he can then be evaluated for stereotactic radiation at [facility] Nikolai Dung Reynaldo [facility]  signed consent form for stereotactic radiation    · Surgical Oncology Consulted  · S/P Punch Biopsy on 7/23/21; follow up on pathology results  · S/P 2x LN bx 7/26/21: Non-small cell carcinoma, 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  · S/P liver mets bx 2021: Non-small cell carcinoma, favor pancreatobiliary primary  Disposition: awaiting discharge: need to ensure pt has insurance coverage prior to discharge; patient to follow up OP with radiation oncology for continued radiation therapy for CS compression due to mets as well as planning for stereotactic surgery for solitary brain mets; F/u 1-2 wks with Heme-Onc to discuss chemotherapy options    SUBJECTIVE     Patient seen and examined  No acute events overnight  Pt has remained afebrile overnight and has had intermittent tachycardia to the 110s  Pt denies all ROS except for neck and back pain  Last bowel movement was yesterday, more formed and return to normal color stools  Pt offers no complaints, comments, concerns at this time  OBJECTIVE     Vitals:    21 1916 21 2126 21 0300 21 0708   BP: 124/78 104/58 105/79 90/70   BP Location:  Left arm Left arm Right arm   Pulse:  (!) 110 (!) 109 (!) 124   Resp: 18 18 18 19   Temp:  98 °F (36 7 °C) 98 5 °F (36 9 °C) (!) 97 2 °F (36 2 °C)   TempSrc:  Oral Tympanic Tympanic   SpO2:  99% (!) 81% 96%   Weight:       Height:          Temperature:   Temp (24hrs), Av 8 °F (36 6 °C), Min:97 2 °F (36 2 °C), Max:98 5 °F (36 9 °C)    Temperature: (!) 97 2 °F (36 2 °C)  Intake & Output:  I/O        07 - / 0700 / 07 -  0700 08/ 07 -  0700    P  O  2040 1940     I V  (mL/kg)  1000 (19 2)     Total Intake(mL/kg) 2040 (39 1) 2940 (56 3)     Urine (mL/kg/hr) 2300 (1 8) 2400 (1 9)     Total Output 2300 2400     Net -260 +540            Unmeasured Urine Occurrence 4 x            Intake/Output Summary (Last 24 hours) at 2021 1205  Last data filed at 2021 0501  Gross per 24 hour   Intake 2440 ml   Output 2400 ml   Net 40 ml     No intake/output data recorded    Weights:   IBW (Ideal Body Weight): 70 7 kg Body mass index is 16 6 kg/m²  Weight (last 2 days)     None        Physical Exam  Vitals reviewed  Constitutional:       General: He is not in acute distress  Appearance: He is ill-appearing  He is not toxic-appearing or diaphoretic  Comments: Cachetic      HENT:      Head: Normocephalic  Comments: 2x left scalp masses  -firm, TTP, erythematous, not warm to touch, with overlying scab; sight of previous punch bx  -firm, TTP      Right Ear: External ear normal       Left Ear: External ear normal       Nose: Nose normal  No congestion or rhinorrhea  Mouth/Throat:      Mouth: Mucous membranes are moist       Pharynx: Oropharynx is clear  No oropharyngeal exudate or posterior oropharyngeal erythema  Eyes:      General: No scleral icterus  Right eye: No discharge  Left eye: No discharge  Extraocular Movements: Extraocular movements intact  Conjunctiva/sclera: Conjunctivae normal       Pupils: Pupils are equal, round, and reactive to light  Neck:      Vascular: No carotid bruit  Cardiovascular:      Rate and Rhythm: Regular rhythm  Tachycardia present  Pulses: Normal pulses  Heart sounds: No murmur heard  No friction rub  No gallop  Pulmonary:      Effort: Pulmonary effort is normal  No respiratory distress  Breath sounds: Normal breath sounds  No stridor  No wheezing, rhonchi or rales  Chest:      Chest wall: No tenderness  Abdominal:      General: Abdomen is flat  Bowel sounds are normal  There is no distension  Palpations: There is mass (left abd wall)  Tenderness: There is abdominal tenderness (LUQ, LLQ)  There is no right CVA tenderness, left CVA tenderness, guarding or rebound  Hernia: No hernia is present  Musculoskeletal:         General: Normal range of motion  Cervical back: Tenderness present  Right lower leg: No edema  Left lower leg: No edema        Comments: Right knee  -medial and lateral TTP at the knee joint line; no erythema, swelling, crepitus, decreased ROM,     Lymphadenopathy:      Cervical: Cervical adenopathy (bilateral) present  Skin:     General: Skin is warm and dry  Capillary Refill: Capillary refill takes less than 2 seconds  Neurological:      General: No focal deficit present  Mental Status: He is alert and oriented to person, place, and time  Cranial Nerves: No cranial nerve deficit  Sensory: No sensory deficit  Motor: No weakness  Psychiatric:         Behavior: Behavior normal          Thought Content: Thought content normal          Judgment: Judgment normal       Comments: Patient with depressed affect           LABORATORY DATA     Labs: I have personally reviewed pertinent reports      Results from last 7 days   Lab Units 08/06/21  0541 08/05/21  0603 08/04/21  6869 08/03/21  0634 08/01/21  0608 07/31/21  0534   WBC Thousand/uL 30 71* 37 10* 32 56* 29 95* 24 86* 22 42*   HEMOGLOBIN g/dL 11 8* 11 2* 10 8* 10 4* 9 4* 8 8*   HEMATOCRIT % 37 2 36 2* 35 2* 33 2* 30 3* 28 8*   PLATELETS Thousands/uL 299 383 382 433* 412* 365   NEUTROS PCT %  --   --   --   --   --  87*   MONOS PCT %  --   --   --   --   --  3*   MONO PCT %  --   --   --  5 4  --       Results from last 7 days   Lab Units 08/06/21  0541 08/05/21  0603 08/04/21  0632 08/03/21  0634   POTASSIUM mmol/L 4 6 4 4 4 1 4 6   CHLORIDE mmol/L 98* 97* 99* 104   CO2 mmol/L 30 29 30 31   BUN mg/dL 30* 27* 21 21   CREATININE mg/dL 0 65 0 70 0 63 0 58*   CALCIUM mg/dL 9 3 9 4 9 6 9 0   ALK PHOS U/L  --  266* 275* 256*   ALT U/L  --  76 75 77   AST U/L  --  46* 49* 70*     Serum creatinine: 0 65 mg/dL 08/06/21 0541  Estimated creatinine clearance: 98 1 mL/min   Results from last 7 days   Lab Units 08/01/21  0608 07/31/21  0534   MAGNESIUM mg/dL 2 0 2 1     Results from last 7 days   Lab Units 08/01/21  0608 07/31/21  0534   PHOSPHORUS mg/dL 2 9 2 9      Results from last 7 days   Lab Units 08/04/21  8879 08/03/21  0634 08/02/21  0613   PTT seconds 22* 74* 85*               IMAGING & DIAGNOSTIC TESTING     Radiology Results: I have personally reviewed pertinent reports  XR spine cervical 2 or 3 vw injury    Result Date: 7/26/2021  Impression: 1  Destructive lytic lesion within the C3 vertebral body extending into the posterior elements suspicious for metastatic disease  2   Additional ill-defined lytic appearing lesions in the C4-C7 vertebral bodies also suggestive of metastatic disease  Workstation performed: [id number]     XR spine thoracolumbar 2 vw    Result Date: 7/26/2021  Impression: Mild superior endplate compression fracture of T11  Multiple lytic lesions to include T11, L3 and S1 are better visualized on recent CT  Workstation performed: [id number]     CT head without contrast    Result Date: 7/22/2021  Impression: There is an approximately 2 4 x 1 4 x 2 2 cm masslike lesion involving the scalp overlying the posterior left parietal region  Neoplasm and/or infection should be excluded  Sampling is recommended  The study was marked in [facility] for immediate notification  Workstation performed: [id number]     CT soft tissue neck w contrast    Result Date: 7/22/2021  Impression: 1  Left greater than right numerous superficial and deep neck soft tissue and intramuscular rim-enhancing necrotic lesions/collections as described  Rim-enhancing epidural thickening/collection within posterolateral spinal canal spanning levels C2-C4 resulting in canal narrowing  Finding is concerning for widespread necrotizing infection  Neoplastic process is within differential consideration  Recommend correlation with sampling  2   Occluded left subclavian and brachiocephalic veins as well as left internal jugular vein to the level of hyoid bone suggesting  3   Subpleural nodular opacities in the right upper lobe and superior segment of right lower lobe could indicate septic emboli  Suggested left hilar necrotic lymph node  4   Indeterminate sclerotic foci within T3 and T4 vertebral bodies  I personally discussed this study with Sri Dillon on 7/22/2021 at 2:10 PM  Workstation performed: [id number]     MRI cervical spine w wo contrast    Result Date: 7/27/2021  Impression: 1  Signal abnormality and erosive change of C3 laminae and spinous process along with lesser degree signal abnormality in the posterior elements of C1, C2, and C4  Associated epidural enhancement /enhancing soft tissue spanning from inferior C2-C4 resulting in severe canal stenosis and cord compression  Findings most favor metastatic disease  Infection is within differential consideration but less favored given findings elsewhere in the body  Correlate with sampling  2   Multiple rim-enhancing lesions within paraspinal musculature, superficial and deep soft tissue neck as demonstrated on recent soft tissue neck CT  Differential considerations are similar as above  3   Heterogeneous marrow signal and enhancement of the cervical and visualized thoracic vertebras concerning for infiltrative metastatic disease  4   Redemonstrated occluded left internal jugular vein  5   Degenerative change as above  I personally discussed this study with Ken Spine on 7/27/2021 at 9:35 AM  Workstation performed: [id number]     US head neck soft tissue    Result Date: 7/22/2021  Impression: Several neck masses are identified corresponding to the palpable abnormalities within additional finding of the suspected thrombosed left jugular vein  Further evaluation with contrast-enhanced CT of the neck is recommended  The study was marked in [facility] for immediate notification  Workstation performed: [id number]     CTA ED chest PE Study    Result Date: 7/23/2021  Impression: No evidence of pulmonary embolism is seen  Measured RV/LV ratio is within normal limits at less than 0 9  Numerous nodular opacities are present bilaterally within the lungs    The appearances most suggestive of metastatic disease  Extensive hepatic metastatic disease  Large bilateral adrenal masses  Extensive lymphadenopathy  Probable osseous metastatic disease with mild to moderate compression deformity of the superior endplate of U70, suggesting pathologic compression  Possible thrombus right subclavian vein extending into the brachiocephalic vein  Consider venous duplex ultrasound for further evaluation  The study was marked in [facility] for immediate notification  Workstation performed: [id number]     CT abdomen pelvis w contrast    Result Date: 7/26/2021  Impression: There is a 2 8 x 2 2 x 1 8 cm ill-defined, hypodense pancreatic lesion, highly suspicious for pancreatic adenocarcinoma (series 3 images 20-22 and series 601 images 57 - 67 ) There is widespread metastatic disease with numerous liver lesions, bilateral adrenal metastases, multiple lytic bone metastases, with resulting mild superior end plate compression fractures of T11 and L3 as above, and also intramuscular metastases as above  An alternative diagnosis to metastatic pancreatic adenocarcinoma, given the unusual intramuscular metastasis, would be metastatic melanoma  Please correlate with already performed biopsy results  Workstation performed: [id number]     Other Diagnostic Testing: I have personally reviewed pertinent reports        ACTIVE MEDICATIONS     Current Facility-Administered Medications   Medication Dose Route Frequency    acetaminophen (TYLENOL) tablet 650 mg  650 mg Oral Q6H PRN    bisacodyl (DULCOLAX) rectal suppository 10 mg  10 mg Rectal Daily PRN    calcium carbonate (TUMS) chewable tablet 500 mg  500 mg Oral Daily PRN    dexamethasone (DECADRON) tablet 4 mg  4 mg Oral Q12H CHULA    enoxaparin (LOVENOX) subcutaneous injection 50 mg  1 mg/kg Subcutaneous Q12H [address]    Gadobutrol injection (SINGLE-DOSE) SOLN 5 mL  5 mL Intravenous Once in imaging    HYDROmorphone (DILAUDID) injection 0 5 mg  0 5 mg Intravenous Q3H PRN    LORazepam (ATIVAN) injection 1 mg  1 mg Intravenous Once PRN    metoprolol (LOPRESSOR) injection 2 5 mg  2 5 mg Intravenous Q6H PRN    metoprolol tartrate (LOPRESSOR) tablet 100 mg  100 mg Oral Q12H [facility]    morphine (MS CONTIN) ER tablet 15 mg  15 mg Oral Q8H [facility]    oxyCODONE (ROXICODONE) immediate release tablet 10 mg  10 mg Oral Q4H PRN    oxyCODONE (ROXICODONE) IR tablet 5 mg  5 mg Oral Q4H PRN    polyethylene glycol (MIRALAX) packet 17 g  17 g Oral BID    senna-docusate sodium (SENOKOT S) 8 6-50 mg per tablet 1 tablet  1 tablet Oral BID     VTE Pharmacologic Prophylaxis: Enoxaparin (Lovenox)  VTE Mechanical Prophylaxis: sequential compression device    ==  Emerita Patel MS4  [facility] Internal Medicine Residency

## 2021-08-06 NOTE — DISCHARGE SUMMARY
INTERNAL MEDICINE RESIDENCY DISCHARGE SUMMARY     Camilla Benitez   48 y o  male  MRN: [id number]  Room/Bed: [place] [address]/[place] [address]     Nelia Chaparro   Encounter: [id number]    DISCHARGE INFORMATION     PCP at Discharge: Rosario Crane MD    Admitting Provider: Vincenzo Smart MD  Admission Date: 7/22/2021    Discharge Provider: Vincenzo Smart MD  Discharge Date:  08/06/2021    Discharge Disposition: Home/Self Care  Discharge Condition: stable  Discharge with Lines: no    Discharge Diet: regular diet; supplement with Ensures  Activity Restrictions: as tolerated   Test Results Pending at Discharge: scalp mass punch bx     Discharge Diagnoses:  Principal Problem:    Metastatic cancer [facility])  Active Problems:    Subclavian vein thromboembolism, acute, right ([facility] [address] )    Compression fracture of T11 vertebra (HCC)    Cervical stenosis of spinal canal    Anemia    Cervical spinal mass (HCC)    Scalp lesion    S/P lymph node biopsy    Severe protein-calorie malnutrition ([facility] [address] )    Pressure ulcer of coccygeal region, unspecified pressure ulcer stage    Mass of thoracic vertebra    Constipation due to opioid therapy    New onset a-fib ([facility] [address] )    Oral ulcer  Resolved Problems:    Sepsis ([facility] [address] )    No new Assessment & Plan notes have been filed under this hospital service since the last note was generated  Service: Internal Medicine      Consulting Providers:      Diagnostic & Therapeutic Procedures Performed:  XR spine cervical 2 or 3 vw injury    Result Date: 7/26/2021  Impression: 1  Destructive lytic lesion within the C3 vertebral body extending into the posterior elements suspicious for metastatic disease  2   Additional ill-defined lytic appearing lesions in the C4-C7 vertebral bodies also suggestive of metastatic disease  Workstation performed: [id number]     XR spine thoracolumbar 2 vw    Result Date: 7/26/2021  Impression: Mild superior endplate compression fracture of T11  Multiple lytic lesions to include T11, L3 and S1 are better visualized on recent CT  Workstation performed: [id number]     CT head without contrast    Result Date: 7/22/2021  Impression: There is an approximately 2 4 x 1 4 x 2 2 cm masslike lesion involving the scalp overlying the posterior left parietal region  Neoplasm and/or infection should be excluded  Sampling is recommended  The study was marked in [facility] for immediate notification  Workstation performed: [id number]     CT soft tissue neck w contrast    Result Date: 7/22/2021  Impression: 1  Left greater than right numerous superficial and deep neck soft tissue and intramuscular rim-enhancing necrotic lesions/collections as described  Rim-enhancing epidural thickening/collection within posterolateral spinal canal spanning levels C2-C4 resulting in canal narrowing  Finding is concerning for widespread necrotizing infection  Neoplastic process is within differential consideration  Recommend correlation with sampling  2   Occluded left subclavian and brachiocephalic veins as well as left internal jugular vein to the level of hyoid bone suggesting  3   Subpleural nodular opacities in the right upper lobe and superior segment of right lower lobe could indicate septic emboli  Suggested left hilar necrotic lymph node  4   Indeterminate sclerotic foci within T3 and T4 vertebral bodies  I personally discussed this study with Chrissy Cooper on 7/22/2021 at 2:10 PM  Workstation performed: [id number]     MRI cervical spine w wo contrast    Result Date: 7/27/2021  Impression: 1  Signal abnormality and erosive change of C3 laminae and spinous process along with lesser degree signal abnormality in the posterior elements of C1, C2, and C4  Associated epidural enhancement /enhancing soft tissue spanning from inferior C2-C4 resulting in severe canal stenosis and cord compression  Findings most favor metastatic disease    Infection is within differential consideration but less favored given findings elsewhere in the body  Correlate with sampling  2   Multiple rim-enhancing lesions within paraspinal musculature, superficial and deep soft tissue neck as demonstrated on recent soft tissue neck CT  Differential considerations are similar as above  3   Heterogeneous marrow signal and enhancement of the cervical and visualized thoracic vertebras concerning for infiltrative metastatic disease  4   Redemonstrated occluded left internal jugular vein  5   Degenerative change as above  I personally discussed this study with Enrique Jarrett on 7/27/2021 at 9:35 AM  Workstation performed: [id number]     US head neck soft tissue    Result Date: 7/22/2021  Impression: Several neck masses are identified corresponding to the palpable abnormalities within additional finding of the suspected thrombosed left jugular vein  Further evaluation with contrast-enhanced CT of the neck is recommended  The study was marked in [facility] for immediate notification  Workstation performed: [id number]     CTA ED chest PE Study    Result Date: 7/23/2021  Impression: No evidence of pulmonary embolism is seen  Measured RV/LV ratio is within normal limits at less than 0 9  Numerous nodular opacities are present bilaterally within the lungs  The appearances most suggestive of metastatic disease  Extensive hepatic metastatic disease  Large bilateral adrenal masses  Extensive lymphadenopathy  Probable osseous metastatic disease with mild to moderate compression deformity of the superior endplate of W69, suggesting pathologic compression  Possible thrombus right subclavian vein extending into the brachiocephalic vein  Consider venous duplex ultrasound for further evaluation  The study was marked in [facility] for immediate notification   Workstation performed: [id number]     CT abdomen pelvis w contrast    Result Date: 7/26/2021  Impression: There is a 2 8 x 2 2 x 1 8 cm ill-defined, hypodense pancreatic lesion, highly suspicious for pancreatic adenocarcinoma (series 3 images 20-22 and series 601 images 62 - 67 ) There is widespread metastatic disease with numerous liver lesions, bilateral adrenal metastases, multiple lytic bone metastases, with resulting mild superior end plate compression fractures of T11 and L3 as above, and also intramuscular metastases as above  An alternative diagnosis to metastatic pancreatic adenocarcinoma, given the unusual intramuscular metastasis, would be metastatic melanoma  Please correlate with already performed biopsy results  Workstation performed: [id number]       Code Status: Level 1 - Full Code  Advance Directive & Living Will: <no information>  Power of :    POLST:      Medications:  Current Discharge Medication List        Current Discharge Medication List      START taking these medications    Details   acetaminophen (TYLENOL) 325 mg tablet Take 2 tablets (650 mg total) by mouth every 6 (six) hours as needed for mild pain  Qty: 120 tablet, Refills: 2    Associated Diagnoses: Cancer related pain      apixaban (ELIQUIS) 5 mg Take 1 tablet (5 mg total) by mouth 2 (two) times a day  Qty: 180 tablet, Refills: 0    Associated Diagnoses: New onset a-fib (HCC)      morphine (MS CONTIN) 15 mg 12 hr tablet Take 1 tablet (15 mg total) by mouth every 8 (eight) hours for 14 daysMax Daily Amount: 45 mg  Qty: 42 tablet, Refills: 0    Associated Diagnoses: Cancer related pain      oxyCODONE (ROXICODONE) 5 mg immediate release tablet Take 1-2 tablets (5 - 10 mg total) up to once every 4 hours as needed for moderate to severe pain    Qty: 168 tablet, Refills: 0    Associated Diagnoses: Cancer related pain      polyethylene glycol (GLYCOLAX) 17 GM/SCOOP powder Take 17 g by mouth 2 (two) times a day as needed (constipation) for up to 28 doses  Qty: 578 g, Refills: 0    Associated Diagnoses: Constipation due to opioid therapy      senna (SENOKOT) 8 6 mg Take 1 tablet (8 6 mg total) by mouth 2 (two) times a day Do not take if you are having loose stools or frequent stools  Qty: 28 tablet, Refills: 0    Associated Diagnoses: Constipation due to opioid therapy           Current Discharge Medication List      CONTINUE these medications which have NOT CHANGED    Details   clindamycin (CLINDAGEL) 1 % gel Apply topically 2 (two) times a day  Qty: 30 g, Refills: 0    Associated Diagnoses: Folliculitis      cyclobenzaprine (FLEXERIL) 10 mg tablet Take 1 tablet (10 mg total) by mouth 2 (two) times a day as needed for muscle spasms for up to 10 days  Qty: 20 tablet, Refills: 0    Associated Diagnoses: Low back pain           Allergies:  No Known Allergies  FOLLOW-UP         Consulting Providers Follow-up:  Oncology     Active Issues Requiring Follow-up:   yes     Issue:  Metastatic cancer  Responsible Individual:  None  What is Needed:  Radiation therapy  Follow-up Appointments Arranged: Yes       [address] [place] [address] Krysta Jaramillo is a 47 yo male with no significant PMHx who presented to [facility] 7/22/2021 at the recommendation of general surgeon Dr Annette Mcmillan for concerns of thrombosed left IJV seen on US and CT scan; also seen on CT soft tissue of the neck at that time were numerous superficial and deep soft tissue and intramuscular rim-enhancing necrotic lesions/collections with epidural thickening/collection within posterolateral spinal canal spanning levels C2-C4, subpleural nodular opacities in the right upper lobe and superior segment of right lower lobe, suggested left hilar necrotic lymph node and indeterminate sclerotic foci within T3 and T4 vertebral bodies  Associated ROS positive for unexpected wt loss of 35 lbs, changes in appetite, fatigue and SOB with exertion, diaphoresis and hot flashes,abd pain, flank pain  and pain of neck, back and chest  Vitals were stable at ED presentation  Labs in ED: WBC elevated to 11 84, procal 0 27, LA 1 1, Hgb 9 9 Hct 29 6   There were concerns for possible infectious etiology as the reasoning for findings seen on OP CT scan; pt was started on cefepime, clindamycin, and vancomycin  He was also started on IV heparin drip for thrombus  Other infectious lab workup negative including: blood cx x3, HIV, repeat procal, MRSA, LDH  Pt was subsequently [facility] from antibiotic treatment  Pt underwent numerous imaging modalities within the ED, which showed potential for neoplasm/mets to the scalp, lungs, liver, cervical spine  Further imaging during this inpatient admission confirming metastasis of cancer of unknown primary to the liver, adrenal glands, C3 C1, C2, and C4 with associated epidural enhancement /enhancing soft tissue spanning from inferior C2-C4 resulting in severe canal stenosis and cord compression, Diffuse metastatic disease within the thoracic spine and lumbar spine, and sacral metastasis with mild narrowing of the S1 and S2 sacral foramen, and numerous soft tissue masses in various muscles  Of note there was a  8 x 2 2 x 1 8 cm ill-defined, hypodense pancreatic lesion, highly suspicious for pancreatic adenocarcinoma  Further labs ordered for identification of primary cancer: CEA 63 8, CA 19-9 5418, leukemia/lymphoma negative  Multiple consults placed including neurosurgery, surgical oncology, med oncology, radiation oncology, GI, and palliative  Due to concerns of CS cord compression and extensive tumor burden, patient was deemed not a candidate for resective surgery as this would delay any other treatment modality for this patient including radiation/chemo; patient was started on decadron 10 mg IV and decadron 4 mg PO Q6H  Radiation of the CS mets x 10 was also started during this admission; planning CT for radiation therapy was 7/29/21 and subsequent radiation therapy was done on 7/30/21, 8/2/21, 8/3/21, 8/4/21       Multiple biopsies were performed including left scalp mass punch bx on 7/23/21, right and left lymph node bx on 7/26/21, and US guided liver mets bx 7/30/21  At the time of this discharge bilateral LN bx were positive for [facility] adenocarcinoma and liver cx was positive for [facility] adenocarcinoma favoring pancreaticobiliary origin  Scalp punch bx was still pending  Prior to [address] guided liver bx pt was found to be in afib with RVR  Afterwards, pt was still found to be in afib with RVR with -150s  During this episode pt was NAD, laying in the bed comfortably, asymptomatic; electrolytes including K, Mg, Phos were found to be WNL  He was administered lopressor 5 mg and 1 g of Mg with improvement of HR to 90s-110  He was switched to Metoprolol 50 mg BID  Pt later had recurrence of Afib with RVR and was started on Cardizem drip which was later discontinued due to stabilization of HR  Palliative medicine evaluated the pt and optimized the pt's pain regimen: MS contin 15 mg Q8H, roxicodone IR 5 and 10 mg Q4H PRN moderate/severe pain, APAP 650 mg Q6H PRN mild pain with improvement in pt's pain  Bowel regimen was also optimized for this pt's opioid induced constipation, including miralax 17 g BID, senokot 8 6-50 mg 1 tablet BID, dulcolax 10 mg PRN  He was also found to have a solitary parenchymal metastasis within the right superior cerebellum  Pt was instructed to make follow up appointments with medical oncology to discuss chemotherapy options, radiation oncology for stereotactic radiation of brain mets after completion of radiation therapy for CS mets  At discharge pt is to continue pain regimen established by palliative medicine, bowel regimen, decadron taper, and lovenox  He is to continue with PT  Discharge Statement:   I spent 45 minutes minutes discharging the patient  This time was spent on the day of discharge  I had direct contact with the patient on the day of discharge   Additional documentation is required if more than 30 minutes were spent on discharge     ==

## 2021-08-06 NOTE — ASSESSMENT & PLAN NOTE
7/30/2021 alerted by nursing that pt was in Afib with RVR s/p IR US guided liver bx  He was stable at that time  Upon arrival to the floors pt was re-evaluated and found to still be in afib with RVR, -150s  /80s  Patient with inadequate response to Lopressor x2 and Cardizem bolus x2  Patient started on a Cardizem drip with improvement in heart rates  Transition off on 07/31/2021 and continued on metoprolol 50 mg b i d       Plan:   Metoprolol tartrate 100 mg BID   Add metoprolol PRN   Lovenox 50 mg IJ Q12H; will consider switching to Eliquis depending on insurance coverage    Monitor and replete to maintain Mg>2 and K>4

## 2021-08-06 NOTE — ASSESSMENT & PLAN NOTE
Patient noted to have evidence go subclavian, internal jugular, and brachiocephalic vein thrombus on imaging on arrival  Started on heparin gtt  Remains on heparin gtt in setting of new onset Afib and ongoing workup and possibility for intervention  7/22/21 - CT Soft Tissue Neck with Contrast: Occluded left subclavian and brachiocephalic veins as well as left internal jugular vein to the level of hyoid bone    7/22/21 - CTA PE chest: Possible thrombus right subclavian vein extending into the brachiocephalic vein  7/27/2021 venous duplex US LEFT UPPER LIMB: Evidence of acute and subacute mostly occlusive deep vein thrombosis noted in the internal jugular vein  Evidence of acute non occlusive deep vein thrombosis noted in the innominate, subclavian and axillary with extension into the most proximal brachial vein      Plan:  · 8/3/2021 pt switched from Heparin gtt to Lovenox 50 mg Q12H inj  May need to switch to Eliquis depending on insurance coverage   · Vascular surgery made aware; do not recommend any surgical intervention  · Cont to monitor PTT

## 2021-08-06 NOTE — PROGRESS NOTES
Oncology MSW team received referral for Mr Vishnu Murphy  Pt is currently hospitalized at [facility]  Oncology MSW team will follow up with Mr Vishnu Murphy as an outpatient once discharged and  oncology appointments have been scheduled

## 2021-08-06 NOTE — ASSESSMENT & PLAN NOTE
Punch bx done on left scalp  8/3/2021 pt complaining of increasing tenderness/pain and size of scalp lesion  Lesion is erythematous, non fluctuant and not warm to palpation     Wound care 8/5/2021 L occi-temporal head with raised lesion of unknown etiology  Lesion/wound with dry black eschar, minimal erythema and no drainage  On palpation hard bony like structure is felt  Plan  · no dressing needed, unable to determine etiology, important to obtain biopsy result before proceeding with any dressing recommendations, please leave wound open to air

## 2021-08-06 NOTE — DISCHARGE INSTR - AVS FIRST PAGE
You are advised to follow-up with Oncology Dr Carbajal Seat information has been provided to you    You are advised to get your radiation as scheduled  Transferred for the same has been set up for you  New medication prescribed-Eliquis, metoprolol, oxycodone  Take these medications as prescribed  You have been given script for repeat blood work       In case of any emergency please visit nearest ED

## 2021-08-06 NOTE — ASSESSMENT & PLAN NOTE
49 yo M with recent abnormal findings on CT head, chest, and tissue neck concerning for extensive metastatic cancer:     7/22/21 - US Head Neck Soft Tissue: Several neck masses are identified corresponding to the palpable abnormalities within additional finding of the suspected thrombosed left jugular vein  Further evaluation with contrast-enhanced CT of the neck is recommended  7/22/21 - CT Soft Tissue Neck: Left greater than right numerous superficial and deep neck soft tissue and intramuscular rim-enhancing necrotic lesions/collections as described  Rim-enhancing epidural thickening/collection within posterolateral spinal canal spanning levels C2-C4 resulting in canal narrowing  Finding is concerning for widespread necrotizing infection  Neoplastic process is within differential consideration  Recommend correlation with sampling  Subpleural nodular opacities in the right upper lobe and superior segment of right lower lobe could indicate septic emboli  Suggested left hilar necrotic lymph node  Indeterminate sclerotic foci within T3 and T4 vertebral bodies  7/22/21 CT Head: There is an approximately 2 4 x 1 4 x 2 2 cm masslike lesion involving the scalp overlying the posterior left parietal region  Neoplasm and/or infection should be excluded  7/22/21 CTA PE study: Numerous nodular opacities are present bilaterally within the lungs  The appearances most suggestive of metastatic disease  Extensive hepatic metastatic disease  Large bilateral adrenal masses  Extensive lymphadenopathy  Probable osseous metastatic disease with mild to moderate compression deformity of the superior endplate of S13, suggesting pathologic compression  7/24/2021 CT Abd and pelvis:  There is a 2 8 x 2 2 x 1 8 cm ill-defined, hypodense pancreatic lesion, highly suspicious for pancreatic adenocarcinoma (series 3 images 20-22 and series 601 images 57 - 67 ) There is widespread metastatic disease with numerous liver lesions, bilateral adrenal metastases, multiple lytic bone metastases, with resulting mild superior end plate compression fractures of T11 and L3 as above, and also intramuscular metastases as   above      8/2/2021 MRI TS w wo Contrast Diffuse metastatic disease within the thoracic spine, lower cervical spine and upper lumbar spine  No extraosseous extension of tumor noted on this examination  pathologic adenopathy with central necrosis within the left inferior neck and supraclavicular region and mediastinum and small left-sided pleural effusion  Multiple liver lesions are noted  8/2/2021 MRI Head w wo contrast Ill-defined area of abnormal signal within the right superior cerebellum with T2 and FLAIR hyperintensity and postcontrast enhancement --> solitary parenchymal metastasis  Abnormal appearance of the upper cervical spine with replacement of normal fatty marrow within several vertebral bodies with soft tissue signal extending into the epidural space, demonstrating irregular enhancement suspicious for osseous metastasis with   epidural extension  Suspected cord compression at the C3 and upper C4 level  8/2/2021 MRI LS w wo Contrast Diffuse osseous metastatic disease  There is slight extraosseous extension of tumor noted at the L2-3 and L3-4 levels on the left  Ureter extends from the left posterior elements into the adjacent paraspinal soft tissues  Diffuse sacral metastasis with mild narrowing of the S1 and S2 sacral foramen  Soft tissue metastasis as described above  BL LN Bx  · Right neck mass: Malignant epithelioid neoplasm, possibly melanoma, morphologically identical to that in specimen B, at least 1 cm maximal dimension, present at specimen edges  · Left neck mass: Malignant epithelioid neoplasm, possibly melanoma, at least 1 5 cm maximal dimension, present in dermal lymphatics and at specimen edges, likely metastatic  Benign overlying epidermis  CEA 63 8; CA 19-9: 5418, LDH: 1234   At this time, concern for synchronous malignancy given preliminary pathology reports demonstrating melanoma and CA 19 9 elevated in the setting of pancreatic lesion with liver metastasis concerning for pancreatic adenocarcinoma  There is a generic link between these 2 cancers raising concern  Plan:  · Palliative medicine consulted; appreciate recommendations for pain management  · Continue MS contin 15mg oral q8h scheduled   · Oxycodone 5mg-10mg q4h PRN for moderate to severe pain  · Dilaudid 0 5mg IV q3h PRN breakthrough pain  · APAP 650mg q6h PRN mild pain  · Bowel Regimen  · Miralax 17g daily  · Senna 8 6mg BID  · Medical Oncology Consulted and Appreciate Recommendation  · appt with Oncologist in [facility] in 1-2 weeks to discuss treatment options  · Rad onc  · solitary brain metastasis: stereotactic radiosurgery/radiotherapy; After he completes radiation therapy to the cervical spine, he can then be evaluated for stereotactic radiation at [place] [address]  signed consent form for stereotactic radiation  · Surgical Oncology Consulted  · S/P Punch Biopsy on 7/23/21; follow up on pathology results  · S/P 2x LN bx 7/26/21: Non-small cell carcinoma, 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

## 2021-08-09 NOTE — TELEPHONE ENCOUNTER
Received prior authorization approval for oxycodone HCL oral tablet 5 mg through 08/06/23     Received prior authorization approval for Morphine sulfate ER oral tablet 15 mg  through 08/06/23

## 2021-08-09 NOTE — UTILIZATION REVIEW
Notification of Discharge   This is a Notification of Discharge from our facility [address]  Please be advised that this patient has been discharge from our facility  Below you will find the admission and discharge date and time including the patients disposition  UTILIZATION REVIEW CONTACT:  Cass Lai  Utilization   Network Utilization Review Department  Phone: [phone] x carefully listen to the prompts  All voicemails are confidential   Email: [email or web address]     PHYSICIAN ADVISORY SERVICES:  FOR HERP-QV-MFWK REVIEW - MEDICAL NECESSITY DENIAL  Phone: [phone]  Fax: [phone]  Email: [email or web address]     PRESENTATION DATE: 7/22/2021  7:16 PM  OBERVATION ADMISSION DATE:   INPATIENT ADMISSION DATE: 7/23/21  1:10 AM   DISCHARGE DATE: 8/6/2021  7:35 PM  DISPOSITION: Home with [place] with [address] [place] [address] Road INFORMATION:  Send all requests for admission clinical reviews, approved or denied determinations and any other requests to dedicated fax number below belonging to the campus where the patient is receiving treatment   List of dedicated fax numbers:  [address] [place] [address] DENIALS (Administrative/Medical Necessity) [phone]   [address] [place] [address] (Maternity/NICU/Pediatrics) [phone]   Salty Acosta [phone]   Jad Saeed [phone]   Reyna Richard [phone]   [address] [phone]   [facility]  [phone]   [address] [place] [address], [address]  [address] [facility] [address] [facility] [phone]

## 2021-08-11 PROBLEM — R56.9 SEIZURE (HCC): Status: ACTIVE | Noted: 2021-01-01

## 2021-08-11 PROBLEM — R65.20 SEVERE SEPSIS (HCC): Status: ACTIVE | Noted: 2021-01-01

## 2021-08-11 PROBLEM — I82.B19 SUBCLAVIAN VEIN THROMBOSIS (HCC): Status: ACTIVE | Noted: 2021-01-01

## 2021-08-11 PROBLEM — N17.9 AKI (ACUTE KIDNEY INJURY) (HCC): Status: ACTIVE | Noted: 2021-01-01

## 2021-08-11 NOTE — LETTER
[id number]   Dept: [phone]    August 18, 2021       To Whom it May Concern:    Please excuse Ruddy Erick from work on 8/17/21-8/18/21  Mr  Rome Montiel was visiting at [facility] in \[facility] [place] [facility]\"" to pay his respect to family member that has, unfortunately, passed away  He may return to work on 8/19/21 without limitations  If you have any questions or concerns, please don't hesitate to call           Sincerely,          Kristin Oconnor PA-C

## 2021-08-11 NOTE — ASSESSMENT & PLAN NOTE
During last admission in July at [facility], he was found to have extensive metastatic cancer, brain, liver, lungs with lytic bone lesion  Currently uncertain primary tumor  Lymph node biopsy of the neck did show non-small cell carcinoma  Liver biopsies show non small cell carcinoma favoring pancreatic biliary origin  Scalp punch biopsy still pending  He received radiation therapy for the brain metastasis over at [facility]  Patient will need to follow up with oncology  Will have goals of care discussion with the patient

## 2021-08-11 NOTE — ASSESSMENT & PLAN NOTE
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) admitted after several episodes of possible seizure-like activity  Patient does have known metastases in the cervical and thoracic spine as well as R cerebellum  Etiology for spells includes seizure vs syncope  More suspicious for syncope at this time given description provided by family and hypotension  Plan:   - EEG completed- diffuse dysfunction  No epileptiform discharges   - Patient is s/p Keppra IV 3200 mg x 1    - Currently on Keppra 750 mg BID- would decrease to 500 mg at this time  - Do not feel repeat MRI brain would  at this time so would defer  - Maintain seizure precautions    - Recommend goals of care discussion/ palliative   - Monitor exam and notify with changes

## 2021-08-11 NOTE — ASSESSMENT & PLAN NOTE
During last admission in July at [facility], he was found to have extensive metastatic cancer, brain, liver, lungs with lytic bone lesion  Currently uncertain primary tumor  Lymph node biopsy of the neck did show non-small cell carcinoma  Liver biopsies show non small cell carcinoma favoring pancreatic biliary origin  Scalp punch biopsy still pending  He received radiation therapy for the brain metastasis over at [place]  Patient will need to follow up with oncology  Will have goals of care discussion with the patient

## 2021-08-11 NOTE — PLAN OF CARE
Problem: MOBILITY - ADULT  Goal: Maintain or return to baseline ADL function  Description: INTERVENTIONS:  -  Assess patient's ability to carry out ADLs; assess patient's baseline for ADL function and identify physical deficits which impact ability to perform ADLs (bathing, care of mouth/teeth, toileting, grooming, dressing, etc )  - Assess/evaluate cause of self-care deficits   - Assess range of motion  - Assess patient's mobility; develop plan if impaired  - Assess patient's need for assistive devices and provide as appropriate  - Encourage maximum independence but intervene and supervise when necessary  - Involve family in performance of ADLs  - Assess for home care needs following discharge   - Consider OT consult to assist with ADL evaluation and planning for discharge  - Provide patient education as appropriate  Outcome: Progressing  Goal: Maintains/Returns to pre admission functional level  Description: INTERVENTIONS:  - Perform BMAT or MOVE assessment daily    - Set and communicate daily mobility goal to care team and patient/family/caregiver     - Collaborate with rehabilitation services on mobility goals if consulted  - - Out of bed for toileting  - Record patient progress and toleration of activity level   Outcome: Progressing     Problem: Potential for Falls  Goal: Patient will remain free of falls  Description: INTERVENTIONS:  - Educate patient/family on patient safety including physical limitations  - Instruct patient to call for assistance with activity   - Consult OT/PT to assist with strengthening/mobility   - Keep Call bell within reach  - Keep bed low and locked with side rails adjusted as appropriate  - Keep care items and personal belongings within reach  - Initiate and maintain comfort rounds  - Make Fall Risk Sign visible to staff    - Apply yellow socks and bracelet for high fall risk patients  - Consider moving patient to room near nurses station  Outcome: Progressing     Problem: Prexisting or High Potential for Compromised Skin Integrity  Goal: Skin integrity is maintained or improved  Description: INTERVENTIONS:  - Identify patients at risk for skin breakdown  - Assess and monitor skin integrity  - Assess and monitor nutrition and hydration status  - Monitor labs   - Assess for incontinence   - Turn and reposition patient  - Assist with mobility/ambulation  - Relieve pressure over bony prominences  - Avoid friction and shearing  - Provide appropriate hygiene as needed including keeping skin clean and dry  - Evaluate need for skin moisturizer/barrier cream  - Collaborate with interdisciplinary team   - Patient/family teaching  - Consider wound care consult   Outcome: Progressing

## 2021-08-11 NOTE — ASSESSMENT & PLAN NOTE
Patient does complain of sore throat  Pharyngeal erythema was noted  SIRS criteria:  Tachycardia 103  BP at one point was 89/53  Leukocytosis with white count 24 08  Lactic acid : 3 7  CT of the neck in July 2021 showed necrotizing infection/neoplasm of the neck  He did complete a course of cefepime, clindamycin, and vancomycin during the last admission  Will empirically treat head and neck infection with cefepime for now  Also likely secondary to metastastic cancer  COVID is negative  Blood culture is pending  UA is bland  Continue IV fluids at 125 cc/hour

## 2021-08-11 NOTE — ASSESSMENT & PLAN NOTE
Malnutrition Findings:           BMI Findings: Body mass index is 16 68 kg/m²  Encourage oral intake     Will order remron if patient continue to be unable to eat

## 2021-08-11 NOTE — ASSESSMENT & PLAN NOTE
- Patient with metastatic non- small cell carcinoma (favor pancreatobiliary primary per pathology report)  - Recommend oncology consult/goals of care discussion given widely metastatic disease

## 2021-08-11 NOTE — PROGRESS NOTES
[facility]  Progress Note - Ana Porter 1970, 48 y o  male MRN: [id number]  Unit/Bed#: ICU 08 Encounter: [id number]  Primary Care Provider: No primary care provider on file  Date and time admitted to hospital: 8/11/2021 10:35 AM    * Seizure [facility])  Assessment & Plan  Patient was noted to have for seizures since early this morning  He had no history of epilepsy and path  Patient has no fever, HA, or mental status change  No leukocytosis was noted  CT of the head showed 1 3 cm low-attenuation right superior cerebellar hemispheric low-attenuation area suspicious for parenchymal  metastasis, unchanged from the MRI of 8/2/2021, without evidence for hemorrhage or abnormal mass effect  Prior to ED arrival, he received 2 doses of Versed and received another dose of Keppra 3200mg in the ED  Etiology most likely secondary to brain metastasis   EEG: pending  Workup:   Blood glucose : 84   BMP unremarkable, mag WNL, Ca: 11 5   UA pending   Blood Culture: pending    Plan:   Continue Seizure precautions   Continue keppra 750 mg Q12H   Neuro Consulted  Recommendation appreciated   Continue frequent neuro checks, please notify with any change   Ativan Q4H PRN for seizure like activity >30 seconds        Severe sepsis [facility])  Assessment & Plan  Patient does complain of sore throat  Pharyngeal erythema was noted  SIRS criteria:  Tachycardia 103  BP at one point was 89/53  Leukocytosis with white count 24 08  Lactic acid : 3 7  CT of the neck in July 2021 showed necrotizing infection/neoplasm of the neck  He did complete a course of cefepime, clindamycin, and vancomycin during the last admission  Will empirically treat head and neck infection with cefepime for now  Also likely secondary to metastastic cancer  COVID is negative  Blood culture is pending  UA is bland  Continue IV fluids at 125 cc/hour        Metastatic cancer [facility])  Assessment & Plan  During last admission in July at [facility], he was found to have extensive metastatic cancer, brain, liver, lungs with lytic bone lesion  Currently uncertain primary tumor  Lymph node biopsy of the neck did show non-small cell carcinoma  Liver biopsies show non small cell carcinoma favoring pancreatic biliary origin  Scalp punch biopsy still pending  He received radiation therapy for the brain metastasis over at [facility]  Patient will need to follow up with oncology  Will have goals of care discussion with the patient  BEE (acute kidney injury) ([facility] [address] )  Assessment & Plan  Creatinine POA 1 07  Baseline appeared to be 0 43-0 63  Patient reported poor intake for the past 2 days  Urinalysis was bland  Hypotension was noted in the ED  Etiology likely secondary to hypovolemia vs sepsis  Continue IV hydration at 125 cc/hour  Strict intake and output  Avoid nephrotoxins  Avoid hypotension  Atrial fibrillation [facility])  Assessment & Plan  New onset a fib noted during last admission  Will hold lopressor at this time due to hypotension  Continue eliquis    Constipation due to opioid therapy  Assessment & Plan  Will continue his home bowel regimen for now  Will hold bowel regimen if develop loose stool or diarrhea  Will hold pain medication for now due to hypotension    Pressure ulcer of coccygeal region, unspecified pressure ulcer stage  Assessment & Plan  Pressure ulcer noted over the sacrum and upper back  Appeared to be stage 2 ulcer  Wound care  Severe protein-calorie malnutrition ([facility] [address] )  Assessment & Plan  Malnutrition Findings:           BMI Findings: Body mass index is 16 68 kg/m²  Encourage oral intake  Will order remron if patient continue to be unable to eat    Compression fracture of T11 vertebra [facility])  Assessment & Plan  Secondary to metastatic lytic bone lesion        Subclavian vein thrombosis (HCC)  Assessment & Plan  Possible thrombus right subclavian vein extending into the brachiocephalic vein  S/p heparin therapy  Will continue eliquis for now      -------------------------------------------------------------------------------------------------------------  Chief Complaint: multiple seizure this morning    History of Present Illness     America Shaver is a 48 y o  male with PMH of metastatic cancer, with unknown origin, atrial fibrillation, subclavian vein thromboembolism, fracture compression fracture of T11, atrial fibrillation, who presents with multiple seizures since this morning  Patient stated that had about 4 seizures this morning, as well as sore throat started this morning  He denies fever, chills, shortness of breath, chest pain, cough, neck pain, diarrhea urinary symptoms or headaches  He stated that last bowel movement was 2 days ago  Prior to arrival to the ED, he received 2 doses of Versed by EMS  He further received a loading dose Keppra 3200 mg in the ED  Hypotension was noted with BP 89-97/53-55  Tachycardia with heart rate of 103  Leukocytosis of 24 08  BMP was notable for creatinine 1 07  Lactic acid was originally 3 4 trended down to 2 4 after fluid resuscitation  Chest x-ray denies showed acute cardiopulmonary disease  CT of the head showed parenchymal metastasis consistent with prior MRI brain result  Previously, MRI on August 2nd showed lytic bone metastasis, liver metastasis, adrenal gland metastasis, and paraspinal soft tissue metastasis  CT of the abdomen and pelvis show suspicious pancreatic cancer with liver metastasis  CTA of the chest shows likely lung metastasis/lung cancer as well as thrombus in subclavian vein extended to brachial cephalic vein  CT of the neck also showed necrotizing infection of the neck, neoplasm cannot be ruled out  Last admission in [facility], biopsy of the lymph node of the neck showed non-small cell carcinoma  Liver biopsy showed 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    He had completed cefepime, clindamycin, and vancomycin treatment for presumed sepsis  Blood culture and MRSA were negative at the time  Radiation of the CS mets x 10 was also started during that admission; CT for radiation therapy was 7/29/21 and subsequent radiation therapy was done on 7/30/21, 8/2/21, 8/3/21, 8/4/21  He was discharged with decadron taper  History obtained from chart review and the patient   -------------------------------------------------------------------------------------------------------------  Dispo: Admit to Stepdown Level 1    Code Status: Level 1 - Full Code  --------------------------------------------------------------------------------------------------------------  Review of Systems   Constitutional: Positive for appetite change, fatigue and unexpected weight change  Negative for chills and fever  HENT: Positive for sore throat  Negative for ear pain  Eyes: Negative for pain and visual disturbance  Respiratory: Negative for cough and shortness of breath  Cardiovascular: Negative for chest pain and palpitations  Gastrointestinal: Negative for abdominal pain, diarrhea, nausea and vomiting  Genitourinary: Negative for dysuria, frequency and hematuria  Musculoskeletal: Negative for arthralgias and back pain  Skin: Negative for color change and rash  Neurological: Positive for seizures  Negative for syncope and headaches  All other systems reviewed and are negative  A 12-point, complete review of systems was reviewed and negative except as stated above     Physical Exam  Vitals and nursing note reviewed  Constitutional:       Appearance: He is well-developed  He is ill-appearing (cachetic)  HENT:      Head: Normocephalic and atraumatic  Mouth/Throat:     Eyes:      Conjunctiva/sclera: Conjunctivae normal    Cardiovascular:      Rate and Rhythm: Regular rhythm  Tachycardia present  Heart sounds: No murmur heard       Pulmonary:      Effort: Pulmonary effort is normal  No respiratory distress  Breath sounds: Normal breath sounds  Abdominal:      Palpations: Abdomen is soft  Tenderness: There is no abdominal tenderness  Musculoskeletal:      Cervical back: Neck supple  Right lower leg: No edema  Left lower leg: No edema  Comments: Amputation of the left second and fourth digit  Skin:     General: Skin is warm and dry  Findings: Erythema and lesion present  Neurological:      Mental Status: He is alert        --------------------------------------------------------------------------------------------------------------  Vitals:   Vitals:    08/11/21 1245 08/11/21 1400 08/11/21 1429 08/11/21 1440   BP: (!) 89/54 (!) 81/53 (!) 75/52    BP Location: Right arm Right arm     Pulse: 102 (!) 112 (!) 110    Resp: 21 18 (!) 27    Temp:    98 8 °F (37 1 °C)   TempSrc:    Temporal   SpO2: 97% 95%     Weight:    48 3 kg (106 lb 7 7 oz)   Height:    5' 7" (1 702 m)     Temp  Min: 98 8 °F (37 1 °C)  Max: 99 3 °F (37 4 °C)  IBW (Ideal Body Weight): 66 1 kg  Height: 5' 7" (170 2 cm)  Body mass index is 16 68 kg/m²      Laboratory and Diagnostics:  Results from last 7 days   Lab Units 08/11/21  1104 08/06/21  0541 08/05/21  0603   WBC Thousand/uL 24 08* 30 71* 37 10*   HEMOGLOBIN g/dL 11 3* 11 8* 11 2*   HEMATOCRIT % 35 6* 37 2 36 2*   PLATELETS Thousands/uL 153 299 383   BANDS PCT % 7  --   --    MONO PCT % 3*  --   --      Results from last 7 days   Lab Units 08/11/21  1104 08/06/21  0541 08/05/21  0603   SODIUM mmol/L 131* 131* 133*   POTASSIUM mmol/L 4 5 4 6 4 4   CHLORIDE mmol/L 94* 98* 97*   CO2 mmol/L 28 30 29   ANION GAP mmol/L 9 3* 7   BUN mg/dL 29* 30* 27*   CREATININE mg/dL 1 07 0 65 0 70   CALCIUM mg/dL 9 7 9 3 9 4   GLUCOSE RANDOM mg/dL 84 96 108   ALT U/L 75  --  76   AST U/L 61*  --  46*   ALK PHOS U/L 282*  --  266*   ALBUMIN g/dL 1 8*  --  2 7*   TOTAL BILIRUBIN mg/dL 0 46  --  0 37     Results from last 7 days   Lab Units 08/11/21  1104   MAGNESIUM mg/dL 2 1      Results from last 7 days   Lab Units 08/11/21  1104   INR  1 40*   PTT seconds 33      Results from last 7 days   Lab Units 08/11/21  1104   TROPONIN I ng/mL <0 02     Results from last 7 days   Lab Units 08/11/21  1310 08/11/21  1104   LACTIC ACID mmol/L 2 4* 3 7*     ABG:    VBG:          Micro:        EKG: sinus tachycardia  Imaging: I have personally reviewed pertinent reports  and I have personally reviewed pertinent films in PACS      Historical Information   History reviewed  No pertinent past medical history  Past Surgical History:   Procedure Laterality Date    FACIAL/NECK BIOPSY Bilateral 7/26/2021    Procedure: INCISIONAL BIOPSY LEFT NECK MASS,AND EXCISIOINAL BIOPSY RIGHT NECK MASS;  Surgeon: Annie Hinojosa MD;  Location: [facility];  Service: Surgical Oncology    HAND SURGERY      left hand    IR BIOPSY LIVER MASS  7/30/2021     Social History   Social History     Substance and Sexual Activity   Alcohol Use Not Currently     Social History     Substance and Sexual Activity   Drug Use Yes    Types: Marijuana     Social History     Tobacco Use   Smoking Status Current Every Day Smoker    Packs/day: 0 25   Smokeless Tobacco Never Used       Family History:   History reviewed  No pertinent family history    I have reviewed this patient's family history and commented on sigificant items within the HPI      Medications:  Current Facility-Administered Medications   Medication Dose Route Frequency    apixaban (ELIQUIS) tablet 5 mg  5 mg Oral BID    bisacodyl (DULCOLAX) rectal suppository 10 mg  10 mg Rectal Daily PRN    calcium carbonate (TUMS) chewable tablet 500 mg  500 mg Oral Daily PRN    cefepime (MAXIPIME) 2 g/50 mL dextrose IVPB  2,000 mg Intravenous Q12H    [START ON 8/12/2021] dexamethasone (DECADRON) tablet 2 mg  2 mg Oral Daily    lactated ringers infusion  125 mL/hr Intravenous Continuous    levETIRAcetam (KEPPRA) 750 mg in sodium chloride 0 9 % 100 mL IVPB  750 mg Intravenous Q12H [facility]    LORazepam (ATIVAN) injection 1 mg  1 mg Intravenous Q4H PRN    polyethylene glycol (GLYCOLAX) bowel prep 17 g  17 g Oral BID PRN    polyethylene glycol (MIRALAX) packet 17 g  17 g Oral BID    senna (SENOKOT) tablet 8 6 mg  8 6 mg Oral BID     Home medications:  Prior to Admission Medications   Prescriptions Last Dose Informant Patient Reported? Taking?   acetaminophen (TYLENOL) 325 mg tablet 8/11/2021 at Unknown time  No Yes   Sig: Take 2 tablets (650 mg total) by mouth every 6 (six) hours as needed for mild pain   apixaban (ELIQUIS) 5 mg 8/11/2021 at Unknown time  No Yes   Sig: Take 1 tablet (5 mg total) by mouth 2 (two) times a day   bisacodyl (DULCOLAX) 10 mg suppository 8/11/2021 at Unknown time  No Yes   Sig: Insert 1 suppository (10 mg total) into the rectum daily as needed for constipation (no bowel movement in 3 days)   calcium carbonate (TUMS) 500 mg chewable tablet 8/11/2021 at Unknown time  No Yes   Sig: Chew 1 tablet (500 mg total) daily as needed for indigestion or heartburn (abdominal pain after meals)   dexamethasone (DECADRON) 4 mg tablet 8/11/2021 at Unknown time  No Yes   Sig: Take 1 tablet (4 mg total) by mouth every 12 (twelve) hours for 2 days, THEN 0 5 tablets (2 mg total) every 12 (twelve) hours for 3 days, THEN 0 5 tablets (2 mg total) daily with breakfast for 2 days  metoprolol tartrate (LOPRESSOR) 100 mg tablet 8/11/2021 at Unknown time  No Yes   Sig: Take 0 5 tablets (50 mg total) by mouth every 12 (twelve) hours   morphine (MS CONTIN) 15 mg 12 hr tablet 8/11/2021 at Unknown time  No Yes   Sig: Take 1 tablet (15 mg total) by mouth every 8 (eight) hours for 14 daysMax Daily Amount: 45 mg   oxyCODONE (ROXICODONE) 5 mg immediate release tablet 8/11/2021 at Unknown time  No Yes   Sig: Take 1-2 tablets (5 - 10 mg total) up to once every 4 hours as needed for moderate to severe pain     polyethylene glycol (GLYCOLAX) 17 GM/SCOOP powder 8/11/2021 at Unknown time  No Yes   Sig: Take 17 g by mouth 2 (two) times a day as needed (constipation) for up to 28 doses   polyethylene glycol (MIRALAX) 17 g packet 8/11/2021 at Unknown time  No Yes   Sig: Take 17 g by mouth 2 (two) times a day   senna (SENOKOT) 8 6 mg 8/11/2021 at Unknown time  No Yes   Sig: Take 1 tablet (8 6 mg total) by mouth 2 (two) times a day Do not take if you are having loose stools or frequent stools  Facility-Administered Medications: None     Allergies:  No Known Allergies    ------------------------------------------------------------------------------------------------------------  Advance Directive and Living Will:      Power of :    POLST:    ------------------------------------------------------------------------------------------------------------  Anticipated Length of Stay is > 2 midnights    Care Time Delivered:   Upon my evaluation, this patient had a high probability of imminent or life-threatening deterioration due to seizure, which required my direct attention, intervention, and personal management  I have personally provided 30 minutes (1330 to 1400) of critical care time, exclusive of procedures, teaching, family meetings, and any prior time recorded by providers other than myself  Jace Reed MD        Portions of the record may have been created with voice recognition software  Occasional wrong word or "sound a like" substitutions may have occurred due to the inherent limitations of voice recognition software    Read the chart carefully and recognize, using context, where substitutions have occurred

## 2021-08-11 NOTE — ASSESSMENT & PLAN NOTE
Patient was noted to have for seizures since early this morning  He had no history of epilepsy and path  Patient has no fever, HA, or mental status change  No leukocytosis was noted  CT of the head showed 1 3 cm low-attenuation right superior cerebellar hemispheric low-attenuation area suspicious for parenchymal  metastasis, unchanged from the MRI of 8/2/2021, without evidence for hemorrhage or abnormal mass effect  Prior to ED arrival, he received 2 doses of Versed and received another dose of Keppra 3200mg in the ED  Etiology most likely secondary to brain metastasis   EEG: pending  Workup:   Blood glucose : 84   BMP unremarkable, mag WNL, Ca: 11 5   UA pending   Blood Culture: pending    Plan:   Continue Seizure precautions   Continue keppra 750 mg Q12H   Neuro Consulted  Recommendation appreciated     Continue frequent neuro checks, please notify with any change   Ativan Q4H PRN for seizure like activity >30 seconds

## 2021-08-11 NOTE — CONSULTS
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) admitted after several episodes of possible seizure-like activity  Patient does have known metastases in the cervical and thoracic spine as well as R cerebellum  Etiology for spells includes seizure vs syncope  Plan:   - Recommend EEG    - Patient is s/p Keppra IV 3200 mg x 1    - Recommend Keppra 750 mg Q12H    - Do not feel repeat MRI brain would  at this time so would defer  - Maintain seizure precautions    - Recommend goals of care discussion with patient and his family when patient is more alert  - Monitor exam and notify with changes  Metastatic cancer [facility])  Assessment & Plan  - Patient with metastatic non- small cell carcinoma (favor pancreatobiliary primary per pathology report)  - Recommend oncology consult/goals of care discussion given widely metastatic disease  Subclavian vein thrombosis (HCC)  Assessment & Plan  - Continue on Eliquis    BEE (acute kidney injury) ([facility] [address] )  Assessment & Plan  - Likely in the setting of poor PO intake  - Management as per critical care team         Recommendations for outpatient neurological follow up have yet to be determined  History of Present Illness     Reason for Consult / Principal Problem: Seizure/Metastatic cancer/Cervical spinal cord compression  Hx and PE limited by: Mental status  HPI: Alice Barroso is a 48 y o   male with metastatic cancer who presented to the hospital after he had 4 episodes concerning for seizure at home  Per his significant other, patient was feeling unwell last night and went upstairs to bed  She notes that he was laying on the floor and appeared stiff and was staring   She notes that he had two additional episodes overnight that he appeared to fall back and had flailing of his extremities, one of these occurred while he was trying to urinate  This morning, she contacted EMS and he had another episode when they arrived and he was given Versed  He was brought to the ED and given Keppra IV 3200 mg x 1  Patient's significant other states that patient was recently in the hospital but she is not entirely sure what his diagnosis is  She notes that she and the patient have not discussed his diagnosis  She notes that they have been together for 15 years  Per review of chart, patient was recently admitted at [place] [address] from Jul 22, 2021- August 6, 2021  He was initially admitted after he underwent outpatient imaging which demonstrated neck mass and subclavian vein thrombosis  He was noted to have concern for metastatic CA and was admitted for further work-up  He underwent skin  biopsy which demonstrated poorly differentiated carcinoma  He also underwent biopsy of right sided neck mass and left sided neck mass which demonstrated non-small cell carcinoma of uncertain primary  MR Imaging was completed of the cervical and thoracic spine which demonstrated signal abnormality and erosive change of C3 laminae and spinous process along with lesser degree signal abnormality in the posterior elements of C1, C2 and C4 with associated epidural enhancement spanning from inferior C2-C4 resulting in severe canal stenosis and cord compression as well as wide metastatic disease in the cervical and thoracic spine  He was seen by neurosurgery team and recommended to treat cervical spine disease with radiation given his advanced metastatic disease/disease burden and poor performance  Neurosurgery also noted that patient could wear TLSO brace as needed for comfort and soft cervical brace for comfort in the setting of heavy disease burden and late stage metastatic disease   Patient underwent liver biopsy and this demonstrated non-small cell carcinoma, favor pancreatobiliary primary  He was seen by palliative care team  He underwent MRI brain which demonstrated ill-defined area of abnormal signal within the right superior cerebellum with T2 and FLAIR hyperintensity and post-contrast enhancement suggestive of solitary parenchymal metastasis and was then seen again by radiation oncology and stereotactic radiation was being considered after radiation therapy to the cervical spine was completed  He was seen by oncology team and plan was to have appointment as an outpatient to discuss treatment options  He was ultimately discharged on August 6, 2021 with plans for outpatient oncology follow-up  Consult to neurology  Consult performed by: Bettylou Kanner, PA-C  Consult ordered by: Alexis Beckham MD          Review of Systems   Unable to perform ROS: Mental status change       Historical Information   History reviewed  No pertinent past medical history  Past Surgical History:   Procedure Laterality Date    FACIAL/NECK BIOPSY Bilateral 7/26/2021    Procedure: INCISIONAL BIOPSY LEFT NECK MASS,AND EXCISIOINAL BIOPSY RIGHT NECK MASS;  Surgeon: Sis Yanez MD;  Location: [facility];  Service: Surgical Oncology    HAND SURGERY      left hand    IR BIOPSY LIVER MASS  7/30/2021     Social History   Social History     Substance and Sexual Activity   Alcohol Use Not Currently     Social History     Substance and Sexual Activity   Drug Use Yes    Types: Marijuana     E-Cigarette/Vaping    E-Cigarette Use Never User      E-Cigarette/Vaping Substances     Social History     Tobacco Use   Smoking Status Current Every Day Smoker    Packs/day: 0 25   Smokeless Tobacco Never Used     Family History: History reviewed  No pertinent family history  Review of previous medical records was completed  Please see HPI       Meds/Allergies   Scheduled Meds:  Current Facility-Administered Medications   Medication Dose Route Frequency Provider Last Rate    apixaban  5 mg Oral BID Irma Sherwood MD      bisacodyl  10 mg Rectal Daily PRN Irma Sherwood MD      calcium carbonate  500 mg Oral Daily PRN Irma Sherwood MD      cefepime  2,000 mg Intravenous Q12H Irma Sherwood MD      [START ON 8/12/2021] dexamethasone  2 mg Oral Daily Irma Sherwood MD      lactated ringers  125 mL/hr Intravenous Continuous Irma Sherwood  mL/hr (08/11/21 1330)    levETIRAcetam  750 mg Intravenous Q12H [facility] Irma Sherwood MD      LORazepam  1 mg Intravenous Q4H PRN Irma Sherwood MD      polyethylene glycol  17 g Oral BID Irma Sherwood MD      senna  8 6 mg Oral BID Irma Sherwood MD       Continuous Infusions:lactated ringers, 125 mL/hr      PRN Meds:  No Known Allergies    Objective   Vitals:Blood pressure (!) 89/54, pulse 102, temperature 99 3 °F (37 4 °C), temperature source Oral, resp  rate 21, weight 52 3 kg (115 lb 4 8 oz), SpO2 97 %  ,Body mass index is 17 03 kg/m²  Intake/Output Summary (Last 24 hours) at 8/11/2021 1313  Last data filed at 8/11/2021 1232  Gross per 24 hour   Intake 1300 ml   Output 510 ml   Net 790 ml       Invasive Devices: Invasive Devices     Peripheral Intravenous Line            Peripheral IV 08/11/21 Left Antecubital <1 day    Peripheral IV 08/11/21 Right Antecubital <1 day                Physical Exam  Constitutional:       General: He is not in acute distress  Appearance: He is ill-appearing  He is not toxic-appearing or diaphoretic  Comments: Cachectic     HENT:      Head: Normocephalic and atraumatic  Mouth/Throat:      Mouth: Mucous membranes are moist       Pharynx: Oropharynx is clear  No oropharyngeal exudate or posterior oropharyngeal erythema  Eyes:      General: No scleral icterus  Right eye: No discharge  Left eye: No discharge  Extraocular Movements: Extraocular movements intact        Conjunctiva/sclera: Conjunctivae normal  Pupils: Pupils are equal, round, and reactive to light  Cardiovascular:      Rate and Rhythm: Normal rate and regular rhythm  Pulmonary:      Effort: Pulmonary effort is normal       Breath sounds: Normal breath sounds  Abdominal:      General: There is no distension  Palpations: Abdomen is soft  Musculoskeletal:      Right lower leg: No edema  Left lower leg: No edema  Skin:     General: Skin is warm and dry  Findings: No erythema or rash  Neurological:      Mental Status: He is oriented to person, place, and time  Deep Tendon Reflexes:      Reflex Scores:       Bicep reflexes are 1+ on the right side and 1+ on the left side  Brachioradialis reflexes are 1+ on the right side and 1+ on the left side  Patellar reflexes are 1+ on the right side and 1+ on the left side  Achilles reflexes are 0 on the right side and 0 on the left side  Psychiatric:         Speech: Speech normal        Neurologic Exam     Mental Status   Oriented to person, place, and time  Oriented to person  Oriented to place  Oriented to time  Attention: decreased  Concentration: decreased  Speech: speech is normal   Level of consciousness: arousable by verbal stimuli  Limited assessment given patient is lethargic after receiving medication  Cranial Nerves     CN III, IV, VI   Pupils are equal, round, and reactive to light  Right pupil: Size: 4 mm  Shape: regular  Reactivity: brisk  Consensual response: intact  Left pupil: Size: 4 mm  Shape: regular  Reactivity: brisk  Consensual response: intact     Nystagmus: none   Diplopia: none  Ophthalmoparesis: none  Upgaze: normal  Downgaze: normal  Conjugate gaze: present    CN V   Right facial sensation deficit: none  Left facial sensation deficit: none    CN VII   Right facial weakness: none  Left facial weakness: none    CN XII   Tongue: not atrophic  Fasciculations: absent  Tongue deviation: none  Limited assessment secondary to mental status     Motor Exam   Muscle bulk: normal  Overall muscle tone: normal  Right arm tone: normal  Left arm tone: normal  Right leg tone: normal  Left leg tone: normalMoves extremities and follows simple commands   Unable to lift LE antigravity, unable to formally assess motor strength secondary to mental status      Sensory Exam   Right arm light touch: normal  Left arm light touch: normal  Right leg light touch: normal  Left leg light touch: normal  Limited assessment secondary to mental status     Gait, Coordination, and Reflexes     Tremor   Resting tremor: absent  Intention tremor: absent  Action tremor: absent    Reflexes   Right brachioradialis: 1+  Left brachioradialis: 1+  Right biceps: 1+  Left biceps: 1+  Right patellar: 1+  Left patellar: 1+  Right achilles: 0  Left achilles: 0  Right plantar: normal  Left plantar: normal  Right ankle clonus: absent  Left pendular knee jerk: absent      Lab Results:   Recent Results (from the past 24 hour(s))   ECG 12 lead    Collection Time: 08/11/21 10:41 AM   Result Value Ref Range    Ventricular Rate 100 BPM    Atrial Rate 100 BPM    WA Interval 108 ms    QRSD Interval 96 ms    QT Interval 308 ms    QTC Interval 397 ms    P Axis 83 degrees    QRS Axis 79 degrees    T Wave Axis 80 degrees   CBC and differential    Collection Time: 08/11/21 11:04 AM   Result Value Ref Range    WBC 24 08 (H) 4 31 - 10 16 Thousand/uL    RBC 3 73 (L) 3 88 - 5 62 Million/uL    Hemoglobin 11 3 (L) 12 0 - 17 0 g/dL    Hematocrit 35 6 (L) 36 5 - 49 3 %    MCV 95 82 - 98 fL    MCH 30 3 26 8 - 34 3 pg    MCHC 31 7 31 4 - 37 4 g/dL    RDW 20 3 (H) 11 6 - 15 1 %    MPV 10 1 8 9 - 12 7 fL    Platelets 927 575 - 360 Thousands/uL    nRBC 7 /100 WBCs   Protime-INR    Collection Time: 08/11/21 11:04 AM   Result Value Ref Range    Protime 16 9 (H) 11 6 - 14 5 seconds    INR 1 40 (H) 0 84 - 1 19   APTT    Collection Time: 08/11/21 11:04 AM   Result Value Ref Range    PTT 33 23 - 37 seconds   Comprehensive metabolic panel    Collection Time: 08/11/21 11:04 AM   Result Value Ref Range    Sodium 131 (L) 136 - 145 mmol/L    Potassium 4 5 3 5 - 5 3 mmol/L    Chloride 94 (L) 100 - 108 mmol/L    CO2 28 21 - 32 mmol/L    ANION GAP 9 4 - 13 mmol/L    BUN 29 (H) 5 - 25 mg/dL    Creatinine 1 07 0 60 - 1 30 mg/dL    Glucose 84 65 - 140 mg/dL    Calcium 9 7 8 3 - 10 1 mg/dL    Corrected Calcium 11 5 (H) 8 3 - 10 1 mg/dL    AST 61 (H) 5 - 45 U/L    ALT 75 12 - 78 U/L    Alkaline Phosphatase 282 (H) 46 - 116 U/L    Total Protein 5 5 (L) 6 4 - 8 2 g/dL    Albumin 1 8 (L) 3 5 - 5 0 g/dL    Total Bilirubin 0 46 0 20 - 1 00 mg/dL    eGFR 81 ml/min/1 73sq m   TSH    Collection Time: 08/11/21 11:04 AM   Result Value Ref Range    TSH 3RD GENERATON 3 857 (H) 0 358 - 3 740 uIU/mL   Magnesium    Collection Time: 08/11/21 11:04 AM   Result Value Ref Range    Magnesium 2 1 1 6 - 2 6 mg/dL   Lipase    Collection Time: 08/11/21 11:04 AM   Result Value Ref Range    Lipase 141 73 - 393 u/L   Troponin I    Collection Time: 08/11/21 11:04 AM   Result Value Ref Range    Troponin I <0 02 <=0 04 ng/mL   NT-BNP PRO    Collection Time: 08/11/21 11:04 AM   Result Value Ref Range    NT-proBNP 924 (H) <125 pg/mL   Lactic acid    Collection Time: 08/11/21 11:04 AM   Result Value Ref Range    LACTIC ACID 3 7 (HH) 0 5 - 2 0 mmol/L   Manual Differential(PHLEBS Do Not Order)    Collection Time: 08/11/21 11:04 AM   Result Value Ref Range    Segmented % 75 43 - 75 %    Bands % 7 0 - 8 %    Lymphocytes % 12 (L) 14 - 44 %    Monocytes % 3 (L) 4 - 12 %    Eosinophils, % 0 0 - 6 %    Basophils % 0 0 - 1 %    Metamyelocytes% 1 0 - 1 %    Myelocytes % 2 (H) 0 - 1 %    Absolute Neutrophils 19 75 (H) 1 85 - 7 62 Thousand/uL    Lymphocytes Absolute 2 89 0 60 - 4 47 Thousand/uL    Monocytes Absolute 0 72 0 00 - 1 22 Thousand/uL    Eosinophils Absolute 0 00 0 00 - 0 40 Thousand/uL    Basophils Absolute 0 00 0 00 - 0 10 Thousand/uL    Total Counted 100     nRBC 1 0 - 2 /100 WBC    Anisocytosis Present     Polychromasia Present     Platelet Estimate Adequate Adequate    Large Platelet Present    Urine Macroscopic, POC    Collection Time: 08/11/21 12:25 PM   Result Value Ref Range    Color, UA Yellow     Clarity, UA Clear     pH, UA 6 0 4 5 - 8 0    Leukocytes, UA Negative Negative    Nitrite, UA Negative Negative    Protein, UA Negative Negative mg/dl    Glucose, UA Negative Negative mg/dl    Ketones, UA Negative Negative mg/dl    Urobilinogen, UA 1 0 0 2, 1 0 E U /dl E U /dl    Bilirubin, UA Negative Negative    Blood, UA Negative Negative    Specific Gravity, UA <=1 005 1 003 - 1 030       Imaging Studies: I have personally reviewed pertinent reports  and I have personally reviewed pertinent films in PACS  CTH- 1 3 cm low-attenuation right superior cerebellar hemispheric low-attenuation area suspicious for parenchymal metastasis, unchanged from the MRI of 8/2/2021, without evidence for hemorrhage or abnormal mass effect  Two abnormal left occipital scalp lesions as noted, previously identified and suspicious for atelectasis  No definite calvarial osseus involvement      VTE Prophylaxis: Enoxaparin (Lovenox)

## 2021-08-11 NOTE — H&P
[address] [facility]  [facility] 1970[facility] 48 [facility]  [facility] MRN: [id number]  Unit/Bed#: ICU 08 Encounter: [id number]  Primary Care Provider: No primary care provider on file  Date and time admitted to hospital: 8/11/2021 10:35 AM    * Seizure-like activity [facility])  Assessment & Plan  Patient was noted to have for seizures since early this morning  He had no history of epilepsy and path  Patient has no fever, HA, or mental status change  No leukocytosis was noted  CT of the head showed 1 3 cm low-attenuation right superior cerebellar hemispheric low-attenuation area suspicious for parenchymal  metastasis, unchanged from the MRI of 8/2/2021, without evidence for hemorrhage or abnormal mass effect  Prior to ED arrival, he received 2 doses of Versed and received another dose of Keppra 3200mg in the ED  Etiology most likely secondary to brain metastasis   EEG: pending  Workup:   Blood glucose : 84   BMP unremarkable, mag WNL, Ca: 11 5   UA pending   Blood Culture: pending    Plan:   Continue Seizure precautions   Continue keppra 750 mg Q12H   Neuro Consulted  Recommendation appreciated   Continue frequent neuro checks, please notify with any change   Ativan Q4H PRN for seizure like activity >30 seconds        Severe sepsis [facility])  Assessment & Plan  Patient does complain of sore throat  Pharyngeal erythema was noted  SIRS criteria:  Tachycardia 103  BP at one point was 89/53  Leukocytosis with white count 24 08  Lactic acid : 3 7  CT of the neck in July 2021 showed necrotizing infection/neoplasm of the neck  He did complete a course of cefepime, clindamycin, and vancomycin during the last admission  Will empirically treat head and neck infection with cefepime for now  Also likely secondary to metastastic cancer  COVID is negative  Blood culture is pending  UA is bland  Continue IV fluids at 125 cc/hour        Metastatic cancer 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  Scalp punch biopsy still pending  He received radiation therapy for the brain metastasis over at [place]  Patient will need to follow up with oncology  Will have goals of care discussion with the patient  BEE (acute kidney injury) ([facility] [address] )  Assessment & Plan  Creatinine POA 1 07  Baseline appeared to be 0 43-0 63  Patient reported poor intake for the past 2 days  Urinalysis was bland  Hypotension was noted in the ED  Etiology likely secondary to hypovolemia vs sepsis  Continue IV hydration at 125 cc/hour  Strict intake and output  Avoid nephrotoxins  Avoid hypotension  Atrial fibrillation [facility])  Assessment & Plan  New onset a fib noted during last admission  Will hold lopressor at this time due to hypotension  Continue eliquis    Constipation due to opioid therapy  Assessment & Plan  Will continue his home bowel regimen for now  Will hold bowel regimen if develop loose stool or diarrhea  Will hold pain medication for now due to hypotension    Pressure ulcer of coccygeal region, unspecified pressure ulcer stage  Assessment & Plan  Pressure ulcer noted over the sacrum and upper back  Appeared to be stage 2 ulcer  Wound care  Severe protein-calorie malnutrition ([facility] [address] )  Assessment & Plan  Malnutrition Findings:           BMI Findings: Body mass index is 16 68 kg/m²  Encourage oral intake  Will order remron if patient continue to be unable to eat    Compression fracture of T11 vertebra [facility])  Assessment & Plan  Secondary to metastatic lytic bone lesion        Subclavian vein thrombosis (HCC)  Assessment & Plan  Possible thrombus right subclavian vein extending into the brachiocephalic vein  S/p heparin therapy  Will continue eliquis for now        -------------------------------------------------------------------------------------------------------------  Chief Complaint: multiple seizure this morning    History of Present Illness     Joana Mckinley is a 48 y o  male with PMH of metastatic cancer, with unknown origin, atrial fibrillation, subclavian vein thromboembolism, fracture compression fracture of T11, atrial fibrillation, who presents with multiple seizures since this morning  Patient stated that had about 4 seizures this morning, as well as sore throat started this morning  He denies fever, chills, shortness of breath, chest pain, cough, neck pain, diarrhea urinary symptoms or headaches  He stated that last bowel movement was 2 days ago  Prior to arrival to the ED, he received 2 doses of Versed by EMS  He further received a loading dose Keppra 3200 mg in the ED  Hypotension was noted with BP 89-97/53-55  Tachycardia with heart rate of 103  Leukocytosis of 24 08  BMP was notable for creatinine 1 07  Lactic acid was originally 3 4 trended down to 2 4 after fluid resuscitation  Chest x-ray denies showed acute cardiopulmonary disease  CT of the head showed parenchymal metastasis consistent with prior MRI brain result  Previously, MRI on August 2nd showed lytic bone metastasis, liver metastasis, adrenal gland metastasis, and paraspinal soft tissue metastasis  CT of the abdomen and pelvis show suspicious pancreatic cancer with liver metastasis  CTA of the chest shows likely lung metastasis/lung cancer as well as thrombus in subclavian vein extended to brachial cephalic vein  CT of the neck also showed necrotizing infection of the neck, neoplasm cannot be ruled out  Last admission in [facility], biopsy of the lymph node of the neck showed non-small cell carcinoma  Liver biopsy showed 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    He had completed cefepime, clindamycin, and vancomycin treatment for presumed sepsis  Blood culture and MRSA were negative at the time  Radiation of the CS mets x 10 was also started during that admission; CT for radiation therapy was 7/29/21 and subsequent radiation therapy was done on 7/30/21, 8/2/21, 8/3/21, 8/4/21  He was discharged with decadron taper  History obtained from chart review and the patient   -------------------------------------------------------------------------------------------------------------  Dispo: Admit to Stepdown Level 1    Code Status: Level 1 - Full Code  --------------------------------------------------------------------------------------------------------------  Review of Systems   Constitutional: Positive for appetite change, fatigue and unexpected weight change  Negative for chills and fever  HENT: Positive for sore throat  Negative for ear pain  Eyes: Negative for pain and visual disturbance  Respiratory: Negative for cough and shortness of breath  Cardiovascular: Negative for chest pain and palpitations  Gastrointestinal: Negative for abdominal pain, diarrhea, nausea and vomiting  Genitourinary: Negative for dysuria, frequency and hematuria  Musculoskeletal: Negative for arthralgias and back pain  Skin: Negative for color change and rash  Neurological: Positive for seizures  Negative for syncope and headaches  All other systems reviewed and are negative  A 12-point, complete review of systems was reviewed and negative except as stated above     Physical Exam  Vitals and nursing note reviewed  Constitutional:       Appearance: He is well-developed  He is ill-appearing (cachetic)  HENT:      Head: Normocephalic and atraumatic  Mouth/Throat:     Eyes:      Conjunctiva/sclera: Conjunctivae normal    Cardiovascular:      Rate and Rhythm: Regular rhythm  Tachycardia present  Heart sounds: No murmur heard       Pulmonary:      Effort: Pulmonary effort is normal  No respiratory distress  Breath sounds: Normal breath sounds  Abdominal:      Palpations: Abdomen is soft  Tenderness: There is no abdominal tenderness  Musculoskeletal:      Cervical back: Neck supple  Right lower leg: No edema  Left lower leg: No edema  Comments: Amputation of the left second and fourth digit  Skin:     General: Skin is warm and dry  Findings: Erythema and lesion present  Neurological:      Mental Status: He is alert        --------------------------------------------------------------------------------------------------------------  Vitals:   Vitals:    08/11/21 1400 08/11/21 1429 08/11/21 1440 08/11/21 1629   BP: (!) 81/53 (!) 75/52  (!) 67/43   BP Location: Right arm      Pulse: (!) 112 (!) 110  (!) 112   Resp: 18 (!) 27  21   Temp:   98 8 °F (37 1 °C)    TempSrc:   Temporal    SpO2: 95%   98%   Weight:   48 3 kg (106 lb 7 7 oz)    Height:   5' 7" (1 702 m)      Temp  Min: 98 8 °F (37 1 °C)  Max: 99 3 °F (37 4 °C)  IBW (Ideal Body Weight): 66 1 kg  Height: 5' 7" (170 2 cm)  Body mass index is 16 68 kg/m²      Laboratory and Diagnostics:  Results from last 7 days   Lab Units 08/11/21  1104 08/06/21  0541 08/05/21  0603   WBC Thousand/uL 24 08* 30 71* 37 10*   HEMOGLOBIN g/dL 11 3* 11 8* 11 2*   HEMATOCRIT % 35 6* 37 2 36 2*   PLATELETS Thousands/uL 153 299 383   BANDS PCT % 7  --   --    MONO PCT % 3*  --   --      Results from last 7 days   Lab Units 08/11/21  1104 08/06/21  0541 08/05/21  0603   SODIUM mmol/L 131* 131* 133*   POTASSIUM mmol/L 4 5 4 6 4 4   CHLORIDE mmol/L 94* 98* 97*   CO2 mmol/L 28 30 29   ANION GAP mmol/L 9 3* 7   BUN mg/dL 29* 30* 27*   CREATININE mg/dL 1 07 0 65 0 70   CALCIUM mg/dL 9 7 9 3 9 4   GLUCOSE RANDOM mg/dL 84 96 108   ALT U/L 75  --  76   AST U/L 61*  --  46*   ALK PHOS U/L 282*  --  266*   ALBUMIN g/dL 1 8*  --  2 7*   TOTAL BILIRUBIN mg/dL 0 46  --  0 37     Results from last 7 days   Lab Units 08/11/21  2382 MAGNESIUM mg/dL 2 1      Results from last 7 days   Lab Units 08/11/21  1104   INR  1 40*   PTT seconds 33      Results from last 7 days   Lab Units 08/11/21  1104   TROPONIN I ng/mL <0 02     Results from last 7 days   Lab Units 08/11/21  1310 08/11/21  1104   LACTIC ACID mmol/L 2 4* 3 7*     ABG:    VBG:          Micro:        EKG: sinus tachycardia  Imaging: I have personally reviewed pertinent reports  and I have personally reviewed pertinent films in PACS      Historical Information   History reviewed  No pertinent past medical history  Past Surgical History:   Procedure Laterality Date    FACIAL/NECK BIOPSY Bilateral 7/26/2021    Procedure: INCISIONAL BIOPSY LEFT NECK MASS,AND EXCISIOINAL BIOPSY RIGHT NECK MASS;  Surgeon: Sandra Kirkpatrick MD;  Location: [facility];  Service: Surgical Oncology    HAND SURGERY      left hand    IR BIOPSY LIVER MASS  7/30/2021     Social History   Social History     Substance and Sexual Activity   Alcohol Use Not Currently     Social History     Substance and Sexual Activity   Drug Use Yes    Types: Marijuana     Social History     Tobacco Use   Smoking Status Current Every Day Smoker    Packs/day: 0 25   Smokeless Tobacco Never Used       Family History:   History reviewed  No pertinent family history    I have reviewed this patient's family history and commented on sigificant items within the HPI      Medications:  Current Facility-Administered Medications   Medication Dose Route Frequency    apixaban (ELIQUIS) tablet 5 mg  5 mg Oral BID    bisacodyl (DULCOLAX) rectal suppository 10 mg  10 mg Rectal Daily PRN    calcium carbonate (TUMS) chewable tablet 500 mg  500 mg Oral Daily PRN    cefepime (MAXIPIME) 2 g/50 mL dextrose IVPB  2,000 mg Intravenous Q12H    [START ON 8/12/2021] dexamethasone (DECADRON) tablet 2 mg  2 mg Oral Daily    lactated ringers infusion  125 mL/hr Intravenous Continuous    levETIRAcetam (KEPPRA) 750 mg in sodium chloride 0 9 % 100 mL IVPB 750 mg Intravenous Q12H [facility]    LORazepam (ATIVAN) injection 1 mg  1 mg Intravenous Q4H PRN    morphine injection 2 mg  2 mg Intravenous Q4H PRN    oxyCODONE (ROXICODONE) IR tablet 5 mg  5 mg Oral Q4H PRN    polyethylene glycol (MIRALAX) packet 17 g  17 g Oral BID    senna (SENOKOT) tablet 8 6 mg  8 6 mg Oral BID     Home medications:  Prior to Admission Medications   Prescriptions Last Dose Informant Patient Reported? Taking?   acetaminophen (TYLENOL) 325 mg tablet 8/11/2021 at Unknown time  No Yes   Sig: Take 2 tablets (650 mg total) by mouth every 6 (six) hours as needed for mild pain   apixaban (ELIQUIS) 5 mg 8/11/2021 at Unknown time  No Yes   Sig: Take 1 tablet (5 mg total) by mouth 2 (two) times a day   bisacodyl (DULCOLAX) 10 mg suppository 8/11/2021 at Unknown time  No Yes   Sig: Insert 1 suppository (10 mg total) into the rectum daily as needed for constipation (no bowel movement in 3 days)   calcium carbonate (TUMS) 500 mg chewable tablet 8/11/2021 at Unknown time  No Yes   Sig: Chew 1 tablet (500 mg total) daily as needed for indigestion or heartburn (abdominal pain after meals)   dexamethasone (DECADRON) 4 mg tablet 8/11/2021 at Unknown time  No Yes   Sig: Take 1 tablet (4 mg total) by mouth every 12 (twelve) hours for 2 days, THEN 0 5 tablets (2 mg total) every 12 (twelve) hours for 3 days, THEN 0 5 tablets (2 mg total) daily with breakfast for 2 days  metoprolol tartrate (LOPRESSOR) 100 mg tablet 8/11/2021 at Unknown time  No Yes   Sig: Take 0 5 tablets (50 mg total) by mouth every 12 (twelve) hours   morphine (MS CONTIN) 15 mg 12 hr tablet 8/11/2021 at Unknown time  No Yes   Sig: Take 1 tablet (15 mg total) by mouth every 8 (eight) hours for 14 daysMax Daily Amount: 45 mg   oxyCODONE (ROXICODONE) 5 mg immediate release tablet 8/11/2021 at Unknown time  No Yes   Sig: Take 1-2 tablets (5 - 10 mg total) up to once every 4 hours as needed for moderate to severe pain     polyethylene glycol (GLYCOLAX) 17 GM/SCOOP powder 8/11/2021 at Unknown time  No Yes   Sig: Take 17 g by mouth 2 (two) times a day as needed (constipation) for up to 28 doses   polyethylene glycol (MIRALAX) 17 g packet 8/11/2021 at Unknown time  No Yes   Sig: Take 17 g by mouth 2 (two) times a day   senna (SENOKOT) 8 6 mg 8/11/2021 at Unknown time  No Yes   Sig: Take 1 tablet (8 6 mg total) by mouth 2 (two) times a day Do not take if you are having loose stools or frequent stools  Facility-Administered Medications: None     Allergies:  No Known Allergies    ------------------------------------------------------------------------------------------------------------  Advance Directive and Living Will:      Power of :    POLST:    ------------------------------------------------------------------------------------------------------------  Anticipated Length of Stay is > 2 midnights    Care Time Delivered:   Upon my evaluation, this patient had a high probability of imminent or life-threatening deterioration due to seizure, which required my direct attention, intervention, and personal management  I have personally provided 30 minutes (1330 to 1400) of critical care time, exclusive of procedures, teaching, family meetings, and any prior time recorded by providers other than myself  Cleve Hitchcock MD        Portions of the record may have been created with voice recognition software  Occasional wrong word or "sound a like" substitutions may have occurred due to the inherent limitations of voice recognition software    Read the chart carefully and recognize, using context, where substitutions have occurred

## 2021-08-11 NOTE — ASSESSMENT & PLAN NOTE
New onset a fib noted during last admission  Will hold lopressor at this time due to hypotension     Continue eliquis

## 2021-08-11 NOTE — SEPSIS NOTE
Sepsis Note   Payam Barlow 48 y o  male MRN: [id number]  Unit/Bed#: ED 17 Encounter: [id number]      qSOFA     Row Name 08/11/21 1230 08/11/21 1149 08/11/21 1040          Altered mental status GCS < 15  --  --  --      Respiratory Rate > / =22  1  0  1      Systolic BP < / =320  1  1  1      Q Sofa Score  2  1  2          Initial Sepsis Screening     Row Name 08/11/21 1242                Is the patient's history suggestive of a new or worsening infection? No  -CS        Suspected source of infection  --        Are two or more of the following signs & symptoms of infection both present and new to the patient? (!) Yes (Proceed)  -CS        Indicate SIRS criteria  Tachycardia > 90 bpm;Tachypnea > 20 resp per min  -CS        If the answer is yes to both questions, suspicion of sepsis is present  --        If severe sepsis is present AND tissue hypoperfusion perists in the hour after fluid resuscitation or lactate > 4, the patient meets criteria for SEPTIC SHOCK  --        Are any of the following organ dysfunction criteria present within 6 hours of suspected infection and SIRS criteria that are NOT considered to be chronic conditions?   (!) Yes  -CS        Organ dysfunction  Lactate > 2 0 mmol/L  -CS        Date of presentation of severe sepsis  --        Time of presentation of severe sepsis  --        Tissue hypoperfusion persists in the hour after crystalloid fluid administration, evidenced, by either:  --        Was hypotension present within one hour of the conclusion of crystalloid fluid administration?  --        Date of presentation of septic shock  --        Time of presentation of septic shock  --          User Key  (r) = Recorded By, (t) = Taken By, (c) = Cosigned By    [id number] Name Provider Type    CS Fifi Garber MD Physician

## 2021-08-11 NOTE — ASSESSMENT & PLAN NOTE
Will continue his home bowel regimen for now  Will hold bowel regimen if develop loose stool or diarrhea    Will hold pain medication for now due to hypotension

## 2021-08-11 NOTE — ASSESSMENT & PLAN NOTE
During last admission in July at [place], he was found to have extensive metastatic cancer, brain, liver, lungs with lytic bone lesion  CA 19-9: 5418  Currently uncertain primary tumor  Lymph node biopsy of the neck did show non-small cell carcinoma  Liver biopsies show non small cell carcinoma favoring pancreatic biliary origin  Scalp punch biopsy still pending  He received radiation therapy for the brain metastasis over at [place]  Patient will need to follow up with oncology  Will continue to have goals of care discussion with the patient and family

## 2021-08-11 NOTE — ASSESSMENT & PLAN NOTE
Possible thrombus right subclavian vein extending into the brachiocephalic vein  S/p heparin therapy  Will continue eliquis for now

## 2021-08-11 NOTE — ASSESSMENT & PLAN NOTE
Creatinine POA 1 07  Baseline appeared to be 0 43-0 63  Patient reported poor intake for the past 2 days  Urinalysis was bland  Hypotension was noted in the ED  Etiology likely secondary to hypovolemia vs sepsis  Continue IV hydration at 125 cc/hour  Strict intake and output  Avoid nephrotoxins  Avoid hypotension

## 2021-08-11 NOTE — ED PROVIDER NOTES
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  Not currently un  Severity:  Unable to specify  Onset quality:  Sudden  Duration: 1st seizure like activity at 9 pm last night  Timing:  Intermittent  Progression:  Unable to specify  Chronicity:  New  Relieved by: Was given Versed  Worsened by:  Metastatic cancer  Ineffective treatments:  Not treated for seizures  Associated symptoms: no abdominal pain, no chest pain, no cough, no fever, no headaches, no nausea, no shortness of breath and no vomiting    Most of the history obtained about what happened came from the patient's girlfriend here in the emergency department  Apparently the 1st seizure-like activity was at 9:00 p m  Last night lasting several minutes and apparently then he seemed to be okay  She does admit that he is very weak and apparently it was rather news to her that he had cancer  He then over the course of the evening had several other episodes similar with seizure-like activity which could have been syncopal seizures verses seizure disorder  He has a minute seen on MRI of his brain and has been on Decadron but no seizure medicine  He is anticoagulated  He has not been having a fever  Decreased p o  Intake  There has been no treatment for this cancer  Apparently was also in the hospital the diagnosis of sepsis but there were cultures were negative and there was no source of any type of infection    White count could have been elevated from medication treatment, his cancer  He was not discharged on antibiotics  Neurosurgery did not plan on any operative intervention  The plan was for outpatient follow-up with Hematology-Oncology and pale ED of care had seen him however the patient did not want to discuss any goals at that time and they provided with some pain medication for home  Patient had a seizure-like episode when EMS arrived at the house and they gave him Versed  Prior to Admission Medications   Prescriptions Last Dose Informant Patient Reported? Taking?   acetaminophen (TYLENOL) 325 mg tablet   No No   Sig: Take 2 tablets (650 mg total) by mouth every 6 (six) hours as needed for mild pain   apixaban (ELIQUIS) 5 mg   No No   Sig: Take 1 tablet (5 mg total) by mouth 2 (two) times a day   bisacodyl (DULCOLAX) 10 mg suppository   No No   Sig: Insert 1 suppository (10 mg total) into the rectum daily as needed for constipation (no bowel movement in 3 days)   calcium carbonate (TUMS) 500 mg chewable tablet   No No   Sig: Chew 1 tablet (500 mg total) daily as needed for indigestion or heartburn (abdominal pain after meals)   dexamethasone (DECADRON) 4 mg tablet   No No   Sig: Take 1 tablet (4 mg total) by mouth every 12 (twelve) hours for 2 days, THEN 0 5 tablets (2 mg total) every 12 (twelve) hours for 3 days, THEN 0 5 tablets (2 mg total) daily with breakfast for 2 days  metoprolol tartrate (LOPRESSOR) 100 mg tablet   No No   Sig: Take 0 5 tablets (50 mg total) by mouth every 12 (twelve) hours   morphine (MS CONTIN) 15 mg 12 hr tablet   No No   Sig: Take 1 tablet (15 mg total) by mouth every 8 (eight) hours for 14 daysMax Daily Amount: 45 mg   oxyCODONE (ROXICODONE) 5 mg immediate release tablet   No No   Sig: Take 1-2 tablets (5 - 10 mg total) up to once every 4 hours as needed for moderate to severe pain     polyethylene glycol (GLYCOLAX) 17 GM/SCOOP powder   No No   Sig: Take 17 g by mouth 2 (two) times a day as needed (constipation) for up to 28 doses   polyethylene glycol (MIRALAX) 17 g packet   No No   Sig: Take 17 g by mouth 2 (two) times a day   senna (SENOKOT) 8 6 mg   No No   Sig: Take 1 tablet (8 6 mg total) by mouth 2 (two) times a day Do not take if you are having loose stools or frequent stools  Facility-Administered Medications: None       History reviewed  No pertinent past medical history  Past Surgical History:   Procedure Laterality Date    FACIAL/NECK BIOPSY Bilateral 7/26/2021    Procedure: INCISIONAL BIOPSY LEFT NECK MASS,AND EXCISIOINAL BIOPSY RIGHT NECK MASS;  Surgeon: Rodger Moser MD;  Location: [facility];  Service: Surgical Oncology    HAND SURGERY      left hand    IR BIOPSY LIVER MASS  7/30/2021       History reviewed  No pertinent family history  I have reviewed and agree with the history as documented  E-Cigarette/Vaping    E-Cigarette Use Never User      E-Cigarette/Vaping Substances     Social History     Tobacco Use    Smoking status: Current Every Day Smoker     Packs/day: 0 25    Smokeless tobacco: Never Used   Vaping Use    Vaping Use: Never used   Substance Use Topics    Alcohol use: Not Currently    Drug use: Yes     Types: Marijuana       Review of Systems   Unable to perform ROS: Acuity of condition   Constitutional: Positive for appetite change and unexpected weight change  Negative for fever  Respiratory: Negative for cough, chest tightness and shortness of breath  Cardiovascular: Negative for chest pain and leg swelling  Gastrointestinal: Negative for abdominal pain, nausea and vomiting  Musculoskeletal: Positive for arthralgias  Neurological: Positive for seizures and weakness  Negative for facial asymmetry and headaches  Generalized weakness per girlfriend       Physical Exam  Physical Exam  Vitals and nursing note reviewed  Constitutional:       General: He is not in acute distress  Appearance: He is well-developed  He is cachectic   He is ill-appearing  He is not toxic-appearing or diaphoretic  HENT:      Head: Normocephalic and atraumatic  Right Ear: Hearing normal  No drainage or swelling  Left Ear: Hearing normal  No drainage or swelling  Mouth/Throat:      Mouth: Mucous membranes are dry  Eyes:      General: Lids are normal          Right eye: No discharge  Left eye: No discharge  Extraocular Movements: Extraocular movements intact  Conjunctiva/sclera: Conjunctivae normal       Pupils: Pupils are equal, round, and reactive to light  Neck:      Vascular: No JVD  Trachea: Trachea normal    Cardiovascular:      Rate and Rhythm: Regular rhythm  Tachycardia present  Pulses: Normal pulses  Heart sounds: Normal heart sounds  No murmur heard  No friction rub  No gallop  Pulmonary:      Effort: Pulmonary effort is normal  No respiratory distress  Breath sounds: Normal breath sounds  No stridor  No wheezing or rales  Chest:      Chest wall: No tenderness  Abdominal:      Palpations: Abdomen is soft  Tenderness: There is no abdominal tenderness  There is no guarding or rebound  Musculoskeletal:         General: No tenderness  Normal range of motion  Cervical back: Normal range of motion  Right lower leg: No edema  Left lower leg: No edema  Comments: Missing fingers left hand which is held in claw-like shape  Skin:     General: Skin is warm and dry  Coloration: Skin is not pale  Findings: No rash  Neurological:      Mental Status: He is alert  He is confused  GCS: GCS eye subscore is 3  GCS verbal subscore is 5  GCS motor subscore is 6  Cranial Nerves: Cranial nerves are intact  No cranial nerve deficit, dysarthria or facial asymmetry  Sensory: Sensation is intact  No sensory deficit  Motor: Motor function is intact  No tremor, abnormal muscle tone or seizure activity     Psychiatric:         Speech: Speech normal          Behavior: Behavior is cooperative  Vital Signs  ED Triage Vitals [08/11/21 1040]   Temperature Pulse Respirations Blood Pressure SpO2   99 3 °F (37 4 °C) 103 22 92/53 99 %      Temp Source Heart Rate Source Patient Position - Orthostatic VS BP Location FiO2 (%)   Oral Monitor Lying Right arm --      Pain Score       --           Vitals:    08/11/21 1040 08/11/21 1149 08/11/21 1200 08/11/21 1230   BP: 92/53 (!) 89/54 97/55 95/54   Pulse: 103 102 96 100   Patient Position - Orthostatic VS: Lying Lying  Sitting         Visual Acuity  Visual Acuity      Most Recent Value   L Pupil Size (mm)  2   R Pupil Size (mm)  2          ED Medications  Medications   lactated ringers infusion (has no administration in time range)   lactated ringers bolus 600 mL (600 mL Intravenous New Bag 8/11/21 1210)   enoxaparin (LOVENOX) subcutaneous injection 40 mg (has no administration in time range)   midazolam (FOR EMS ONLY) (VERSED) 2 mg/2 mL injection 2 mg (0 mg Does not apply Given to EMS 8/11/21 1129)   levETIRAcetam (KEPPRA) 3,200 mg in sodium chloride 0 9 % 250 mL IVPB (0 mg Intravenous Stopped 8/11/21 1152)   lactated ringers bolus 500 mL (0 mL Intravenous Stopped 8/11/21 1210)   cefepime (MAXIPIME) 2 g/50 mL dextrose IVPB (2,000 mg Intravenous New Bag 8/11/21 1202)   lactated ringers bolus 500 mL (0 mL Intravenous Stopped 8/11/21 1209)       Diagnostic Studies  Results Reviewed     Procedure Component Value Units Date/Time    TSH [[id number]]  (Abnormal) Collected: 08/11/21 1104    Lab Status: Final result Specimen: Blood from Line, Arterial Updated: 08/11/21 1249     TSH 3RD GENERATON 3 857 uIU/mL     Narrative:      Patients undergoing fluorescein dye angiography may retain small amounts of fluorescein in the body for 48-72 hours post procedure  Samples containing fluorescein can produce falsely depressed TSH values  If the patient had this procedure,a specimen should be resubmitted post fluorescein clearance        Magnesium [[id number]]  (Normal) Collected: 08/11/21 1104    Lab Status: Final result Specimen: Blood from Line, Arterial Updated: 08/11/21 1249     Magnesium 2 1 mg/dL     Lipase [[id number]]  (Normal) Collected: 08/11/21 1104    Lab Status: Final result Specimen: Blood from Line, Arterial Updated: 08/11/21 1249     Lipase 141 u/L     NT-BNP PRO [[id number]]  (Abnormal) Collected: 08/11/21 1104    Lab Status: Final result Specimen: Blood from Line, Arterial Updated: 08/11/21 1249     NT-proBNP 924 pg/mL     POCT urinalysis dipstick [[id number]]  (Normal) Resulted: 08/11/21 1229    Lab Status: Final result Specimen: Urine Updated: 08/11/21 1229    Urine Macroscopic, POC [[id number]] Collected: 08/11/21 1225    Lab Status: Final result Specimen: Urine Updated: 08/11/21 1226     Color, UA Yellow     Clarity, UA Clear     pH, UA 6 0     Leukocytes, UA Negative     Nitrite, UA Negative     Protein, UA Negative mg/dl      Glucose, UA Negative mg/dl      Ketones, UA Negative mg/dl      Urobilinogen, UA 1 0 E U /dl      Bilirubin, UA Negative     Blood, UA Negative     Specific Gravity, UA <=1 005    Narrative:      CLINITEK RESULT    Novel Coronavirus [place] - 2 Hour Stat [[id number]] Collected: 08/11/21 1212    Lab Status:  In process Specimen: Nares from Nose Updated: 08/11/21 1225    Manual Differential(PHLEBS Do Not Order) [[id number]]  (Abnormal) Collected: 08/11/21 1104    Lab Status: Final result Specimen: Blood from Line, Arterial Updated: 08/11/21 1158     Segmented % 75 %      Bands % 7 %      Lymphocytes % 12 %      Monocytes % 3 %      Eosinophils, % 0 %      Basophils % 0 %      Metamyelocytes% 1 %      Myelocytes % 2 %      Absolute Neutrophils 19 75 Thousand/uL      Lymphocytes Absolute 2 89 Thousand/uL      Monocytes Absolute 0 72 Thousand/uL      Eosinophils Absolute 0 00 Thousand/uL      Basophils Absolute 0 00 Thousand/uL      Total Counted 100     nRBC 1 /100 WBC      Anisocytosis Present     Polychromasia Present     Platelet Estimate Adequate     Large Platelet Present    Lactic acid [[id number]]  (Abnormal) Collected: 08/11/21 1104    Lab Status: Final result Specimen: Blood from Line, Arterial Updated: 08/11/21 1156     LACTIC ACID 3 7 mmol/L     Narrative:      Result may be elevated if tourniquet was used during collection      Lactic acid 2 Hours [[id number]]     Lab Status: No result Specimen: Blood     Troponin I [[id number]]  (Normal) Collected: 08/11/21 1104    Lab Status: Final result Specimen: Blood from Line, Arterial Updated: 08/11/21 1143     Troponin I <0 02 ng/mL     Comprehensive metabolic panel [[id number]]  (Abnormal) Collected: 08/11/21 1104    Lab Status: Final result Specimen: Blood from Line, Arterial Updated: 08/11/21 1141     Sodium 131 mmol/L      Potassium 4 5 mmol/L      Chloride 94 mmol/L      CO2 28 mmol/L      ANION GAP 9 mmol/L      BUN 29 mg/dL      Creatinine 1 07 mg/dL      Glucose 84 mg/dL      Calcium 9 7 mg/dL      Corrected Calcium 11 5 mg/dL      AST 61 U/L      ALT 75 U/L      Alkaline Phosphatase 282 U/L      Total Protein 5 5 g/dL      Albumin 1 8 g/dL      Total Bilirubin 0 46 mg/dL      eGFR 81 ml/min/1 73sq m     Narrative:      [place] guidelines for Chronic Kidney Disease (CKD):     Stage 1 with normal or high GFR (GFR > 90 mL/min/1 73 square meters)    Stage 2 Mild CKD (GFR = 60-89 mL/min/1 73 square meters)    Stage 3A Moderate CKD (GFR = 45-59 mL/min/1 73 square meters)    Stage 3B Moderate CKD (GFR = 30-44 mL/min/1 73 square meters)    Stage 4 Severe CKD (GFR = 15-29 mL/min/1 73 square meters)    Stage 5 End Stage CKD (GFR <15 mL/min/1 73 square meters)  Note: GFR calculation is accurate only with a steady state creatinine    Protime-INR [[id number]]  (Abnormal) Collected: 08/11/21 1104    Lab Status: Final result Specimen: Blood from Line, Arterial Updated: 08/11/21 1130     Protime 16 9 seconds      INR 1 40    APTT [[id number]] (Normal) Collected: 08/11/21 1104    Lab Status: Final result Specimen: Blood from Line, Arterial Updated: 08/11/21 1130     PTT 33 seconds     CBC and differential [[id number]]  (Abnormal) Collected: 08/11/21 1104    Lab Status: Final result Specimen: Blood from Line, Arterial Updated: 08/11/21 1124     WBC 24 08 Thousand/uL      RBC 3 73 Million/uL      Hemoglobin 11 3 g/dL      Hematocrit 35 6 %      MCV 95 fL      MCH 30 3 pg      MCHC 31 7 g/dL      RDW 20 3 %      MPV 10 1 fL      Platelets 539 Thousands/uL      nRBC 7 /100 WBCs     Narrative: This is an appended report  These results have been appended to a previously verified report  Blood culture #1 [[id number]] Collected: 08/11/21 1104    Lab Status: In process Specimen: Blood from Line, Arterial Updated: 08/11/21 1112    Blood culture #2 [[id number]] Collected: 08/11/21 1104    Lab Status: In process Specimen: Blood from Arm, Left Updated: 08/11/21 1112                 XR chest 1 view portable   ED Interpretation by Melody Santos MD (08/11 7291)   I have personally reviewed the x-ray and my findings are: no acute disease  CT head without contrast   Final Result by Sriram Mukherjee MD (08/11 1214)         1   1 3 cm low-attenuation right superior cerebellar hemispheric low-attenuation area suspicious for parenchymal  metastasis, unchanged from the MRI of 8/2/2021, without evidence for hemorrhage or abnormal mass effect  2   Two abnormal left occipital scalp lesions as noted, previously identified and suspicious for atelectasis  No definite calvarial osseous involvement      The study was marked in EPIC for immediate notification                    Workstation performed: [id number]                    Procedures  ECG 12 Lead Documentation Only    Date/Time: 8/11/2021 11:44 AM  Performed by: Melody Santos MD  Authorized by: Melody Santos MD     Indications / Diagnosis:  Seizures  ECG reviewed by me, the ED Provider: yes    Patient location:  ED  Interpretation:     Interpretation: abnormal    Rate:     ECG rate:  100    ECG rate assessment: tachycardic    Rhythm:     Rhythm: sinus tachycardia    Ectopy:     Ectopy: none    QRS:     QRS axis:  Normal    QRS intervals:  Normal  Conduction:     Conduction: normal    ST segments:     ST segments:  Abnormal    Elevation:  II, III, aVF and aVR  T waves:     T waves: inverted      Inverted:  AVL and aVR  Other findings:     Other findings comment:  Pr depressioninferiorly    CriticalCare Time  Performed by: Marcia Alvarez MD  Authorized by: Marcia Alvarez MD     Critical care provider statement:     Critical care time (minutes):  45    Critical care time was exclusive of:  Separately billable procedures and treating other patients and teaching time    Critical care was necessary to treat or prevent imminent or life-threatening deterioration of the following conditions: Multiple seizures, IV Keppra, dehydration, IV fluids, IV antibiotics, consultation Neurology, admission to critical care  Critical care was time spent personally by me on the following activities:  Obtaining history from patient or surrogate, development of treatment plan with patient or surrogate, discussions with consultants, evaluation of patient's response to treatment, examination of patient, review of old charts, re-evaluation of patient's condition, ordering and review of radiographic studies and ordering and review of laboratory studies    I assumed direction of critical care for this patient from another provider in my specialty: no               ED Course  ED Course as of Aug 11 1251   Wed Aug 11, 2021   1159 Sepsis alert was called but I do not have a source  Not even clear that this is an infectious presentation at all     LACTIC ACID(!!): 3 7                         Initial Sepsis Screening     Row Name 08/11/21 1242                Is the patient's history suggestive of a new or worsening infection? No  -CS        Suspected source of infection  --        Are two or more of the following signs & symptoms of infection both present and new to the patient? (!) Yes (Proceed)  -CS        Indicate SIRS criteria  Tachycardia > 90 bpm;Tachypnea > 20 resp per min  -CS        If the answer is yes to both questions, suspicion of sepsis is present  --        If severe sepsis is present AND tissue hypoperfusion perists in the hour after fluid resuscitation or lactate > 4, the patient meets criteria for SEPTIC SHOCK  --        Are any of the following organ dysfunction criteria present within 6 hours of suspected infection and SIRS criteria that are NOT considered to be chronic conditions? (!) Yes  -CS        Organ dysfunction  Lactate > 2 0 mmol/L  -CS        Date of presentation of severe sepsis  --        Time of presentation of severe sepsis  --        Tissue hypoperfusion persists in the hour after crystalloid fluid administration, evidenced, by either:  --        Was hypotension present within one hour of the conclusion of crystalloid fluid administration?  --        Date of presentation of septic shock  --        Time of presentation of septic shock  --          User Key  (r) = Recorded By, (t) = Taken By, (c) = Cosigned By    [address] Name Provider Type    Tomas Salas MD Physician                        MDM  Number of Diagnoses or Management Options  Abnormal EKG  Brain metastases ([facility] [address] )  Cervical spinal cord compression ([address] )  Metastatic cancer ([address] )  Seizure ([address] )  Seizure-like activity ([address] )  Diagnosis management comments: Patient is extremely ill gentleman  Concerning for the possibility of seizure disorder given the fact that he has had for seizure-like episodes so he was started on IV Keppra he was given pre-hospital benzodiazepine and has not had any more seizure-like activity here    The seizure-like activity could have also been syncopal type episode as the patient looks profoundly malnourished/dehydrated  I gave him 30 per kilos of IV fluids  His blood pressure improved  He is afebrile here  Has a very elevated white blood cell count however it was even higher over a [facility]  He was discharged from [facility] with a diagnosis of sepsis but all of his cultures were negative and this could all be due to his cancer  I spoke with Neurology who agreed with the [address] and they came down to see the patient feel he could remain here on the step-down unit  I spoke with the intensive care unit physician who accepts him to the step-down unit  We discussed the treatment thus far  I do not feel that the patient has any infectious presentation and that this is not sepsis but all this findings are more explained by his metastatic cancer/dehydration  He has also been on Decadron  I did cover him with antibiotics until we get some further information  Patient also did have an abnormal EKG with ST elevation seen in the inferior leads as well as some WI depression  His initial troponin is negative         Amount and/or Complexity of Data Reviewed  Clinical lab tests: ordered and reviewed  Tests in the radiology section of CPT®: ordered and reviewed  Tests in the medicine section of CPT®: ordered and reviewed  Review and summarize past medical records: yes  Discuss the patient with other providers: yes  Independent visualization of images, tracings, or specimens: yes    Patient Progress  Patient progress: (Serious)      Disposition  Final diagnoses:   Seizure ([address] )   Metastatic cancer ([address] )   Cervical spinal cord compression ([address] )   Seizure-like activity ([address] )   Abnormal EKG   Brain metastases ([address] )     Time reflects when diagnosis was documented in both MDM as applicable and the Disposition within this note     Time User Action Codes Description Comment    8/11/2021 11:32 AM Caprice Ruiz Add [R56 9] Seizure ([address] )     8/11/2021 11:32 AM Caprice Ruiz Add [C79 9] Metastatic cancer ([address] )     8/11/2021 11:32 AM Lisa Chavez Add [G95 20] Cervical spinal cord compression ([address] )     8/11/2021 12:17 PM Lisa SCHMITT Add [R56 9] Seizure-like activity ([address] )     8/11/2021 12:46 PM Lisa Chavez Add [R94 31] Abnormal EKG     8/11/2021 12:47 PM Lisa Chavez Add [C79 31] Brain metastases [facility])       ED Disposition     ED Disposition Condition Date/Time Comment    Admit Stable Wed Aug 11, 2021 12:17 PM Case was discussed with Dr Sapphire Romo and the patient's admission status was agreed to be Admission Status: inpatient status to the service of Dr Sapphire Romo   Follow-up Information    None         Patient's Medications   Discharge Prescriptions    No medications on file     No discharge procedures on file      PDMP Review       Value Time User    PDMP Reviewed  Yes 8/5/2021  2:33 PM Geraldine Juan MD          ED Provider  Electronically Signed by           Noel Polk MD  08/11/21 9625

## 2021-08-12 PROBLEM — N17.9 AKI (ACUTE KIDNEY INJURY) (HCC): Status: RESOLVED | Noted: 2021-01-01 | Resolved: 2021-01-01

## 2021-08-12 PROBLEM — E87.1 HYPONATREMIA: Status: ACTIVE | Noted: 2021-01-01

## 2021-08-12 NOTE — ASSESSMENT & PLAN NOTE
Patient does complain of sore throat  Pharyngeal erythema was noted  SIRS criteria:  Tachycardia 103  BP at one point was 89/53  Leukocytosis with white count 24 08  Lactic acid : 3 7  CT of the neck in July 2021 showed necrotizing infection/neoplasm of the neck  He did complete a course of cefepime, clindamycin, and vancomycin during the last admission  Will empirically treat head and neck infection verses neoplasm with cefepime for now  COVID is negative  Blood culture is pending  UA is remarkable  Continue IV fluids-decreased to 75 cc/hour  Fluid bolus as needed for hypotension  Continue cefepime  Cultures pending  Procal elevated at 3 35  Repeat procal   Closely monitor WBC, temperature

## 2021-08-12 NOTE — CONSULTS
Consultation - Palliative & Supportive Care   Ariela Montana  48 y o   male  [address]*   MRN: [id number]  Encounter: [id number]    ASSESSMENT:    Patient Active Problem List   Diagnosis    Metastatic cancer ([facility] [address] )    Subclavian vein thrombosis (HCC)    Compression fracture of T11 vertebra (HCC)    Cervical stenosis of spinal canal    Anemia    Cervical spinal mass ([address] )    Scalp lesion    S/P lymph node biopsy    Severe protein-calorie malnutrition (HCC)    Pressure ulcer of coccygeal region, unspecified pressure ulcer stage    Mass of thoracic vertebra    Constipation due to opioid therapy    Severe sepsis (HCC)    Atrial fibrillation ([address] )    Oral ulcer    Cervical spinal cord compression ([facility] [address] )    Seizure-like activity (HCC)    Hyponatremia       Active problems addressed:  · Metastatic pancreatic cancer to the liver, lungs, brains, bones  · New-onset seizure activities vs Syncope  · Severe protein calorie malnutrition  · Compression fracture T11   · Cancer related pain  · Goals of care    Consult is for goals of care    PLAN:    1  Goals:    Patient remains treatment-focused and hopes to get cancer treatments  He has yet to establish with medical oncology as an OP   Did explain to him his seemingly poor prognosis given extent of cancer and poor performance status  Did talk about hospice as an option, but did explain that this is end of life care  At this time, he is not ready to die yet   D/w Dr Trudy Bullock    Code status: Level 1 - Full Code   Decisional apparatus:  Patient does have capacity to make medical decisions on my exam today  If such capacity is lost, daughter patient's substitute decision maker would default to d by [place]  Advance Directive / Living Will / POLST:  None on file    2  Social Support:   Supportive listening provided   Encouraged patient to talk to his wife/SO and daughter about cancer diagnosis  He worries about how hard itll be on them   But his advanced malignancy worries me and he should let them know  He said he will  3  Symptom management:   OxyIR 5mg PO q4H prn   Dilaudid 0 5mg IV q3H prn    No changes for now     Cont[place] Substance [place]    [place] or [place]  reviewed: No red flags were identified; safe to proceed with prescription  Lynita Ped 08/06/2021  1   08/05/2021  Morphine Sulf Er 15 MG Tablet  42 00  14 [facility]   [id number]   St ([id number])   0  45 00 MME  [place]   08/06/2021  1   08/05/2021  Oxycodone Hcl 5 MG Tablet  168 00  14 [facility]   [id number]   St ([id number])   0  90 00 MME  [place]       I have reviewed the patient's controlled substance dispensing history in the Prescription Drug Monitoring Program in compliance with the [facility] regulations before prescribing any controlled substances  We appreciate the opportunity to participate in this patient's care  We will continue to follow  Please do not hesitate to contact our on-call provider through our clinic answering service at [phone] should you have acute symptom control concerns  IDENTIFICATION:  Consults  Reason for Consult / Principal Problem: goals of care    HISTORY OF PRESENT ILLNESS:    Rocael Mcgregor is a 48 y o  male with metastatic cancer of possible pancreas origin, with mets to the scalp, brain (R superior cerebellar), bone (cervical spine), lungs, liver  He was admitted not too long ago at [facility] where he was started on RT to the cervical spine because of concerns for cord compression from cervical spine mets  Surgical resection was not recommended  in lieu of needing urgent chemoRT  Plan was to follow up with oncology ad jordan for chemotherapy and brain RT after CS RT  He has yet to establish with [facility] as he ended up back in the hospital this time around for new onset seizures, thought due to underlying brain mets  However, could also be syncope as EEG showing diffuse dysfunction with no epileptiform discharges   Nonetheless, he is currently receiving seizure precautions/prophylaxis  [facility] for [address]  Of note, he was seen by [facility] in [place] where he wished to receive treatments for cancer  Was also started on MS Contin 15 mg Q8H, roxicodone IR 5 and 10 mg Q4H PRN moderate/severe pain  Due to BEE and possibly hypotension, MS Contin stopped  Currently on just oxyIR 5mg  Met with patient in the ICU, no family member present at baseline  AAO x 3  A little slow to respond, but overall seems to be responding appropriately  He was able to say back what we discussed, including his cancer and treatments  He does complain of karen eback pain, but did not look uncomfortable  Rest of conversation as above  Interview and exam limited by: none     Review of Systems   Constitutional: Positive for fatigue and unexpected weight change  Negative for activity change, appetite change and chills  HENT: Negative for trouble swallowing  Respiratory: Negative for shortness of breath  Cardiovascular: Negative for chest pain  Gastrointestinal: Negative for abdominal pain, constipation, diarrhea, nausea and vomiting  Musculoskeletal: Positive for back pain  Neurological: Positive for weakness  Psychiatric/Behavioral: Negative for sleep disturbance  The patient is not nervous/anxious  All other systems reviewed and are negative  History reviewed  No pertinent past medical history    Past Surgical History:   Procedure Laterality Date    FACIAL/NECK BIOPSY Bilateral 7/26/2021    Procedure: INCISIONAL BIOPSY LEFT NECK MASS,AND EXCISIOINAL BIOPSY RIGHT NECK MASS;  Surgeon: Kylie Burns MD;  [facility]: [facility];  Service: Surgical Oncology    HAND SURGERY      left hand    IR BIOPSY LIVER MASS  7/30/2021     Social History     Socioeconomic History    Marital status: Single     Spouse name: Not on file    Number of children: Not on file    Years of education: Not on file    Highest education level: Not on file   Occupational History    Not on file   Tobacco Use    Smoking status: Current Every Day Smoker     Packs/day: 0 25    Smokeless tobacco: Never Used   Vaping Use    Vaping Use: Never used   Substance and Sexual Activity    Alcohol use: Not Currently    Drug use: Yes     Types: Marijuana    Sexual activity: Not on file   Other Topics Concern    Not on file   Social History Narrative    Not on file     Social Determinants of Health     Financial Resource Strain:     Difficulty of Paying Living Expenses:    Food Insecurity:     Worried About Running Out of Food in the Last Year:     Ran Out of Food in the Last Year:    Transportation Needs:     Lack of Transportation (Medical):  Lack of Transportation (Non-Medical):    Physical Activity:     Days of Exercise per Week:     Minutes of Exercise per Session:    Stress:     Feeling of Stress :    Social Connections:     Frequency of Communication with Friends and Family:     Frequency of Social Gatherings with Friends and Family:     Attends Temple Services:     Active Member of Clubs or Organizations:     Attends Club or Organization Meetings:     Marital Status:    Intimate Partner Violence:     Fear of Current or Ex-Partner:     Emotionally Abused:     Physically Abused:     Sexually Abused:      History reviewed  No pertinent family history  MEDICATIONS / ALLERGIES:  all current active meds have been reviewed    No Known Allergies    OBJECTIVE:  /65   Pulse (!) 112   Temp 98 °F (36 7 °C) (Temporal)   Resp 20   Ht 5' 7" (1 702 m)   Wt 51 4 kg (113 lb 5 1 oz)   SpO2 97%   BMI 17 75 kg/m²   Physical Exam:  Constitutional: Appears cachectic, acutely ill, not toxic looking In no acute physical or emotional distress  Head: Normocephalic and atraumatic  Eyes: EOM are normal  No ocular discharge  No scleral icterus  Neck: No visible adenopathy or masses  Respiratory: Effort normal  No stridor  No respiratory distress  Gastrointestinal: No abdominal distension  Musculoskeletal: No edema  Neurological: Alert, oriented and appropriately conversant  Slow to respond  Skin: Dry, no diaphoresis  Psychiatric: Displays a normal mood and affect  Behavior, judgment and thought content appear normal      Lab Results: I have personally reviewed pertinent labs  Imaging Studies: I have personally reviewed pertinent reports  EKG, Pathology, and Other Studies: I have personally reviewed pertinent reports  Counseling / Coordination of Care:  Counseling / Coordination of Care  Total floor / unit time spent today 60 minutes  Greater than 50% of total time was spent with the patient and / or family counseling and / or coordination of care  A description of the counseling / coordination of care: provided medical updates, discussed palliative care, discussed hospice care, determined competency, determined goals of care, determined POA, determined social/family support, discussed plans of care, discussed symptom management, provided psychosocial support       Vianey Perla MD  [address] and Supportive Care  [phone]

## 2021-08-12 NOTE — PROGRESS NOTES
[address] [facility]  Progress Note - Ariela Haji 1970, 48 y o  male MRN: [id number]  Unit/Bed#: ICU 11 Encounter: [id number]  Primary Care Provider: No primary care provider on file  Date and time admitted to hospital: 8/11/2021 10:35 AM    Severe sepsis [facility])  Assessment & Plan  Patient does complain of sore throat  Pharyngeal erythema was noted  SIRS criteria:  Tachycardia 103  BP at one point was 89/53  Leukocytosis with white count 24 08  Lactic acid : 3 7  CT of the neck in July 2021 showed necrotizing infection/neoplasm of the neck  He did complete a course of cefepime, clindamycin, and vancomycin during the last admission  Will empirically treat head and neck infection verses neoplasm with cefepime for now  COVID is negative  Blood culture is pending  UA is remarkable  Continue IV fluids-decreased to 75 cc/hour  Fluid bolus as needed for hypotension  Continue cefepime  Cultures pending  Procal elevated at 3 35  Repeat procal   Closely monitor WBC, temperature  * Seizure-like activity [facility])  Assessment & Plan  Patient was noted to have for seizures since early this morning  He had no history of epilepsy and path  Patient has no fever, HA, or mental status change  No leukocytosis was noted  CT of the head showed 1 3 cm low-attenuation right superior cerebellar hemispheric low-attenuation area suspicious for parenchymal  metastasis, unchanged from the MRI of 8/2/2021, without evidence for hemorrhage or abnormal mass effect  Prior to ED arrival, he received 2 doses of Versed and received another dose of Keppra 3200mg in the ED  Etiology most likely secondary to brain metastasis   EEG: pending  Workup:   Blood glucose : 84   BMP unremarkable, mag WNL, Ca: 11 5   UA pending   Blood Culture: pending    Plan:   Previous seizures versus syncopal events  Continue Seizure precautions   Continue keppra 750 mg Q12H   Neuro Consulted   Recommendation appreciated   Continue frequent neuro checks, please notify with any change   Ativan Q4H PRN for seizure like activity >30 seconds   No seizure-like activity noted in the ICU  EEG pending  BEE (acute kidney injury) (HCC)-resolved as of 8/12/2021  Assessment & Plan  Creatinine POA 1 07  Baseline appeared to be 0 43-0 63  Patient reported poor intake for the past 2 days  Urinalysis was bland  Hypotension was noted in the ED  Etiology likely secondary to hypovolemia vs sepsis  Continue IV hydration at 125 cc/hour  Strict intake and output  Avoid nephrotoxins  Avoid hypotension  CR this a m  0 57  Closely monitor  Continue IVF as stated above  Hyponatremia  Assessment & Plan  · Monitor    Atrial fibrillation [facility])  Assessment & Plan  New onset a fib noted during last admission  Will hold lopressor at this time due to hypotension  Continue eliquis    Constipation due to opioid therapy  Assessment & Plan  Will continue his home bowel regimen for now  Will hold bowel regimen if develop loose stool or diarrhea  Pressure ulcer of coccygeal region, unspecified pressure ulcer stage  Assessment & Plan  Pressure ulcer noted over the sacrum and upper back POA  Appeared to be stage 2 ulcer  Wound care with nursing  Severe protein-calorie malnutrition ([address] )  Assessment & Plan  Malnutrition Findings:           BMI Findings: Body mass index is 17 75 kg/m²  Encourage oral intake  Will order remron if patient continue to be unable to eat  Continue regular diet and t i d  Ensure  Consult nutrition  Compression fracture of T11 vertebra [facility])  Assessment & Plan  Secondary to metastatic lytic bone lesion  Subclavian vein thrombosis (HCC)  Assessment & Plan  Possible thrombus right subclavian vein extending into the brachiocephalic vein  S/p heparin therapy  Will continue eliquis for now       Metastatic cancer [facility])  Assessment & Plan  During last admission in July at [facility], he was found to have extensive metastatic cancer, brain, liver, lungs with lytic bone lesion  CA 19-9: 5418  Currently uncertain primary tumor  Lymph node biopsy of the neck did show non-small cell carcinoma  Liver biopsies show non small cell carcinoma favoring pancreatic biliary origin  Scalp punch biopsy still pending  He received radiation therapy for the brain metastasis over at [facility]  Patient will need to follow up with oncology  Will continue to have goals of care discussion with the patient and family      ----------------------------------------------------------------------------------------  HPI/24hr events:  No major events overnight    Disposition: Transfer to [facility]   Code Status: Level 1 - Full Code  ---------------------------------------------------------------------------------------  SUBJECTIVE      Review of Systems   Constitutional: Positive for appetite change and fatigue  Negative for chills, diaphoresis and fever  HENT: Negative for sore throat, trouble swallowing and voice change  Eyes: Negative for photophobia and visual disturbance  Respiratory: Negative for cough, choking, chest tightness, shortness of breath, wheezing and stridor  Cardiovascular: Negative for chest pain, palpitations and leg swelling  Gastrointestinal: Positive for abdominal pain (Generalized)  Negative for abdominal distention, constipation, diarrhea, nausea and vomiting  Genitourinary: Negative for decreased urine volume, difficulty urinating, dysuria, hematuria and urgency  Musculoskeletal: Positive for arthralgias and back pain  Generalized musculoskeletal and bony pain   Skin: Negative for color change, pallor, rash and wound  Neurological: Negative for dizziness, tremors, seizures, syncope, facial asymmetry, speech difficulty, weakness, light-headedness, numbness and headaches     All other systems reviewed and are negative       ---------------------------------------------------------------------------------------  OBJECTIVE    Vitals   Vitals:    21 0530 21 0533 21 0600 21 0700   BP: (!) 87/52  93/53 94/53   Pulse: (!) 122  (!) 114 (!) 116   Resp: (!) 24  18 20   Temp:       TempSrc:       SpO2: 96%  96% 96%   Weight:  51 4 kg (113 lb 5 1 oz)     Height:         Temp (24hrs), Av 4 °F (36 9 °C), Min:97 4 °F (36 3 °C), Max:99 3 °F (37 4 °C)  Current: Temperature: (!) 97 4 °F (36 3 °C)          Respiratory:  SpO2: SpO2: 96 %, SpO2 Activity: SpO2 Activity: At Rest, SpO2 Device: O2 Device: None (Room air)  Nasal Cannula O2 Flow Rate (L/min): 2 L/min    Invasive/non-invasive ventilation settings   Respiratory    Lab Data (Last 4 hours)    None         O2/Vent Data (Last 4 hours)    None                Physical Exam  Vitals and nursing note reviewed  Constitutional:       General: He is not in acute distress  Appearance: He is ill-appearing  He is not toxic-appearing or diaphoretic  Comments: Severe malnourishment  Muscle wasting and atrophy  Temporal wasting  HENT:      Head: Normocephalic and atraumatic  Nose: Nose normal  No congestion or rhinorrhea  Mouth/Throat:      Mouth: Mucous membranes are moist       Pharynx: No oropharyngeal exudate or posterior oropharyngeal erythema  Eyes:      General: No scleral icterus  Right eye: No discharge  Left eye: No discharge  Extraocular Movements: Extraocular movements intact  Conjunctiva/sclera: Conjunctivae normal       Pupils: Pupils are equal, round, and reactive to light  Neck:      Vascular: No carotid bruit  Cardiovascular:      Rate and Rhythm: Regular rhythm  Tachycardia present  Pulses: Normal pulses  Heart sounds: Normal heart sounds  No murmur heard  No friction rub  No gallop  Pulmonary:      Effort: Pulmonary effort is normal  No respiratory distress        Breath sounds: Normal breath sounds  No stridor  No wheezing, rhonchi or rales  Chest:      Chest wall: Tenderness (Generalized chest wall bony tenderness with palpation ) present  Abdominal:      General: Abdomen is flat  Bowel sounds are normal  There is no distension  Palpations: Abdomen is soft  Tenderness: There is abdominal tenderness (Generalized)  There is no right CVA tenderness, left CVA tenderness, guarding or rebound  Musculoskeletal:         General: No swelling, tenderness, deformity or signs of injury  Normal range of motion  Cervical back: Normal range of motion and neck supple  No rigidity or tenderness  Right lower leg: No edema  Left lower leg: No edema  Comments: Amputation left 2nd digit  Lymphadenopathy:      Cervical: No cervical adenopathy  Skin:     General: Skin is warm and dry  Capillary Refill: Capillary refill takes less than 2 seconds  Coloration: Skin is not jaundiced or pale  Findings: No bruising, erythema, lesion or rash  Neurological:      General: No focal deficit present  Mental Status: He is alert  Sensory: No sensory deficit           Laboratory and Diagnostics:  Results from last 7 days   Lab Units 08/12/21 0440 08/11/21  1104 08/06/21  0541   WBC Thousand/uL 19 12* 24 08* 30 71*   HEMOGLOBIN g/dL 8 9* 11 3* 11 8*   HEMATOCRIT % 28 3* 35 6* 37 2   PLATELETS Thousands/uL 138* 153 299   BANDS PCT %  --  7  --    MONO PCT %  --  3*  --      Results from last 7 days   Lab Units 08/12/21 0440 08/11/21  1104 08/06/21  0541   SODIUM mmol/L 135* 131* 131*   POTASSIUM mmol/L 4 7 4 5 4 6   CHLORIDE mmol/L 101 94* 98*   CO2 mmol/L 24 28 30   ANION GAP mmol/L 10 9 3*   BUN mg/dL 19 29* 30*   CREATININE mg/dL 0 57* 1 07 0 65   CALCIUM mg/dL 9 4 9 7 9 3   GLUCOSE RANDOM mg/dL 85 84 96   ALT U/L 53 75  --    AST U/L 58* 61*  --    ALK PHOS U/L 198* 282*  --    ALBUMIN g/dL 1 8* 1 8*  --    TOTAL BILIRUBIN mg/dL 0 52 0 46  --      Results from last 7 days   Lab Units 08/11/21  1104   MAGNESIUM mg/dL 2 1      Results from last 7 days   Lab Units 08/11/21  1104   INR  1 40*   PTT seconds 33      Results from last 7 days   Lab Units 08/11/21  1104   TROPONIN I ng/mL <0 02     Results from last 7 days   Lab Units 08/11/21  1310 08/11/21  1104   LACTIC ACID mmol/L 2 4* 3 7*     ABG:    VBG:    Results from last 7 days   Lab Units 08/11/21  1528   PROCALCITONIN ng/ml 3 35*       Micro  Results from last 7 days   Lab Units 08/11/21  1104   BLOOD CULTURE  Received in Microbiology Lab  Culture in Progress  Received in Microbiology Lab  Culture in Progress  EKG:  Reviewed  Imaging: I have personally reviewed pertinent reports  and I have personally reviewed pertinent films in PACS    Intake and Output  I/O       08/10 0701 - 08/11 0700 08/11 0701 - 08/12 0700    P  O   2680    I V  (mL/kg)  2062 5 (40 1)    IV Piggyback  1900    Total Intake(mL/kg)  6642 5 (129 2)    Urine (mL/kg/hr)  2185    Stool  0    Total Output  2185    Net  +4457 5          Unmeasured Urine Occurrence  1 x    Unmeasured Stool Occurrence  1 x          Height and Weights   Height: 5' 7" (170 2 cm)  IBW (Ideal Body Weight): 66 1 kg  Body mass index is 17 75 kg/m²  Weight (last 2 days)     Date/Time   Weight    08/12/21 0533   51 4 (113 32)    08/11/21 1440   48 3 (106 48)    08/11/21 1040   52 3 (115 3)                Nutrition       Diet Orders   (From admission, onward)             Start     Ordered    08/11/21 1240  Dietary nutrition supplements  Once     Question Answer Comment   Select Supplement: Ensure Max - Vanilla    Frequency Breakfast, Lunch, Dinner        08/11/21 1240    08/11/21 1239  Diet Regular; Regular House  Diet effective now     Question Answer Comment   Diet Type Regular    Regular Regular House    RD to adjust diet per protocol?  Yes        08/11/21 1240                  Active Medications  Scheduled Meds:  Current Facility-Administered Medications Medication Dose Route Frequency Provider Last Rate    apixaban  5 mg Oral BID Irma Melendez MD      bisacodyl  10 mg Rectal Daily PRN Irma Melendez MD      calcium carbonate  500 mg Oral Daily PRN Irma Melendez MD      dexamethasone  2 mg Oral Daily Irma Melendez MD      HYDROmorphone  0 5 mg Intravenous Q3H PRN KRISTIN Simmons Arm      lactated ringers  75 mL/hr Intravenous Continuous Benoit JUAN Marks 125 mL/hr (08/12/21 0118)    levETIRAcetam  750 mg Intravenous Q12H [address] Irma Melendez MD Stopped (08/11/21 2040)    LORazepam  1 mg Intravenous Q4H PRN Irma Melendez MD      oxyCODONE  5 mg Oral Q4H PRN Irma Melendez MD      polyethylene glycol  17 g Oral BID Irma Melendez MD      senna  8 6 mg Oral BID Irma Melendez MD       Continuous Infusions:  lactated ringers, 75 mL/hr, Last Rate: 125 mL/hr (08/12/21 0118)      PRN Meds:   bisacodyl, 10 mg, Daily PRN  calcium carbonate, 500 mg, Daily PRN  HYDROmorphone, 0 5 mg, Q3H PRN  LORazepam, 1 mg, Q4H PRN  oxyCODONE, 5 mg, Q4H PRN        Invasive Devices Review  Invasive Devices     Peripheral Intravenous Line            Peripheral IV 08/11/21 Left Antecubital 1 day    Peripheral IV 08/11/21 Right Antecubital <1 day          Drain            External Urinary Catheter Medium <1 day                Rationale for remaining devices:   ---------------------------------------------------------------------------------------  Advance Directive and Living Will:      Power of :    POLST:    ---------------------------------------------------------------------------------------  Care Time Delivered:   No Critical Care time spent       Benoit Marks PA-C      Portions of the record may have been created with voice recognition software  Occasional wrong word or "sound a like" substitutions may have occurred due to the inherent limitations of voice recognition software    Read the chart carefully and recognize, using context, where substitutions have occurred

## 2021-08-12 NOTE — ASSESSMENT & PLAN NOTE
Pressure ulcer noted over the sacrum and upper back POA  Appeared to be stage 2 ulcer  Wound care with nursing

## 2021-08-12 NOTE — UTILIZATION REVIEW
Initial Clinical Review    Admission: Date/Time/Statement:   Admission Orders (From admission, onward)     Ordered        08/11/21 1218  Inpatient Admission  Once                   Orders Placed This Encounter   Procedures    Inpatient Admission     Standing Status:   Standing     Number of Occurrences:   1     Order Specific Question:   Level of Care     Answer:   Level 1 Stepdown [13]     Order Specific Question:   Estimated length of stay     Answer:   More than 2 Midnights     Order Specific Question:   Certification     Answer:   I certify that inpatient services are medically necessary for this patient for a duration of greater than two midnights  See H&P and MD Progress Notes for additional information about the patient's course of treatment  ED Arrival Information     Expected Arrival Acuity    - 8/11/2021 10:35 Emergent         Means of arrival Escorted by Service Admission type    [facility] ([address]) Critical Care/ICU Emergency         Arrival complaint    seizure        Chief Complaint   Patient presents with    Seizure - New Onset     family reports pt having a total of 4 seizures since 4am this morning and 1 seizure in front of ems; pt does have cancer that he is being treated for         Initial Presentation:    48 Y O male presents to ED  Via  EMS after  Multiple seizures the am of admission  Had  4 seizures the am of admission  And a sore throat  Given 2 doses  Versed by EMS  Had loading dose keppra on  ED arrival   Hypotensive  And tachycardic   In ED  Labs  Reveal  WBC  24 08, lactic acid  3 4  CT head  Shows parenchymal metastasis  Ct abd/pelvis suspicious for pancreatic cancer with liver mets  CTA  Chest  Shows likely lung mets, thrombus in subclavian vein  PMH  Is metastatic cancer, unknown origin, atrial fibrillation, compression fracture  T 11  And   Subclavian vein thromboembolism    Meets  SIRS  Criteria with tachycardia, leukocytosis and  Elevated lactic acid  Admit  IP,  Stepdown Level  1  With  Severe Sepsis, BEE  And metastatic cancer and plan is  IVF, monitor labs, follow  Blood culture, monitor BP,  THOM, neuro consult   And pain control  Neuro consult  (  8/11)    Unclear if  Syncope  Vs  Seizure  Patient depressed/withdrawn  SO is  Caregiver and unaware until  Now that patient has cancer  Recommend  EEG  S/P  IV  keppra  X1 and continue po  Maintain seizure precautions  MRI  Brain of no benefit  Date:    8/12     Day 2:   Continue frequent neuro checks  Continue po keppra  IV  Cefepime d/c  Monitor labs and temps  No further seizure like activity  Noted  Continue seizure precautions  Continue IVF  Continue current care      ED Triage Vitals   Temperature Pulse Respirations Blood Pressure SpO2   08/11/21 1040 08/11/21 1040 08/11/21 1040 08/11/21 1040 08/11/21 1040   99 3 °F (37 4 °C) 103 22 92/53 99 %      Temp Source Heart Rate Source Patient Position - Orthostatic VS BP Location FiO2 (%)   08/11/21 1040 08/11/21 1040 08/11/21 1040 08/11/21 1040 --   Oral Monitor Lying Right arm       Pain Score       08/11/21 1429       9          Wt Readings from Last 1 Encounters:   08/12/21 51 4 kg (113 lb 5 1 oz)     Additional Vital Signs:   08/12/21 0709  98 6 °F (37 °C)  --  --  --  --  --  --  --  --  --   08/12/21 0700  --  116Abnormal   20  94/53  61  96 %  --  --  --  --   08/12/21 0600  --  114Abnormal   18  93/53  62  96 %  --  --  --  --   08/12/21 0530  --  122Abnormal   24Abnormal   87/52Abnormal   64  96 %  --  --  --  --   08/12/21 0500  --  120Abnormal   21  85/50Abnormal   58  96 %  --  --  None (Room air)  --   08/12/21 0430  --  124Abnormal   24Abnormal   84/54Abnormal   62  96 %  --  --  --  --   08/12/21 0400  97 4 °F (36 3 °C)Abnormal   118Abnormal   20  90/49Abnormal   58  99 %  --  --  --  --   08/12/21 0300  --  112Abnormal   21  97/58  68  99 %  --  --  --  --   08/12/21 0200  --  108Abnormal   22  93/54  61  98 %  -- --  Nasal cannula  --   08/12/21 0130  --  114Abnormal   20  93/58  63  97 %  --  --  --  --   08/12/21 0100  --  110Abnormal   16  84/51Abnormal   60  100 %  --  --  --  --   08/12/21 0030  --  114Abnormal   20  94/61  71  99 %  --  --  --  --   08/12/21 0000  97 5 °F (36 4 °C)  112Abnormal   18  84/50Abnormal   56  99 %  --  --  --  --   08/11/21 2330  --  112Abnormal   15  82/53Abnormal   60  99 %  --  --  --         08/11/21 2130  --  112Abnormal   19  92/49Abnormal   60  98 %  --  --  --  --   08/11/21 2100  --  106Abnormal   18  97/51  60  99 %  --  --  --  --   08/11/21 2030  --  112Abnormal   22  91/52  62  98 %  --  --  --  --   08/11/21 2000  98 8 °F (37 1 °C)  110Abnormal   19  84/56Abnormal   63  98 %  28  2 L/min  Nasal cannula  Lying   08/11/21 1900  --  106Abnormal   20  97/52  66  99 %  --  --  --  --   08/11/21 1845  --  106Abnormal   20  80/44Abnormal   54  99 %  --  --  --  --   08/11/21 1830  --  104  17  83/43Abnormal   53  99 %  --  --  --  --   08/11/21 1815  --  104  20  82/47Abnormal   53  100 %  --  --  --  --   08/11/21 1800  --  104  20  82/42Abnormal   53  99 %  --  --  --  --   08/11/21 1745  --  106Abnormal   24Abnormal   87/51Abnormal   62  99 %  --  --  --  --   08/11/21 1730  --  110Abnormal   24Abnormal   79/53Abnormal   60  91 %  --  --  --  --   08/11/21 1715  --  108Abnormal   16  79/50Abnormal   55  96 %  --  --  --  --   08/11/21 1629  --  112Abnormal   21  67/43Abnormal   49  98 %  --  --  --  --   08/11/21 1440  98 8 °F (37 1 °C)  --  --  --  --  --  --  --  --  --   08/11/21 1429  --  110Abnormal   27Abnormal   75/52Abnormal   57  --  --  --  --  --   08/11/21 1400  --  112Abnormal   18  81/53Abnormal   62  95 %  --  --  None (Room air)  Lying   08/11/21 1245  --  102  21  89/54Abnormal   66  97 %  --  --  None (Room air)  Lying   08/11/21 1230  --  100  24Abnormal   95/54  69  96 %  --  --  None (Room air)  Sitting   08/11/21 1200  --  96  21  97/55  71  95 %  --  --  -- --   08/11/21 1149  --  102  20  89/54Abnormal   --  94 %  --  --  None (Room air)  Lying   08/11/21 1100  --  --  --  --  --  --  --  --  None (Room air)  --   08/11/21 1040  99 3 °F (37 4 °C)  103  22  92/53  --  99 %  --  --  None (Room air)  Lying         Pertinent Labs/Diagnostic Test Results:   CXR  ( 8/11)   NAD  Ct head  ( 8/11)    1 3 cm low-attenuation right superior cerebellar hemispheric low-attenuation area suspicious for parenchymal  metastasis, unchanged from the MRI of 8/2/2021, without evidence for hemorrhage or abnormal mass effect     2   Two abnormal left occipital scalp lesions as noted, previously identified and suspicious for atelectasis   No definite calvarial osseous involvement  Results from last 7 days   Lab Units 08/11/21  1212   SARS-COV-2  Negative     Results from last 7 days   Lab Units 08/12/21  0440 08/11/21  1104 08/06/21  0541   WBC Thousand/uL 19 12* 24 08* 30 71*   HEMOGLOBIN g/dL 8 9* 11 3* 11 8*   HEMATOCRIT % 28 3* 35 6* 37 2   PLATELETS Thousands/uL 138* 153 299   BANDS PCT %  --  7  --          Results from last 7 days   Lab Units 08/12/21 0440 08/11/21  1104 08/06/21  0541   SODIUM mmol/L 135* 131* 131*   POTASSIUM mmol/L 4 7 4 5 4 6   CHLORIDE mmol/L 101 94* 98*   CO2 mmol/L 24 28 30   ANION GAP mmol/L 10 9 3*   BUN mg/dL 19 29* 30*   CREATININE mg/dL 0 57* 1 07 0 65   EGFR ml/min/1 73sq m 120 81 113   CALCIUM mg/dL 9 4 9 7 9 3   MAGNESIUM mg/dL  --  2 1  --      Results from last 7 days   Lab Units 08/12/21  0440 08/11/21  1104   AST U/L 58* 61*   ALT U/L 53 75   ALK PHOS U/L 198* 282*   TOTAL PROTEIN g/dL 4 8* 5 5*   ALBUMIN g/dL 1 8* 1 8*   TOTAL BILIRUBIN mg/dL 0 52 0 46         Results from last 7 days   Lab Units 08/12/21  0440 08/11/21  1104 08/06/21  0541   GLUCOSE RANDOM mg/dL 85 84 96               Results from last 7 days   Lab Units 08/11/21  1104   TROPONIN I ng/mL <0 02         Results from last 7 days   Lab Units 08/11/21  1104   PROTIME seconds 16 9*   INR 1 40*   PTT seconds 33     Results from last 7 days   Lab Units 08/11/21  1104   TSH 3RD GENERATON uIU/mL 3 857*     Results from last 7 days   Lab Units 08/12/21  0440 08/11/21  1528   PROCALCITONIN ng/ml 3 86* 3 35*     Results from last 7 days   Lab Units 08/11/21  1310 08/11/21  1104   LACTIC ACID mmol/L 2 4* 3 7*             Results from last 7 days   Lab Units 08/11/21  1104   NT-PRO BNP pg/mL 924*             Results from last 7 days   Lab Units 08/11/21  1104   LIPASE u/L 141             Results from last 7 days   Lab Units 08/11/21  1225   CLARITY UA  Clear   COLOR UA  Yellow   SPEC GRAV UA  <=1 005   PH UA  6 0   GLUCOSE UA mg/dl Negative   KETONES UA mg/dl Negative   BLOOD UA  Negative   PROTEIN UA mg/dl Negative   NITRITE UA  Negative   BILIRUBIN UA  Negative   UROBILINOGEN UA E U /dl 1 0   LEUKOCYTES UA  Negative           Results from last 7 days   Lab Units 08/11/21  1104   BLOOD CULTURE  Received in Microbiology Lab  Culture in Progress  Received in Microbiology Lab  Culture in Progress       Results from last 7 days   Lab Units 08/11/21  1104   TOTAL COUNTED  100           ED Treatment:   Medication Administration from 08/11/2021 1035 to 08/11/2021 1421       Date/Time Order Dose Route Action Comments     08/11/2021 1129 midazolam (FOR EMS ONLY) (VERSED) 2 mg/2 mL injection 2 mg 0 mg Does not apply Given to EMS      08/11/2021 1152 levETIRAcetam (KEPPRA) 3,200 mg in sodium chloride 0 9 % 250 mL IVPB 0 mg Intravenous Stopped      08/11/2021 1137 levETIRAcetam (KEPPRA) 3,200 mg in sodium chloride 0 9 % 250 mL IVPB 3,200 mg Intravenous New Bag      08/11/2021 1210 lactated ringers bolus 500 mL 0 mL Intravenous Stopped      08/11/2021 1116 lactated ringers bolus 500 mL 500 mL Intravenous New Bag      08/11/2021 1330 lactated ringers infusion 125 mL/hr Intravenous New Bag      08/11/2021 1232 cefepime (MAXIPIME) 2 g/50 mL dextrose IVPB 0 mg Intravenous Stopped      08/11/2021 1202 cefepime (MAXIPIME) 2 g/50 mL dextrose IVPB 2,000 mg Intravenous New Bag      08/11/2021 1209 lactated ringers bolus 500 mL 0 mL Intravenous Stopped      08/11/2021 1141 lactated ringers bolus 500 mL 500 mL Intravenous New Bag      08/11/2021 1329 lactated ringers bolus 600 mL 0 mL Intravenous Stopped      08/11/2021 1210 lactated ringers bolus 600 mL 600 mL Intravenous New Bag      08/11/2021 1306 enoxaparin (LOVENOX) subcutaneous injection 40 mg 40 mg Subcutaneous Given         History reviewed  No pertinent past medical history  Present on Admission:   Metastatic cancer ([facility] )   Pressure ulcer of coccygeal region, unspecified pressure ulcer stage   Subclavian vein thrombosis (HCC)   Constipation due to opioid therapy   Compression fracture of T11 vertebra (HCC)   Severe protein-calorie malnutrition (HCC)      Admitting Diagnosis: [facility] [R56 9]  Metastatic cancer (HCC) [C79 9]  Cervical spinal cord compression (HCC) [G95 20]  Brain metastases (HCC) [C79 31]  Abnormal EKG [R94 31]  Seizure-like activity ([facility] ) [R56 9]  Age/Sex: 48 y o  male  Admission Orders:  Scheduled Medications:  apixaban, 5 mg, Oral, BID  levETIRAcetam, 750 mg, Intravenous, Q12H [address]  polyethylene glycol, 17 g, Oral, BID  senna, 8 6 mg, Oral, BID  IV  Cefepime   - d/c  8/11         Continuous IV Infusions:  lactated ringers, 75 mL/hr, Intravenous, Continuous      PRN Meds:  bisacodyl, 10 mg, Rectal, Daily PRN  calcium carbonate, 500 mg, Oral, Daily PRN  HYDROmorphone, 0 5 mg, Intravenous, Q3H PRN  ( x1  8/11)  LORazepam, 1 mg, Intravenous, Q4H PRN  oxyCODONE, 5 mg, Oral, Q4H PRN        IP CONSULT TO NEUROLOGY  IP CONSULT TO CASE MANAGEMENT  IP CONSULT TO NUTRITION SERVICES     Neuro checks  Q 4 hrs  Fall precautions  Dysphagia eval  Seizure precautions    Network Utilization Review Department  ATTENTION: Please call with any questions or concerns to [phone] and carefully listen to the prompts so that you are directed to the right person   All voicemails are confidential   Guerline RUST all requests for admission clinical reviews, approved or denied determinations and any other requests to dedicated fax number below belonging to the campus where the patient is receiving treatment   List of dedicated fax numbers for the Facilities:  [address] DENIALS (Administrative/Medical Necessity) [phone]   [address] (Maternity/NICU/Pediatrics) [phone]   [address] [place] [address] [facility] [place] [phone] [address] Radha Dumont 1481 [address] [facility] [phone]

## 2021-08-12 NOTE — ASSESSMENT & PLAN NOTE
Will continue his home bowel regimen for now  Will hold bowel regimen if develop loose stool or diarrhea

## 2021-08-12 NOTE — PLAN OF CARE
Problem: MOBILITY - ADULT  Goal: Maintain or return to baseline ADL function  Description: INTERVENTIONS:  -  Assess patient's ability to carry out ADLs; assess patient's baseline for ADL function and identify physical deficits which impact ability to perform ADLs (bathing, care of mouth/teeth, toileting, grooming, dressing, etc )  - Assess/evaluate cause of self-care deficits   - Assess range of motion  - Assess patient's mobility; develop plan if impaired  - Assess patient's need for assistive devices and provide as appropriate  - Encourage maximum independence but intervene and supervise when necessary  - Involve family in performance of ADLs  - Assess for home care needs following discharge   - Consider OT consult to assist with ADL evaluation and planning for discharge  - Provide patient education as appropriate  Outcome: Progressing  Goal: Maintains/Returns to pre admission functional level  Description: INTERVENTIONS:  - Perform BMAT or MOVE assessment daily    - Set and communicate daily mobility goal to care team and patient/family/caregiver  - Collaborate with rehabilitation services on mobility goals if consulted  - Perform Range of Motion 2 times a day  - Reposition patient every 2 hours    - Dangle patient 2 times a day  - Stand patient 2 times a day  - Ambulate patient 2 times a day  - Out of bed to chair 2 times a day   - Out of bed for meals 2 times a day  - Out of bed for toileting  - Record patient progress and toleration of activity level   Outcome: Progressing     Problem: Potential for Falls  Goal: Patient will remain free of falls  Description: INTERVENTIONS:  - Educate patient/family on patient safety including physical limitations  - Instruct patient to call for assistance with activity   - Consult OT/PT to assist with strengthening/mobility   - Keep Call bell within reach  - Keep bed low and locked with side rails adjusted as appropriate  - Keep care items and personal belongings within reach  - Initiate and maintain comfort rounds  - Make Fall Risk Sign visible to staff  - Offer Toileting every 2 Hours, in advance of need  - Initiate/Maintain bed alarm  - Obtain necessary fall risk management equipment:   - Apply yellow socks and bracelet for high fall risk patients  - Consider moving patient to room near nurses station  Outcome: Progressing     Problem: Prexisting or High Potential for Compromised Skin Integrity  Goal: Skin integrity is maintained or improved  Description: INTERVENTIONS:  - Identify patients at risk for skin breakdown  - Assess and monitor skin integrity  - Assess and monitor nutrition and hydration status  - Monitor labs   - Assess for incontinence   - Turn and reposition patient  - Assist with mobility/ambulation  - Relieve pressure over bony prominences  - Avoid friction and shearing  - Provide appropriate hygiene as needed including keeping skin clean and dry  - Evaluate need for skin moisturizer/barrier cream  - Collaborate with interdisciplinary team   - Patient/family teaching  - Consider wound care consult   Outcome: Progressing     Problem: Nutrition/Hydration-ADULT  Goal: Nutrient/Hydration intake appropriate for improving, restoring or maintaining nutritional needs  Description: Monitor and assess patient's nutrition/hydration status for malnutrition  Collaborate with interdisciplinary team and initiate plan and interventions as ordered  Monitor patient's weight and dietary intake as ordered or per policy  Utilize nutrition screening tool and intervene as necessary  Determine patient's food preferences and provide high-protein, high-caloric foods as appropriate       INTERVENTIONS:  - Monitor oral intake, urinary output, labs, and treatment plans  - Assess nutrition and hydration status and recommend course of action  - Evaluate amount of meals eaten  - Assist patient with eating if necessary   - Allow adequate time for meals  - Recommend/ encourage appropriate diets, oral nutritional supplements, and vitamin/mineral supplements  - Order, calculate, and assess calorie counts as needed  - Recommend, monitor, and adjust tube feedings and TPN/PPN based on assessed needs  - Assess need for intravenous fluids  - Provide specific nutrition/hydration education as appropriate  - Include patient/family/caregiver in decisions related to nutrition  Outcome: Progressing

## 2021-08-12 NOTE — PROGRESS NOTES
Progress Note - Neurology   Luisana Mart 48 y o  male [id number]  Unit/Bed#: ICU 11/ICU 11    Assessment/Plan:    * Seizure-like activity [facility])  Assessment & Plan  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) admitted after several episodes of possible seizure-like activity  Patient does have known metastases in the cervical and thoracic spine as well as R cerebellum  Etiology for spells includes seizure vs syncope  More suspicious for syncope at this time given description provided by family and hypotension  Plan:   - EEG completed- diffuse dysfunction  No epileptiform discharges   - Patient is s/p Keppra IV 3200 mg x 1    - Currently on Keppra 750 mg BID- would decrease to 500 mg at this time  - Do not feel repeat MRI brain would  at this time so would defer  - Maintain seizure precautions    - Recommend goals of care discussion/ palliative   - Monitor exam and notify with changes  Subclavian vein thrombosis (HCC)  Assessment & Plan  - Continue on Eliquis    Metastatic cancer [facility])  Assessment & Plan  - Patient with metastatic non- small cell carcinoma (favor pancreatobiliary primary per pathology report)  - Recommend oncology consult/goals of care discussion given widely metastatic disease  BEE (acute kidney injury) (HCC)-resolved as of 8/12/2021  Assessment & Plan  - Likely in the setting of poor PO intake  - Management as per critical care team         Patient does not need outpatient follow up with neurology  Subjective:   Patient has been mostly hypotensive  Most recent blood pressure recordings were in the 85H and 37I systolic  EEG was completed this morning and results are pending  Patient reports he is fine today  He reports some back pain, which was present prior to hospitalization  Daughter is present at bedside  History reviewed  No pertinent past medical history    Past Surgical History: Procedure Laterality Date    FACIAL/NECK BIOPSY Bilateral 7/26/2021    Procedure: INCISIONAL BIOPSY LEFT NECK MASS,AND EXCISIOINAL BIOPSY RIGHT NECK MASS;  Surgeon: Marta Clark MD;  [facility]: [facility];  Service: Surgical Oncology    HAND SURGERY      left hand    IR BIOPSY LIVER MASS  7/30/2021     History reviewed  No pertinent family history  Social History     Socioeconomic History    Marital status: Single     Spouse name: None    Number of children: None    Years of education: None    Highest education level: None   Occupational History    None   Tobacco Use    Smoking status: Current Every Day Smoker     Packs/day: 0 25    Smokeless tobacco: Never Used   Vaping Use    Vaping Use: Never used   Substance and Sexual Activity    Alcohol use: Not Currently    Drug use: Yes     Types: Marijuana    Sexual activity: None   Other Topics Concern    None   Social History Narrative    None     Social Determinants of Health     Financial Resource Strain:     Difficulty of Paying Living Expenses:    Food Insecurity:     Worried About Running Out of Food in the Last Year:     Ran Out of Food in the Last Year:    Transportation Needs:     Lack of Transportation (Medical):  Lack of Transportation (Non-Medical):    Physical Activity:     Days of Exercise per Week:     Minutes of Exercise per Session:    Stress:     Feeling of Stress :    Social Connections:     Frequency of Communication with Friends and Family:     Frequency of Social Gatherings with Friends and Family:     Attends Congregational Services:     Active Member of Clubs or Organizations:     Attends Club or Organization Meetings:     Marital Status:    Intimate Partner Violence:     Fear of Current or Ex-Partner:     Emotionally Abused:     Physically Abused:     Sexually Abused:          Medications:   All current active meds have been reviewed and current meds:  Scheduled Meds:  Current Facility-Administered Medications Medication Dose Route Frequency Provider Last Rate    apixaban  5 mg Oral BID Irma Acosta Necessary, MD      bisacodyl  10 mg Rectal Daily PRN Irma Acosta Necessary, MD      calcium carbonate  500 mg Oral Daily PRN Irma Acosta Necessary, MD      HYDROmorphone  0 5 mg Intravenous Q3H PRN KRISTIN Gayle      lactated ringers  75 mL/hr Intravenous Continuous Lius Enrique Joel PA-C 75 mL/hr (08/12/21 0824)    levETIRAcetam  750 mg Intravenous Q12H [facility] Irma Acosta Necessary,  mg (08/12/21 0950)    LORazepam  1 mg Intravenous Q4H PRN Irma Acosta Necessary, MD      oxyCODONE  5 mg Oral Q4H PRN Irma Acosta Necessary, MD      polyethylene glycol  17 g Oral BID Irma Acosta Necessary, MD      senna  8 6 mg Oral BID Irma Acosta Necessary, MD       Continuous Infusions:lactated ringers, 75 mL/hr, Last Rate: 75 mL/hr (08/12/21 0824)      PRN Meds: bisacodyl    calcium carbonate    HYDROmorphone    LORazepam    oxyCODONE       ROS:   Review of Systems   Musculoskeletal: Positive for back pain  Neurological: Positive for numbness  All other systems reviewed and are negative  Vitals:   BP 94/56   Pulse (!) 120   Temp 98 4 °F (36 9 °C) (Temporal)   Resp 21   Ht 5' 7" (1 702 m)   Wt 51 4 kg (113 lb 5 1 oz)   SpO2 96%   BMI 17 75 kg/m²     Physical Exam:   Vital signs and nursing notes reviewed  Constitutional: Patient is resting comfortably, daughter at bedside  Frail, cachetic  No diaphoresis  HENT: Normocephalic, atraumatic  Right and left external ear normal  Oropharynx clear and moist  Nose normal    Eyes: EOMs intact  No scleral icterus or injection  No discharge  Neck: Supple, normal ROM  No stridor noted  Cardiovascular: Regular rate  Pulmonary: No respiratory distress  Effort normal    Musculoskeletal: Moves all extremities spontaneously and anti-gravity  Neurological: Alert  Follows all commands  Detailed below  Skin: Warm and dry  Psychiatric: Dysphoric mood  Flat affect  No hallucinations or agitation  Neurologic Exam:  Mental Status: Patient is awake and alert  Oriented  Follows commands without difficulty  No dysarthria, but is hypophonic  No aphasia  Cranial Nerves: Cranial nerves 2-12 grossly intact  Motor: Moves all extremities spontaneously and anti-gravity  Sensation: Sensation to light touch intact  No tremors noted  Gait: Deferred         Labs: I have personally reviewed pertinent reports     Recent Results (from the past 24 hour(s))   Procalcitonin    Collection Time: 08/11/21  3:28 PM   Result Value Ref Range    Procalcitonin 3 35 (H) <=0 25 ng/ml   ECG 12 lead    Collection Time: 08/11/21 11:15 PM   Result Value Ref Range    Ventricular Rate 109 BPM    Atrial Rate 109 BPM    MS Interval 121 ms    QRSD Interval 104 ms    QT Interval 308 ms    QTC Interval 415 ms    P Axis 77 degrees    QRS Axis 84 degrees    T Wave Axis 72 degrees   Procalcitonin Reflex    Collection Time: 08/12/21  4:40 AM   Result Value Ref Range    Procalcitonin 3 86 (H) <=0 25 ng/ml   Comprehensive metabolic panel    Collection Time: 08/12/21  4:40 AM   Result Value Ref Range    Sodium 135 (L) 136 - 145 mmol/L    Potassium 4 7 3 5 - 5 3 mmol/L    Chloride 101 100 - 108 mmol/L    CO2 24 21 - 32 mmol/L    ANION GAP 10 4 - 13 mmol/L    BUN 19 5 - 25 mg/dL    Creatinine 0 57 (L) 0 60 - 1 30 mg/dL    Glucose 85 65 - 140 mg/dL    Calcium 9 4 8 3 - 10 1 mg/dL    Corrected Calcium 11 2 (H) 8 3 - 10 1 mg/dL    AST 58 (H) 5 - 45 U/L    ALT 53 12 - 78 U/L    Alkaline Phosphatase 198 (H) 46 - 116 U/L    Total Protein 4 8 (L) 6 4 - 8 2 g/dL    Albumin 1 8 (L) 3 5 - 5 0 g/dL    Total Bilirubin 0 52 0 20 - 1 00 mg/dL    eGFR 120 ml/min/1 73sq m   CBC and differential    Collection Time: 08/12/21  4:40 AM   Result Value Ref Range    WBC 19 12 (H) 4 31 - 10 16 Thousand/uL    RBC 2 94 (L) 3 88 - 5 62 Million/uL    Hemoglobin 8 9 (L) 12 0 - 17 0 g/dL    Hematocrit 28 3 (L) 36 5 - 49 3 %    MCV 96 82 - 98 fL MCH 30 3 26 8 - 34 3 pg    MCHC 31 4 31 4 - 37 4 g/dL    RDW 19 9 (H) 11 6 - 15 1 %    MPV 9 1 8 9 - 12 7 fL    Platelets 587 (L) 027 - 390 Thousands/uL    nRBC 2 /100 WBCs       Imaging: I have personally reviewed pertinent imaging in PACS, including MRI B, T, L,  and I have personally reviewed PACS reports  EKG, Pathology, and Other Studies: I have personally reviewed pertinent reports  VTE Prophylaxis: Eliquis      Counseling / Coordination of Care  Total time spent today 20 minutes  Greater than 50% of total time was spent with the patient and/or family counseling and/or coordination of care  A description of the counseling/coordination of care:  Patient was seen and evaluated  Discussed with attending  Chart reviewed thoroughly including laboratory and imaging studies    Plan of care discussed with patient and primary team

## 2021-08-12 NOTE — ASSESSMENT & PLAN NOTE
Creatinine POA 1 07  Baseline appeared to be 0 43-0 63  Patient reported poor intake for the past 2 days  Urinalysis was bland  Hypotension was noted in the ED  Etiology likely secondary to hypovolemia vs sepsis  Continue IV hydration at 125 cc/hour  Strict intake and output  Avoid nephrotoxins  Avoid hypotension  CR this a m  0 57  Closely monitor  Continue IVF as stated above

## 2021-08-12 NOTE — PROGRESS NOTES
Oncology LSW noted pt re-admitted to hospital on 8/11/21  Will provide supportive outreach once pt is discharged from inpatient setting

## 2021-08-12 NOTE — CASE MANAGEMENT
LOS:   Bundle:   Unplanned Readmission Score:   30 Day Readmission:      CM met with patient at bedside  Explained the role of CM  Obtained the following information from patient  Home: [address] 3 flights of stairs to enter  Lives With: ABY Melendez)  ADL's: Rosaura Arnold  DME: none  Ambulation: IPTA  Transportation: does not drive  Pharmacy: [facility]   PCP: none  HHC Hx: none  Rehab Hx: none  Mental Health Hx: none  Substance Abuse Hx: none  Employment: employed  POA/LW/AD: none  Transport at D/C: SO will transport    CM reviewed d/c planning process including the following: identifying help at home, patient preferences for d/c planning needs, [facility] to Annika Hamilton, availability of treatment team to discuss questions or concerns patient and/or family may have regarding understanding medications and recognizing signs and symptoms once discharged

## 2021-08-12 NOTE — MALNUTRITION/BMI
This medical record reflects one or more clinical indicators suggestive of malnutrition and/or morbid obesity  Malnutrition Findings:   Adult Malnutrition type: Acute illness  Adult Degree of Malnutrition: Other severe protein calorie malnutrition  Malnutrition Characteristics: Fat loss, Muscle loss, Inadequate energy, Weight loss    BMI Findings:  Adult BMI Classifications: Underweight < 18 5     Body mass index is 17 75 kg/m²  Acute on chronic severe pro, blanca malnutrition d/t CA, decreased appetite as evidence by moderate muscle and fat depletion of clavicles, orbitals, triceps, <50% energy intake >5 days, <75% energy intake > 1 month, reviewed wt  hx records which were limited: 6/10/21 121lbs, 7/8/21 115lbs, 8/12/21 113lbs 6 6% wt  loss x 2 months, PT reported usual weight is 154lbs, BMI 17 7  PT currently on regular diet and ensure max vanilla ordered TID, PT didn't like ensure max vanilla and not eating much at meals  Will adjust diet to dental soft as PT reported trouble chewing and will change ensure max to ensure enlive strawberry and chocolate and trial high pro gelatein  Consider appetite stimulant  Ordered calorie count  If PT continues with poor po intake and weight loss will need to consider intiating TF  See Nutrition note dated 8/12/21 for additional details  Completed nutrition assessment is viewable in the nutrition documentation

## 2021-08-12 NOTE — ASSESSMENT & PLAN NOTE
Malnutrition Findings:           BMI Findings: Body mass index is 17 75 kg/m²  Encourage oral intake  Will order remron if patient continue to be unable to eat  Continue regular diet and t i d  Ensure  Consult nutrition

## 2021-08-12 NOTE — PLAN OF CARE
Problem: Potential for Falls  Goal: Patient will remain free of falls  Description: INTERVENTIONS:  - Educate patient/family on patient safety including physical limitations  - Instruct patient to call for assistance with activity   - Consult OT/PT to assist with strengthening/mobility   - Keep Call bell within reach  - Keep bed low and locked with side rails adjusted as appropriate  - Keep care items and personal belongings within reach  - Initiate and maintain comfort rounds  - Make Fall Risk Sign visible to staff  - Apply yellow socks and bracelet for high fall risk patients  - Consider moving patient to room near nurses station  Outcome: Progressing     Problem: Prexisting or High Potential for Compromised Skin Integrity  Goal: Skin integrity is maintained or improved  Description: INTERVENTIONS:  - Identify patients at risk for skin breakdown  - Assess and monitor skin integrity  - Assess and monitor nutrition and hydration status  - Monitor labs   - Assess for incontinence   - Turn and reposition patient  - Assist with mobility/ambulation  - Relieve pressure over bony prominences  - Avoid friction and shearing  - Provide appropriate hygiene as needed including keeping skin clean and dry  - Evaluate need for skin moisturizer/barrier cream  - Collaborate with interdisciplinary team   - Patient/family teaching  - Consider wound care consult   Outcome: Progressing     Problem: Nutrition/Hydration-ADULT  Goal: Nutrient/Hydration intake appropriate for improving, restoring or maintaining nutritional needs  Description: Monitor and assess patient's nutrition/hydration status for malnutrition  Collaborate with interdisciplinary team and initiate plan and interventions as ordered  Monitor patient's weight and dietary intake as ordered or per policy  Utilize nutrition screening tool and intervene as necessary  Determine patient's food preferences and provide high-protein, high-caloric foods as appropriate  INTERVENTIONS:  - Monitor oral intake, urinary output, labs, and treatment plans  - Assess nutrition and hydration status and recommend course of action  - Evaluate amount of meals eaten  - Assist patient with eating if necessary   - Allow adequate time for meals  - Recommend/ encourage appropriate diets, oral nutritional supplements, and vitamin/mineral supplements  - Order, calculate, and assess calorie counts as needed  - Recommend, monitor, and adjust tube feedings and TPN/PPN based on assessed needs  - Assess need for intravenous fluids  - Provide specific nutrition/hydration education as appropriate  - Include patient/family/caregiver in decisions related to nutrition  Outcome: Progressing     Problem: PAIN - ADULT  Goal: Verbalizes/displays adequate comfort level or baseline comfort level  Description: Interventions:  - Encourage patient to monitor pain and request assistance  - Assess pain using appropriate pain scale  - Administer analgesics based on type and severity of pain and evaluate response  - Implement non-pharmacological measures as appropriate and evaluate response  - Consider cultural and social influences on pain and pain management  - Notify physician/advanced practitioner if interventions unsuccessful or patient reports new pain  Outcome: Progressing     Problem: INFECTION - ADULT  Goal: Absence or prevention of progression during hospitalization  Description: INTERVENTIONS:  - Assess and monitor for signs and symptoms of infection  - Monitor lab/diagnostic results  - Monitor all insertion sites, i e  indwelling lines, tubes, and drains  - Monitor endotracheal if appropriate and nasal secretions for changes in amount and color  - [place] appropriate cooling/warming therapies per order  - Administer medications as ordered  - Instruct and encourage patient and family to use good hand hygiene technique  - Identify and instruct in appropriate isolation precautions for identified infection/condition  Outcome: Progressing  Goal: Absence of fever/infection during neutropenic period  Description: INTERVENTIONS:  - Monitor WBC    Outcome: Progressing     Problem: SAFETY ADULT  Goal: Patient will remain free of falls  Description: INTERVENTIONS:  - Educate patient/family on patient safety including physical limitations  - Instruct patient to call for assistance with activity   - Consult OT/PT to assist with strengthening/mobility   - Keep Call bell within reach  - Keep bed low and locked with side rails adjusted as appropriate  - Keep care items and personal belongings within reach  - Initiate and maintain comfort rounds  - Make Fall Risk Sign visible to staff  - Apply yellow socks and bracelet for high fall risk patients  - Consider moving patient to room near nurses station  Outcome: Progressing     Problem: Knowledge Deficit  Goal: Patient/family/caregiver demonstrates understanding of disease process, treatment plan, medications, and discharge instructions  Description: Complete learning assessment and assess knowledge base    Interventions:  - Provide teaching at level of understanding  - Provide teaching via preferred learning methods  Outcome: Progressing

## 2021-08-13 NOTE — CASE MANAGEMENT
Chart reviewed and discussed during rounds  Pt is a readmission , not a bundle  Readmission risk score 23, green and low  Hospital day 2  Pt noted with extensive metastatic cancer  Refusing hospice/ palliative care at this time and would like active treatment  ICU transfer- monitoring seizures and adjusting keppra dosage  Pending oncology consult  DC plan: home with op f/u - will monitor for additional needs after all consults performed

## 2021-08-13 NOTE — PLAN OF CARE
Problem: Nutrition/Hydration-ADULT  Goal: Nutrient/Hydration intake appropriate for improving, restoring or maintaining nutritional needs  Description: Monitor and assess patient's nutrition/hydration status for malnutrition  Collaborate with interdisciplinary team and initiate plan and interventions as ordered  Monitor patient's weight and dietary intake as ordered or per policy  Utilize nutrition screening tool and intervene as necessary  Determine patient's food preferences and provide high-protein, high-caloric foods as appropriate       INTERVENTIONS:  - Monitor oral intake, urinary output, labs, and treatment plans  - Assess nutrition and hydration status and recommend course of action  - Evaluate amount of meals eaten  - Assist patient with eating if necessary   - Allow adequate time for meals  - Recommend/ encourage appropriate diets, oral nutritional supplements, and vitamin/mineral supplements  - Order, calculate, and assess calorie counts as needed  - Recommend, monitor, and adjust tube feedings and TPN/PPN based on assessed needs  - Assess need for intravenous fluids  - Provide specific nutrition/hydration education as appropriate  - Include patient/family/caregiver in decisions related to nutrition  Outcome: Progressing     Problem: PAIN - ADULT  Goal: Verbalizes/displays adequate comfort level or baseline comfort level  Description: Interventions:  - Encourage patient to monitor pain and request assistance  - Assess pain using appropriate pain scale  - Administer analgesics based on type and severity of pain and evaluate response  - Implement non-pharmacological measures as appropriate and evaluate response  - Consider cultural and social influences on pain and pain management  - Notify physician/advanced practitioner if interventions unsuccessful or patient reports new pain  Outcome: Progressing     Problem: INFECTION - ADULT  Goal: Absence or prevention of progression during hospitalization  Description: INTERVENTIONS:  - Assess and monitor for signs and symptoms of infection  - Monitor lab/diagnostic results  - Monitor all insertion sites, i e  indwelling lines, tubes, and drains  - Monitor endotracheal if appropriate and nasal secretions for changes in amount and color  - [place] appropriate cooling/warming therapies per order  - Administer medications as ordered  - Instruct and encourage patient and family to use good hand hygiene technique  - Identify and instruct in appropriate isolation precautions for identified infection/condition  Outcome: Progressing

## 2021-08-13 NOTE — CONSULTS
Hematology/Oncology Consult Note       [facility]AT[facility]     Date of Service: 8/13/2021     Admitting Diagnosis: Seizure ([address] ) [R56 9]  Metastatic cancer ([address] ) [C79 9]  Cervical spinal cord compression ([address] ) [G95 20]  Brain metastases ([address] ) [C79 31]  Abnormal EKG [R94 31]  Seizure-like activity ([address] ) [R56 9]    Reason for Consultation:  Metastatic pancreatic cancer    Cancer Stage:  Stage IV    Referral Physician: Armando Forrest DO    Oncology/Hematology History:  1  US HN 7/22/21 Several neck masses are identified corresponding to the palpable abnormalities within additional finding of the suspected thrombosed left jugular vein   Further evaluation with contrast-enhanced CT of the neck is recommended  2  CT Soft Tissue Neck 7/22/21        Left greater than right numerous superficial and deep neck soft tissue and intramuscular rim-enhancing necrotic lesions/collections as described   Rim-enhancing epidural thickening/collection within posterolateral spinal canal spanning levels C2-C4 resulting in canal narrowing   Finding is concerning for widespread necrotizing infection   Neoplastic process is within differential consideration   Recommend correlation with sampling  Occluded left subclavian and brachiocephalic veins as well as left internal jugular vein to the level of hyoid bone suggesting  Subpleural nodular opacities in the right upper lobe and superior segment of right lower lobe could indicate septic emboli   Suggested left hilar necrotic lymph node               Indeterminate sclerotic foci within T3 and T4 vertebral bodies  3  CT Head 7/22/21 There is an approximately 2 4 x 1 4 x 2 2 cm masslike lesion involving the scalp overlying the posterior left parietal region   Neoplasm and/or infection should be excluded   Sampling is recommended  4  CTPA 7/22/21 No evidence of pulmonary embolism is seen  Measured RV/LV ratio is within normal limits at less than 0 9           Numerous nodular opacities are present bilaterally within the lungs   The appearances most suggestive of metastatic disease  Extensive hepatic metastatic disease  Large bilateral adrenal masses  Extensive lymphadenopathy  Probable osseous metastatic disease with mild to moderate compression deformity of the superior endplate of Y43, suggesting pathologic compression  Possible thrombus right subclavian vein extending into the brachiocephalic vein   Consider venous duplex ultrasound for further evaluation  5  July 23, 2021, biopsy of neck skin consistent with extensive necrotic dermis with small foci of poorly differentiated carcinoma  6  CT A/P 7/24/21 There is a 2 8 x 2 2 x 1 8 cm ill-defined, hypodense pancreatic lesion, highly suspicious for pancreatic adenocarcinoma (series 3 images 20-22 and series 601 images 57 - 67 )           There is widespread metastatic disease with numerous liver lesions, bilateral adrenal metastases, multiple lytic bone metastases, with resulting mild superior end plate compression fractures of T11 and L3 as above, and also intramuscular metastases as above  An alternative diagnosis to metastatic pancreatic adenocarcinoma, given the unusual intramuscular metastasis, would be metastatic melanoma   Please correlate with already performed biopsy results  7  July 26, 2021, biopsy of right neck mass and left neck mass consistent with non-small cell carcinoma, 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    8  MRI C-Spine 7/27/21         Signal abnormality and erosive change of C3 laminae and spinous process along with lesser degree signal abnormality in the posterior elements of C1, C2, and C4   Associated epidural enhancement /enhancing soft tissue spanning from inferior C2-C4  resulting in severe canal stenosis and cord compression   Findings most favor metastatic disease   Infection is within differential consideration but less favored given findings elsewhere in the body   Correlate with sampling  Multiple rim-enhancing lesions within paraspinal musculature, superficial and deep soft tissue neck as demonstrated on recent soft tissue neck CT   Differential considerations are similar as above  Heterogeneous marrow signal and enhancement of the cervical and visualized thoracic vertebras concerning for infiltrative metastatic disease  Redemonstrated occluded left internal jugular vein  Degenerative change as above  9  July 28, 2021, MRI of T-spine showed a white metastatic disease to the thoracic spine access with early pathologic fracture superior T11 endplate  CA 19-9 [id number]  10  July 29, 2021 patient consulted radiation oncologist Dr Rojas Gayle with recommendation of palliative care radiation therapy to the cervical spine with total of 10 fractions  11 July 30, 2021 liver biopsy consistent with non-small cell carcinoma fevers pancreatobiliary primary  12  Palliative radiation therapy to the cervical spine started on July 30, 2021  13  August 2, 2021 MRI thoracic spine shows diffuse metastatic disease within the thoracic spine, lower cervical spine and upper lumbar spine  Pathologic adenopathy with central necrosis within the left inferior neck and superior clavicular region  MRI brain: Positive for intracranial metastatic disease involving the right superior cerebellum  Irregular enhancement suspicious for osseous metastatic disease with extradural extension in the cervical spine  Suspected cord compression at the C3 and apathy for level  MRI of L-spine showed diffuse osseous metastatic disease  14  August 11, 2021 CT head without contrast showed 1 3 cm low attenuation right superior cerebellar hemispheric low anterior in a shin area suspicious for parenchymal metastatic disease       Assessment and Recommendations: 1  Metastatic pancreatic cancer with extensive metastatic disease involving the bone, lymph nodes, liver, adrenal gland and brain metastatic disease  Patient received 4/10 fractions(July 30, 2021 to August 4, 2021) of palliative radiation therapy to the cervical spine for impending cord compression  Patient could not continue radiation therapy because of weakness  After long discussion with patient and patient's family(5 family members present) in the bedside  They understood that Mr Dipti Rolon has an end-stage pancreatic cancer with poor prognosis  ECOG performance status 3/5  Patient cannot tolerate chemotherapy unless his overall performance status is improved  I recommend patient increasing oral intake and hydration, do exercise as tolerated  Physical therapy is also recommended  We will re-evaluate him in [facility] office after discharge  2  Intracranial metastatic disease from pancreatic cancer  Patient is not a candidate for surgery  Stereotactic radiation  surgery was discussed by radiation oncologist   I recommend Radiation Oncology consult  3   Cervical spine cord compression  Status post 4/10 fraction radiation therapy  Radiation oncology consult to resume palliative radiation therapy  4  Severe malnutrition, patient is very cachectic  I recommend Dietitian consult  5  Normocytic normochromic anemia and thrombocytopenia likely secondary to metastatic pancreatic cancer  Hemoglobin 8 9  TSH 3 857  Repeat CBC  PRBC transfusion if hemoglobin less than 7    6  Subclavian vein thrombosis secondary to pancreatic cancer,   on Eliquis  7  Follow-up: Please make an appointment with Leeanne Deal oncologist in [facility] upon discharge  Thank you very much for your consultation and making us part of this nice patient's care  I will continue to follow closely with you  Please contact me with any additional questions  Disclaimer:  This document was prepared using [employer] Fluency Direct technology  If a word or phrase is confusing, or does not make sense, this is likely due to recognition error which was not discovered during the providers review  If you believe an error has occurred, please contact me through [employer] service line for carlos? cation  HPI:   Chantale Cohn is a 48 y o  male admitted by Marcelo Dallas MD with a   past medical history of atrial fibrillation, metastatic pancreatic cancer with extensive metastatic disease including the bone, liver, lymph nodes, brain, cutaneous tissue, and adrenal glands  CA 19-9 [id number]  Image studies showed possible cervical spine cord compression  Status post palliative radiation therapy 4/10 fractions by August 4, 2021  Patient was admitted to hospital because of seizure activity  Ct brain showed stable intracranial metastatic disease  Neurologist consulted patient and started on [address]  Patient also takes dexamethasone  Medical oncology consult is requested for comanagement of metastatic pancreatic cancer  Patient feels very weak and fatigued  Patient denied fever or chills  No chest pain or shortness of breath  No cough and phlegm    No GI or  symptoms  Appetite is poor  Patient lost significant amount of weight  No bleeding anywhere  Patient has a good family support  Review of system:  12-point review of system was performed, pertinent positive and negative were detailed as above    History reviewed  No pertinent past medical history  Past Surgical History:   Procedure Laterality Date    FACIAL/NECK BIOPSY Bilateral 7/26/2021    Procedure: INCISIONAL BIOPSY LEFT NECK MASS,AND EXCISIOINAL BIOPSY RIGHT NECK MASS;  Surgeon: Javier Rothman MD;  [facility]: [facility];  Service: Surgical Oncology    HAND SURGERY      left hand    IR BIOPSY LIVER MASS  7/30/2021       History reviewed  No pertinent family history      Social History     Socioeconomic History    Marital status: Single     Spouse name: Not on file    Number of children: Not on file    Years of education: Not on file    Highest education level: Not on file   Occupational History    Not on file   Tobacco Use    Smoking status: Current Every Day Smoker     Packs/day: 0 25    Smokeless tobacco: Never Used   Vaping Use    Vaping Use: Never used   Substance and Sexual Activity    Alcohol use: Not Currently    Drug use: Yes     Types: Marijuana    Sexual activity: Not on file   Other Topics Concern    Not on file   Social History Narrative    Not on file     Social Determinants of Health     Financial Resource Strain:     Difficulty of Paying Living Expenses:    Food Insecurity:     Worried About Running Out of Food in the Last Year:     Ran Out of Food in the Last Year:    Transportation Needs:     Lack of Transportation (Medical):      Lack of Transportation (Non-Medical):    Physical Activity:     Days of Exercise per Week:     Minutes of Exercise per Session:    Stress:     Feeling of Stress :    Social Connections:     Frequency of Communication with Friends and Family:     Frequency of Social Gatherings with Friends and Family:     Attends Congregational Services:     Active Member of Clubs or Organizations:     Attends Club or Organization Meetings:     Marital Status:    Intimate Partner Violence:     Fear of Current or Ex-Partner:     Emotionally Abused:     Physically Abused:     Sexually Abused:        No Known Allergies    Current Facility-Administered Medications   Medication Dose Route Frequency Provider Last Rate Last Admin    apixaban (ELIQUIS) tablet 5 mg  5 mg Oral BID Baldmoero Harp PA-C   5 mg at 08/13/21 0857    bisacodyl (DULCOLAX) rectal suppository 10 mg  10 mg Rectal Daily PRN Baldomero Harp PA-C        calcium carbonate (TUMS) chewable tablet 500 mg  500 mg Oral Daily PRN Baldomero Harp PA-C        cefepime (MAXIPIME) 2,000 mg in dextrose 5 % 50 mL IVPB  2,000 mg Intravenous Q12H Natalya Muñoz Prechtel,  mL/hr at 08/13/21 1557 2,000 mg at 08/13/21 1557    HYDROmorphone (DILAUDID) injection 0 5 mg  0 5 mg Intravenous Q3H PRN Caryle Barrier, PA-C   0 5 mg at 08/13/21 1234    lactated ringers infusion  125 mL/hr Intravenous Continuous Pink Hughes Prechtel,  mL/hr at 08/13/21 1619 125 mL/hr at 08/13/21 1619    levETIRAcetam (KEPPRA) 750 mg in sodium chloride 0 9 % 100 mL IVPB  750 mg Intravenous Q12H [address], PA-C 400 mL/hr at 08/13/21 0924 750 mg at 08/13/21 0924    LORazepam (ATIVAN) injection 1 mg  1 mg Intravenous Q4H PRN Caryle Barrier, PA-C        metroNIDAZOLE (FLAGYL) IVPB (premix) 500 mg 100 mL  500 mg Intravenous Q8H Eloy S Prechtel,  mL/hr at 08/13/21 1618 500 mg at 08/13/21 1618    oxyCODONE (ROXICODONE) IR tablet 5 mg  5 mg Oral Q4H PRN Caryle Barrier, PA-C   5 mg at 08/12/21 1839    polyethylene glycol (MIRALAX) packet 17 g  17 g Oral BID Caryle Barrier, PA-C   17 g at 08/13/21 9163    senna (SENOKOT) tablet 8 6 mg  8 6 mg Oral BID Caryle Barrier, PA-C   8 6 mg at 08/13/21 3129       Medications Prior to Admission   Medication Sig Dispense Refill Last Dose    acetaminophen (TYLENOL) 325 mg tablet Take 2 tablets (650 mg total) by mouth every 6 (six) hours as needed for mild pain 120 tablet 2 8/11/2021 at Unknown time    apixaban (ELIQUIS) 5 mg Take 1 tablet (5 mg total) by mouth 2 (two) times a day 180 tablet 0 8/11/2021 at Unknown time    bisacodyl (DULCOLAX) 10 mg suppository Insert 1 suppository (10 mg total) into the rectum daily as needed for constipation (no bowel movement in 3 days) 12 suppository 0 8/11/2021 at Unknown time    calcium carbonate (TUMS) 500 mg chewable tablet Chew 1 tablet (500 mg total) daily as needed for indigestion or heartburn (abdominal pain after meals) 90 tablet 0 8/11/2021 at Unknown time    dexamethasone (DECADRON) 4 mg tablet Take 1 tablet (4 mg total) by mouth every 12 (twelve) hours for 2 days, THEN 0 5 tablets (2 mg total) every 12 (twelve) hours for 3 days, THEN 0 5 tablets (2 mg total) daily with breakfast for 2 days  8 tablet 0 2021 at Unknown time    metoprolol tartrate (LOPRESSOR) 100 mg tablet Take 0 5 tablets (50 mg total) by mouth every 12 (twelve) hours 90 tablet 2 2021 at Unknown time    morphine (MS CONTIN) 15 mg 12 hr tablet Take 1 tablet (15 mg total) by mouth every 8 (eight) hours for 14 daysMax Daily Amount: 45 mg 42 tablet 0 2021 at Unknown time    oxyCODONE (ROXICODONE) 5 mg immediate release tablet Take 1-2 tablets (5 - 10 mg total) up to once every 4 hours as needed for moderate to severe pain  168 tablet 0 2021 at Unknown time    polyethylene glycol (GLYCOLAX) 17 GM/SCOOP powder Take 17 g by mouth 2 (two) times a day as needed (constipation) for up to 28 doses 578 g 0 2021 at Unknown time    polyethylene glycol (MIRALAX) 17 g packet Take 17 g by mouth 2 (two) times a day 90 each 0 2021 at Unknown time    senna (SENOKOT) 8 6 mg Take 1 tablet (8 6 mg total) by mouth 2 (two) times a day Do not take if you are having loose stools or frequent stools  28 tablet 0 2021 at Unknown time       Pain Score: 0    ECOG PS: 3/5    Objective:     24 Hour Vitals Assessment:     BP-Systolic (50SOW), :125 , Min:102 , JVC:580   BP-Diastolic (31FTU), MM, Min:68, Max:79  BP  Min: 102/68  Max: 113/79  Temp  Av °F (37 2 °C)  Min: 98 °F (36 7 °C)  Max: 100 3 °F (37 9 °C)  Pulse  Av 7  Min: 101  Max: 119  Resp  Av 5  Min: 17  Max: 18  SpO2  Av 3 %  Min: 94 %  Max: 98 %    PHYSICIAN EXAM:    General: Appearance:  very cachectic male, alert, cooperative, no distress  HEENT: Normocephalic, atraumatic  No scleral icterus  conjunctivae clear  PERRL, EOM's intact  No sinus drainage or tenderness  Chest: No tenderness  Lungs: Clear to auscultation bilaterally, Respirations unlabored    Cardiac: Regular rate and rhythm, S1and S2 are normal, no murmur, click, rubs or gallops  Abdomen: Soft, non-tender, non-distended  Bowel sounds are normal  No masses, no organomegaly  Pelvic: deferred  Extremities:  No edema, cyanosis, clubbing  Skin: Skin color, turgor are normal  No rashes  Lymphatics: no palpable adenopathy  Neurologic: Alert and oriented X 3, Cranial nerve 2-12 grosely intact  Normal strength and sensation  Data Review:    Image Study:   reviewed    LABS:  CBC:  Recent Labs     08/11/21  1104 08/12/21  0440   WBC 24 08* 19 12*   MCV 95 96   MCH 30 3 30 3   MCHC 31 7 31 4   RDW 20 3* 19 9*   MPV 10 1 9 1     CMP:   Recent Labs     08/11/21  1104 08/12/21  0440   SODIUM 131* 135*   BUN 29* 19   CALCIUM 9 7 9 4   CREATININE 1 07 0 57*       MISC  LABS:    Recent Labs     08/13/21  0918 08/13/21  1122 08/13/21  1609   LACTICACID 7 2* 4 0* 3 6*  3 7*     LFT:   Recent Labs     08/11/21  1104 08/12/21  0440   AST 61* 58*       Coags:  Invalid input(s): COAGPROFILE  Recent Labs     08/11/21  1104   INR 1 40*   PTT 33       By:  Dayanna Murphy MD, 8/13/2021, 5:31 PM                                  Primary Care Physician:  No primary care provider on file

## 2021-08-13 NOTE — ASSESSMENT & PLAN NOTE
Unclear if she actually has an infection source or if this abnormal lab work and vital signs showing sepsis is more from his malignancy and possible seizure  He did have a necrotizing neck infection back in July 2021  Chest x-ray negative    Only complaint is ongoing neck pain since July    I will check a CT soft tissue neck to see if there is anything going on  Place him empirically on cefepime and Flagyl    Lactic acid has come down from 7 to 4, but still elevated  Will give him another 2 fluid boluses per the sepsis protocol  It may be from the seizure

## 2021-08-13 NOTE — PALLIATIVE CARE CONFERENCE
Late Entry      Dr Stacie Louie and this writer met with pt yesterday, 8/1e/21  He states that at this time he would like to receive cancer treatments  We spoke about hospice and he is not ready at this time  We did encourage him to speak with his family regarding his cancer diagnosis, as he states that they are unaware

## 2021-08-13 NOTE — PROGRESS NOTES
Patient , temp 100 3, WBC 19 12, most recent lactic acid 4 0  SLIM (Dr Ronnie Nair) notified and at bedside  Unable to obtain lactic acid at this moment  Charge RN aware  Will continue to monitor

## 2021-08-13 NOTE — PROGRESS NOTES
[facility]  Progress Note - Sayda Trujillo 1970, 48 y o  male MRN: [id number]  Unit/Bed#: [place] [address] -01 Encounter: [id number]  Primary Care Provider: No primary care provider on file  Date and time admitted to hospital: 2021 10:35 AM    * Seizure-like activity [facility])  Assessment & Plan  Found to have a cerebellar met  Neurology recommends continuing AED    Severe sepsis [facility])  Assessment & Plan  Unclear if she actually has an infection source or if this abnormal lab work and vital signs showing sepsis is more from his malignancy and possible seizure  He did have a necrotizing neck infection back in 2021  Chest x-ray negative    Only complaint is ongoing neck pain since July    I will check a CT soft tissue neck to see if there is anything going on  Place him empirically on cefepime and Flagyl    Lactic acid has come down from 7 to 4, but still elevated  Will give him another 2 fluid boluses per the sepsis protocol  It may be from the seizure  Atrial fibrillation ([facility] [address] )  Assessment & Plan  On metoprolol and apixaban    Metastatic cancer [facility])  Assessment & Plan  Unclear primary  CA 19 9 elevated, so maybe pancreatic  Has liver metastasis,  neck involvement, brain metastasis    Seen by palliative care  Right now he wants full treatment and does not want hospice  Will ask Oncology to consult on him  Subjective:   Complains of neck and back pain which is chronic for the last month  Is not complaining any fever chills  No cough  No dysuria  No diarrhea  No headache  No neck stiffness      Objective:     Vitals:   Temp (24hrs), Av 9 °F (37 2 °C), Min:98 °F (36 7 °C), Max:100 3 °F (37 9 °C)    Temp:  [98 °F (36 7 °C)-100 3 °F (37 9 °C)] 100 3 °F (37 9 °C)  HR:  [101-119] 119  Resp:  [17-19] 18  BP: (101-113)/(68-79) 112/77  SpO2:  [94 %-98 %] 98 %  Body mass index is 17 75 kg/m²       Input and Output Summary (last 24 hours): Intake/Output Summary (Last 24 hours) at 8/13/2021 1604  Last data filed at 8/13/2021 1546  Gross per 24 hour   Intake 3710 ml   Output 3250 ml   Net 460 ml       Physical Exam:     Physical Exam  Vitals and nursing note reviewed  HENT:      Head: Normocephalic and atraumatic  Eyes:      Pupils: Pupils are equal, round, and reactive to light  Cardiovascular:      Rate and Rhythm: Normal rate and regular rhythm  Heart sounds: No murmur heard  No friction rub  No gallop  Pulmonary:      Effort: Pulmonary effort is normal       Breath sounds: Normal breath sounds  No wheezing or rales  Abdominal:      General: Bowel sounds are normal       Palpations: Abdomen is soft  Tenderness: There is no abdominal tenderness  Musculoskeletal:      Right lower leg: No edema  Left lower leg: No edema          Additional Data:     Labs:    Results from last 7 days   Lab Units 08/12/21  0440 08/11/21  1104   WBC Thousand/uL 19 12* 24 08*   HEMOGLOBIN g/dL 8 9* 11 3*   HEMATOCRIT % 28 3* 35 6*   PLATELETS Thousands/uL 138* 153   LYMPHO PCT %  --  12*   MONO PCT %  --  3*   EOS PCT %  --  0     Results from last 7 days   Lab Units 08/12/21  0440   POTASSIUM mmol/L 4 7   CHLORIDE mmol/L 101   CO2 mmol/L 24   BUN mg/dL 19   CREATININE mg/dL 0 57*   CALCIUM mg/dL 9 4   ALK PHOS U/L 198*   ALT U/L 53   AST U/L 58*     Results from last 7 days   Lab Units 08/11/21  1104   INR  1 40*                   * I Have Reviewed All Lab Data     Recent Cultures (last 7 days):     Results from last 7 days   Lab Units 08/11/21  1104   BLOOD CULTURE  No Growth at 24 hrs  No Growth at 24 hrs           Last 24 Hours Medication List:   Current Facility-Administered Medications   Medication Dose Route Frequency Provider Last Rate    apixaban  5 mg Oral BID Luis Enrique Joel PA-C      bisacodyl  10 mg Rectal Daily PRN Luis Enrique Joel PA-C      calcium carbonate  500 mg Oral Daily PRN Luis Enrique Joel PA-C      cefepime  2,000 mg Intravenous Q12H Eloy S Prechtel, DO 2,000 mg (08/13/21 1557)    HYDROmorphone  0 5 mg Intravenous Q3H PRN ISAAC Olivera-JORDEN      lactated ringers  1,000 mL Intravenous Once Andrew Evans Prechtel, DO 1,000 mL (08/13/21 1547)    Followed by   Anderson Mcgrath lactated ringers  1,000 mL Intravenous Once Andrew Lehmanhtel, DO      lactated ringers  75 mL/hr Intravenous Continuous Dahlia Frey PA-C 75 mL/hr (08/13/21 1234)    levETIRAcetam  750 mg Intravenous Q12H [facility] Dahliala nena Frey PA-C 750 mg (08/13/21 0924)    LORazepam  1 mg Intravenous Q4H PRN ISAAC Olivera-JORDEN      metroNIDAZOLE  500 mg Intravenous Q8H Eloy S Prechtel, DO      oxyCODONE  5 mg Oral Q4H PRN ISAAC Olivera-C      polyethylene glycol  17 g Oral BID ISAAC Olivera-JORDEN      senna  8 6 mg Oral BID ISAAC Olivera-JORDEN           VTE Pharmacologic Prophylaxis:   Pharmacologic: Apixaban (Eliquis)      Current Length of Stay: 2 day(s)    Current Patient Status: Inpatient       Discharge Plan:   Code Status: Level 1 - Full Code      Greater than 45 minutes spent  Greater than 50% of time spent coordinatng care  Today, Patient Was Seen By: Shon Chou DO    ** Please Note: Dictation voice to text software may have been used in the creation of this document   **

## 2021-08-13 NOTE — ASSESSMENT & PLAN NOTE
Established High Blood Pressure    High blood pressure (hypertension) is a chronic disease. Often health care providers don’t know what causes it. But it can be caused by certain health conditions and medicines.  If you have high blood pressure, you may not have any symptoms. If you do have symptoms, they may include headache, dizziness, changes in your vision, chest pain, and shortness of breath. But even without symptoms, high blood pressure that’s not treated raises your risk for heart attack and stroke. High blood pressure is a serious health risk and shouldn’t be ignored.  A blood pressure reading is made up of two numbers: a higher number over a lower number. The top number is the systolic pressure. The bottom number is the diastolic pressure. A normal blood pressure is less than 120 over less than 80.  High blood pressure is when either the top number is 140 or higher, or the bottom number is 90 or higher. This must be the result when taking your blood pressure a number of times. The blood pressures between normal and high are called prehypertension.  Home care  If you have high blood pressure, you should do what is listed below to lower your blood pressure. If you are taking medicines for high blood pressure, these methods may reduce or end your need for medicines in the future.  · Begin a weight-loss program if you are overweight.  · Cut back on how much salt you get in your diet. Here’s how to do this:  ¨ Don’t eat foods that have a lot of salt. These include olives, pickles, smoked meats, and salted potato chips.  ¨ Don’t add salt to your food at the table.  ¨ Use only small amounts of salt when cooking.  · Begin an exercise program. Talk with your health care provider about the type of exercise program that would be best for you. It doesn't have to be hard. Even brisk walking for 20 minutes 3 times a week is a good form of exercise.  · Don’t take medicines that have heart stimulants. This includes  Unclear primary  CA 19 9 elevated, so maybe pancreatic  Has liver metastasis,  neck involvement, brain metastasis    Seen by palliative care  Right now he wants full treatment and does not want hospice  Will ask Oncology to consult on him  many cold and sinus decongestant pills and sprays, as well as diet pills. Check the warnings about hypertension on the label. Stimulants such as amphetamine or cocaine could be lethal for someone with high blood pressure. Never take these.  · Limit how much caffeine you get in your diet. Switch to caffeine-free products.  · Stop smoking. If you are a long-time smoker, this can be hard. Enroll in a stop-smoking program to make it more likely that you will quit for good.  · Learn how to handle stress. This is an important part of any program to lower blood pressure. Learn about relaxation methods like meditation, yoga, or biofeedback.  · If your provider prescribed medicines, take them exactly as directed. Missing doses may cause your blood pressure get out of control.  · Consider buying an automatic blood pressure machine. You can get one of these at most pharmacies. Use this to watch your blood pressure at home. Give the results to your provider.  Follow-up care  You will need to make regular visits to your health care provider. This is to check your blood pressure and to make changes to your medicines. Make a follow-up appointment as directed.  When to seek medical advice  Call your health care provider right away if any of these occur:  · Chest pain or shortness of breath  · Severe headache  · Throbbing or rushing sound in the ears  · Nosebleed  · Sudden severe pain in your belly (abdomen)  · Extreme drowsiness, confusion, or fainting  · Dizziness or dizziness with a spinning sensation (vertigo)  · Weakness of an arm or leg or one side of the face  · You have problems speaking or seeing   © 4333-0008 [employer]. [address] [place] [address], [place] [address]. All rights reserved. This information is not intended as a substitute for professional medical care. Always follow your healthcare professional's instructions.        Chest Pain    Chest pain can happen for a number of reasons. Sometimes the cause  can not be determined. If your condition does not seem serious, and your pain does not appear to be coming from your heart, your healthcare provider may recommend watching it closely. Sometimes the signs of a serious problem take more time to appear. Many problems can cause chest pain that are not related to your heart. These include:  · Musculoskeletal. Costochondritis, an inflammation of the tissues around the ribs that can occur from trauma or overuse injuries  · Respiratory.Pneumonia, pneumothorax, or pneumonitis (inflammation of the lining of the chest and lungs)  · Gastrointestinal. Esophageal reflux, heartburn, or gallbladder disease  · Anxiety and panic disorders  · Nerve compression and neuritis  · Miscellaneous others such as aortic aneurysm or pulmonary embolism (a blood clot in the lungs)  Home care  After your visit, follow these recommendations:  · Rest today and avoid strenuous activity.  · Take any prescribed medicine as directed.  · Be aware of any recurrent chest pain and notice any changes  Follow-up care  Follow up with your healthcare provider if you do not start to feel better within 24 hours, or as advised.  Call 911  Call 911 if any of these occur:  · A change in the type of pain: if it feels different, becomes more severe, lasts longer, or begins to spread into your shoulder, arm, neck, jaw or back  · Shortness of breath or increased pain with breathing  · Weakness, dizziness, or fainting  · Rapid heart beat  · Crushing sensation in your chest  When to seek medical advice  Call your healthcare provider right away if any of the following occur:  · Cough with dark colored sputum (phlegm) or blood  · Fever of 100.4ºF (38ºC) or higher, or as directed by your healthcare provider  · Swelling, pain or redness in one leg  · Shortness of breath  © 0852-3263 [employer] [address] [place] [address], [place] [address]. All rights reserved. This information is not intended as a substitute for  professional medical care. Always follow your healthcare professional's instructions.        Fainting: Uncertain Cause  Fainting (syncope) is a temporary loss of consciousness. It’s also called passing out. It occurs when blood flow to the brain is less than normal. Near-fainting (near-syncope) is very similar to fainting, but you don’t fully pass out.  Common minor causes of fainting include:  · Sudden fear  · Pain  · Nausea  · Emotional stress  · Overexertion  Suddenly standing up after sitting or lying for a long time can also cause fainting.  More serious causes of fainting include:  · Very slow or very fast heartbeat (arrhythmia)  · Other types of heart disease  · Dehydration  · Loss of blood loss  · Seizure  · Stroke  · Ruptured blood vessel in the brain  Taking too much high blood pressure medicine can also cause low blood pressure and fainting.  Your health care provider does not know the exact cause of your fainting. But the tests today did not show any of the serious causes of fainting. Sometimes you may need more tests to find out if you have a serious problem. That’s why it’s important to follow up with your provider as advised.  Home care  Follow these guidelines when caring for yourself at home:  · Rest today. You may go back to your normal activities when you are feeling back to normal. It is best to stay with someone who can check on you for the next 24 hours to watch for another episode of fainting.  · If you become lightheaded or dizzy, lie down right away. Or sit with your head between your knees.  · Because the provider doesn’t know the exact cause of your fainting or near-fainting spell, it’s possible for you to have another spell without warning. Because of this, don’t drive a car or operate dangerous equipment. Don’t take a bath alone. Use a shower instead. Don’t swim alone until your health care provider says that you are no longer in danger of having another fainting spell.  Follow-up  care  Follow up with your health care provider, or as advised.  When to seek medical advice  Call your health care provider right away if any of these occur:  · Another fainting spell that’s not explained by the common causes listed above  · Pain in your chest, arm, neck, jaw, back, or abdomen  · Shortness of breath  · Severe headache or seizure  · Blood in vomit or stools (black or red color)  · Unexpected vaginal bleeding  · Your heart beats very rapidly, very slowly, or irregularly (palpitations)  Also call your provider if you have signs of stroke:  · Weakness in an arm or leg or on one side of the face  · Difficulty speaking or seeing  · Extreme drowsiness, confusion, dizziness, or fainting  © 1157-4557 [employer]. [address] [place] [address], [place] [address]. All rights reserved. This information is not intended as a substitute for professional medical care. Always follow your healthcare professional's instructions.

## 2021-08-13 NOTE — PLAN OF CARE
Problem: MOBILITY - ADULT  Goal: Maintain or return to baseline ADL function  Description: INTERVENTIONS:  -  Assess patient's ability to carry out ADLs; assess patient's baseline for ADL function and identify physical deficits which impact ability to perform ADLs (bathing, care of mouth/teeth, toileting, grooming, dressing, etc )  - Assess/evaluate cause of self-care deficits   - Assess range of motion  - Assess patient's mobility; develop plan if impaired  - Assess patient's need for assistive devices and provide as appropriate  - Encourage maximum independence but intervene and supervise when necessary  - Involve family in performance of ADLs  - Assess for home care needs following discharge   - Consider OT consult to assist with ADL evaluation and planning for discharge  - Provide patient education as appropriate  Outcome: Progressing  Goal: Maintains/Returns to pre admission functional level  Description: INTERVENTIONS:  - Perform BMAT or MOVE assessment daily    - Set and communicate daily mobility goal to care team and patient/family/caregiver  - Collaborate with rehabilitation services on mobility goals if consulted  - Perform Range of Motion   - Reposition patient every 2 hours    - Dangle patient  - Out of bed for toileting  - Record patient progress and toleration of activity level   Outcome: Progressing     Problem: Potential for Falls  Goal: Patient will remain free of falls  Description: INTERVENTIONS:  - Educate patient/family on patient safety including physical limitations  - Instruct patient to call for assistance with activity   - Consult OT/PT to assist with strengthening/mobility   - Keep Call bell within reach  - Keep bed low and locked with side rails adjusted as appropriate  - Keep care items and personal belongings within reach  - Initiate and maintain comfort rounds  - Make Fall Risk Sign visible to staff  - Offer Toileting  - Obtain necessary fall risk management equipment  - Apply yellow socks and bracelet for high fall risk patients  - Consider moving patient to room near nurses station  Outcome: Progressing     Problem: Prexisting or High Potential for Compromised Skin Integrity  Goal: Skin integrity is maintained or improved  Description: INTERVENTIONS:  - Identify patients at risk for skin breakdown  - Assess and monitor skin integrity  - Assess and monitor nutrition and hydration status  - Monitor labs   - Assess for incontinence   - Turn and reposition patient  - Assist with mobility/ambulation  - Relieve pressure over bony prominences  - Avoid friction and shearing  - Provide appropriate hygiene as needed including keeping skin clean and dry  - Evaluate need for skin moisturizer/barrier cream  - Collaborate with interdisciplinary team   - Patient/family teaching  - Consider wound care consult   Outcome: Progressing     Problem: Nutrition/Hydration-ADULT  Goal: Nutrient/Hydration intake appropriate for improving, restoring or maintaining nutritional needs  Description: Monitor and assess patient's nutrition/hydration status for malnutrition  Collaborate with interdisciplinary team and initiate plan and interventions as ordered  Monitor patient's weight and dietary intake as ordered or per policy  Utilize nutrition screening tool and intervene as necessary  Determine patient's food preferences and provide high-protein, high-caloric foods as appropriate       INTERVENTIONS:  - Monitor oral intake, urinary output, labs, and treatment plans  - Assess nutrition and hydration status and recommend course of action  - Evaluate amount of meals eaten  - Assist patient with eating if necessary   - Allow adequate time for meals  - Recommend/ encourage appropriate diets, oral nutritional supplements, and vitamin/mineral supplements  - Order, calculate, and assess calorie counts as needed  - Recommend, monitor, and adjust tube feedings and TPN/PPN based on assessed needs  - Assess need for intravenous fluids  - Provide specific nutrition/hydration education as appropriate  - Include patient/family/caregiver in decisions related to nutrition  Outcome: Progressing     Problem: PAIN - ADULT  Goal: Verbalizes/displays adequate comfort level or baseline comfort level  Description: Interventions:  - Encourage patient to monitor pain and request assistance  - Assess pain using appropriate pain scale  - Administer analgesics based on type and severity of pain and evaluate response  - Implement non-pharmacological measures as appropriate and evaluate response  - Consider cultural and social influences on pain and pain management  - Notify physician/advanced practitioner if interventions unsuccessful or patient reports new pain  Outcome: Progressing     Problem: INFECTION - ADULT  Goal: Absence or prevention of progression during hospitalization  Description: INTERVENTIONS:  - Assess and monitor for signs and symptoms of infection  - Monitor lab/diagnostic results  - Monitor all insertion sites, i e  indwelling lines, tubes, and drains  - Monitor endotracheal if appropriate and nasal secretions for changes in amount and color  - [place] appropriate cooling/warming therapies per order  - Administer medications as ordered  - Instruct and encourage patient and family to use good hand hygiene technique  - Identify and instruct in appropriate isolation precautions for identified infection/condition  Outcome: Progressing  Goal: Absence of fever/infection during neutropenic period  Description: INTERVENTIONS:  - Monitor WBC    Outcome: Progressing     Problem: SAFETY ADULT  Goal: Maintain or return to baseline ADL function  Description: INTERVENTIONS:  -  Assess patient's ability to carry out ADLs; assess patient's baseline for ADL function and identify physical deficits which impact ability to perform ADLs (bathing, care of mouth/teeth, toileting, grooming, dressing, etc )  - Assess/evaluate cause of self-care deficits   - Assess range of motion  - Assess patient's mobility; develop plan if impaired  - Assess patient's need for assistive devices and provide as appropriate  - Encourage maximum independence but intervene and supervise when necessary  - Involve family in performance of ADLs  - Assess for home care needs following discharge   - Consider OT consult to assist with ADL evaluation and planning for discharge  - Provide patient education as appropriate  Outcome: Progressing  Goal: Maintains/Returns to pre admission functional level  Description: INTERVENTIONS:  - Perform BMAT or MOVE assessment daily    - Set and communicate daily mobility goal to care team and patient/family/caregiver     - Collaborate with rehabilitation services on mobility goals if consulted  - Perform Range of Motion   - Reposition patient   - Dangle patient   - Out of bed for toileting  - Record patient progress and toleration of activity level   Outcome: Progressing  Goal: Patient will remain free of falls  Description: INTERVENTIONS:  - Educate patient/family on patient safety including physical limitations  - Instruct patient to call for assistance with activity   - Consult OT/PT to assist with strengthening/mobility   - Keep Call bell within reach  - Keep bed low and locked with side rails adjusted as appropriate  - Keep care items and personal belongings within reach  - Initiate and maintain comfort rounds  - Make Fall Risk Sign visible to staff  - Offer Toileting   - Obtain necessary fall risk management equipment  - Apply yellow socks and bracelet for high fall risk patients  - Consider moving patient to room near nurses station  Outcome: Progressing     Problem: DISCHARGE PLANNING  Goal: Discharge to home or other facility with appropriate resources  Description: INTERVENTIONS:  - Identify barriers to discharge w/patient and caregiver  - Arrange for needed discharge resources and transportation as appropriate  - Identify discharge learning needs (meds, wound care, etc )  - Arrange for interpretive services to assist at discharge as needed  - Refer to Case Management Department for coordinating discharge planning if the patient needs post-hospital services based on physician/advanced practitioner order or complex needs related to functional status, cognitive ability, or social support system  Outcome: Progressing     Problem: Knowledge Deficit  Goal: Patient/family/caregiver demonstrates understanding of disease process, treatment plan, medications, and discharge instructions  Description: Complete learning assessment and assess knowledge base    Interventions:  - Provide teaching at level of understanding  - Provide teaching via preferred learning methods  Outcome: Progressing

## 2021-08-14 NOTE — ASSESSMENT & PLAN NOTE
Unclear primary  CA 19 9 elevated, so maybe pancreatic  Has liver metastasis,  neck involvement, brain metastasis    Seen by palliative care  Right now he wants full treatment and does not want hospice      Appreciate oncology to eval     Only palliative treatment is an option and he wasn't even able to tolerate palliative radiation to neck    I consult radiation oncology to see if any further radiation, such as to the brain met, would be warranted

## 2021-08-14 NOTE — ASSESSMENT & PLAN NOTE
Unclear if she actually has an infection source or if this abnormal lab work and vital signs showing sepsis is more from his malignancy and possible seizure  He did have a necrotizing neck infection back in July 2021  Chest x-ray negative    Only complaint is ongoing neck pain since July    CT neck did not really show any infection  Place him empirically on cefepime and Flagyl    Lactic acid is coming down    It may be from the seizure

## 2021-08-14 NOTE — PLAN OF CARE
Problem: MOBILITY - ADULT  Goal: Maintain or return to baseline ADL function  Description: INTERVENTIONS:  -  Assess patient's ability to carry out ADLs; assess patient's baseline for ADL function and identify physical deficits which impact ability to perform ADLs (bathing, care of mouth/teeth, toileting, grooming, dressing, etc )  - Assess/evaluate cause of self-care deficits   - Assess range of motion  - Assess patient's mobility; develop plan if impaired  - Assess patient's need for assistive devices and provide as appropriate  - Encourage maximum independence but intervene and supervise when necessary  - Involve family in performance of ADLs  - Assess for home care needs following discharge   - Consider OT consult to assist with ADL evaluation and planning for discharge  - Provide patient education as appropriate  Outcome: Progressing  Goal: Maintains/Returns to pre admission functional level  Description: INTERVENTIONS:  - Perform BMAT or MOVE assessment daily    - Set and communicate daily mobility goal to care team and patient/family/caregiver  - Collaborate with rehabilitation services on mobility goals if consulted  - Perform Range of Motion   - Reposition patient every 2 hours    - Dangle patient   - Stand patient   - Ambulate patient   - Out of bed to chair   - Out of bed for meals   - Out of bed for toileting  - Record patient progress and toleration of activity level   Outcome: Progressing     Problem: Potential for Falls  Goal: Patient will remain free of falls  Description: INTERVENTIONS:  - Educate patient/family on patient safety including physical limitations  - Instruct patient to call for assistance with activity   - Consult OT/PT to assist with strengthening/mobility   - Keep Call bell within reach  - Keep bed low and locked with side rails adjusted as appropriate  - Keep care items and personal belongings within reach  - Initiate and maintain comfort rounds  - Make Fall Risk Sign visible to staff  - Offer Toileting  - Obtain necessary fall risk management equipment  - Apply yellow socks and bracelet for high fall risk patients  - Consider moving patient to room near nurses station  Outcome: Progressing     Problem: Prexisting or High Potential for Compromised Skin Integrity  Goal: Skin integrity is maintained or improved  Description: INTERVENTIONS:  - Identify patients at risk for skin breakdown  - Assess and monitor skin integrity  - Assess and monitor nutrition and hydration status  - Monitor labs   - Assess for incontinence   - Turn and reposition patient  - Assist with mobility/ambulation  - Relieve pressure over bony prominences  - Avoid friction and shearing  - Provide appropriate hygiene as needed including keeping skin clean and dry  - Evaluate need for skin moisturizer/barrier cream  - Collaborate with interdisciplinary team   - Patient/family teaching  - Consider wound care consult   Outcome: Progressing     Problem: Nutrition/Hydration-ADULT  Goal: Nutrient/Hydration intake appropriate for improving, restoring or maintaining nutritional needs  Description: Monitor and assess patient's nutrition/hydration status for malnutrition  Collaborate with interdisciplinary team and initiate plan and interventions as ordered  Monitor patient's weight and dietary intake as ordered or per policy  Utilize nutrition screening tool and intervene as necessary  Determine patient's food preferences and provide high-protein, high-caloric foods as appropriate       INTERVENTIONS:  - Monitor oral intake, urinary output, labs, and treatment plans  - Assess nutrition and hydration status and recommend course of action  - Evaluate amount of meals eaten  - Assist patient with eating if necessary   - Allow adequate time for meals  - Recommend/ encourage appropriate diets, oral nutritional supplements, and vitamin/mineral supplements  - Order, calculate, and assess calorie counts as needed  - Recommend, monitor, and adjust tube feedings and TPN/PPN based on assessed needs  - Assess need for intravenous fluids  - Provide specific nutrition/hydration education as appropriate  - Include patient/family/caregiver in decisions related to nutrition  Outcome: Progressing     Problem: PAIN - ADULT  Goal: Verbalizes/displays adequate comfort level or baseline comfort level  Description: Interventions:  - Encourage patient to monitor pain and request assistance  - Assess pain using appropriate pain scale  - Administer analgesics based on type and severity of pain and evaluate response  - Implement non-pharmacological measures as appropriate and evaluate response  - Consider cultural and social influences on pain and pain management  - Notify physician/advanced practitioner if interventions unsuccessful or patient reports new pain  Outcome: Progressing     Problem: INFECTION - ADULT  Goal: Absence or prevention of progression during hospitalization  Description: INTERVENTIONS:  - Assess and monitor for signs and symptoms of infection  - Monitor lab/diagnostic results  - Monitor all insertion sites, i e  indwelling lines, tubes, and drains  - Monitor endotracheal if appropriate and nasal secretions for changes in amount and color  - [place] appropriate cooling/warming therapies per order  - Administer medications as ordered  - Instruct and encourage patient and family to use good hand hygiene technique  - Identify and instruct in appropriate isolation precautions for identified infection/condition  Outcome: Progressing  Goal: Absence of fever/infection during neutropenic period  Description: INTERVENTIONS:  - Monitor WBC    Outcome: Progressing     Problem: SAFETY ADULT  Goal: Maintain or return to baseline ADL function  Description: INTERVENTIONS:  -  Assess patient's ability to carry out ADLs; assess patient's baseline for ADL function and identify physical deficits which impact ability to perform ADLs (bathing, care of mouth/teeth, toileting, grooming, dressing, etc )  - Assess/evaluate cause of self-care deficits   - Assess range of motion  - Assess patient's mobility; develop plan if impaired  - Assess patient's need for assistive devices and provide as appropriate  - Encourage maximum independence but intervene and supervise when necessary  - Involve family in performance of ADLs  - Assess for home care needs following discharge   - Consider OT consult to assist with ADL evaluation and planning for discharge  - Provide patient education as appropriate  Outcome: Progressing  Goal: Maintains/Returns to pre admission functional level  Description: INTERVENTIONS:  - Perform BMAT or MOVE assessment daily    - Set and communicate daily mobility goal to care team and patient/family/caregiver  - Collaborate with rehabilitation services on mobility goals if consulted  - Perform Range of Motion   - Reposition patient every 2 hours    - Dangle patient  - Stand patient   - Ambulate patient   - Out of bed to chair  - Out of bed for meals  - Out of bed for toileting  - Record patient progress and toleration of activity level   Outcome: Progressing  Goal: Patient will remain free of falls  Description: INTERVENTIONS:  - Educate patient/family on patient safety including physical limitations  - Instruct patient to call for assistance with activity   - Consult OT/PT to assist with strengthening/mobility   - Keep Call bell within reach  - Keep bed low and locked with side rails adjusted as appropriate  - Keep care items and personal belongings within reach  - Initiate and maintain comfort rounds  - Make Fall Risk Sign visible to staff  - Offer Toileting   - Obtain necessary fall risk management equipment  - Apply yellow socks and bracelet for high fall risk patients  - Consider moving patient to room near nurses station  Outcome: Progressing     Problem: DISCHARGE PLANNING  Goal: Discharge to home or other facility with appropriate resources  Description: INTERVENTIONS:  - Identify barriers to discharge w/patient and caregiver  - Arrange for needed discharge resources and transportation as appropriate  - Identify discharge learning needs (meds, wound care, etc )  - Arrange for interpretive services to assist at discharge as needed  - Refer to Case Management Department for coordinating discharge planning if the patient needs post-hospital services based on physician/advanced practitioner order or complex needs related to functional status, cognitive ability, or social support system  Outcome: Progressing     Problem: Knowledge Deficit  Goal: Patient/family/caregiver demonstrates understanding of disease process, treatment plan, medications, and discharge instructions  Description: Complete learning assessment and assess knowledge base    Interventions:  - Provide teaching at level of understanding  - Provide teaching via preferred learning methods  Outcome: Progressing

## 2021-08-14 NOTE — ASSESSMENT & PLAN NOTE
Malnutrition Findings:   Adult Malnutrition type: Acute illness  Adult Degree of Malnutrition: Other severe protein calorie malnutrition    BMI Findings:  Adult BMI Classifications: Underweight < 18 5     Body mass index is 19 68 kg/m²       Working with nutrition

## 2021-08-14 NOTE — PLAN OF CARE
Problem: MOBILITY - ADULT  Goal: Maintain or return to baseline ADL function  Description: INTERVENTIONS:  -  Assess patient's ability to carry out ADLs; assess patient's baseline for ADL function and identify physical deficits which impact ability to perform ADLs (bathing, care of mouth/teeth, toileting, grooming, dressing, etc )  - Assess/evaluate cause of self-care deficits   - Assess range of motion  - Assess patient's mobility; develop plan if impaired  - Assess patient's need for assistive devices and provide as appropriate  - Encourage maximum independence but intervene and supervise when necessary  - Involve family in performance of ADLs  - Assess for home care needs following discharge   - Consider OT consult to assist with ADL evaluation and planning for discharge  - Provide patient education as appropriate  Outcome: Progressing  Goal: Maintains/Returns to pre admission functional level  Description: INTERVENTIONS:  - Perform BMAT or MOVE assessment daily    - Set and communicate daily mobility goal to care team and patient/family/caregiver     - Collaborate with rehabilitation services on mobility goals if consulted  - Out of bed for toileting  - Record patient progress and toleration of activity level   Outcome: Progressing     Problem: Potential for Falls  Goal: Patient will remain free of falls  Description: INTERVENTIONS:  - Educate patient/family on patient safety including physical limitations  - Instruct patient to call for assistance with activity   - Consult OT/PT to assist with strengthening/mobility   - Keep Call bell within reach  - Keep bed low and locked with side rails adjusted as appropriate  - Keep care items and personal belongings within reach  - Initiate and maintain comfort rounds  - Make Fall Risk Sign visible to staff  - Apply yellow socks and bracelet for high fall risk patients  - Consider moving patient to room near nurses station  Outcome: Progressing     Problem: Prexisting or High Potential for Compromised Skin Integrity  Goal: Skin integrity is maintained or improved  Description: INTERVENTIONS:  - Identify patients at risk for skin breakdown  - Assess and monitor skin integrity  - Assess and monitor nutrition and hydration status  - Monitor labs   - Assess for incontinence   - Turn and reposition patient  - Assist with mobility/ambulation  - Relieve pressure over bony prominences  - Avoid friction and shearing  - Provide appropriate hygiene as needed including keeping skin clean and dry  - Evaluate need for skin moisturizer/barrier cream  - Collaborate with interdisciplinary team   - Patient/family teaching  - Consider wound care consult   Outcome: Progressing     Problem: Nutrition/Hydration-ADULT  Goal: Nutrient/Hydration intake appropriate for improving, restoring or maintaining nutritional needs  Description: Monitor and assess patient's nutrition/hydration status for malnutrition  Collaborate with interdisciplinary team and initiate plan and interventions as ordered  Monitor patient's weight and dietary intake as ordered or per policy  Utilize nutrition screening tool and intervene as necessary  Determine patient's food preferences and provide high-protein, high-caloric foods as appropriate       INTERVENTIONS:  - Monitor oral intake, urinary output, labs, and treatment plans  - Assess nutrition and hydration status and recommend course of action  - Evaluate amount of meals eaten  - Assist patient with eating if necessary   - Allow adequate time for meals  - Recommend/ encourage appropriate diets, oral nutritional supplements, and vitamin/mineral supplements  - Order, calculate, and assess calorie counts as needed  - Recommend, monitor, and adjust tube feedings and TPN/PPN based on assessed needs  - Assess need for intravenous fluids  - Provide specific nutrition/hydration education as appropriate  - Include patient/family/caregiver in decisions related to nutrition  Outcome: Progressing     Problem: PAIN - ADULT  Goal: Verbalizes/displays adequate comfort level or baseline comfort level  Description: Interventions:  - Encourage patient to monitor pain and request assistance  - Assess pain using appropriate pain scale  - Administer analgesics based on type and severity of pain and evaluate response  - Implement non-pharmacological measures as appropriate and evaluate response  - Consider cultural and social influences on pain and pain management  - Notify physician/advanced practitioner if interventions unsuccessful or patient reports new pain  Outcome: Progressing     Problem: INFECTION - ADULT  Goal: Absence or prevention of progression during hospitalization  Description: INTERVENTIONS:  - Assess and monitor for signs and symptoms of infection  - Monitor lab/diagnostic results  - Monitor all insertion sites, i e  indwelling lines, tubes, and drains  - Monitor endotracheal if appropriate and nasal secretions for changes in amount and color  - [place] appropriate cooling/warming therapies per order  - Administer medications as ordered  - Instruct and encourage patient and family to use good hand hygiene technique  - Identify and instruct in appropriate isolation precautions for identified infection/condition  Outcome: Progressing  Goal: Absence of fever/infection during neutropenic period  Description: INTERVENTIONS:  - Monitor WBC    Outcome: Progressing     Problem: SAFETY ADULT  Goal: Maintain or return to baseline ADL function  Description: INTERVENTIONS:  -  Assess patient's ability to carry out ADLs; assess patient's baseline for ADL function and identify physical deficits which impact ability to perform ADLs (bathing, care of mouth/teeth, toileting, grooming, dressing, etc )  - Assess/evaluate cause of self-care deficits   - Assess range of motion  - Assess patient's mobility; develop plan if impaired  - Assess patient's need for assistive devices and provide as appropriate  - Encourage maximum independence but intervene and supervise when necessary  - Involve family in performance of ADLs  - Assess for home care needs following discharge   - Consider OT consult to assist with ADL evaluation and planning for discharge  - Provide patient education as appropriate  Outcome: Progressing  Goal: Maintains/Returns to pre admission functional level  Description: INTERVENTIONS:  - Perform BMAT or MOVE assessment daily    - Set and communicate daily mobility goal to care team and patient/family/caregiver     - Collaborate with rehabilitation services on mobility goals if consulted  - Out of bed for toileting  - Record patient progress and toleration of activity level   Outcome: Progressing  Goal: Patient will remain free of falls  Description: INTERVENTIONS:  - Educate patient/family on patient safety including physical limitations  - Instruct patient to call for assistance with activity   - Consult OT/PT to assist with strengthening/mobility   - Keep Call bell within reach  - Keep bed low and locked with side rails adjusted as appropriate  - Keep care items and personal belongings within reach  - Initiate and maintain comfort rounds  - Make Fall Risk Sign visible to staff  - Apply yellow socks and bracelet for high fall risk patients  - Consider moving patient to room near nurses station  Outcome: Progressing     Problem: DISCHARGE PLANNING  Goal: Discharge to home or other facility with appropriate resources  Description: INTERVENTIONS:  - Identify barriers to discharge w/patient and caregiver  - Arrange for needed discharge resources and transportation as appropriate  - Identify discharge learning needs (meds, wound care, etc )  - Arrange for interpretive services to assist at discharge as needed  - Refer to Case Management Department for coordinating discharge planning if the patient needs post-hospital services based on physician/advanced practitioner order or complex needs related to functional status, cognitive ability, or social support system  Outcome: Progressing     Problem: Knowledge Deficit  Goal: Patient/family/caregiver demonstrates understanding of disease process, treatment plan, medications, and discharge instructions  Description: Complete learning assessment and assess knowledge base    Interventions:  - Provide teaching at level of understanding  - Provide teaching via preferred learning methods  Outcome: Progressing

## 2021-08-14 NOTE — PROGRESS NOTES
The metronidazole has been converted to Oral per [facility] IV-to-PO Auto-Conversion Protocol for Adults as approved by the Pharmacy and Therapeutics Committee  The patient met all eligible criteria:  1) Age >/= 25years old   2) Received at least one dose of the IV form   3) Receiving at least one other scheduled oral/enteral medication   4) Tolerating an oral/enteral diet   and did not have any exclusions:   1) Critical care patient   2) Active GI bleed (IF assessing H2RAs or PPIs)   3) Continuous tube feeding (IF assessing cipro, doxycycline, levofloxacin, minocycline, rifampin, or voriconazole)   4) Receiving PO vancomycin (IF assessing metronidazole)   5) Persistent nausea and/or vomiting   6) Ileus or gastrointestinal obstruction   7) Wendy/nasogastric tube set for continuous suction   8) Specific order not to automatically convert to PO (in the order's comments or if discussed in the most recent Infectious Disease or primary team's progress notes)

## 2021-08-14 NOTE — CONSULTS
Hematology/Oncology Consult Note       [facility]AT[facility]     Date of Service: 8/14/2021     Admitting Diagnosis: Seizure ([address] ) [R56 9]  Metastatic cancer ([address] ) [C79 9]  Cervical spinal cord compression ([address] ) [G95 20]  Brain metastases ([address] ) [C79 31]  Abnormal EKG [R94 31]  Seizure-like activity ([address] ) [R56 9]    Reason for Consultation:  Metastatic pancreatic cancer    Cancer Stage:  Stage IV    Referral Physician: Delmis Morgan DO    Oncology/Hematology History:  1  US HN 7/22/21 Several neck masses are identified corresponding to the palpable abnormalities within additional finding of the suspected thrombosed left jugular vein   Further evaluation with contrast-enhanced CT of the neck is recommended  2  CT Soft Tissue Neck 7/22/21        Left greater than right numerous superficial and deep neck soft tissue and intramuscular rim-enhancing necrotic lesions/collections as described   Rim-enhancing epidural thickening/collection within posterolateral spinal canal spanning levels C2-C4 resulting in canal narrowing   Finding is concerning for widespread necrotizing infection   Neoplastic process is within differential consideration   Recommend correlation with sampling  Occluded left subclavian and brachiocephalic veins as well as left internal jugular vein to the level of hyoid bone suggesting  Subpleural nodular opacities in the right upper lobe and superior segment of right lower lobe could indicate septic emboli   Suggested left hilar necrotic lymph node               Indeterminate sclerotic foci within T3 and T4 vertebral bodies  3  CT Head 7/22/21 There is an approximately 2 4 x 1 4 x 2 2 cm masslike lesion involving the scalp overlying the posterior left parietal region   Neoplasm and/or infection should be excluded   Sampling is recommended  4  CTPA 7/22/21 No evidence of pulmonary embolism is seen  Measured RV/LV ratio is within normal limits at less than 0 9           Numerous nodular opacities are present bilaterally within the lungs   The appearances most suggestive of metastatic disease  Extensive hepatic metastatic disease  Large bilateral adrenal masses  Extensive lymphadenopathy  Probable osseous metastatic disease with mild to moderate compression deformity of the superior endplate of B60, suggesting pathologic compression  Possible thrombus right subclavian vein extending into the brachiocephalic vein   Consider venous duplex ultrasound for further evaluation  5  July 23, 2021, biopsy of neck skin consistent with extensive necrotic dermis with small foci of poorly differentiated carcinoma  6  CT A/P 7/24/21 There is a 2 8 x 2 2 x 1 8 cm ill-defined, hypodense pancreatic lesion, highly suspicious for pancreatic adenocarcinoma (series 3 images 20-22 and series 601 images 57 - 67 )           There is widespread metastatic disease with numerous liver lesions, bilateral adrenal metastases, multiple lytic bone metastases, with resulting mild superior end plate compression fractures of T11 and L3 as above, and also intramuscular metastases as above  An alternative diagnosis to metastatic pancreatic adenocarcinoma, given the unusual intramuscular metastasis, would be metastatic melanoma   Please correlate with already performed biopsy results  7  July 26, 2021, biopsy of right neck mass and left neck mass consistent with non-small cell carcinoma, 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    8  MRI C-Spine 7/27/21         Signal abnormality and erosive change of C3 laminae and spinous process along with lesser degree signal abnormality in the posterior elements of C1, C2, and C4   Associated epidural enhancement /enhancing soft tissue spanning from inferior C2-C4  resulting in severe canal stenosis and cord compression   Findings most favor metastatic disease   Infection is within differential consideration but less favored given findings elsewhere in the body   Correlate with sampling  Multiple rim-enhancing lesions within paraspinal musculature, superficial and deep soft tissue neck as demonstrated on recent soft tissue neck CT   Differential considerations are similar as above  Heterogeneous marrow signal and enhancement of the cervical and visualized thoracic vertebras concerning for infiltrative metastatic disease  Redemonstrated occluded left internal jugular vein  Degenerative change as above  9  July 28, 2021, MRI of T-spine showed a white metastatic disease to the thoracic spine access with early pathologic fracture superior T11 endplate  CA 19-9 [id number]  10  July 29, 2021 patient consulted radiation oncologist Dr Debbie Hernandez with recommendation of palliative care radiation therapy to the cervical spine with total of 10 fractions  11 July 30, 2021 liver biopsy consistent with non-small cell carcinoma fevers pancreatobiliary primary  12  Palliative radiation therapy to the cervical spine started on July 30, 2021  13  August 2, 2021 MRI thoracic spine shows diffuse metastatic disease within the thoracic spine, lower cervical spine and upper lumbar spine  Pathologic adenopathy with central necrosis within the left inferior neck and superior clavicular region  MRI brain: Positive for intracranial metastatic disease involving the right superior cerebellum  Irregular enhancement suspicious for osseous metastatic disease with extradural extension in the cervical spine  Suspected cord compression at the C3 and apathy for level  MRI of L-spine showed diffuse osseous metastatic disease  14  August 11, 2021 CT head without contrast showed 1 3 cm low attenuation right superior cerebellar hemispheric low anterior in a shin area suspicious for parenchymal metastatic disease       Assessment and Recommendations: 1  Metastatic pancreatic cancer with extensive metastatic disease involving the bone, lymph nodes, liver, adrenal gland and brain metastatic disease  Patient received 4/10 fractions(July 30, 2021 to August 4, 2021) of palliative radiation therapy to the cervical spine for impending cord compression  Patient could not continue radiation therapy because of weakness  After long discussion with patient and patient's family(5 family members present) in the bedside  They understood that Mr Alice Camacho has an end-stage pancreatic cancer with poor prognosis  ECOG performance status 3/5  Patient cannot tolerate chemotherapy unless his overall performance status is improved  I recommend patient increasing oral intake and hydration, do exercise as tolerated  Physical therapy is also recommended  [facility]  2  Intracranial metastatic disease from pancreatic cancer  Patient is not a candidate for surgery  Stereotactic radiation  surgery was discussed by radiation oncologist   I recommend Radiation Oncology consult  3   Cervical spine cord compression  Status post 4/10 fraction radiation therapy  Radiation oncology consult to resume palliative radiation therapy  4  Severe malnutrition, patient is very cachectic  I recommend Dietitian consult  5  Normocytic normochromic anemia and thrombocytopenia likely secondary to metastatic pancreatic cancer  Hemoglobin 8 9  TSH 3 857  Repeat CBC  PRBC transfusion if hemoglobin less than 7    6  Subclavian vein thrombosis secondary to pancreatic cancer,   on Eliquis  7  Follow-up: Please make an appointment with Leeanne Deal oncologist in [facility] upon discharge  Thank you very much for your consultation and making us part of this nice patient's care  I will continue to follow closely with you  Please contact me with any additional questions  Disclaimer:  This document was prepared using M Mod[employer] Fluency Direct technology  If a word or phrase is confusing, or does not make sense, this is likely due to recognition error which was not discovered during the providers review  If you believe an error has occurred, please contact me through [employer] service line for carlos? cation  HPI:   Michelle Jaramillo is a 48 y o  male admitted by Molinda Litten, MD with a   past medical history of atrial fibrillation, metastatic pancreatic cancer with extensive metastatic disease including the bone, liver, lymph nodes, brain, cutaneous tissue, and adrenal glands  CA 19-9 5418  Image studies showed possible cervical spine cord compression  Status post palliative radiation therapy 4/10 fractions by August 4, 2021  Patient was admitted to hospital because of seizure activity  Ct brain showed stable intracranial metastatic disease  Neurologist consulted patient and started on [address]  Patient also takes dexamethasone  Medical oncology consult is requested for comanagement of metastatic pancreatic cancer  Patient feels very weak and fatigued  Patient denied fever or chills  No chest pain or shortness of breath  No cough and phlegm    No GI or  symptoms  Appetite is poor  Patient lost significant amount of weight  No bleeding anywhere  Patient has a good family support  Review of system:  12-point review of system was performed, pertinent positive and negative were detailed as above    History reviewed  No pertinent past medical history  Past Surgical History:   Procedure Laterality Date    FACIAL/NECK BIOPSY Bilateral 7/26/2021    Procedure: INCISIONAL BIOPSY LEFT NECK MASS,AND EXCISIOINAL BIOPSY RIGHT NECK MASS;  Surgeon: Marta Clark MD;  [facility]: [facility];  Service: Surgical Oncology    HAND SURGERY      left hand    IR BIOPSY LIVER MASS  7/30/2021       History reviewed  No pertinent family history      Social History     Socioeconomic History    Marital status: Single     Spouse name: Not on file    Number of children: Not on file    Years of education: Not on file    Highest education level: Not on file   Occupational History    Not on file   Tobacco Use    Smoking status: Current Every Day Smoker     Packs/day: 0 25    Smokeless tobacco: Never Used   Vaping Use    Vaping Use: Never used   Substance and Sexual Activity    Alcohol use: Not Currently    Drug use: Yes     Types: Marijuana    Sexual activity: Not on file   Other Topics Concern    Not on file   Social History Narrative    Not on file     Social Determinants of Health     Financial Resource Strain:     Difficulty of Paying Living Expenses:    Food Insecurity:     Worried About Running Out of Food in the Last Year:     Ran Out of Food in the Last Year:    Transportation Needs:     Lack of Transportation (Medical):      Lack of Transportation (Non-Medical):    Physical Activity:     Days of Exercise per Week:     Minutes of Exercise per Session:    Stress:     Feeling of Stress :    Social Connections:     Frequency of Communication with Friends and Family:     Frequency of Social Gatherings with Friends and Family:     Attends Yazidism Services:     Active Member of Clubs or Organizations:     Attends Club or Organization Meetings:     Marital Status:    Intimate Partner Violence:     Fear of Current or Ex-Partner:     Emotionally Abused:     Physically Abused:     Sexually Abused:        No Known Allergies    Current Facility-Administered Medications   Medication Dose Route Frequency Provider Last Rate Last Admin    apixaban (ELIQUIS) tablet 5 mg  5 mg Oral BID JUAN Cedillo   5 mg at 08/14/21 0813    bisacodyl (DULCOLAX) rectal suppository 10 mg  10 mg Rectal Daily PRN JUAN Cedillo        calcium carbonate (TUMS) chewable tablet 500 mg  500 mg Oral Daily PRN JUAN Cedillo        cefepime (MAXIPIME) 2,000 mg in dextrose 5 % 50 mL IVPB  2,000 mg Intravenous Q12H [facility] Prechtel,  mL/hr at 21 0354 2,000 mg at 21 0354    HYDROmorphone (DILAUDID) injection 0 5 mg  0 5 mg Intravenous Q3H PRN Evelin Mueller PA-C   0 5 mg at 21 1234    ibuprofen (MOTRIN) tablet 400 mg  400 mg Oral Q6H PRN Aftab Engle PA-C   400 mg at 21 0221    lactated ringers infusion  125 mL/hr Intravenous Continuous Eloy S Prechtel,  mL/hr at 21 1009 125 mL/hr at 21 1009    levETIRAcetam (KEPPRA) 750 mg in sodium chloride 0 9 % 100 mL IVPB  750 mg Intravenous Q12H [address] NJUAN 400 mL/hr at 21 0814 750 mg at 21 0814    LORazepam (ATIVAN) injection 1 mg  1 mg Intravenous Q4H PRN Evelin Mueller PA-C        metroNIDAZOLE (FLAGYL) IVPB (premix) 500 mg 100 mL  500 mg Intravenous Q8H Eloy S Prechtel,  mL/hr at 21 0737 500 mg at 21 0737    oxyCODONE (ROXICODONE) IR tablet 5 mg  5 mg Oral Q4H PRN Evelin Mueller PA-C   5 mg at 21 1839    polyethylene glycol (MIRALAX) packet 17 g  17 g Oral BID Evelin Mueller PA-C   17 g at 21 6334    senna (SENOKOT) tablet 8 6 mg  8 6 mg Oral BID Evelin Mueller PA-C   8 6 mg at 21 0813       Medications Prior to Admission   Medication Sig Dispense Refill Last Dose    acetaminophen (TYLENOL) 325 mg tablet Take 2 tablets (650 mg total) by mouth every 6 (six) hours as needed for mild pain 120 tablet 2 2021 at Unknown time    apixaban (ELIQUIS) 5 mg Take 1 tablet (5 mg total) by mouth 2 (two) times a day 180 tablet 0 2021 at Unknown time    bisacodyl (DULCOLAX) 10 mg suppository Insert 1 suppository (10 mg total) into the rectum daily as needed for constipation (no bowel movement in 3 days) 12 suppository 0 2021 at Unknown time    calcium carbonate (TUMS) 500 mg chewable tablet Chew 1 tablet (500 mg total) daily as needed for indigestion or heartburn (abdominal pain after meals) 90 tablet 0 2021 at Unknown time    [] dexamethasone (DECADRON) 4 mg tablet Take 1 tablet (4 mg total) by mouth every 12 (twelve) hours for 2 days, THEN 0 5 tablets (2 mg total) every 12 (twelve) hours for 3 days, THEN 0 5 tablets (2 mg total) daily with breakfast for 2 days  8 tablet 0 2021 at Unknown time    metoprolol tartrate (LOPRESSOR) 100 mg tablet Take 0 5 tablets (50 mg total) by mouth every 12 (twelve) hours 90 tablet 2 2021 at Unknown time    morphine (MS CONTIN) 15 mg 12 hr tablet Take 1 tablet (15 mg total) by mouth every 8 (eight) hours for 14 daysMax Daily Amount: 45 mg 42 tablet 0 2021 at Unknown time    oxyCODONE (ROXICODONE) 5 mg immediate release tablet Take 1-2 tablets (5 - 10 mg total) up to once every 4 hours as needed for moderate to severe pain  168 tablet 0 2021 at Unknown time    polyethylene glycol (GLYCOLAX) 17 GM/SCOOP powder Take 17 g by mouth 2 (two) times a day as needed (constipation) for up to 28 doses 578 g 0 2021 at Unknown time    polyethylene glycol (MIRALAX) 17 g packet Take 17 g by mouth 2 (two) times a day 90 each 0 2021 at Unknown time    senna (SENOKOT) 8 6 mg Take 1 tablet (8 6 mg total) by mouth 2 (two) times a day Do not take if you are having loose stools or frequent stools  28 tablet 0 2021 at Unknown time       Pain Score: 0    ECOG PS: 3/5    Objective:     24 Hour Vitals Assessment:     BP-Systolic (23RHC), JYJ:836 , Min:102 , JOSUÉ:528   BP-Diastolic (20YYL), LFD:01, Min:64, Max:77  BP  Min: 102/65  Max: 112/77  Temp  Av 7 °F (37 6 °C)  Min: 97 8 °F (36 6 °C)  Max: 100 6 °F (38 1 °C)  Pulse  Av 3  Min: 114  Max: 128  Resp  Av 2  Min: 18  Max: 20  SpO2  Av 4 %  Min: 93 %  Max: 98 %    PHYSICIAN EXAM:    General: Appearance:  very cachectic male, alert, cooperative, no distress  HEENT: Normocephalic, atraumatic  No scleral icterus  conjunctivae clear  PERRL, EOM's intact  No sinus drainage or tenderness  Chest: No tenderness    Lungs: Clear to auscultation bilaterally, Respirations unlabored  Cardiac: Regular rate and rhythm, S1and S2 are normal, no murmur, click, rubs or gallops  Abdomen: Soft, non-tender, non-distended  Bowel sounds are normal  No masses, no organomegaly  Pelvic: deferred  Extremities:  No edema, cyanosis, clubbing  Skin: Skin color, turgor are normal  No rashes  Lymphatics: no palpable adenopathy  Neurologic: Alert and oriented X 3, Cranial nerve 2-12 grosely intact  Normal strength and sensation  Data Review:    Image Study:   reviewed    LABS:  CBC:  Recent Labs     08/12/21  0440 08/14/21  0505   WBC 19 12* 16 04*   MCV 96 94   MCH 30 3 30 2   MCHC 31 4 32 2   RDW 19 9* 19 2*   MPV 9 1 9 7     CMP:   Recent Labs     08/12/21  0440 08/14/21  0505   SODIUM 135* 135*   BUN 19 13   CALCIUM 9 4 9 5   CREATININE 0 57* 0 63       MISC  LABS:    Recent Labs     08/13/21  1609 08/13/21 2033 08/14/21  0505   LACTICACID 3 6*  3 7* 2 6* 2 9*     LFT:   Recent Labs     08/12/21  0440 08/14/21  0505   AST 58* 57*       Coags:  Invalid input(s): COAGPROFILE  No results for input(s): INR, PTT in the last 72 hours  Invalid input(s): PTP, FIBRINOGENQN    By:  Isamar Miller MD, 8/14/2021, 12:53 PM                                  Primary Care Physician:  No primary care provider on file

## 2021-08-14 NOTE — PROGRESS NOTES
[address] [place] [address]  Progress Note - Ariela Haji 1970, 48 y o  male MRN: [id number]  Unit/Bed#: [place] [address] -01 Encounter: [id number]  Primary Care Provider: No primary care provider on file  Date and time admitted to hospital: 2021 10:35 AM    * Seizure-like activity [facility])  Assessment & Plan  Found to have a cerebellar met  Neurology recommends continuing AED    Severe sepsis [facility])  Assessment & Plan  Unclear if she actually has an infection source or if this abnormal lab work and vital signs showing sepsis is more from his malignancy and possible seizure  He did have a necrotizing neck infection back in 2021  Chest x-ray negative    Only complaint is ongoing neck pain since July    CT neck did not really show any infection  Place him empirically on cefepime and Flagyl    Lactic acid is coming down    It may be from the seizure  Severe protein-calorie malnutrition ([facility] [address] )  Assessment & Plan  Malnutrition Findings:   Adult Malnutrition type: Acute illness  Adult Degree of Malnutrition: Other severe protein calorie malnutrition    BMI Findings:  Adult BMI Classifications: Underweight < 18 5     Body mass index is 19 68 kg/m²  Working with nutrition    Metastatic cancer [facility])  Assessment & Plan  Unclear primary  CA 19 9 elevated, so maybe pancreatic  Has liver metastasis,  neck involvement, brain metastasis    Seen by palliative care  Right now he wants full treatment and does not want hospice  Appreciate oncology to eval     Only palliative treatment is an option and he wasn't even able to tolerate palliative radiation to neck    I consult radiation oncology to see if any further radiation, such as to the brain met, would be warranted          Subjective:   Feels a little better  Has a little more energy today  Neck pain is about the same        Objective:     Vitals:   Temp (24hrs), Av 4 °F (37 4 °C), Min:97 8 °F (36 6 °C), Max:100 6 °F (38 1 °C)    Temp:  [97 8 °F (36 6 °C)-100 6 °F (38 1 °C)] 98 5 °F (36 9 °C)  HR:  [114-128] 115  Resp:  [18-20] 18  BP: (102-114)/(64-77) 114/73  SpO2:  [93 %-96 %] 95 %  Body mass index is 19 68 kg/m²  Input and Output Summary (last 24 hours): Intake/Output Summary (Last 24 hours) at 8/14/2021 1607  Last data filed at 8/14/2021 1007  Gross per 24 hour   Intake 1330 ml   Output 3950 ml   Net -2620 ml       Physical Exam:     Physical Exam  Vitals and nursing note reviewed  HENT:      Head: Normocephalic and atraumatic  Eyes:      Pupils: Pupils are equal, round, and reactive to light  Neck:      Comments: Left side of the anterior neck is indurated, but I don't appreciate an abscess  No redness nor warmth  Cardiovascular:      Rate and Rhythm: Normal rate and regular rhythm  Heart sounds: No murmur heard  No friction rub  No gallop  Pulmonary:      Effort: Pulmonary effort is normal       Breath sounds: Normal breath sounds  No wheezing or rales  Abdominal:      General: Bowel sounds are normal       Palpations: Abdomen is soft  Tenderness: There is no abdominal tenderness  Musculoskeletal:      Right lower leg: No edema  Left lower leg: No edema          Additional Data:     Labs:    Results from last 7 days   Lab Units 08/14/21  0505   WBC Thousand/uL 16 04*   HEMOGLOBIN g/dL 8 7*   HEMATOCRIT % 27 0*   PLATELETS Thousands/uL 141*   NEUTROS PCT % 84*   LYMPHS PCT % 9*   MONOS PCT % 4   EOS PCT % 1     Results from last 7 days   Lab Units 08/14/21  0505   POTASSIUM mmol/L 4 3   CHLORIDE mmol/L 101   CO2 mmol/L 25   BUN mg/dL 13   CREATININE mg/dL 0 63   CALCIUM mg/dL 9 5   ALK PHOS U/L 272*   ALT U/L 65   AST U/L 57*     Results from last 7 days   Lab Units 08/11/21  1104   INR  1 40*                   * I Have Reviewed All Lab Data     Recent Cultures (last 7 days):     Results from last 7 days   Lab Units 08/11/21  1104   BLOOD CULTURE  No Growth at 48 hrs    No Growth at 48 hrs  Last 24 Hours Medication List:   Current Facility-Administered Medications   Medication Dose Route Frequency Provider Last Rate    apixaban  5 mg Oral BID Elgin Blush, PA-C      bisacodyl  10 mg Rectal Daily PRN Elgin Blush, PA-C      calcium carbonate  500 mg Oral Daily PRN Elgin Blush, PA-C      cefepime  2,000 mg Intravenous Q12H Eloy S Prechtel, DO 2,000 mg (08/14/21 1526)    HYDROmorphone  0 5 mg Intravenous Q3H PRN Elgin Blush, PA-C      ibuprofen  400 mg Oral Q6H PRN Aftab Engle, PA-C      lactated ringers  125 mL/hr Intravenous Continuous Eloy S Prechtel,  mL/hr (08/14/21 1009)    levETIRAcetam  750 mg Intravenous Q12H [address] Dahlia Shante, PA-C 750 mg (08/14/21 0814)    LORazepam  1 mg Intravenous Q4H PRN Elgin Blush, PA-C      metroNIDAZOLE  500 mg Intravenous Q8H Eloy S Prechtel,  mg (08/14/21 0737)    oxyCODONE  5 mg Oral Q4H PRN Elgin Blush, PA-C      polyethylene glycol  17 g Oral BID Elgin Blush, PA-C      senna  8 6 mg Oral BID Elgin Blush, PA-C           VTE Pharmacologic Prophylaxis:   Pharmacologic: Apixaban (Eliquis)      Current Length of Stay: 3 day(s)    Current Patient Status: Inpatient       Discharge Plan:   Code Status: Level 1 - Full Code           Today, Patient Was Seen By: Cathie Hauser DO    ** Please Note: Dictation voice to text software may have been used in the creation of this document   **

## 2021-08-15 NOTE — PLAN OF CARE
Problem: Potential for Falls  Goal: Patient will remain free of falls  Description: INTERVENTIONS:  - Educate patient/family on patient safety including physical limitations  - Instruct patient to call for assistance with activity   - Consult OT/PT to assist with strengthening/mobility   - Keep Call bell within reach  - Keep bed low and locked with side rails adjusted as appropriate  - Keep care items and personal belongings within reach  - Initiate and maintain comfort rounds  - Make Fall Risk Sign visible to staff  - Offer Toileting every 2 Hours, in advance of need  - Initiate/Maintain bed alarm  - Obtain necessary fall risk management equipment:   Problem: Prexisting or High Potential for Compromised Skin Integrity  Goal: Skin integrity is maintained or improved  Description: INTERVENTIONS:  - Identify patients at risk for skin breakdown  - Assess and monitor skin integrity  - Assess and monitor nutrition and hydration status  - Monitor labs   - Assess for incontinence   - Turn and reposition patient  - Assist with mobility/ambulation  - Relieve pressure over bony prominences  - Avoid friction and shearing  - Provide appropriate hygiene as needed including keeping skin clean and dry  - Evaluate need for skin moisturizer/barrier cream  - Collaborate with interdisciplinary team   - Patient/family teaching  - Consider wound care consult   Outcome: Progressing     Problem: Nutrition/Hydration-ADULT  Goal: Nutrient/Hydration intake appropriate for improving, restoring or maintaining nutritional needs  Description: Monitor and assess patient's nutrition/hydration status for malnutrition  Collaborate with interdisciplinary team and initiate plan and interventions as ordered  Monitor patient's weight and dietary intake as ordered or per policy  Utilize nutrition screening tool and intervene as necessary  Determine patient's food preferences and provide high-protein, high-caloric foods as appropriate  INTERVENTIONS:  - Monitor oral intake, urinary output, labs, and treatment plans  - Assess nutrition and hydration status and recommend course of action  - Evaluate amount of meals eaten  - Assist patient with eating if necessary   - Allow adequate time for meals  - Recommend/ encourage appropriate diets, oral nutritional supplements, and vitamin/mineral supplements  - Order, calculate, and assess calorie counts as needed  - Recommend, monitor, and adjust tube feedings and TPN/PPN based on assessed needs  - Assess need for intravenous fluids  - Provide specific nutrition/hydration education as appropriate  - Include patient/family/caregiver in decisions related to nutrition  Outcome: Progressing     Problem: PAIN - ADULT  Goal: Verbalizes/displays adequate comfort level or baseline comfort level  Description: Interventions:  - Encourage patient to monitor pain and request assistance  - Assess pain using appropriate pain scale  - Administer analgesics based on type and severity of pain and evaluate response  - Implement non-pharmacological measures as appropriate and evaluate response  - Consider cultural and social influences on pain and pain management  - Notify physician/advanced practitioner if interventions unsuccessful or patient reports new pain  Outcome: Progressing     Problem: INFECTION - ADULT  Goal: Absence or prevention of progression during hospitalization  Description: INTERVENTIONS:  - Assess and monitor for signs and symptoms of infection  - Monitor lab/diagnostic results  - Monitor all insertion sites, i e  indwelling lines, tubes, and drains  - Monitor endotracheal if appropriate and nasal secretions for changes in amount and color  - [place] appropriate cooling/warming therapies per order  - Administer medications as ordered  - Instruct and encourage patient and family to use good hand hygiene technique  - Identify and instruct in appropriate isolation precautions for identified infection/condition  Outcome: Progressing  Goal: Absence of fever/infection during neutropenic period  Description: INTERVENTIONS:  - Monitor WBC    Outcome: Progressing     Problem: SAFETY ADULT  Goal: Maintain or return to baseline ADL function  Description: INTERVENTIONS:  -  Assess patient's ability to carry out ADLs; assess patient's baseline for ADL function and identify physical deficits which impact ability to perform ADLs (bathing, care of mouth/teeth, toileting, grooming, dressing, etc )  - Assess/evaluate cause of self-care deficits   - Assess range of motion  - Assess patient's mobility; develop plan if impaired  - Assess patient's need for assistive devices and provide as appropriate  - Encourage maximum independence but intervene and supervise when necessary  - Involve family in performance of ADLs  - Assess for home care needs following discharge   - Consider OT consult to assist with ADL evaluation and planning for discharge  - Provide patient education as appropriate  Outcome: Progressing  Goal: Maintains/Returns to pre admission functional level  Description: INTERVENTIONS:  - Perform BMAT or MOVE assessment daily    - Set and communicate daily mobility goal to care team and patient/family/caregiver  - Collaborate with rehabilitation services on mobility goals if consulted  - Perform Range of Motion  times a day  - Reposition patient every  hours    - Dangle patient  times a day  - Stand patient  times a day  - Ambulate patient  times a day  - Out of bed to chair  times a day   - Out of bed for meals  times a day  - Out of bed for toileting  - Record patient progress and toleration of activity level   Outcome: Progressing  Goal: Patient will remain free of falls  Description: INTERVENTIONS:  - Educate patient/family on patient safety including physical limitations  - Instruct patient to call for assistance with activity   - Consult OT/PT to assist with strengthening/mobility   - Keep Call bell within reach  - Keep bed low and locked with side rails adjusted as appropriate  - Keep care items and personal belongings within reach  - Initiate and maintain comfort rounds  - Make Fall Risk Sign visible to staff  - Offer Toileting every 2 Hours, in advance of need  - Initiate/Maintain bed alarm  - Obtain necessary fall risk management equipment:   - Apply yellow socks and bracelet for high fall risk patients  - Consider moving patient to room near nurses station  Outcome: Progressing     - Apply yellow socks and bracelet for high fall risk patients  - Consider moving patient to room near nurses station  Outcome: Progressing     Problem: DISCHARGE PLANNING  Goal: Discharge to home or other facility with appropriate resources  Description: INTERVENTIONS:  - Identify barriers to discharge w/patient and caregiver  - Arrange for needed discharge resources and transportation as appropriate  - Identify discharge learning needs (meds, wound care, etc )  - Arrange for interpretive services to assist at discharge as needed  - Refer to Case Management Department for coordinating discharge planning if the patient needs post-hospital services based on physician/advanced practitioner order or complex needs related to functional status, cognitive ability, or social support system  Outcome: Progressing     Problem: Knowledge Deficit  Goal: Patient/family/caregiver demonstrates understanding of disease process, treatment plan, medications, and discharge instructions  Description: Complete learning assessment and assess knowledge base    Interventions:  - Provide teaching at level of understanding  - Provide teaching via preferred learning methods  Outcome: Progressing

## 2021-08-15 NOTE — PROGRESS NOTES
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  Neurology recommends continuing AED    Severe sepsis [facility])  Assessment & Plan  Unclear if he actually has an infection source or if this abnormal lab work and vital signs  is more from his malignancy and possible seizure  All cultures were negative    He had a necrotizing neck infection in 2021, so I am treating him with empiric abx for that  But, CT soft tissue neck did not show infection this time  If lactic acid and vital signs return to normal, antibiotics can likely be stopped  Atrial fibrillation ([address] )  Assessment & Plan  On metoprolol and apixaban    Metastatic cancer [facility])  Assessment & Plan  Likely metastatic pancreatic cancer  This was diagnosed and 2021  Patient home was given palliative radiation to his cervical spine, but he was getting too weak from that so it stopped  I had Oncology see him here in the hospital     This nothing to add other than palliative treatment at this point  I did consult radiation oncology to see if any further radiation was warranted especially with the brain metastasis  The consult is pending    Will ask palliative Care to see as well  Subjective:   Feels better  No specific complaint other than chronic neck pain where he had radiation  No further seizure activity  No cough  No fever chills  No dysuria  No diarrhea        Objective:     Vitals:   Temp (24hrs), Av 6 °F (37 °C), Min:98 1 °F (36 7 °C), Max:99 6 °F (37 6 °C)    Temp:  [98 1 °F (36 7 °C)-99 6 °F (37 6 °C)] 98 2 °F (36 8 °C)  HR:  [115-142] 123  Resp:  [15-18] 15  BP: ()/(61-73) 93/61  SpO2:  [92 %-95 %] 92 %  Body mass index is 19 51 kg/m²  Input and Output Summary (last 24 hours): Intake/Output Summary (Last 24 hours) at 8/15/2021 1215  Last data filed at 8/15/2021 0504  Gross per 24 hour   Intake 960 ml   Output 1950 ml   Net -990 ml       Physical Exam:     Physical Exam  Vitals and nursing note reviewed  HENT:      Head: Normocephalic and atraumatic  Eyes:      Pupils: Pupils are equal, round, and reactive to light  Neck:      Comments: Left neck anterior tissue was indurated but no obvious abscess  No redness or warmth  No obvious infection  Cardiovascular:      Rate and Rhythm: Normal rate and regular rhythm  Heart sounds: No murmur heard  No friction rub  No gallop  Pulmonary:      Effort: Pulmonary effort is normal       Breath sounds: Normal breath sounds  No wheezing or rales  Abdominal:      General: Bowel sounds are normal       Palpations: Abdomen is soft  Tenderness: There is no abdominal tenderness  Musculoskeletal:      Right lower leg: No edema  Left lower leg: No edema          Additional Data:     Labs:    Results from last 7 days   Lab Units 08/15/21  0456   WBC Thousand/uL 18 08*   HEMOGLOBIN g/dL 8 4*   HEMATOCRIT % 26 0*   PLATELETS Thousands/uL 142*   NEUTROS PCT % 82*   LYMPHS PCT % 10*   MONOS PCT % 5   EOS PCT % 1     Results from last 7 days   Lab Units 08/15/21  0456 08/14/21  0505   POTASSIUM mmol/L 4 6 4 3   CHLORIDE mmol/L 102 101   CO2 mmol/L 25 25   BUN mg/dL 16 13   CREATININE mg/dL 0 62 0 63   CALCIUM mg/dL 10 1 9 5   ALK PHOS U/L  --  272*   ALT U/L  --  65   AST U/L  --  57*     Results from last 7 days   Lab Units 08/11/21  1104   INR  1 40*                   * I Have Reviewed All Lab Data     Recent Cultures (last 7 days):     Results from last 7 days   Lab Units 08/11/21  1104   BLOOD CULTURE  No Growth at 72 hrs  No Growth at 72 hrs           Last 24 Hours Medication List:   Current Facility-Administered Medications   Medication Dose Route Frequency Provider Last Rate    apixaban  5 mg Oral BID Rosmery Call, PA-JORDEN      bisacodyl  10 mg Rectal Daily PRN Rosmery Call, PA-JORDEN      calcium carbonate  500 mg Oral Daily PRN Rosmery Call, JUAN      cefepime  2,000 mg Intravenous Q12H Lorelle Purdue Prechtel, DO 2,000 mg (08/15/21 0421)    HYDROmorphone  0 5 mg Intravenous Q3H PRN Rosmery Call, JUAN      ibuprofen  400 mg Oral Q6H PRN Aftab Engle PA-C      lactated ringers  125 mL/hr Intravenous Continuous Lorelle Purdue Prechtel,  mL/hr (08/15/21 0849)    levETIRAcetam  750 mg Intravenous Q12H [facility] Dahlia FryeJUAN 750 mg (08/15/21 0848)    LORazepam  1 mg Intravenous Q4H PRN Rosmery Call, JUAN      metroNIDAZOLE  500 mg Oral Q8H [facility] Eloy S Prechtel, DO      oxyCODONE  5 mg Oral Q4H PRN Rosmery Call, PA-JORDEN      polyethylene glycol  17 g Oral BID Rosmery Call, PA-JORDEN      senna  8 6 mg Oral BID Rosmery Call, JUAN           VTE Pharmacologic Prophylaxis:   Pharmacologic: Apixaban (Eliquis)      Current Length of Stay: 4 day(s)    Current Patient Status: Inpatient       Discharge Plan: when he stabilizes with normal vital signs and normal lactic acid, he can be discharged home    Code Status: Level 1 - Full Code           Today, Patient Was Seen By: Ty Mayes DO    ** Please Note: Dictation voice to text software may have been used in the creation of this document   **

## 2021-08-15 NOTE — PLAN OF CARE
Problem: MOBILITY - ADULT  Goal: Maintain or return to baseline ADL function  Description: INTERVENTIONS:  -  Assess patient's ability to carry out ADLs; assess patient's baseline for ADL function and identify physical deficits which impact ability to perform ADLs (bathing, care of mouth/teeth, toileting, grooming, dressing, etc )  - Assess/evaluate cause of self-care deficits   - Assess range of motion  - Assess patient's mobility; develop plan if impaired  - Assess patient's need for assistive devices and provide as appropriate  - Encourage maximum independence but intervene and supervise when necessary  - Involve family in performance of ADLs  - Assess for home care needs following discharge   - Consider OT consult to assist with ADL evaluation and planning for discharge  - Provide patient education as appropriate  Outcome: Progressing  Goal: Maintains/Returns to pre admission functional level  Description: INTERVENTIONS:  - Perform BMAT or MOVE assessment daily    - Set and communicate daily mobility goal to care team and patient/family/caregiver     - Collaborate with rehabilitation services on mobility goals if consulted  - Perform Range of Motion  - Reposition patient   - Out of bed for toileting  - Record patient progress and toleration of activity level   Outcome: Progressing     Problem: Potential for Falls  Goal: Patient will remain free of falls  Description: INTERVENTIONS:  - Educate patient/family on patient safety including physical limitations  - Instruct patient to call for assistance with activity   - Consult OT/PT to assist with strengthening/mobility   - Keep Call bell within reach  - Keep bed low and locked with side rails adjusted as appropriate  - Keep care items and personal belongings within reach  - Initiate and maintain comfort rounds  - Make Fall Risk Sign visible to staff  - Offer Toileting   - Obtain necessary fall risk management equipment  - Apply yellow socks and bracelet for high fall risk patients  - Consider moving patient to room near nurses station  Outcome: Progressing     Problem: Prexisting or High Potential for Compromised Skin Integrity  Goal: Skin integrity is maintained or improved  Description: INTERVENTIONS:  - Identify patients at risk for skin breakdown  - Assess and monitor skin integrity  - Assess and monitor nutrition and hydration status  - Monitor labs   - Assess for incontinence   - Turn and reposition patient  - Assist with mobility/ambulation  - Relieve pressure over bony prominences  - Avoid friction and shearing  - Provide appropriate hygiene as needed including keeping skin clean and dry  - Evaluate need for skin moisturizer/barrier cream  - Collaborate with interdisciplinary team   - Patient/family teaching  - Consider wound care consult   Outcome: Progressing     Problem: Nutrition/Hydration-ADULT  Goal: Nutrient/Hydration intake appropriate for improving, restoring or maintaining nutritional needs  Description: Monitor and assess patient's nutrition/hydration status for malnutrition  Collaborate with interdisciplinary team and initiate plan and interventions as ordered  Monitor patient's weight and dietary intake as ordered or per policy  Utilize nutrition screening tool and intervene as necessary  Determine patient's food preferences and provide high-protein, high-caloric foods as appropriate       INTERVENTIONS:  - Monitor oral intake, urinary output, labs, and treatment plans  - Assess nutrition and hydration status and recommend course of action  - Evaluate amount of meals eaten  - Assist patient with eating if necessary   - Allow adequate time for meals  - Recommend/ encourage appropriate diets, oral nutritional supplements, and vitamin/mineral supplements  - Order, calculate, and assess calorie counts as needed  - Recommend, monitor, and adjust tube feedings and TPN/PPN based on assessed needs  - Assess need for intravenous fluids  - Provide specific nutrition/hydration education as appropriate  - Include patient/family/caregiver in decisions related to nutrition  Outcome: Progressing     Problem: PAIN - ADULT  Goal: Verbalizes/displays adequate comfort level or baseline comfort level  Description: Interventions:  - Encourage patient to monitor pain and request assistance  - Assess pain using appropriate pain scale  - Administer analgesics based on type and severity of pain and evaluate response  - Implement non-pharmacological measures as appropriate and evaluate response  - Consider cultural and social influences on pain and pain management  - Notify physician/advanced practitioner if interventions unsuccessful or patient reports new pain  Outcome: Progressing     Problem: INFECTION - ADULT  Goal: Absence or prevention of progression during hospitalization  Description: INTERVENTIONS:  - Assess and monitor for signs and symptoms of infection  - Monitor lab/diagnostic results  - Monitor all insertion sites, i e  indwelling lines, tubes, and drains  - Monitor endotracheal if appropriate and nasal secretions for changes in amount and color  - [place] appropriate cooling/warming therapies per order  - Administer medications as ordered  - Instruct and encourage patient and family to use good hand hygiene technique  - Identify and instruct in appropriate isolation precautions for identified infection/condition  Outcome: Progressing  Goal: Absence of fever/infection during neutropenic period  Description: INTERVENTIONS:  - Monitor WBC    Outcome: Progressing     Problem: SAFETY ADULT  Goal: Maintain or return to baseline ADL function  Description: INTERVENTIONS:  -  Assess patient's ability to carry out ADLs; assess patient's baseline for ADL function and identify physical deficits which impact ability to perform ADLs (bathing, care of mouth/teeth, toileting, grooming, dressing, etc )  - Assess/evaluate cause of self-care deficits   - Assess range of motion  - Assess patient's mobility; develop plan if impaired  - Assess patient's need for assistive devices and provide as appropriate  - Encourage maximum independence but intervene and supervise when necessary  - Involve family in performance of ADLs  - Assess for home care needs following discharge   - Consider OT consult to assist with ADL evaluation and planning for discharge  - Provide patient education as appropriate  Outcome: Progressing  Goal: Maintains/Returns to pre admission functional level  Description: INTERVENTIONS:  - Perform BMAT or MOVE assessment daily    - Set and communicate daily mobility goal to care team and patient/family/caregiver  - Collaborate with rehabilitation services on mobility goals if consulted  - Perform Range of Motion   - Reposition patient every 2 hours    - Dangle patient  - Out of bed for toileting  - Record patient progress and toleration of activity level   Outcome: Progressing  Goal: Patient will remain free of falls  Description: INTERVENTIONS:  - Educate patient/family on patient safety including physical limitations  - Instruct patient to call for assistance with activity   - Consult OT/PT to assist with strengthening/mobility   - Keep Call bell within reach  - Keep bed low and locked with side rails adjusted as appropriate  - Keep care items and personal belongings within reach  - Initiate and maintain comfort rounds  - Make Fall Risk Sign visible to staff  - Offer Toileting   - Obtain necessary fall risk management equipment:  - Apply yellow socks and bracelet for high fall risk patients  - Consider moving patient to room near nurses station  Outcome: Progressing     Problem: DISCHARGE PLANNING  Goal: Discharge to home or other facility with appropriate resources  Description: INTERVENTIONS:  - Identify barriers to discharge w/patient and caregiver  - Arrange for needed discharge resources and transportation as appropriate  - Identify discharge learning needs (meds, wound care, etc )  - Arrange for interpretive services to assist at discharge as needed  - Refer to Case Management Department for coordinating discharge planning if the patient needs post-hospital services based on physician/advanced practitioner order or complex needs related to functional status, cognitive ability, or social support system  Outcome: Progressing     Problem: Knowledge Deficit  Goal: Patient/family/caregiver demonstrates understanding of disease process, treatment plan, medications, and discharge instructions  Description: Complete learning assessment and assess knowledge base    Interventions:  - Provide teaching at level of understanding  - Provide teaching via preferred learning methods  Outcome: Progressing

## 2021-08-15 NOTE — ASSESSMENT & PLAN NOTE
Unclear if he actually has an infection source or if this abnormal lab work and vital signs  is more from his malignancy and possible seizure  All cultures were negative    He had a necrotizing neck infection in July 2021, so I am treating him with empiric abx for that  But, CT soft tissue neck did not show infection this time  If lactic acid and vital signs return to normal, antibiotics can likely be stopped

## 2021-08-15 NOTE — ASSESSMENT & PLAN NOTE
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

## 2021-08-15 NOTE — ASSESSMENT & PLAN NOTE
Likely metastatic pancreatic cancer  This was diagnosed and July 2021  Patient home was given palliative radiation to his cervical spine, but he was getting too weak from that so it stopped  I had Oncology see him here in the hospital     This nothing to add other than palliative treatment at this point  I did consult radiation oncology to see if any further radiation was warranted especially with the brain metastasis  The consult is pending    Will ask palliative Care to see as well

## 2021-08-16 PROBLEM — C79.51 BONE METASTASIS (HCC): Status: ACTIVE | Noted: 2021-01-01

## 2021-08-16 NOTE — PROGRESS NOTES
[facility]  Progress Note - Torsten Lot 1970, 48 y o  male MRN: [id number]  Unit/Bed#: [place] [address] -01 Encounter: [id number]  Primary Care Provider: No primary care provider on file  Date and time admitted to hospital: 8/11/2021 10:35 AM    * Seizure-like activity [facility])  Assessment & Plan  Found to have a new cerebellar metastasis with metastatic pancreatic cancer  Neurology recommends continuing AED    Metastatic cancer [facility])  Assessment & Plan    · This was diagnosed and July 2021  · Metastatic pancreatic cancer with extensive metastatic disease to bone, lymph nodes, liver, adrenal gland, lungs and brain  · Patient was given palliative radiation to his cervical spine, but he was getting too weak from that so it stopped  · Jugular and subclavian vein thrombosis, on Eliquis  · Evaluated by Oncology while inpatient, intracranial metastatic disease, he is not a candidate for surgery  Patient had end-stage pancreatic cancer with poor prognosis  Patient cannot tolerate chemo  · Patient would like to be evaluated by Radiation Oncology  · Discussed with patient and family they agreed to hospice referral,  But would like to hear  Recommendations from Radiation Oncology       Subclavian vein thrombosis [facility])  Assessment & Plan   Continue Eliquis    Bone metastasis ([facility] [address] )  Assessment & Plan   Widespread metastatic disease   cervical spine cord compression, status post 4/10 fraction radiation therapy  Radiation Oncology consult placed to see if patient would benefit from further palliative radiation therapy      Atrial fibrillation ([facility] [address] )  Assessment & Plan  On metoprolol and apixaban    Severe sepsis [facility])  Assessment & Plan  Unclear if he actually has an infection source or if this abnormal lab work and vital signs  is more from his malignancy and possible seizure    All cultures were negative  He had a necrotizing neck infection in July 2021,  treating him with empiric abx for that   But, CT soft tissue neck did not show infection this time  Severe protein-calorie malnutrition ([address] )  Assessment & Plan  Malnutrition Findings:   Adult Malnutrition type: Acute illness  Adult Degree of Malnutrition: Other severe protein calorie malnutrition    BMI Findings:  Adult BMI Classifications: Underweight < 18 5     Body mass index is 19 96 kg/m²  Working with nutrition    Anemia  Assessment & Plan   Normocytic normochromic anemia and thrombocytopenia most likely secondary to pancreatic cancer   Hemoglobin today 7 3   Transfuse if hemoglobin less than 7          VTE Pharmacologic Prophylaxis: VTE Score: 8 High Risk (Score >/= 5) - Pharmacological DVT Prophylaxis Ordered: apixaban (Eliquis)  Sequential Compression Devices Ordered  Patient Centered Rounds: I performed bedside rounds with nursing staff today  Discussions with Specialists or Other Care Team Provider:  nursing    Education and Discussions with Family / Patient: Updated  (mother, sister and son in law) at bedside  Time Spent for Care: 30 minutes  More than 50% of total time spent on counseling and coordination of care as described above  Current Length of Stay: 5 day(s)  Current Patient Status: Inpatient   Certification Statement: The patient will continue to require additional inpatient hospital stay due to Pending radiation oncology evaluation and hospice evaluation  Discharge Plan: Anticipate discharge in 48 hrs to Skilled nursing facility versus inpatient hospice    Code Status: Level 1 - Full Code    Subjective:    patient was seen evaluated bedside  He is comfortable, weak, tired  His mother, 3 sisters and son-in-law at bedside  They all speaking wishes 7 the mother, day refused  and agreed to interpret for her  Patient would like to be evaluated by Radiation Oncology  They all agreed on hospice evaluation too    Patient would like his mother to make medical decisions on his behalf  Objective:     Vitals:   Temp (24hrs), Av 1 °F (36 7 °C), Min:97 6 °F (36 4 °C), Max:98 7 °F (37 1 °C)    Temp:  [97 6 °F (36 4 °C)-98 7 °F (37 1 °C)] 98 °F (36 7 °C)  HR:  [122-133] 122  Resp:  [16] 16  BP: ()/(60-63) 100/63  SpO2:  [89 %-92 %] 89 %  Body mass index is 19 96 kg/m²  Input and Output Summary (last 24 hours): Intake/Output Summary (Last 24 hours) at 2021 0951  Last data filed at 2021 0559  Gross per 24 hour   Intake --   Output 1650 ml   Net -1650 ml       Physical Exam:   Physical Exam  Constitutional:       General: He is not in acute distress  Appearance: He is ill-appearing  Comments:  cachectic   HENT:      Head: Atraumatic  Cardiovascular:      Rate and Rhythm: Regular rhythm  Tachycardia present  Heart sounds: No murmur heard  No friction rub  No gallop  Pulmonary:      Effort: Pulmonary effort is normal  No respiratory distress  Breath sounds: Normal breath sounds  No wheezing  Abdominal:      General: Bowel sounds are normal  There is no distension  Palpations: Abdomen is soft  Tenderness: There is abdominal tenderness  Musculoskeletal:         General: No swelling  Cervical back: Neck supple  Skin:     General: Skin is warm and dry  Neurological:      General: No focal deficit present  Mental Status: He is alert  Psychiatric:         Mood and Affect: Mood normal           Additional Data:     Labs:  Results from last 7 days   Lab Units 21  0459 21  0440 21  1104   WBC Thousand/uL 17 97*  --  24 08*   HEMOGLOBIN g/dL 7 3*  --  11 3*   HEMATOCRIT % 23 4*  --  35 6*   PLATELETS Thousands/uL 154  --  153   BANDS PCT %  --   --  7   NEUTROS PCT % 85*   < >  --    LYMPHS PCT % 8*   < >  --    LYMPHO PCT %  --   --  12*   MONOS PCT % 4   < >  --    MONO PCT %  --   --  3*   EOS PCT % 1   < > 0    < > = values in this interval not displayed       Results from last 7 days   Lab Units 08/16/21  0459 08/14/21  0505   SODIUM mmol/L 137 135*   POTASSIUM mmol/L 4 2 4 3   CHLORIDE mmol/L 103 101   CO2 mmol/L 26 25   BUN mg/dL 18 13   CREATININE mg/dL 0 75 0 63   ANION GAP mmol/L 8 9   CALCIUM mg/dL 11 1* 9 5   ALBUMIN g/dL  --  1 4*   TOTAL BILIRUBIN mg/dL  --  0 41   ALK PHOS U/L  --  272*   ALT U/L  --  65   AST U/L  --  57*   GLUCOSE RANDOM mg/dL 97 124     Results from last 7 days   Lab Units 08/11/21  1104   INR  1 40*             Results from last 7 days   Lab Units 08/15/21  0456 08/14/21  0505 08/13/21  2033 08/13/21  1609 08/12/21  0440 08/11/21  1528   LACTIC ACID mmol/L 1 9 2 9* 2 6* 3 6*  3 7*  --   --    PROCALCITONIN ng/ml  --  2 32*  --  3 30* 3 86* 3 35*       Lines/Drains:  Invasive Devices     Peripheral Intravenous Line            Peripheral IV 08/15/21 Right Antecubital <1 day          Drain            External Urinary Catheter Medium 4 days                      Imaging: Reviewed radiology reports from this admission including: CT head    Recent Cultures (last 7 days):   Results from last 7 days   Lab Units 08/11/21  1104   BLOOD CULTURE  No Growth After 4 Days  No Growth After 4 Days         Last 24 Hours Medication List:   Current Facility-Administered Medications   Medication Dose Route Frequency Provider Last Rate    apixaban  5 mg Oral BID Luis Enrique Joel PA-C      bisacodyl  10 mg Rectal Daily PRN Luis Enrique Joel PA-C      calcium carbonate  500 mg Oral Daily PRN Luis Enrique Joel PA-C      cefepime  2,000 mg Intravenous Q12H Eloy S Prechtel, DO 2,000 mg (08/16/21 0502)    HYDROmorphone  0 5 mg Intravenous Q3H PRN Luis Enrique Joel PA-C      ibuprofen  400 mg Oral Q6H PRN Aftab Engle PA-C      lactated ringers  125 mL/hr Intravenous Continuous Eloy S Prechtel,  mL/hr (08/16/21 0502)    levETIRAcetam  750 mg Intravenous Q12H [facility] Dahlia Frey PA-C 750 mg (08/16/21 0905)    LORazepam  1 mg Intravenous Q4H PRN Luis Enrique Joel PA-C      magnesium sulfate  2 g Intravenous Once Ange Post MD      metroNIDAZOLE  500 mg Oral Q8H [facility] Eloy Jiang,       oxyCODONE  5 mg Oral Q4H PRN Nmarata Wu PA-C      polyethylene glycol  17 g Oral BID Namrata Wu PA-C      senna  8 6 mg Oral BID Namrata Wu PA-C          Today, Patient Was Seen By: Ange Post MD    **Please Note: This note may have been constructed using a voice recognition system  **

## 2021-08-16 NOTE — ASSESSMENT & PLAN NOTE
Unclear if he actually has an infection source or if this abnormal lab work and vital signs  is more from his malignancy and possible seizure  All cultures were negative  He had a necrotizing neck infection in July 2021,  treating him with empiric abx for that  But, CT soft tissue neck did not show infection this time

## 2021-08-16 NOTE — ASSESSMENT & PLAN NOTE
Malnutrition Findings:   Adult Malnutrition type: Acute illness  Adult Degree of Malnutrition: Other severe protein calorie malnutrition    BMI Findings:  Adult BMI Classifications: Underweight < 18 5     Body mass index is 19 96 kg/m²       Working with nutrition

## 2021-08-16 NOTE — ASSESSMENT & PLAN NOTE
Normocytic normochromic anemia and thrombocytopenia most likely secondary to pancreatic cancer   Hemoglobin today 7 3   Transfuse if hemoglobin less than 7

## 2021-08-16 NOTE — CASE MANAGEMENT
Cm met with patient and family at bedside, MD present  Pt's family translating for him  Pt requesting Mother Afsaneh Jolly [phone] make medical decisions  for him if needed  Other contacts include sister Anny Cheng( sister and lives within 2 hrs) @  [phone], and CANDELARIA Fernandez @ [phone]/[email or web address]  Hospice discussed with patient and family - patient agreeable to [facility] - he does not have 24/hr caretaker and will need IP/SNF hospice as discussed with family  IP hospice referral placed  Will continue to follow for DC needs  Addendum from 1: Cm spoke to [facility] who is also with mother Bon Nails - CM explained that pt does not currently meet IP hospice criteria  SNF was discussed - per [facility] , patient can not afford room/board payment associated with SNF hospice  Home hospice was discussed  with possibility of family taking shifts to stay with patient to cover SO work schedule  - per [place] no family is available  Cm will update MD and continue to work with patient and family on DC plan  Addendum from [address]  [address] : Cm met with Fermín Butts (SO) at bedside with multiple other family/friends  [facility] was confrontational and argumentative re: not being called for decisions  CM questioned if they were legally  and [facility] stated "no" they are not legally - CM did address that patient stated he wanted Afsaneh Jolly contacted to make health decisions for him  - pt did verify that in front of SO [facility] requesting that she be kept up to date with status

## 2021-08-16 NOTE — ASSESSMENT & PLAN NOTE
Widespread metastatic disease   cervical spine cord compression, status post 4/10 fraction radiation therapy  Radiation Oncology consult placed to see if patient would benefit from further palliative radiation therapy

## 2021-08-16 NOTE — ASSESSMENT & PLAN NOTE
· This was diagnosed and July 2021  · Metastatic pancreatic cancer with extensive metastatic disease to bone, lymph nodes, liver, adrenal gland, lungs and brain  · Patient was given palliative radiation to his cervical spine, but he was getting too weak from that so it stopped  · Jugular and subclavian vein thrombosis, on Eliquis  · Evaluated by Oncology while inpatient, intracranial metastatic disease, he is not a candidate for surgery  Patient had end-stage pancreatic cancer with poor prognosis  Patient cannot tolerate chemo    · Patient would like to be evaluated by Radiation Oncology  · Discussed with patient and family they agreed to hospice referral,  But would like to hear  Recommendations from Radiation Oncology

## 2021-08-16 NOTE — PLAN OF CARE
Problem: MOBILITY - ADULT  Goal: Maintain or return to baseline ADL function  Description: INTERVENTIONS:  -  Assess patient's ability to carry out ADLs; assess patient's baseline for ADL function and identify physical deficits which impact ability to perform ADLs (bathing, care of mouth/teeth, toileting, grooming, dressing, etc )  - Assess/evaluate cause of self-care deficits   - Assess range of motion  - Assess patient's mobility; develop plan if impaired  - Assess patient's need for assistive devices and provide as appropriate  - Encourage maximum independence but intervene and supervise when necessary  - Involve family in performance of ADLs  - Assess for home care needs following discharge   - Consider OT consult to assist with ADL evaluation and planning for discharge  - Provide patient education as appropriate  Outcome: Progressing  Goal: Maintains/Returns to pre admission functional level  Description: INTERVENTIONS:  - Perform BMAT or MOVE assessment daily    - Set and communicate daily mobility goal to care team and patient/family/caregiver  - Collaborate with rehabilitation services on mobility goals if consulted  - Perform Range of Motion  times a day  - Reposition patient every  hours    - Dangle patient  times a day  - Stand patient  times a day  - Ambulate patient  times a day  - Out of bed to chair  times a day   - Out of bed for meals  times a day  - Out of bed for toileting  - Record patient progress and toleration of activity level   Outcome: Progressing     Problem: Potential for Falls  Goal: Patient will remain free of falls  Description: INTERVENTIONS:  - Educate patient/family on patient safety including physical limitations  - Instruct patient to call for assistance with activity   - Consult OT/PT to assist with strengthening/mobility   - Keep Call bell within reach  - Keep bed low and locked with side rails adjusted as appropriate  - Keep care items and personal belongings within reach  - Initiate and maintain comfort rounds  - Make Fall Risk Sign visible to staff  - Offer Toileting every  Hours, in advance of need  - Initiate/Maintain alarm  - Obtain necessary fall risk management equipment:   - Apply yellow socks and bracelet for high fall risk patients  - Consider moving patient to room near nurses station  Outcome: Progressing     Problem: Prexisting or High Potential for Compromised Skin Integrity  Goal: Skin integrity is maintained or improved  Description: INTERVENTIONS:  - Identify patients at risk for skin breakdown  - Assess and monitor skin integrity  - Assess and monitor nutrition and hydration status  - Monitor labs   - Assess for incontinence   - Turn and reposition patient  - Assist with mobility/ambulation  - Relieve pressure over bony prominences  - Avoid friction and shearing  - Provide appropriate hygiene as needed including keeping skin clean and dry  - Evaluate need for skin moisturizer/barrier cream  - Collaborate with interdisciplinary team   - Patient/family teaching  - Consider wound care consult   Outcome: Progressing     Problem: Nutrition/Hydration-ADULT  Goal: Nutrient/Hydration intake appropriate for improving, restoring or maintaining nutritional needs  Description: Monitor and assess patient's nutrition/hydration status for malnutrition  Collaborate with interdisciplinary team and initiate plan and interventions as ordered  Monitor patient's weight and dietary intake as ordered or per policy  Utilize nutrition screening tool and intervene as necessary  Determine patient's food preferences and provide high-protein, high-caloric foods as appropriate       INTERVENTIONS:  - Monitor oral intake, urinary output, labs, and treatment plans  - Assess nutrition and hydration status and recommend course of action  - Evaluate amount of meals eaten  - Assist patient with eating if necessary   - Allow adequate time for meals  - Recommend/ encourage appropriate diets, oral nutritional supplements, and vitamin/mineral supplements  - Order, calculate, and assess calorie counts as needed  - Recommend, monitor, and adjust tube feedings and TPN/PPN based on assessed needs  - Assess need for intravenous fluids  - Provide specific nutrition/hydration education as appropriate  - Include patient/family/caregiver in decisions related to nutrition  Outcome: Progressing     Problem: PAIN - ADULT  Goal: Verbalizes/displays adequate comfort level or baseline comfort level  Description: Interventions:  - Encourage patient to monitor pain and request assistance  - Assess pain using appropriate pain scale  - Administer analgesics based on type and severity of pain and evaluate response  - Implement non-pharmacological measures as appropriate and evaluate response  - Consider cultural and social influences on pain and pain management  - Notify physician/advanced practitioner if interventions unsuccessful or patient reports new pain  Outcome: Progressing     Problem: INFECTION - ADULT  Goal: Absence or prevention of progression during hospitalization  Description: INTERVENTIONS:  - Assess and monitor for signs and symptoms of infection  - Monitor lab/diagnostic results  - Monitor all insertion sites, i e  indwelling lines, tubes, and drains  - Monitor endotracheal if appropriate and nasal secretions for changes in amount and color  - [place] appropriate cooling/warming therapies per order  - Administer medications as ordered  - Instruct and encourage patient and family to use good hand hygiene technique  - Identify and instruct in appropriate isolation precautions for identified infection/condition  Outcome: Progressing  Goal: Absence of fever/infection during neutropenic period  Description: INTERVENTIONS:  - Monitor WBC    Outcome: Progressing     Problem: SAFETY ADULT  Goal: Maintain or return to baseline ADL function  Description: INTERVENTIONS:  -  Assess patient's ability to carry out ADLs; assess patient's baseline for ADL function and identify physical deficits which impact ability to perform ADLs (bathing, care of mouth/teeth, toileting, grooming, dressing, etc )  - Assess/evaluate cause of self-care deficits   - Assess range of motion  - Assess patient's mobility; develop plan if impaired  - Assess patient's need for assistive devices and provide as appropriate  - Encourage maximum independence but intervene and supervise when necessary  - Involve family in performance of ADLs  - Assess for home care needs following discharge   - Consider OT consult to assist with ADL evaluation and planning for discharge  - Provide patient education as appropriate  Outcome: Progressing  Goal: Maintains/Returns to pre admission functional level  Description: INTERVENTIONS:  - Perform BMAT or MOVE assessment daily    - Set and communicate daily mobility goal to care team and patient/family/caregiver  - Collaborate with rehabilitation services on mobility goals if consulted  - Perform Range of Motion  times a day  - Reposition patient every  hours    - Dangle patient  times a day  - Stand patient  times a day  - Ambulate patient  times a day  - Out of bed to chair  times a day   - Out of bed for meals  times a day  - Out of bed for toileting  - Record patient progress and toleration of activity level   Outcome: Progressing  Goal: Patient will remain free of falls  Description: INTERVENTIONS:  - Educate patient/family on patient safety including physical limitations  - Instruct patient to call for assistance with activity   - Consult OT/PT to assist with strengthening/mobility   - Keep Call bell within reach  - Keep bed low and locked with side rails adjusted as appropriate  - Keep care items and personal belongings within reach  - Initiate and maintain comfort rounds  - Make Fall Risk Sign visible to staff  - Offer Toileting every  Hours, in advance of need  - Initiate/Maintain alarm  - Obtain necessary fall risk management equipment  - Apply yellow socks and bracelet for high fall risk patients  - Consider moving patient to room near nurses station  Outcome: Progressing     Problem: DISCHARGE PLANNING  Goal: Discharge to home or other facility with appropriate resources  Description: INTERVENTIONS:  - Identify barriers to discharge w/patient and caregiver  - Arrange for needed discharge resources and transportation as appropriate  - Identify discharge learning needs (meds, wound care, etc )  - Arrange for interpretive services to assist at discharge as needed  - Refer to Case Management Department for coordinating discharge planning if the patient needs post-hospital services based on physician/advanced practitioner order or complex needs related to functional status, cognitive ability, or social support system  Outcome: Progressing     Problem: Knowledge Deficit  Goal: Patient/family/caregiver demonstrates understanding of disease process, treatment plan, medications, and discharge instructions  Description: Complete learning assessment and assess knowledge base    Interventions:  - Provide teaching at level of understanding  - Provide teaching via preferred learning methods  Outcome: Progressing

## 2021-08-16 NOTE — CONSULTS
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, liver, lymphadenopathy, adrenal gland, lungs, and solitary brain metastasis most likely from a pancreatic primary  Hx and PE limited by: No limitations  HPI: Adilia Tan is a 48y o  year old male who presents with a stage IV widely metastatic carcinoma to bone, liver, lymphadenopathy, adrenal gland, lungs, and solitary brain metastasis most likely from a pancreatic primary  He was receiving radiation therapy to the cervical spine and has had 8 fractions/2400 cGy out of a total planned 10 fractions as of August 10, 2021  He was discharged home and is not been able to complete the last 2 treatments  He has now been readmitted to the hospital with progressive weakness due to his extensive metastatic carcinoma  MRI of the brain previouslyrevealed a 1 5 x 2 3 cm right superior cerebellar metastasis that is asymptomatic  Right and left neck biopsies are positive for non-small cell carcinoma consistent with poorly differentiated adenocarcinoma  Liver biopsy revealed non-small cell carcinoma favoring and pancreatic primary  Neck skin biopsy was positive for poorly differentiated carcinoma consistent with metastatic carcinoma  He is being seen consultation for his widely metastatic disease because he questions whether or not additional radiation therapy would be of any benefit  He is being considered for hospice care      Consults    Review of Systems   Fourteen point review system takes negative except for as noted history of the present illness  Historical Information   Previous Oncology History:   As per history of present illness  History reviewed  No pertinent past medical history    Past Surgical History: Procedure Laterality Date    FACIAL/NECK BIOPSY Bilateral 7/26/2021    Procedure: INCISIONAL BIOPSY LEFT NECK MASS,AND EXCISIOINAL BIOPSY RIGHT NECK MASS;  Surgeon: Marta Clark MD;  Location: [facility];  Service: Surgical Oncology    HAND SURGERY      left hand    IR BIOPSY LIVER MASS  7/30/2021     History reviewed  No pertinent family history  Social History   Social History     Substance and Sexual Activity   Alcohol Use Not Currently     Social History     Substance and Sexual Activity   Drug Use Yes    Types: Marijuana     Social History     Tobacco Use   Smoking Status Current Every Day Smoker    Packs/day: 0 25   Smokeless Tobacco Never Used       Meds/Allergies   all current active meds have been reviewed    No Known Allergies    Objective     Intake/Output Summary (Last 24 hours) at 8/16/2021 1328  Last data filed at 8/16/2021 1101  Gross per 24 hour   Intake --   Output 3250 ml   Net -3250 ml     Invasive Devices     Peripheral Intravenous Line            Peripheral IV 08/15/21 Right Antecubital <1 day          Drain            External Urinary Catheter Medium 4 days              Physical Exam  Vitals and nursing note reviewed  Constitutional:       General: He is not in acute distress  Appearance: He is well-developed  He is ill-appearing  He is not diaphoretic  Comments: Thin cachectic male in no acute distress  HENT:      Head: Normocephalic and atraumatic  Mouth/Throat:      Pharynx: No oropharyngeal exudate  Eyes:      General: No scleral icterus  Conjunctiva/sclera: Conjunctivae normal       Pupils: Pupils are equal, round, and reactive to light  Neck:      Thyroid: No thyromegaly  Trachea: No tracheal deviation  Cardiovascular:      Rate and Rhythm: Normal rate and regular rhythm  Heart sounds: Normal heart sounds  Pulmonary:      Effort: Pulmonary effort is normal  No respiratory distress  Breath sounds: Normal breath sounds   No wheezing or rales    Abdominal:      General: Bowel sounds are normal  There is no distension  Palpations: Abdomen is soft  There is no mass  Tenderness: There is no abdominal tenderness  Musculoskeletal:         General: No swelling or tenderness  Normal range of motion  Cervical back: Normal range of motion and neck supple  Comments: He is thin with generalized muscle wasting  Lymphadenopathy:      Cervical: No cervical adenopathy  Skin:     General: Skin is warm and dry  Coloration: Skin is not pale  Findings: No erythema or rash  Neurological:      General: No focal deficit present  Mental Status: He is alert and oriented to person, place, and time  Cranial Nerves: No cranial nerve deficit  Motor: Weakness present  Coordination: Coordination normal       Comments: Diffuse generalized weakness  Psychiatric:         Mood and Affect: Mood normal          Behavior: Behavior normal          Thought Content: Thought content normal          Judgment: Judgment normal        Lab Results: I have personally reviewed pertinent reports  Imaging Studies: I have personally reviewed pertinent reports  EKG, Pathology, and Other Studies: I have personally reviewed pertinent reports  Assessment/Plan     Assessment and Plan:  Ariela Haji is a 48y o  year old male who presents with a stage IV widely metastatic carcinoma to bone, liver, lymphadenopathy, adrenal gland, lungs, and solitary brain metastasis most likely from a pancreatic primary  He was initially seen as an inpatient at [facility] and has been receiving radiation therapy to the cervical spine with 8/10 fractions completed as of August 10, 2021 or 2400 cGy  His clinical condition has continued to deteriorate due to the overall burden metastatic disease throughout his body  Performance status is poor such that he is not a candidate for any chemotherapy    We had previously discussed potential for stereotactic radiosurgery for his solitary asymptomatic brain metastasis but given the extent of his disease we would not recommend any additional radiation therapy to the spine and also none to the brain  Patient wanted to know if any additional radiation therapy would be of benefit in explained to him this would not be of benefit  Recommend that he go on hospice care  He consents to hospice care and consultation has previously been placed for inpatient hospice care  The he was seen today with his father and cousin at the bedside who provided translation  Code Status: Level 1 - Full Code  Advance Directive and Living Will:      Power of :    POLST:      Counseling / Coordination of Care  Total floor / unit time spent today 45 minutes  Greater than 50% of total time was spent with the patient and / or family counseling and / or coordination of care   A description of the counseling / coordination of care: See Above

## 2021-08-16 NOTE — PLAN OF CARE
Problem: MOBILITY - ADULT  Goal: Maintain or return to baseline ADL function  Description: INTERVENTIONS:  -  Assess patient's ability to carry out ADLs; assess patient's baseline for ADL function and identify physical deficits which impact ability to perform ADLs (bathing, care of mouth/teeth, toileting, grooming, dressing, etc )  - Assess/evaluate cause of self-care deficits   - Assess range of motion  - Assess patient's mobility; develop plan if impaired  - Assess patient's need for assistive devices and provide as appropriate  - Encourage maximum independence but intervene and supervise when necessary  - Involve family in performance of ADLs  - Assess for home care needs following discharge   - Consider OT consult to assist with ADL evaluation and planning for discharge  - Provide patient education as appropriate  Outcome: Progressing  Goal: Maintains/Returns to pre admission functional level  Description: INTERVENTIONS:  - Perform BMAT or MOVE assessment daily    - Set and communicate daily mobility goal to care team and patient/family/caregiver     - Collaborate with rehabilitation services on mobility goals if consulted    - Out of bed for toileting  - Record patient progress and toleration of activity level   Outcome: Progressing     Problem: Potential for Falls  Goal: Patient will remain free of falls  Description: INTERVENTIONS:  - Educate patient/family on patient safety including physical limitations  - Instruct patient to call for assistance with activity   - Consult OT/PT to assist with strengthening/mobility   - Keep Call bell within reach  - Keep bed low and locked with side rails adjusted as appropriate  - Keep care items and personal belongings within reach  - Initiate and maintain comfort rounds  - Make Fall Risk Sign visible to staff  - Apply yellow socks and bracelet for high fall risk patients  - Consider moving patient to room near nurses station  Outcome: Progressing     Problem: Prexisting or High Potential for Compromised Skin Integrity  Goal: Skin integrity is maintained or improved  Description: INTERVENTIONS:  - Identify patients at risk for skin breakdown  - Assess and monitor skin integrity  - Assess and monitor nutrition and hydration status  - Monitor labs   - Assess for incontinence   - Turn and reposition patient  - Assist with mobility/ambulation  - Relieve pressure over bony prominences  - Avoid friction and shearing  - Provide appropriate hygiene as needed including keeping skin clean and dry  - Evaluate need for skin moisturizer/barrier cream  - Collaborate with interdisciplinary team   - Patient/family teaching  - Consider wound care consult   Outcome: Progressing     Problem: Nutrition/Hydration-ADULT  Goal: Nutrient/Hydration intake appropriate for improving, restoring or maintaining nutritional needs  Description: Monitor and assess patient's nutrition/hydration status for malnutrition  Collaborate with interdisciplinary team and initiate plan and interventions as ordered  Monitor patient's weight and dietary intake as ordered or per policy  Utilize nutrition screening tool and intervene as necessary  Determine patient's food preferences and provide high-protein, high-caloric foods as appropriate       INTERVENTIONS:  - Monitor oral intake, urinary output, labs, and treatment plans  - Assess nutrition and hydration status and recommend course of action  - Evaluate amount of meals eaten  - Assist patient with eating if necessary   - Allow adequate time for meals  - Recommend/ encourage appropriate diets, oral nutritional supplements, and vitamin/mineral supplements  - Order, calculate, and assess calorie counts as needed  - Recommend, monitor, and adjust tube feedings and TPN/PPN based on assessed needs  - Assess need for intravenous fluids  - Provide specific nutrition/hydration education as appropriate  - Include patient/family/caregiver in decisions related to nutrition  Outcome: Progressing     Problem: PAIN - ADULT  Goal: Verbalizes/displays adequate comfort level or baseline comfort level  Description: Interventions:  - Encourage patient to monitor pain and request assistance  - Assess pain using appropriate pain scale  - Administer analgesics based on type and severity of pain and evaluate response  - Implement non-pharmacological measures as appropriate and evaluate response  - Consider cultural and social influences on pain and pain management  - Notify physician/advanced practitioner if interventions unsuccessful or patient reports new pain  Outcome: Progressing     Problem: INFECTION - ADULT  Goal: Absence or prevention of progression during hospitalization  Description: INTERVENTIONS:  - Assess and monitor for signs and symptoms of infection  - Monitor lab/diagnostic results  - Monitor all insertion sites, i e  indwelling lines, tubes, and drains  - Monitor endotracheal if appropriate and nasal secretions for changes in amount and color  - [place] appropriate cooling/warming therapies per order  - Administer medications as ordered  - Instruct and encourage patient and family to use good hand hygiene technique  - Identify and instruct in appropriate isolation precautions for identified infection/condition  Outcome: Progressing  Goal: Absence of fever/infection during neutropenic period  Description: INTERVENTIONS:  - Monitor WBC    Outcome: Progressing     Problem: SAFETY ADULT  Goal: Maintain or return to baseline ADL function  Description: INTERVENTIONS:  -  Assess patient's ability to carry out ADLs; assess patient's baseline for ADL function and identify physical deficits which impact ability to perform ADLs (bathing, care of mouth/teeth, toileting, grooming, dressing, etc )  - Assess/evaluate cause of self-care deficits   - Assess range of motion  - Assess patient's mobility; develop plan if impaired  - Assess patient's need for assistive devices and provide as appropriate  - Encourage maximum independence but intervene and supervise when necessary  - Involve family in performance of ADLs  - Assess for home care needs following discharge   - Consider OT consult to assist with ADL evaluation and planning for discharge  - Provide patient education as appropriate  Outcome: Progressing  Goal: Maintains/Returns to pre admission functional level  Description: INTERVENTIONS:  - Perform BMAT or MOVE assessment daily    - Set and communicate daily mobility goal to care team and patient/family/caregiver     - Collaborate with rehabilitation services on mobility goals if consulted  - Out of bed for toileting  - Record patient progress and toleration of activity level   Outcome: Progressing  Goal: Patient will remain free of falls  Description: INTERVENTIONS:  - Educate patient/family on patient safety including physical limitations  - Instruct patient to call for assistance with activity   - Consult OT/PT to assist with strengthening/mobility   - Keep Call bell within reach  - Keep bed low and locked with side rails adjusted as appropriate  - Keep care items and personal belongings within reach  - Initiate and maintain comfort rounds  - Make Fall Risk Sign visible to staff  - Apply yellow socks and bracelet for high fall risk patients  - Consider moving patient to room near nurses station  Outcome: Progressing     Problem: DISCHARGE PLANNING  Goal: Discharge to home or other facility with appropriate resources  Description: INTERVENTIONS:  - Identify barriers to discharge w/patient and caregiver  - Arrange for needed discharge resources and transportation as appropriate  - Identify discharge learning needs (meds, wound care, etc )  - Arrange for interpretive services to assist at discharge as needed  - Refer to Case Management Department for coordinating discharge planning if the patient needs post-hospital services based on physician/advanced practitioner order or complex needs related to functional status, cognitive ability, or social support system  Outcome: Progressing     Problem: Knowledge Deficit  Goal: Patient/family/caregiver demonstrates understanding of disease process, treatment plan, medications, and discharge instructions  Description: Complete learning assessment and assess knowledge base    Interventions:  - Provide teaching at level of understanding  - Provide teaching via preferred learning methods  Outcome: Progressing

## 2021-08-17 NOTE — CASE MANAGEMENT
CANDELARIA Carney called and updated with status - informed that pt remains not appropriate for IP hospice at this time but pt's condition can change- CM tried to discuss other options - per CANDELARIA there are tensions with pt's SO - CM discussed again at length other options including Home Hospice and SNF hospice - per CANDELARIA the barriers remain with both - no 24/hr caretaker and unable to afford room/board with SNF  Will continue to work on options for safe DC  Addendum form 1500: Pt seems less arousable per RN  Pt comfort care only - Hospice liaison TT to update and request evaluation

## 2021-08-17 NOTE — ASSESSMENT & PLAN NOTE
· Metastatic pancreatic cancer with extensive metastatic disease to bone, lymph nodes, liver, adrenal gland, lungs and brain diagnosed in July 2021  · Patient was given palliative radiation to his cervical spine, but he was getting too weak from that so it stopped  · Jugular and subclavian vein thrombosis, on Eliquis  · Evaluated by Oncology while inpatient, intracranial metastatic disease, he is not a candidate for surgery  Patient had end-stage pancreatic cancer with poor prognosis  Patient cannot tolerate chemo  · Evaluated by Radiation Oncology, appreciate recommendations  He is not a candidate for further radiation  · Discussed with patient and family at bedside 8/16 they agreed to hospice , awaiting placement  · Patient was transitioned to comfort care

## 2021-08-17 NOTE — ASSESSMENT & PLAN NOTE
Widespread metastatic disease  Cervical spine cord compression, status post 8/10 fraction radiation therapy  Not a candidate for further radiation  Family and patient decided for hospice    Pending placement

## 2021-08-17 NOTE — PLAN OF CARE
Problem: MOBILITY - ADULT  Goal: Maintain or return to baseline ADL function  Description: INTERVENTIONS:  -  Assess patient's ability to carry out ADLs; assess patient's baseline for ADL function and identify physical deficits which impact ability to perform ADLs (bathing, care of mouth/teeth, toileting, grooming, dressing, etc )  - Assess/evaluate cause of self-care deficits   - Assess range of motion  - Assess patient's mobility; develop plan if impaired  - Assess patient's need for assistive devices and provide as appropriate  - Encourage maximum independence but intervene and supervise when necessary  - Involve family in performance of ADLs  - Assess for home care needs following discharge   - Consider OT consult to assist with ADL evaluation and planning for discharge  - Provide patient education as appropriate  Outcome: Progressing  Goal: Maintains/Returns to pre admission functional level  Description: INTERVENTIONS:  - Perform BMAT or MOVE assessment daily    - Set and communicate daily mobility goal to care team and patient/family/caregiver  - Collaborate with rehabilitation services on mobility goals if consulted  - Perform Range of Motion  times a day  - Reposition patient every  hours    - Dangle patient  times a day  - Stand patient  times a day  - Ambulate patient  times a day  - Out of bed to chair  times a day   - Out of bed for meals times a day  - Out of bed for toileting  - Record patient progress and toleration of activity level   Outcome: Progressing     Problem: Potential for Falls  Goal: Patient will remain free of falls  Description: INTERVENTIONS:  - Educate patient/family on patient safety including physical limitations  - Instruct patient to call for assistance with activity   - Consult OT/PT to assist with strengthening/mobility   - Keep Call bell within reach  - Keep bed low and locked with side rails adjusted as appropriate  - Keep care items and personal belongings within reach  - Initiate and maintain comfort rounds  - Make Fall Risk Sign visible to staff  - Offer Toileting every  Hours, in advance of need  - Initiate/Maintainalarm  - Obtain necessary fall risk management equipment  - Apply yellow socks and bracelet for high fall risk patients  - Consider moving patient to room near nurses station  Outcome: Progressing     Problem: Prexisting or High Potential for Compromised Skin Integrity  Goal: Skin integrity is maintained or improved  Description: INTERVENTIONS:  - Identify patients at risk for skin breakdown  - Assess and monitor skin integrity  - Assess and monitor nutrition and hydration status  - Monitor labs   - Assess for incontinence   - Turn and reposition patient  - Assist with mobility/ambulation  - Relieve pressure over bony prominences  - Avoid friction and shearing  - Provide appropriate hygiene as needed including keeping skin clean and dry  - Evaluate need for skin moisturizer/barrier cream  - Collaborate with interdisciplinary team   - Patient/family teaching  - Consider wound care consult   Outcome: Progressing     Problem: Nutrition/Hydration-ADULT  Goal: Nutrient/Hydration intake appropriate for improving, restoring or maintaining nutritional needs  Description: Monitor and assess patient's nutrition/hydration status for malnutrition  Collaborate with interdisciplinary team and initiate plan and interventions as ordered  Monitor patient's weight and dietary intake as ordered or per policy  Utilize nutrition screening tool and intervene as necessary  Determine patient's food preferences and provide high-protein, high-caloric foods as appropriate       INTERVENTIONS:  - Monitor oral intake, urinary output, labs, and treatment plans  - Assess nutrition and hydration status and recommend course of action  - Evaluate amount of meals eaten  - Assist patient with eating if necessary   - Allow adequate time for meals  - Recommend/ encourage appropriate diets, oral nutritional supplements, and vitamin/mineral supplements  - Order, calculate, and assess calorie counts as needed  - Recommend, monitor, and adjust tube feedings and TPN/PPN based on assessed needs  - Assess need for intravenous fluids  - Provide specific nutrition/hydration education as appropriate  - Include patient/family/caregiver in decisions related to nutrition  Outcome: Progressing     Problem: PAIN - ADULT  Goal: Verbalizes/displays adequate comfort level or baseline comfort level  Description: Interventions:  - Encourage patient to monitor pain and request assistance  - Assess pain using appropriate pain scale  - Administer analgesics based on type and severity of pain and evaluate response  - Implement non-pharmacological measures as appropriate and evaluate response  - Consider cultural and social influences on pain and pain management  - Notify physician/advanced practitioner if interventions unsuccessful or patient reports new pain  Outcome: Progressing     Problem: INFECTION - ADULT  Goal: Absence or prevention of progression during hospitalization  Description: INTERVENTIONS:  - Assess and monitor for signs and symptoms of infection  - Monitor lab/diagnostic results  - Monitor all insertion sites, i e  indwelling lines, tubes, and drains  - Monitor endotracheal if appropriate and nasal secretions for changes in amount and color  - [place] appropriate cooling/warming therapies per order  - Administer medications as ordered  - Instruct and encourage patient and family to use good hand hygiene technique  - Identify and instruct in appropriate isolation precautions for identified infection/condition  Outcome: Progressing  Goal: Absence of fever/infection during neutropenic period  Description: INTERVENTIONS:  - Monitor WBC    Outcome: Progressing     Problem: SAFETY ADULT  Goal: Maintain or return to baseline ADL function  Description: INTERVENTIONS:  -  Assess patient's ability to carry out ADLs; assess patient's baseline for ADL function and identify physical deficits which impact ability to perform ADLs (bathing, care of mouth/teeth, toileting, grooming, dressing, etc )  - Assess/evaluate cause of self-care deficits   - Assess range of motion  - Assess patient's mobility; develop plan if impaired  - Assess patient's need for assistive devices and provide as appropriate  - Encourage maximum independence but intervene and supervise when necessary  - Involve family in performance of ADLs  - Assess for home care needs following discharge   - Consider OT consult to assist with ADL evaluation and planning for discharge  - Provide patient education as appropriate  Outcome: Progressing  Goal: Maintains/Returns to pre admission functional level  Description: INTERVENTIONS:  - Perform BMAT or MOVE assessment daily    - Set and communicate daily mobility goal to care team and patient/family/caregiver  - Collaborate with rehabilitation services on mobility goals if consulted  - Perform Range of Motion times a day  - Reposition patient every  hours    - Dangle patient  times a day  - Stand patient  times a day  - Ambulate patient  times a day  - Out of bed to chair  times a day   - Out of bed for meals  times a day  - Out of bed for toileting  - Record patient progress and toleration of activity level   Outcome: Progressing  Goal: Patient will remain free of falls  Description: INTERVENTIONS:  - Educate patient/family on patient safety including physical limitations  - Instruct patient to call for assistance with activity   - Consult OT/PT to assist with strengthening/mobility   - Keep Call bell within reach  - Keep bed low and locked with side rails adjusted as appropriate  - Keep care items and personal belongings within reach  - Initiate and maintain comfort rounds  - Make Fall Risk Sign visible to staff  - Offer Toileting every  Hours, in advance of need  - Initiate/Maintain alarm  - Obtain necessary fall risk management equipment:   - Apply yellow socks and bracelet for high fall risk patients  - Consider moving patient to room near nurses station  Outcome: Progressing     Problem: DISCHARGE PLANNING  Goal: Discharge to home or other facility with appropriate resources  Description: INTERVENTIONS:  - Identify barriers to discharge w/patient and caregiver  - Arrange for needed discharge resources and transportation as appropriate  - Identify discharge learning needs (meds, wound care, etc )  - Arrange for interpretive services to assist at discharge as needed  - Refer to Case Management Department for coordinating discharge planning if the patient needs post-hospital services based on physician/advanced practitioner order or complex needs related to functional status, cognitive ability, or social support system  Outcome: Progressing     Problem: Knowledge Deficit  Goal: Patient/family/caregiver demonstrates understanding of disease process, treatment plan, medications, and discharge instructions  Description: Complete learning assessment and assess knowledge base    Interventions:  - Provide teaching at level of understanding  - Provide teaching via preferred learning methods  Outcome: Progressing

## 2021-08-17 NOTE — ASSESSMENT & PLAN NOTE
Unclear if he actually has an infection source or if this abnormal lab work and vital signs  is more from his malignancy and possible seizure  All cultures were negative  He had a necrotizing neck infection in July 2021,  treated him with empiric abx for that  But, CT soft tissue neck did not show infection this time    Discontinue antibiotics, patient is comfort care

## 2021-08-17 NOTE — ASSESSMENT & PLAN NOTE
Normocytic normochromic anemia and thrombocytopenia most likely secondary to pancreatic cancer   Last hemoglobin 7 3  Patient is comfort care, will stop checking labs

## 2021-08-17 NOTE — PLAN OF CARE
Problem: MOBILITY - ADULT  Goal: Maintain or return to baseline ADL function  Description: INTERVENTIONS:  -  Assess patient's ability to carry out ADLs; assess patient's baseline for ADL function and identify physical deficits which impact ability to perform ADLs (bathing, care of mouth/teeth, toileting, grooming, dressing, etc )  - Assess/evaluate cause of self-care deficits   - Assess range of motion  - Assess patient's mobility; develop plan if impaired  - Assess patient's need for assistive devices and provide as appropriate  - Encourage maximum independence but intervene and supervise when necessary  - Involve family in performance of ADLs  - Assess for home care needs following discharge   - Consider OT consult to assist with ADL evaluation and planning for discharge  - Provide patient education as appropriate  Outcome: Progressing  Goal: Maintains/Returns to pre admission functional level  Description: INTERVENTIONS:  - Perform BMAT or MOVE assessment daily    - Set and communicate daily mobility goal to care team and patient/family/caregiver  - Collaborate with rehabilitation services on mobility goals if consulted  - Perform Range of Motion  times a day  - Reposition patient every  hours    - Dangle patient  times a day  - Stand patient  times a day  - Ambulate patient  times a day  - Out of bed to chair  times a day   - Out of bed for meals  times a day  - Out of bed for toileting  - Record patient progress and toleration of activity level   Outcome: Progressing     Problem: Potential for Falls  Goal: Patient will remain free of falls  Description: INTERVENTIONS:  - Educate patient/family on patient safety including physical limitations  - Instruct patient to call for assistance with activity   - Consult OT/PT to assist with strengthening/mobility   - Keep Call bell within reach  - Keep bed low and locked with side rails adjusted as appropriate  - Keep care items and personal belongings within reach  - Initiate and maintain comfort rounds  - Make Fall Risk Sign visible to staff  - Offer Toileting every  Hours, in advance of need  - Initiate/Maintain alarm  - Obtain necessary fall risk management equipment:   - Apply yellow socks and bracelet for high fall risk patients  - Consider moving patient to room near nurses station  Outcome: Progressing     Problem: Prexisting or High Potential for Compromised Skin Integrity  Goal: Skin integrity is maintained or improved  Description: INTERVENTIONS:  - Identify patients at risk for skin breakdown  - Assess and monitor skin integrity  - Assess and monitor nutrition and hydration status  - Monitor labs   - Assess for incontinence   - Turn and reposition patient  - Assist with mobility/ambulation  - Relieve pressure over bony prominences  - Avoid friction and shearing  - Provide appropriate hygiene as needed including keeping skin clean and dry  - Evaluate need for skin moisturizer/barrier cream  - Collaborate with interdisciplinary team   - Patient/family teaching  - Consider wound care consult   Outcome: Progressing     Problem: Nutrition/Hydration-ADULT  Goal: Nutrient/Hydration intake appropriate for improving, restoring or maintaining nutritional needs  Description: Monitor and assess patient's nutrition/hydration status for malnutrition  Collaborate with interdisciplinary team and initiate plan and interventions as ordered  Monitor patient's weight and dietary intake as ordered or per policy  Utilize nutrition screening tool and intervene as necessary  Determine patient's food preferences and provide high-protein, high-caloric foods as appropriate       INTERVENTIONS:  - Monitor oral intake, urinary output, labs, and treatment plans  - Assess nutrition and hydration status and recommend course of action  - Evaluate amount of meals eaten  - Assist patient with eating if necessary   - Allow adequate time for meals  - Recommend/ encourage appropriate diets, oral nutritional supplements, and vitamin/mineral supplements  - Order, calculate, and assess calorie counts as needed  - Recommend, monitor, and adjust tube feedings and TPN/PPN based on assessed needs  - Assess need for intravenous fluids  - Provide specific nutrition/hydration education as appropriate  - Include patient/family/caregiver in decisions related to nutrition  Outcome: Progressing     Problem: PAIN - ADULT  Goal: Verbalizes/displays adequate comfort level or baseline comfort level  Description: Interventions:  - Encourage patient to monitor pain and request assistance  - Assess pain using appropriate pain scale  - Administer analgesics based on type and severity of pain and evaluate response  - Implement non-pharmacological measures as appropriate and evaluate response  - Consider cultural and social influences on pain and pain management  - Notify physician/advanced practitioner if interventions unsuccessful or patient reports new pain  Outcome: Progressing     Problem: INFECTION - ADULT  Goal: Absence or prevention of progression during hospitalization  Description: INTERVENTIONS:  - Assess and monitor for signs and symptoms of infection  - Monitor lab/diagnostic results  - Monitor all insertion sites, i e  indwelling lines, tubes, and drains  - Monitor endotracheal if appropriate and nasal secretions for changes in amount and color  - [place] appropriate cooling/warming therapies per order  - Administer medications as ordered  - Instruct and encourage patient and family to use good hand hygiene technique  - Identify and instruct in appropriate isolation precautions for identified infection/condition  Outcome: Progressing  Goal: Absence of fever/infection during neutropenic period  Description: INTERVENTIONS:  - Monitor WBC    Outcome: Progressing     Problem: SAFETY ADULT  Goal: Maintain or return to baseline ADL function  Description: INTERVENTIONS:  -  Assess patient's ability to carry out ADLs; assess patient's baseline for ADL function and identify physical deficits which impact ability to perform ADLs (bathing, care of mouth/teeth, toileting, grooming, dressing, etc )  - Assess/evaluate cause of self-care deficits   - Assess range of motion  - Assess patient's mobility; develop plan if impaired  - Assess patient's need for assistive devices and provide as appropriate  - Encourage maximum independence but intervene and supervise when necessary  - Involve family in performance of ADLs  - Assess for home care needs following discharge   - Consider OT consult to assist with ADL evaluation and planning for discharge  - Provide patient education as appropriate  Outcome: Progressing  Goal: Maintains/Returns to pre admission functional level  Description: INTERVENTIONS:  - Perform BMAT or MOVE assessment daily    - Set and communicate daily mobility goal to care team and patient/family/caregiver  - Collaborate with rehabilitation services on mobility goals if consulted  - Perform Range of Motion  times a day  - Reposition patient every  hours    - Dangle patient  times a day  - Stand patient  times a day  - Ambulate patient  times a day  - Out of bed to chair  times a day   - Out of bed for meals  times a day  - Out of bed for toileting  - Record patient progress and toleration of activity level   Outcome: Progressing  Goal: Patient will remain free of falls  Description: INTERVENTIONS:  - Educate patient/family on patient safety including physical limitations  - Instruct patient to call for assistance with activity   - Consult OT/PT to assist with strengthening/mobility   - Keep Call bell within reach  - Keep bed low and locked with side rails adjusted as appropriate  - Keep care items and personal belongings within reach  - Initiate and maintain comfort rounds  - Make Fall Risk Sign visible to staff  - Offer Toileting every  Hours, in advance of need  - Initiate/Maintain alarm  - Obtain necessary fall risk management equipment:   - Apply yellow socks and bracelet for high fall risk patients  - Consider moving patient to room near nurses station  Outcome: Progressing     Problem: DISCHARGE PLANNING  Goal: Discharge to home or other facility with appropriate resources  Description: INTERVENTIONS:  - Identify barriers to discharge w/patient and caregiver  - Arrange for needed discharge resources and transportation as appropriate  - Identify discharge learning needs (meds, wound care, etc )  - Arrange for interpretive services to assist at discharge as needed  - Refer to Case Management Department for coordinating discharge planning if the patient needs post-hospital services based on physician/advanced practitioner order or complex needs related to functional status, cognitive ability, or social support system  Outcome: Progressing     Problem: Knowledge Deficit  Goal: Patient/family/caregiver demonstrates understanding of disease process, treatment plan, medications, and discharge instructions  Description: Complete learning assessment and assess knowledge base    Interventions:  - Provide teaching at level of understanding  - Provide teaching via preferred learning methods  Outcome: Progressing

## 2021-08-17 NOTE — PROGRESS NOTES
An[address] 48  Progress Note - Kaitlynn Serna 1970, 48 y o  male MRN: [id number]  Unit/Bed#: [place] [address] -01 Encounter: [id number]  Primary Care Provider: No primary care provider on file  Date and time admitted to hospital: 8/11/2021 10:35 AM    * Seizure-like activity [facility])  Assessment & Plan  Found to have a new cerebellar metastasis with metastatic pancreatic cancer  Neurology recommends continuing AED    Metastatic cancer [facility])  Assessment & Plan    · Metastatic pancreatic cancer with extensive metastatic disease to bone, lymph nodes, liver, adrenal gland, lungs and brain diagnosed in July 2021  · Patient was given palliative radiation to his cervical spine, but he was getting too weak from that so it stopped  · Jugular and subclavian vein thrombosis, on Eliquis  · Evaluated by Oncology while inpatient, intracranial metastatic disease, he is not a candidate for surgery  Patient had end-stage pancreatic cancer with poor prognosis  Patient cannot tolerate chemo  · Evaluated by Radiation Oncology, appreciate recommendations  He is not a candidate for further radiation  · Discussed with patient and family at bedside 8/16 they agreed to hospice , awaiting placement  · Patient was transitioned to comfort care  Subclavian vein thrombosis (HCC)  Assessment & Plan  Stop Eliquis, patient is comfort care    Bone metastasis ([facility] [address] )  Assessment & Plan  Widespread metastatic disease  Cervical spine cord compression, status post 8/10 fraction radiation therapy  Not a candidate for further radiation  Family and patient decided for hospice  Pending placement      Atrial fibrillation [facility])  Assessment & Plan  Comfort care    Severe sepsis [facility])  Assessment & Plan  Unclear if he actually has an infection source or if this abnormal lab work and vital signs  is more from his malignancy and possible seizure    All cultures were negative  He had a necrotizing neck infection in July 2021, treated him with empiric abx for that  But, CT soft tissue neck did not show infection this time  Discontinue antibiotics, patient is comfort care      Severe protein-calorie malnutrition ([address] )  Assessment & Plan  Malnutrition Findings:   Adult Malnutrition type: Acute illness  Adult Degree of Malnutrition: Other severe protein calorie malnutrition    BMI Findings:  Adult BMI Classifications: Underweight < 18 5     Body mass index is 19 89 kg/m²  In the setting of metastatic cancer    Anemia  Assessment & Plan   Normocytic normochromic anemia and thrombocytopenia most likely secondary to pancreatic cancer   Last hemoglobin 7 3  Patient is comfort care, will stop checking labs          VTE Pharmacologic Prophylaxis: VTE Score: 8 Comfort care    Patient Centered Rounds: I performed bedside rounds with nursing staff today  Discussions with Specialists or Other Care Team Provider:  Nursing, case management    Education and Discussions with Family / Patient: Updated  (father) at bedside  Time Spent for Care: 30 minutes  More than 50% of total time spent on counseling and coordination of care as described above  Current Length of Stay: 6 day(s)  Current Patient Status: Inpatient   Certification Statement: The patient will continue to require additional inpatient hospital stay due to Pending placement to hospice  Discharge Plan: Anticipate discharge in 24-48 hrs to Skilled nursing facility with hospice    Code Status: Level 4 - Comfort Care    Subjective:   Patient was seen and evaluated bedside  He is feeling well, he ate about half of his meal today  Patient awake alert and oriented  Patient and family yesterday agreed for hospice  Patient agreed for comfort care today      Objective:     Vitals:   Temp (24hrs), Av 4 °F (36 9 °C), Min:98 1 °F (36 7 °C), Max:98 7 °F (37 1 °C)    Temp:  [98 1 °F (36 7 °C)-98 7 °F (37 1 °C)] 98 7 °F (37 1 °C)  HR:  [127-130] 128  Resp:  [16-17] 16  BP: (105-108)/(67-71) 108/67  SpO2:  [86 %-90 %] 86 %  Body mass index is 19 89 kg/m²  Input and Output Summary (last 24 hours): Intake/Output Summary (Last 24 hours) at 8/17/2021 1624  Last data filed at 8/17/2021 0433  Gross per 24 hour   Intake 960 ml   Output 4750 ml   Net -3790 ml       Physical Exam:   Physical Exam  Constitutional:       General: He is not in acute distress  Appearance: He is ill-appearing  HENT:      Head: Atraumatic  Cardiovascular:      Rate and Rhythm: Regular rhythm  Tachycardia present  Heart sounds: No murmur heard  No friction rub  No gallop  Pulmonary:      Effort: Pulmonary effort is normal  No respiratory distress  Breath sounds: Normal breath sounds  No wheezing  Abdominal:      General: Bowel sounds are normal  There is no distension  Palpations: Abdomen is soft  Tenderness: There is abdominal tenderness  Musculoskeletal:         General: No swelling  Cervical back: Neck supple  Skin:     General: Skin is warm and dry  Neurological:      General: No focal deficit present  Mental Status: He is alert  Psychiatric:         Mood and Affect: Mood normal           Additional Data:     Labs:  Results from last 7 days   Lab Units 08/16/21  0459 08/12/21  0440 08/11/21  1104   WBC Thousand/uL 17 97*  --  24 08*   HEMOGLOBIN g/dL 7 3*  --  11 3*   HEMATOCRIT % 23 4*  --  35 6*   PLATELETS Thousands/uL 154  --  153   BANDS PCT %  --   --  7   NEUTROS PCT % 85*   < >  --    LYMPHS PCT % 8*   < >  --    LYMPHO PCT %  --   --  12*   MONOS PCT % 4   < >  --    MONO PCT %  --   --  3*   EOS PCT % 1   < > 0    < > = values in this interval not displayed       Results from last 7 days   Lab Units 08/16/21  0459 08/14/21  0505   SODIUM mmol/L 137 135*   POTASSIUM mmol/L 4 2 4 3   CHLORIDE mmol/L 103 101   CO2 mmol/L 26 25   BUN mg/dL 18 13   CREATININE mg/dL 0 75 0 63   ANION GAP mmol/L 8 9   CALCIUM mg/dL 11 1* 9 5   ALBUMIN g/dL  --  1 4* TOTAL BILIRUBIN mg/dL  --  0 41   ALK PHOS U/L  --  272*   ALT U/L  --  65   AST U/L  --  57*   GLUCOSE RANDOM mg/dL 97 124     Results from last 7 days   Lab Units 08/11/21  1104   INR  1 40*             Results from last 7 days   Lab Units 08/15/21  0456 08/14/21  0505 08/13/21  2033 08/13/21  1609 08/12/21  0440 08/11/21  1528   LACTIC ACID mmol/L 1 9 2 9* 2 6* 3 6*  3 7*  --   --    PROCALCITONIN ng/ml  --  2 32*  --  3 30* 3 86* 3 35*       Lines/Drains:  Invasive Devices     Peripheral Intravenous Line            Peripheral IV 08/15/21 Right Antecubital 1 day          Drain            External Urinary Catheter Medium 5 days                      Imaging: Reviewed radiology reports from this admission including: CT head and MRI brain    Recent Cultures (last 7 days):   Results from last 7 days   Lab Units 08/11/21  1104   BLOOD CULTURE  No Growth After 5 Days  No Growth After 5 Days  Last 24 Hours Medication List:   Current Facility-Administered Medications   Medication Dose Route Frequency Provider Last Rate    bisacodyl  10 mg Rectal Daily PRN Priscila Starring, PA-C      calcium carbonate  500 mg Oral Daily PRN Priscila Starring, PA-C      HYDROmorphone  0 5 mg Intravenous Q3H PRN Priscila Starring, PA-C      ibuprofen  400 mg Oral Q6H PRN Aftab Pasalides, PA-C      levETIRAcetam  750 mg Intravenous Q12H [address] Dahlia Shante, PA-C 750 mg (08/17/21 0168)    LORazepam  1 mg Intravenous Q4H PRN Priscila Starring, PA-C      oxyCODONE  5 mg Oral Q4H PRN Priscila Starring, PA-C      senna  8 6 mg Oral BID Priscila Starring, PA-C          Today, Patient Was Seen By: Latoya Loyd MD    **Please Note: This note may have been constructed using a voice recognition system  **

## 2021-08-17 NOTE — HOSPICE NOTE
Liaison met with pt and family at bedside yesterday  Pt and family were educated on Hospice goals of care and agree to pursue hospice care  Pt did not meet criteria for IPU evel of care but was approved for [facility]  Pt states that he is unsure if there is anyone to care for him at home    Liaison will continue to follow

## 2021-08-17 NOTE — ASSESSMENT & PLAN NOTE
Malnutrition Findings:   Adult Malnutrition type: Acute illness  Adult Degree of Malnutrition: Other severe protein calorie malnutrition    BMI Findings:  Adult BMI Classifications: Underweight < 18 5     Body mass index is 19 89 kg/m²       In the setting of metastatic cancer

## 2021-08-18 NOTE — DEATH NOTE
PRONOUNCEMENT OF DEATH     DOA: 21    DOD: 21    TOD:     CODE STATUS AT TOD: Level 4 - Comfort Care    PATIENT ON HOSPICE?:  No    FAMILY NOTIFIED?:  Yes, Mother James Fields)    AUTOPSY DESIRED?:  No    SUSPECTED COD:  Metastatic disease from pancreatic cancer    HOSPITAL PROBLEM LIST:   Patient Active Problem List   Diagnosis    Metastatic cancer ([facility] [address] )    Subclavian vein thrombosis (HCC)    Compression fracture of T11 vertebra (HCC)    Cervical stenosis of spinal canal    Anemia    Cervical spinal mass ([facility] [address] )    Scalp lesion    S/P lymph node biopsy    Severe protein-calorie malnutrition (HCC)    Pressure ulcer of coccygeal region, unspecified pressure ulcer stage    Mass of thoracic vertebra    Constipation due to opioid therapy    Severe sepsis (HCC)    Atrial fibrillation (HCC)    Oral ulcer    Cervical spinal cord compression ([facility] [address] )    Seizure-like activity (HCC)    Hyponatremia    Bone metastasis (HCC)       PRONOUNCEMENT OF DEATH    I was contacted by nursing staff to evaluate the patient found without vital signs  Patient was identified visually and identification was confirmed by hospital ID [email or web address]  Patient was found laying in bed with RN at bedside  Personally examined the patient without heart sounds auscultated on exam nor were pulses detected x 2  No breath sounds were appreciated after 2 minutes of auscultation  Pupils were fixed and non-reactive to light  Patient was pronounced dead at this date and time  Family does not have a  home in mind, states the patient's wishes were for him to be cremated, family will make arrangements  Please kindly review hospital chart for all details of this hospitalization and circumstances leading to death  Death certificate will be signed my attending physician at a later time

## 2021-08-18 NOTE — ASSESSMENT & PLAN NOTE
Widespread metastatic disease  Cervical spine cord compression, status post 8/10 fraction radiation therapy  Not a candidate for further radiation  Family and patient decided for comfort care

## 2021-08-18 NOTE — DISCHARGE SUMMARY
[address] [facility]  Discharge- [facility] 1970, 48 y o  male MRN: [id number]  Unit/Bed#: [place] [address] -01 Encounter: [id number]  Primary Care Provider: No primary care provider on file  Date and time admitted to hospital: 2021 10:35 AM    * Seizure-like activity [facility])  Assessment & Plan  Found to have a new cerebellar metastasis with metastatic pancreatic cancer  Placed on Keppra      Metastatic cancer [facility])  Assessment & Plan    · Metastatic pancreatic cancer with extensive metastatic disease to bone, lymph nodes, liver, adrenal gland, lungs and brain diagnosed in 2021  · Patient was given palliative radiation to his cervical spine, but he was getting too weak from that so it stopped  · Jugular and subclavian vein thrombosis, on Eliquis  · Evaluated by Oncology while inpatient, intracranial metastatic disease, he is not a candidate for surgery  Patient had end-stage pancreatic cancer with poor prognosis  Patient cannot tolerate chemo  · Evaluated by Radiation Oncology, appreciate recommendations  He is not a candidate for further radiation  · Discussed with patient and family at bedside  they agreed to hospice , awaiting placement  · Patient was transitioned to comfort care       Subclavian vein thrombosis (HCC)  Assessment & Plan  Stop Eliquis, patient is comfort care    Bone metastasis ([facility] [address] )  Assessment & Plan  Widespread metastatic disease  Cervical spine cord compression, status post 8/10 fraction radiation therapy  Not a candidate for further radiation  Family and patient decided for comfort care      Atrial fibrillation [facility])  Assessment & Plan  Previous on metoprolol on Eliquis  Comfort care    Severe sepsis [facility])  Assessment & Plan  Unclear if he actually has an infection source or if this abnormal lab work and vital signs  is more from his malignancy and possible seizure    All cultures were negative  He had a necrotizing neck infection in July 2021,  treated him with empiric abx for that  But, CT soft tissue neck did not show infection this time  Discontinue antibiotics, patient is comfort care      Severe protein-calorie malnutrition ([address] )  Assessment & Plan  Malnutrition Findings:   Adult Malnutrition type: Acute illness  Adult Degree of Malnutrition: Other severe protein calorie malnutrition    BMI Findings:  Adult BMI Classifications: Underweight < 18 5     Body mass index is 19 89 kg/m²  In the setting of metastatic cancer    Anemia  Assessment & Plan   Normocytic normochromic anemia and thrombocytopenia most likely secondary to pancreatic cancer   Last hemoglobin 7 3  Patient is comfort care          Medical Problems     Resolved Problems  Date Reviewed: 8/18/2021        Resolved    BEE (acute kidney injury) ([address] ) 8/12/2021     Resolved by  Namrata Wu PA-C                Admission Date:   Admission Orders (From admission, onward)     Ordered        08/11/21 1218  Inpatient Admission  Once                     Admitting Diagnosis: Seizure ([address] ) [R56 9]  Metastatic cancer ([address] ) [C79 9]  Cervical spinal cord compression ([address] ) [G95 20]  Brain metastases ([address] ) [C79 31]  Abnormal EKG [R94 31]  Seizure-like activity ([address] ) [R56 9]    HPI: Peggy Rush is a 48 y o  male with PMH of metastatic cancer, with unknown origin, atrial fibrillation, subclavian vein thromboembolism, fracture compression fracture of T11, atrial fibrillation, who presents with multiple seizures since this morning  Patient stated that had about 4 seizures this morning, as well as sore throat started this morning  He denies fever, chills, shortness of breath, chest pain, cough, neck pain, diarrhea urinary symptoms or headaches  He stated that last bowel movement was 2 days ago  Prior to arrival to the ED, he received 2 doses of Versed by EMS  He further received a loading dose Keppra 3200 mg in the ED  Hypotension was noted with BP 89-97/53-55    Tachycardia with heart rate of 103  Leukocytosis of 24 08  BMP was notable for creatinine 1 07  Lactic acid was originally 3 4 trended down to 2 4 after fluid resuscitation  Chest x-ray denies showed acute cardiopulmonary disease  CT of the head showed parenchymal metastasis consistent with prior MRI brain result  Previously, MRI on August 2nd showed lytic bone metastasis, liver metastasis, adrenal gland metastasis, and paraspinal soft tissue metastasis  CT of the abdomen and pelvis show suspicious pancreatic cancer with liver metastasis  CTA of the chest shows likely lung metastasis/lung cancer as well as thrombus in subclavian vein extended to brachial cephalic vein  CT of the neck also showed necrotizing infection of the neck, neoplasm cannot be ruled out       Last admission in B, biopsy of the lymph node of the neck showed non-small cell carcinoma  Liver biopsy showed 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  He had completed cefepime, clindamycin, and vancomycin treatment for presumed sepsis  Blood culture and MRSA were negative at the time  Radiation of the CS mets x 10 was also started during that admission; CT for radiation therapy was 7/29/21 and subsequent radiation therapy was done on 7/30/21, 8/2/21, 8/3/21, 8/4/21  He was discharged with decadron taper  Procedures Performed:   Orders Placed This Encounter   Procedures   [address] ED ECG Documentation Only       Summary of Hospital Course:  Patient presented after having multiple seizure episodes  Admitted initially to ICU, met sepsis criteria  Started empirically on IV antibiotics  Ciro  Patient was diagnosed last month with metastatic pancreatic cancer with extensive metastatic disease to bone, lymph nodes, liver, adrenal gland, lungs and brain  Patient was given palliative radiation to cervical spine but he was getting too weak from that so it was stopped    Evaluated by Oncology while inpatient, patient is not a candidate for surgery or chemo at this point  Evaluated by Radiation Oncology he is not a candidate for further radiation  Discussed with family and patient at bedside  they agreed for comfort care/hospice  Patient  2021  Significant Findings, Care, Treatment and Services Provided:   CT head 2021  IMPRESSION:  1   1 3 cm low-attenuation right superior cerebellar hemispheric low-attenuation area suspicious for parenchymal  metastasis, unchanged from the MRI of 2021, without evidence for hemorrhage or abnormal mass effect  2   Two abnormal left occipital scalp lesions as noted, previously identified and suspicious for atelectasis  No definite calvarial osseous involvement     Chest x-ray 2021  IMPRESSION:  No acute cardiopulmonary disease  Findings are stable    CT soft tissue neck 2021  IMPRESSION:  1  Pathologic C3 vertebral body fracture  Diffuse erosive and permeative metastases in the posterior elements of C2, C3 and C4 with stable epidural extension and adjacent posterior paraspinal metastases  Left C7 foramen transversarium and left T1   transverse process bony metastases  2   Numerous anterior posterior cervical lesions with presumed central necrosis, not significantly changed when compared to the recent CT and MRI of the cervical spine  3   Limited visualization of distal left jugular and subclavian vein thrombosis  Complications: none    Condition at Time of Death:     Final Diagnosis:   Metastatic pancreatic cancer  Seizure secondary to metastatic disease  Subclavian vein thrombosis  Severe sepsis  Atrial fibrillation  Severe protein calorie malnutrition  Anemia    Date, Time and Cause of Death    Date of Death: 21  Time of Death:  8:58 PM  Preliminary Cause of Death: Metastasis from pancreatic cancer ([facility] [address] )  Entered by: Usha Ramsey PA-C[CV1 1]     Attribution     CV1  [address] [place] [address] 21 21:32          PCP: No primary care provider on file      Disposition:

## 2021-08-18 NOTE — ASSESSMENT & PLAN NOTE
· Metastatic pancreatic cancer with extensive metastatic disease to bone, lymph nodes, liver, adrenal gland, lungs and brain diagnosed in 2021  · Patient was given palliative radiation to his cervical spine, but he was getting too weak from that so it stopped  · Jugular and subclavian vein thrombosis, on Eliquis  · Evaluated by Oncology while inpatient, intracranial metastatic disease, he is not a candidate for surgery  Patient had end-stage pancreatic cancer with poor prognosis  Patient cannot tolerate chemo  · Evaluated by Radiation Oncology, appreciate recommendations  He is not a candidate for further radiation  · Discussed with patient and family at bedside  they agreed to hospice , awaiting placement  · Patient was transitioned to comfort care

## 2021-08-18 NOTE — ASSESSMENT & PLAN NOTE
Normocytic normochromic anemia and thrombocytopenia most likely secondary to pancreatic cancer   Last hemoglobin 7 3  Patient is comfort care

## 2021-08-18 NOTE — NURSING NOTE
Nurse went to assess pt and found him having difficulty breathing with a "rattle"  Nurse called additional nurse to change lines and boost pt  Pt had small BM  After boosting pt he made a few gasping breaths and passed away

## 2021-08-18 NOTE — UTILIZATION REVIEW
Notification of Discharge   This is a Notification of Discharge from our facility [address]  Please be advised that this patient has been discharge from our facility  Below you will find the admission and discharge date and time including the patients disposition  UTILIZATION REVIEW CONTACT:  David Rehman  Utilization   Network Utilization Review Department  Phone: [phone] x carefully listen to the prompts  All voicemails are confidential   Email: [email or web address]     PHYSICIAN ADVISORY SERVICES:  FOR UCZM-CP-HRDU REVIEW - MEDICAL NECESSITY DENIAL  Phone: [phone]  Fax: [phone]  Email: [email or web address]     PRESENTATION DATE: 2021 10:35 AM  OBERVATION ADMISSION DATE:   INPATIENT ADMISSION DATE: 21 12:18 PM   DISCHARGE DATE: 2021  8:58 PM  DISPOSITION:        IMPORTANT INFORMATION:  Send all requests for admission clinical reviews, approved or denied determinations and any other requests to dedicated fax number below belonging to the campus where the patient is receiving treatment   List of dedicated fax numbers:  [address] DENIALS (Administrative/Medical Necessity) [phone]   [address] (Maternity/NICU/Pediatrics) [phone]   Leroy Stone [phone]   Guzman Moore [phone]   Yohannes Dempsey [phone]   [address] [phone]   [facility]  [phone]   [address] [facility]  [address] [facility] [address] [facility] [phone]

## 2021-08-30 LAB
FUNGUS SPEC CULT: NORMAL
FUNGUS SPEC CULT: NORMAL

## 2021-09-14 LAB
MYCOBACTERIUM SPEC CULT: NORMAL
MYCOBACTERIUM SPEC CULT: NORMAL
RHODAMINE-AURAMINE STN SPEC: NORMAL
RHODAMINE-AURAMINE STN SPEC: NORMAL

## 2022-01-18 NOTE — UTILIZATION REVIEW
URGENT/EMERGENT  INPATIENT/SPU AUTHORIZATION REQUEST    Date: 01/18/22            # Pages in this Request:     x New Request   Additional Information for PA#:     Office Contact Name:  Stella Zavaleta Title: Utilization Review, Regiman Nurse     Phone: [phone]  Ext  Availability (Date/Time): Wednesday - Friday 8 am- 4 pm     Inpatient Review  SPU Review        Current       x Late Pick-up   · How your facility was first notified of the Late Pick-up: [facility] EVS  · When your facility was first notified of the Late Pick-up (date): 1/13/22         RECIPIENT INFORMATION    Recipient ID#: [id number]    Recipient Name: Mignon Owens      YOB: 1970  46 y o  Recipient Alias:     Gender:  x Male  Female Medicaid Eligibility ([address] [place] [address]): INSURANCE INFORMATION    (All other private or governmental health insurance benefits must be utilized prior to billing the [facility] Program)    Was this admission the result of an MVA, other accident, assault, injury, fall, gunshot, bite etc ? Yes x No                   If yes, provide a brief description of the incident  Does the recipient have other insurance coverage? x Yes  No        Insurance Company Name/Policy # [id number] [employer] - Paid 10/27/21     Did that insurance pay on this claim? x Yes  No        Did that insurance deny this claim? Yes x No    If yes, reason for denial:      Does the recipient have Medicare? Yes x No        Did Medicare exhaust prior to this admission? Yes  No        Did Medicare partially pay this claim? Yes  No        Did that insurance deny this claim? Yes  No    If yes, reason for denial:          Was the recipient a prisoner at the time of admission?   Yes x No            PROVIDER INFORMATION    Hospital Name: [address] [place] [address] Provider ID#: [id number]    [address] [place] [address] Physician Name: Tremaine Souza Provider ID#: [id number]        ADMISSION INFORMATION    Type of Admission: (please choose one)    x ED      Direct    If yes, from where? Transfer    If yes, transferring hospital (inpatient, rehab, or psych) Provider Name/Provider ID#: Admission Floor or Unit Type:    Dates/Times:        ED Date/Time: 7/22/2021  7:16 PM        Observation Date/Time:         Admission Date/Time: 7/23/21  0323 AM        Discharge or Transfer Date/Time: 8/6/2021  7:35 PM        DIAGNOSIS/PROCEDURE CODES    Primary Diagnosis Code/Primary Diagnosis Code description:  C78 7     Secondary malignant neoplasm of liver and intrahepatic bile duct  E43         Unspecified severe protein-calorie malnutrition   A41 9     Sepsis, unspecified organism        C79 31   Secondary malignant neoplasm of brain                     Additional Diagnosis Code(s) and Description(s)-(up to three additional codes):    Procedure Code (one) and description:  [id number] Excision of Liver, Percutaneous Approach, Diagnostic        CLINICAL INFORMATION - PRIOR ADMISSION ONLY    Is there a prior admission with a discharge date within 30 days of the date of this admission?    x No (Proceed to the next section - "Clinical Information - General Review Checklist:)      Yes (Provide the following information)     Prior admission dates:    MA Prior Authorization Number:        Review Outcome:     Diagnosis Code(s)/Description:    Procedure Code/Description:    Findings:    Treatment:    Condition on Discharge:   Vitals:    Labs:   Imaging:   Medications: Follow-up Instructions:    Disposition:        CLINICAL INFORMATION - GENERAL REVIEW CHECKLIST    EMERGENCY DEPARTMENT: (Proceed to "ADMISSION" if Direct Admission)    Presenting Signs/Symptoms: Abnormal outpatient ultrasound & CT scan of neck that showed multiple ring-enhancing and necrotic lesions and possible septic pulmonary emboli  Endorses unexpected weight loss, fatigue, SOB       Medication/treatment prior to arrival in the ED: n/a    Past Medical History: none    Clinical Exam: Multiple tender raised masses on neck; cervical adenopathy, raised scalp lesion      Initial Vital Signs: (Temp, Pulse, Resp, and BP)   ED Triage Vitals   Temperature Pulse Respirations Blood Pressure SpO2   07/22/21 1830 07/22/21 1830 07/22/21 1830 07/22/21 1830 07/22/21 1830   98 °F (36 7 °C) 102 16 107/85 100 %      Temp Source Heart Rate Source Patient Position - Orthostatic VS BP Location FiO2 (%)   07/26/21 1025 07/22/21 1830 07/22/21 2155 07/22/21 2155 --   Temporal Monitor Lying Left arm       Pain Score       07/22/21 2250       8           Pertinent Repeat Vital Signs: (include times they were obtained)    Pertinent Sustained Findings: (include times they were obtained)    Weight in Kilograms:  Wt Readings from Last 1 Encounters:   08/17/21 57 6 kg (126 lb 15 8 oz)       Pertinent Labs (results):  Results Reviewed      Procedure Component Value Units Date/Time     Rapid HIV 1/2 AB-AG Combo [[id number]]  (Normal) Collected: 07/22/21 2250       Rapid HIV 1 AND 2 Non-Reactive       HIV-1 P24 Ag Screen Non-Reactive     Procalcitonin with AM Reflex [[id number]]  (Abnormal) Collected: 07/22/21 2038       Procalcitonin 0 27 ng/ml       Lactic acid [[id number]]  (Normal) Collected: 07/22/21 2038       LACTIC ACID 1 1 mmol/L       Comprehensive metabolic panel [[id number]]  (Abnormal) Collected: 07/22/21 2038       Sodium 137 mmol/L         Potassium 4 1 mmol/L         Chloride 102 mmol/L         CO2 27 mmol/L         ANION GAP 8 mmol/L         BUN 10 mg/dL         Creatinine 0 64 mg/dL         Glucose 91 mg/dL         Calcium 9 6 mg/dL         Corrected Calcium 10 6 mg/dL         AST 32 U/L         ALT 32 U/L         Alkaline Phosphatase 142 U/L         Total Protein 7 1 g/dL         Albumin 2 8 g/dL         Total Bilirubin 0 38 mg/dL         eGFR 114 ml/min/1 73sq m       Protime-INR [[id number]]  (Normal) Collected: 07/22/21 2038       Protime 12 8 seconds         INR 0 96     APTT [[id number]] (Normal) Collected: 07/22/21 2038       PTT 27 seconds       CBC and differential [[id number]]  (Abnormal) Collected: 07/22/21 2038       WBC 11 84 Thousand/uL         RBC 3 66 Million/uL         Hemoglobin 11 2 g/dL         Hematocrit 33 7 %         MCV 92 fL         MCH 30 6 pg         MCHC 33 2 g/dL         RDW 14 0 %         MPV 8 8 fL         Platelets 460 Thousands/uL         nRBC 0 /100 WBCs         Neutrophils Relative 68 %         Immat GRANS % 1 %         Lymphocytes Relative 21 %         Monocytes Relative 8 %         Eosinophils Relative 1 %         Basophils Relative 1 %         Neutrophils Absolute 8 10 Thousands/µL         Immature Grans Absolute 0 14 Thousand/uL         Lymphocytes Absolute 2 47 Thousands/µL         Monocytes Absolute 0 90 Thousand/µL         Eosinophils Absolute 0 16 Thousand/µL         Basophils Absolute 0 07 Thousands/µL         Radiology (results):  07/22/2021 @ 2220  CT head: There is an approximately 2 4 x 1 4 x 2 2 cm masslike lesion involving the scalp overlying the posterior left parietal region   Neoplasm and/or infection should be excluded   Sampling is recommended  The study was [facility] for immediate notification       07/22/2021 @ 1316  CT soft tissue neck:   1   Left greater than right numerous superficial and deep neck soft tissue and intramuscular rim-enhancing necrotic lesions/collections as described   Rim-enhancing epidural thickening/collection within posterolateral spinal canal spanning levels C2-C4 resulting in canal narrowing   Finding is concerning for widespread necrotizing infection   Neoplastic process is within differential consideration   Recommend correlation with sampling  2   Occluded left subclavian and brachiocephalic veins as well as left internal jugular vein to the level of hyoid bone suggesting     3   Subpleural nodular opacities in the right upper lobe and superior segment of right lower lobe could indicate septic emboli   Suggested left hilar necrotic lymph node  4   Indeterminate sclerotic foci within T3 and T4 vertebral bodies       2021 @ 1223  US head/neck:  Several neck masses are identified corresponding to the palpable abnormalities within additional finding of the suspected thrombosed left jugular vein   Further evaluation with contrast-enhanced CT of the neck is recommended         EKG (results):   2021 @ 2107  EC, Sinus rhythm with sinus arrhythmia    Other tests (results):    Tests pending final results:    Treatment in the ED:   Medication Administration from 2021 1815 to 2021 1007       Date/Time Order Dose Route Action     2021 2141 iohexol (OMNIPAQUE) 350 MG/ML injection (SINGLE-DOSE) 100 mL 100 mL Intravenous Given     2021 2258 cefepime (MAXIPIME) 2 g/50 mL dextrose IVPB 2,000 mg Intravenous New Bag     2021 2250 clindamycin (CLEOCIN) IVPB (premix in dextrose) 600 mg 50 mL 600 mg Intravenous New Bag     2021 2313 vancomycin (VANCOCIN) IVPB (premix in dextrose) 1,000 mg 200 mL 1,000 mg Intravenous New Bag     2021 2250 ketorolac (TORADOL) injection 15 mg 15 mg Intravenous Given     2021 0325 acetaminophen (TYLENOL) tablet 975 mg 975 mg Oral Given     2021 0405 sodium chloride 0 9 % infusion 75 mL/hr Intravenous New Bag     2021 0405 oxyCODONE (ROXICODONE) IR tablet 5 mg 5 mg Oral Given     2021 0749 vancomycin (VANCOCIN) IVPB (premix in dextrose) 750 mg 150 mL 750 mg Intravenous New Bag     2021 0423 heparin (porcine) injection 4,000 Units 4,000 Units Intravenous Given     2021 0424 heparin (porcine) 25,000 units in 0 45% NaCl 250 mL infusion (premix) 18 Units/kg/hr Intravenous New Bag        Other treatments:      Change in condition while in the ED:     Response to ED Treatment:          OBSERVATION: (Proceed to "ADMISSION" if Direct Admission)    Orders written during the observation period  Meds Name, dose, route, time, how may doses given:  PRN Meds Name, dose, route, time, how many doses given within the first 24 hrs :  IVs Type, rate, and total amt  ordered/given:  Labs, imaging, other:  Consults and findings:    Test Results during the observation period  Pertinent Lab tests (dates/results):  Culture results (blood, urine, spinal, wound, respiratory, etc ):  Imaging tests (dates/results):  EKG (dates/results): Other test (dates/results):  Tests pending (dates/results):    Surgical or Invasive Procedures during the observation period  Name of surgery/procedure:  Date & Time:  Patient Response:  Post-operative orders:  Operative Report/Findings:    Response to Treatment, Major Change in Condition, Major Charge in Treatment during the observation period          ADMISSION:    DIRECT Admissions Only:    · Presenting Signs/Symptoms:   ·   · Medication/treatment prior to arrival:  ·   · Past Medical History:  ·   · Clinical Exam on admission:  ·   · Vital Signs on admission: (Temp, Pulse, Resp, and BP)  ·   · Weight in kilograms:     ALL Admissions:    Admission Orders and Other Orders written within the first 24 hrs after admission  Meds Name, dose, route, time, how may doses given:  cefepime, 2,000 mg, Intravenous, Q12H  vancomycin, 15 mg/kg, Intravenous, Q8H    PRN Meds Name, dose, route, time, how many doses given within the first 24 hrs :  acetaminophen, 650 mg, Oral, Q6H PRN  heparin (porcine), 2,000 Units, Intravenous, Q1H PRN  heparin (porcine), 4,000 Units, Intravenous, Q1H PRN  oxyCODONE, 2 5 mg, Oral, Q6H PRN  oxyCODONE, 5 mg, Oral, Q6H PRN  polyethylene glycol, 17 g, Oral, Daily PRN    IVs Type, rate, and total amt  Ordered/given:  heparin (porcine), 3-30 Units/kg/hr (Order-Specific), Intravenous, Titrated  sodium chloride, 75 mL/hr, Intravenous, Continuous    Labs, imaging, other:  07/23/2021 @ 0850  CTa chest:  No evidence of pulmonary embolism is seen     Measured RV/LV ratio is within normal limits at less than 0 9    Numerous nodular opacities are present bilaterally within the lungs   The appearances most suggestive of metastatic disease  Extensive hepatic metastatic disease  Large bilateral adrenal masses  Extensive lymphadenopathy  Probable osseous metastatic disease with mild to moderate compression deformity of the superior endplate of U37, suggesting pathologic compression  Possible thrombus right subclavian vein extending into the brachiocephalic vein   Consider venous duplex ultrasound for further evaluation  Lab Units 07/23/21  0415   WBC Thousand/uL 9 03   HEMOGLOBIN g/dL 9 9*   HEMATOCRIT % 29 6*   PLATELETS Thousands/uL 278   NEUTROS ABS Thousands/µL 5 95          Lab Units 07/23/21  0415   SODIUM mmol/L 138   POTASSIUM mmol/L 3 9   CHLORIDE mmol/L 105   CO2 mmol/L 27   ANION GAP mmol/L 6   BUN mg/dL 11   CREATININE mg/dL 0 50*   EGFR ml/min/1 73sq m 126   CALCIUM mg/dL 8 3           Lab Units 07/23/21  0415   AST U/L 32   ALT U/L 26   ALK PHOS U/L 117*   TOTAL PROTEIN g/dL 5 7*   ALBUMIN g/dL 2 2*   TOTAL BILIRUBIN mg/dL 0 22           Lab Units 07/23/21  0415   GLUCOSE RANDOM mg/dL 94           Lab Units 07/23/21  0415   PROTIME seconds 14 0  13 7   INR   1 08  1 05   PTT seconds 28      Lab Units 07/23/21  0416   PROCALCITONIN ng/ml 0 14      Lab Units 07/22/21  2108 07/22/21  2038   BLOOD CULTURE   Received in Microbiology Lab  Culture in Progress  Received in Microbiology Lab  Culture in Progress  Received in Microbiology Lab   Culture in Progress       Date/Time Temp Pulse Resp BP MAP (mmHg) SpO2 O2 Device   08/06/21 1100 -- 116Abnormal  -- 110/76 -- -- --   08/06/21 0708 97 2 °F (36 2 °C)  Abnormal  124Abnormal  19 90/70 65 96 % None (Room air)   08/06/21 0300 98 5 °F (36 9 °C) 109Abnormal  18 105/79 63 81 % Abnormal  --   08/05/21 2126 98 °F (36 7 °C) 110Abnormal  18 104/58 112 99 % --   08/05/21 2100 -- -- -- -- -- -- None (Room air)   08/05/21 19:16:51 -- -- 18 124/78 -- -- --   08/05/21 15:53:57 97 4 °F (36 3 °C)  Abnormal  93 18 115/70  -- 99 % --   08/05/21 0800 -- -- -- -- -- 98 % None (Room air)   08/05/21 07:00:46 98 3 °F (36 8 °C) 117Abnormal  -- 129/92 -- 98 % --   08/04/21 18:54:36 -- 111Abnormal  16 115/64 -- 97 % --   08/04/21 18:54:03 -- 107Abnormal  16 -- -- 97 % --   08/04/21 1640 -- -- -- -- -- -- None (Room air)   08/04/21 15:12:37 -- 64 16 112/75 -- 97 % --   08/04/21 0715 98 6 °F (37 °C) 104 18 122/96 80 98 % None (Room air)   08/04/21 0300 98 4 °F (36 9 °C) 93 18 121/90 77 96 % None (Room air)   08/04/21 0157 -- -- -- -- -- 95 % None (Room air)           Consults and findings:  Medical Oncology: With negative infectious workup, his multiple nodules, diffuse lymphadenopathy, and liver lesions, this is extremely concerning for a metastatic malignant process w/ unknown primary  Subclavian vein thrombus  Notable left parietal bone mass, measuring about 5 x5 cm  Non-fungating  Minimally tender  Epigastric subcutanous nodule on the skin of the abdomen  Neurosurgery: Pt states that he noticed a lump on his left skull few weeks ago as well and has been trying to have it evaluated to see what it is  He also reports unintentional weight loss of about 25-30 lb  He feels low energy  Cervical epidural mass at C2-C4  Compression fracture of T11  No surgical intervention warranted; c-collar and TLSO brace  Surgical Oncology: Patient with a large left scalp lesion with bilateral cervical adenopathy as well as subcutaneous nodules on the abdomen  Scalp biopsy done 07/22/21; neck lymph node biopsy 7/26  Radiation Oncology: Incisional biopsy of a left neck mass, and excisional biopsy of a right neck mass, results of which are pending  Recommending palliative radiation therapy regimen (10 daily treatments) to the cervical spine  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    He is currently receiving radiation therapy to the cervical spine and has had 3 fractions/900 cGy out of a total planned 10 fractions  MRI of the brain reveals a 1 5 x 2 3 cm right superior cerebellar metastasis  Right and left neck biopsies are positive for non-small cell carcinoma consistent with poorly differentiated adenocarcinoma  Liver biopsy is pending and skin punch biopsy is pending  The patient may have  2 primary cancers with a pancreatic adenocarcinoma and metastatic melanoma  Gastroenterology: Given diffuse adenopathy and liver metastasis would recommend IR biopsy for liver biopsy given its ease of biopsy  If this is inconclusive can consider EUS at some point  Prior neck lymph node biopsies pre limbs as melanoma  Highly suspicious for pancreatic adenocarcinoma  Palliative Care: Symptom management w/ tylenol, oxycodone, dilaudid; bowel regimen          Test Results after admission  Pertinent Lab tests (dates/results):  Results from last 7 days   Lab Units 08/06/21  0541 08/05/21  0603 08/04/21  2019 08/03/21  0634 08/02/21  0613 07/31/21  0534   WBC Thousand/uL 30 71* 37 10* 32 56* 29 95* 29 72* 22 42*   HEMOGLOBIN g/dL 11 8* 11 2* 10 8* 10 4* 9 6* 8 8*   HEMATOCRIT % 37 2 36 2* 35 2* 33 2* 30 8* 28 8*   PLATELETS Thousands/uL 299 383 382 433* 438* 365   NEUTROS ABS Thousands/µL  --   --   --   --   --  19 48*                 Results from last 7 days   Lab Units 08/06/21  0541 08/05/21  0603 08/04/21  6976 08/03/21  0634 08/02/21  0613 08/01/21  0608 07/31/21  0534   SODIUM mmol/L 131* 133* 135* 139 140 143 143   POTASSIUM mmol/L 4 6 4 4 4 1 4 6 4 2 3 7 4 2   CHLORIDE mmol/L 98* 97* 99* 104 106 107 111*   CO2 mmol/L 30 29 30 31 32 27 24   ANION GAP mmol/L 3* 7 6 4 2* 9 8   BUN mg/dL 30* 27* 21 21 15 13 13   CREATININE mg/dL 0 65 0 70 0 63 0 58* 0 52* 0 63 0 70   EGFR ml/min/1 73sq m 113 110 115 119 124 115 110   CALCIUM mg/dL 9 3 9 4 9 6 9 0 8 9 8 9 8 8   MAGNESIUM mg/dL  --   --   --   --   --  2 0 2 1   PHOSPHORUS mg/dL  --   --   --   --   --  2 9 2 9     Results from last 7 days   Lab Units 08/05/21  0603 08/04/21  4646 08/03/21  0634 08/02/21  0613   AST U/L 46* 49* 70* 49*   ALT U/L 76 75 77 65   ALK PHOS U/L 266* 275* 256* 254*   TOTAL PROTEIN g/dL 7 3 7 3 6 6 6 2*   ALBUMIN g/dL 2 7* 2 6* 2 3* 2 2*   TOTAL BILIRUBIN mg/dL 0 37 0 40 0 32 0 29   BILIRUBIN DIRECT mg/dL  --   --   --  0 12           Results from last 7 days   Lab Units 08/02/21  1347   POC GLUCOSE mg/dl 156*                 Results from last 7 days   Lab Units 08/06/21  0541 08/05/21  0603 08/04/21  9094 08/03/21  0634 08/02/21  0613 08/01/21  0608 07/31/21  0534   GLUCOSE RANDOM mg/dL 96 108 116 122 101 151* 199*             Results from last 7 days   Lab Units 08/04/21  0632 08/03/21  0634 08/02/21  0613   PTT seconds 22* 74* 85*       Culture results (blood, urine, spinal, wound, respiratory, etc ):  Imaging tests (dates/results):    CT A/P 7/24/21  There is a 2 8 x 2 2 x 1 8 cm ill-defined, hypodense pancreatic lesion, highly suspicious for pancreatic adenocarcinoma (series 3 images 20-22 and series 601 images 57 - 67 ) There is widespread metastatic disease with numerous liver lesions, bilateral adrenal metastases, multiple lytic bone metastases, with resulting mild superior end plate compression fractures of T11 and L3 as above, and also intramuscular metastases as Above   An alternative diagnosis to metastatic pancreatic adenocarcinoma, given the unusual intramuscular metastasis, would be metastatic melanoma   Please correlate with already performed biopsy results      CT A/P: 7/26/21   There is a 2 8 x 2 2 x 1 8 cm ill-defined, hypodense pancreatic lesion, highly suspicious for pancreatic adenocarcinoma (series 3 images 20-22 and series 601 images 57 - 67 ) There is widespread metastatic disease with numerous liver lesions, bilateral adrenal metastases, multiple lytic bone metastases, with resulting mild superior end plate compression fractures of T11 and L3 as above, and also intramuscular metastases as above  An alternative diagnosis to metastatic pancreatic adenocarcinoma, given the unusual intramuscular metastasis, would be metastatic melanoma  Please correlate with already performed biopsy results  MRI C-Spine 7/27/21  1   Signal abnormality and erosive change of C3 laminae and spinous process along with lesser degree signal abnormality in the posterior elements of C1, C2, and C4   Associated epidural enhancement /enhancing soft tissue spanning from inferior C2-C4   resulting in severe canal stenosis and cord compression   Findings most favor metastatic disease   Infection is within differential consideration but less favored given findings elsewhere in the body   Correlate with sampling  2   Multiple rim-enhancing lesions within paraspinal musculature, superficial and deep soft tissue neck as demonstrated on recent soft tissue neck CT   Differential considerations are similar as above  3   Heterogeneous marrow signal and enhancement of the cervical and visualized thoracic vertebras concerning for infiltrative metastatic disease  4   Redemonstrated occluded left internal jugular vein  5   Degenerative change as above  XR abdomen KUB: 7/29/2021  Larger than typical volume of stool seen over the colon consistent with reported history of constipation  No bowel obstruction  8/2/2021 MRI TS w wo Contrast Diffuse metastatic disease within the thoracic spine, lower cervical spine and upper lumbar spine   No extraosseous extension of tumor noted on this examination   pathologic adenopathy with central necrosis within the left inferior neck and supraclavicular region and mediastinum and small left-sided pleural effusion  Multiple liver lesions are noted      8/2/2021 MRI Head w wo contrast Ill-defined area of abnormal signal within the right superior cerebellum with T2 and FLAIR hyperintensity and postcontrast enhancement --> solitary parenchymal metastasis   Abnormal appearance of the upper cervical spine with replacement of normal fatty marrow within several vertebral bodies with soft tissue signal extending into the epidural space, demonstrating irregular enhancement suspicious for osseous metastasis with   epidural extension   Suspected cord compression at the C3 and upper C4 level      8/2/2021 MRI LS w wo Contrast Diffuse osseous metastatic disease  Carolyn Pry is slight extraosseous extension of tumor noted at the L2-3 and L3-4 levels on the left  Eulice Papa extends from the left posterior elements into the adjacent paraspinal soft tissues  Diffuse sacral metastasis with mild narrowing of the S1 and S2 sacral foramen  Soft tissue metastasis as described above      BL LN Bx  Right neck mass: Malignant epithelioid neoplasm, possibly melanoma, morphologically identical to that in specimen B, at least 1 cm maximal dimension, present at specimen edges  Left neck mass: Malignant epithelioid neoplasm, possibly melanoma, at least 1 5 cm maximal dimension, present in dermal lymphatics and at specimen edges, likely metastatic  Benign overlying epidermis  EKG (dates/results): Other test (dates/results):  Tests pending (dates/results):    Surgical or Invasive Procedures  Name of surgery/procedure:INCISIONAL BIOPSY LEFT NECK MASS,AND EXCISIOINAL BIOPSY RIGHT NECK MASS (Bilateral)  Date & Time: 07/26/21 0933  Patient Response: Tolerated  Post-operative orders: Trend labs, replete electrolytes as needed     Operative Report/Findings: Enlarged bilateral cervical nodes    Response to Treatment, Major Change in Condition, Major Charge in Treatment anytime during admission    8/6/21 Medications:   Scheduled Medications:  dexamethasone, 4 mg, Oral, Q12H CHULA  enoxaparin, 1 mg/kg, Subcutaneous, Q12H CHULA  metoprolol tartrate, 100 mg, Oral, Q12H CHULA  morphine, 15 mg, Oral, Q8H CHULA  polyethylene glycol, 17 g, Oral, BID  senna-docusate sodium, 1 tablet, Oral, BID  Continuous IV Infusions:  LR  @ 100 ml /hr - d/c'd 8  Heparin gtt -- d/c' d  8/3  PRN Meds:  acetaminophen, 650 mg, Oral, Q6H PRN  bisacodyl, 10 mg, Rectal, Daily PRN  calcium carbonate, 500 mg, Oral, Daily PRN  Gadobutrol, 5 mL, Intravenous, Once in imaging  HYDROmorphone, 0 5 mg, Intravenous, Q3H PRN - 8/1x 3  -  8/2 x 3 - 8/3 x 1 - 8/5x 1   LORazepam, 1 mg, Intravenous, Once PRN  metoprolol, 2 5 mg, Intravenous, Q6H PRN - 8/4 x 1   oxyCODONE, 10 mg, Oral, Q4H PRN - 8/4x 2 - 8/5 x 3   oxyCODONE, 5 mg, Oral, Q4H PRN      Disposition on Discharge  Home, Rehab, SNF, LTC, Shelter, etc :     Cease to Breathe (CTB)  If a patient expires during an admission, in addition to the above information, please include:    Summary/timeline of the patient's decline in condition:    Medications and treatment:    Patient response to treatment:    Date and time patient ceased to breathe:        Is there a Readmission that follows this admission? Yes x No    If yes, provide dates:          InterQual Review    InterQual Criteria Met:  Yes x No  N/A        Please include the InterQual Review, InterQual year/version used, and the criteria selected:   Created Using Review Status Review Entered   [employer]aning  In Primary 2021 14:34       Criteria Set Name - Subset   LOC:Acute Adult-Hematology/Oncology: Malignant disease      Criteria Review   REVIEW SUMMARY     Patient: MEIR VELAZQUEZ  Review Number: [id number]  Review Status: In Primary  Criteria Status: Not Met     Condition Specific: Yes        OUTCOMES  Outcome Type: Primary           REVIEW DETAILS     Product: Pabl[facility] Schwartz Adult  Subset: Hematology/Oncology: Malignant disease      (Symptom or finding within 24h)         (Excludes PO medications unless noted)     Version: [employer]   [employer]eba Perches and InterQual®  2020 [employer] and/or one of its subsidiaries  All Rights Reserved  CPT only ©  [employer]  All Rights Reserved             PLEASE SUBMIT THE COMPLETED FORM TO THE DEPARTMENT OF HUMAN SERVICES - DIVISION OF CLINICAL  REVIEW VIA FAX -[phone] or VIA E-MAIL TO [email or web address]    Signature: Melanie Patterson Date:  01/18/22    Confidentiality Notice: The documents accompanying this telecopy may contain confidential information belonging to the sender  The information is intended only for the use of the individual named above  If you are not the intended recipient, you are hereby notified  That any disclosure, copying, distribution or taking of any telecopy is strictly prohibited

## (undated) DEVICE — CHLORAPREP HI-LITE 26ML ORANGE

## (undated) DEVICE — SCD SEQUENTIAL COMPRESSION COMFORT SLEEVE MEDIUM KNEE LENGTH: Brand: KENDALL SCD

## (undated) DEVICE — DRAPE SURGIKIT SADDLE BAG

## (undated) DEVICE — PACK UNIVERSAL NECK

## (undated) DEVICE — SKIN MARKER DUAL TIP WITH RULER CAP, FLEXIBLE RULER AND LABELS: Brand: DEVON

## (undated) DEVICE — TIBURON SPLIT SHEET: Brand: CONVERTORS

## (undated) DEVICE — SUT MONOCRYL 4-0 PS-2 27 IN Y426H

## (undated) DEVICE — PAD GROUNDING ADULT

## (undated) DEVICE — ADHESIVE SKIN HIGH VISCOSITY EXOFIN 1ML

## (undated) DEVICE — SUT VICRYL 3-0 SH 27 IN J416H

## (undated) DEVICE — SURGICEL 4 X 8

## (undated) DEVICE — BULB SYRINGE,IRRIGATION WITH PROTECTIVE CAP: Brand: DOVER

## (undated) DEVICE — PLUMEPEN PRO 10FT

## (undated) DEVICE — GLOVE INDICATOR PI UNDERGLOVE SZ 8 BLUE

## (undated) DEVICE — MEDI-VAC YANK SUCT HNDL W/TPRD BULBOUS TIP: Brand: CARDINAL HEALTH

## (undated) DEVICE — NEEDLE 25G X 1 1/2

## (undated) DEVICE — GAUZE SPONGES,USP TYPE VII GAUZE, 12 PLY: Brand: CURITY

## (undated) DEVICE — INTENDED FOR TISSUE SEPARATION, AND OTHER PROCEDURES THAT REQUIRE A SHARP SURGICAL BLADE TO PUNCTURE OR CUT.: Brand: BARD-PARKER SAFETY BLADES SIZE 15, STERILE

## (undated) DEVICE — 3000CC GUARDIAN II: Brand: GUARDIAN

## (undated) DEVICE — GLOVE SRG BIOGEL ECLIPSE 8